# Patient Record
Sex: FEMALE | Race: WHITE | NOT HISPANIC OR LATINO | Employment: OTHER | URBAN - METROPOLITAN AREA
[De-identification: names, ages, dates, MRNs, and addresses within clinical notes are randomized per-mention and may not be internally consistent; named-entity substitution may affect disease eponyms.]

---

## 2017-03-17 ENCOUNTER — DOCUMENTATION ONLY (OUTPATIENT)
Dept: LAB | Facility: CLINIC | Age: 77
End: 2017-03-17

## 2017-03-17 ENCOUNTER — HOSPITAL ENCOUNTER (OUTPATIENT)
Dept: MAMMOGRAPHY | Facility: CLINIC | Age: 77
Discharge: HOME | End: 2017-03-17
Attending: INTERNAL MEDICINE | Admitting: INTERNAL MEDICINE
Payer: MEDICARE

## 2017-03-17 DIAGNOSIS — Z00.00 ROUTINE GENERAL MEDICAL EXAMINATION AT A HEALTH CARE FACILITY: Primary | ICD-10-CM

## 2017-03-17 DIAGNOSIS — Z12.31 VISIT FOR SCREENING MAMMOGRAM: ICD-10-CM

## 2017-03-17 DIAGNOSIS — E78.5 HYPERLIPIDEMIA LDL GOAL <130: ICD-10-CM

## 2017-03-17 PROCEDURE — G0202 SCR MAMMO BI INCL CAD: HCPCS

## 2017-03-17 NOTE — PROGRESS NOTES
This patient is coming in for pre physical labs on 3/20/2017. Please review, associate diagnosis and sign the orders. Thanks!  -Lab Staff

## 2017-03-20 DIAGNOSIS — E78.5 HYPERLIPIDEMIA LDL GOAL <130: ICD-10-CM

## 2017-03-20 DIAGNOSIS — Z00.00 ROUTINE GENERAL MEDICAL EXAMINATION AT A HEALTH CARE FACILITY: ICD-10-CM

## 2017-03-20 LAB
ALBUMIN SERPL-MCNC: 3.6 G/DL (ref 3.4–5)
ALP SERPL-CCNC: 78 U/L (ref 40–150)
ALT SERPL W P-5'-P-CCNC: 29 U/L (ref 0–50)
ANION GAP SERPL CALCULATED.3IONS-SCNC: 4 MMOL/L (ref 3–14)
AST SERPL W P-5'-P-CCNC: 24 U/L (ref 0–45)
BILIRUB SERPL-MCNC: 0.5 MG/DL (ref 0.2–1.3)
BUN SERPL-MCNC: 13 MG/DL (ref 7–30)
CALCIUM SERPL-MCNC: 8.9 MG/DL (ref 8.5–10.1)
CHLORIDE SERPL-SCNC: 108 MMOL/L (ref 94–109)
CHOLEST SERPL-MCNC: 221 MG/DL
CO2 SERPL-SCNC: 31 MMOL/L (ref 20–32)
CREAT SERPL-MCNC: 0.69 MG/DL (ref 0.52–1.04)
ERYTHROCYTE [DISTWIDTH] IN BLOOD BY AUTOMATED COUNT: 13.1 % (ref 10–15)
GFR SERPL CREATININE-BSD FRML MDRD: 83 ML/MIN/1.7M2
GLUCOSE SERPL-MCNC: 96 MG/DL (ref 70–99)
HCT VFR BLD AUTO: 41.6 % (ref 35–47)
HDLC SERPL-MCNC: 74 MG/DL
HGB BLD-MCNC: 13.7 G/DL (ref 11.7–15.7)
LDLC SERPL CALC-MCNC: 126 MG/DL
MCH RBC QN AUTO: 30 PG (ref 26.5–33)
MCHC RBC AUTO-ENTMCNC: 32.9 G/DL (ref 31.5–36.5)
MCV RBC AUTO: 91 FL (ref 78–100)
NONHDLC SERPL-MCNC: 147 MG/DL
PLATELET # BLD AUTO: 215 10E9/L (ref 150–450)
POTASSIUM SERPL-SCNC: 4.3 MMOL/L (ref 3.4–5.3)
PROT SERPL-MCNC: 6.9 G/DL (ref 6.8–8.8)
RBC # BLD AUTO: 4.57 10E12/L (ref 3.8–5.2)
SODIUM SERPL-SCNC: 143 MMOL/L (ref 133–144)
TRIGL SERPL-MCNC: 107 MG/DL
WBC # BLD AUTO: 5.9 10E9/L (ref 4–11)

## 2017-03-20 PROCEDURE — 80053 COMPREHEN METABOLIC PANEL: CPT | Performed by: INTERNAL MEDICINE

## 2017-03-20 PROCEDURE — 80061 LIPID PANEL: CPT | Performed by: INTERNAL MEDICINE

## 2017-03-20 PROCEDURE — 36415 COLL VENOUS BLD VENIPUNCTURE: CPT | Performed by: INTERNAL MEDICINE

## 2017-03-20 PROCEDURE — 85027 COMPLETE CBC AUTOMATED: CPT | Performed by: INTERNAL MEDICINE

## 2017-03-22 ENCOUNTER — TRANSFERRED RECORDS (OUTPATIENT)
Dept: HEALTH INFORMATION MANAGEMENT | Facility: CLINIC | Age: 77
End: 2017-03-22

## 2017-03-27 ENCOUNTER — OFFICE VISIT (OUTPATIENT)
Dept: FAMILY MEDICINE | Facility: CLINIC | Age: 77
End: 2017-03-27
Payer: COMMERCIAL

## 2017-03-27 VITALS
TEMPERATURE: 97.6 F | HEIGHT: 67 IN | SYSTOLIC BLOOD PRESSURE: 129 MMHG | DIASTOLIC BLOOD PRESSURE: 69 MMHG | WEIGHT: 145.4 LBS | BODY MASS INDEX: 22.82 KG/M2 | HEART RATE: 65 BPM | OXYGEN SATURATION: 97 %

## 2017-03-27 DIAGNOSIS — G47.00 INSOMNIA, UNSPECIFIED TYPE: ICD-10-CM

## 2017-03-27 DIAGNOSIS — E78.5 HYPERLIPIDEMIA LDL GOAL <130: ICD-10-CM

## 2017-03-27 DIAGNOSIS — K21.9 GASTROESOPHAGEAL REFLUX DISEASE WITHOUT ESOPHAGITIS: ICD-10-CM

## 2017-03-27 DIAGNOSIS — G62.9 PERIPHERAL POLYNEUROPATHY: ICD-10-CM

## 2017-03-27 DIAGNOSIS — Z00.00 ROUTINE GENERAL MEDICAL EXAMINATION AT A HEALTH CARE FACILITY: Primary | ICD-10-CM

## 2017-03-27 DIAGNOSIS — R91.1 LUNG NODULE: ICD-10-CM

## 2017-03-27 DIAGNOSIS — R09.89 CAROTID BRUIT, UNSPECIFIED LATERALITY: ICD-10-CM

## 2017-03-27 DIAGNOSIS — M48.061 LUMBAR SPINAL STENOSIS: ICD-10-CM

## 2017-03-27 DIAGNOSIS — Z78.0 ASYMPTOMATIC MENOPAUSAL STATE: ICD-10-CM

## 2017-03-27 DIAGNOSIS — N89.3 VAGINAL DYSPLASIA: ICD-10-CM

## 2017-03-27 DIAGNOSIS — F41.9 ANXIETY: ICD-10-CM

## 2017-03-27 PROCEDURE — G0439 PPPS, SUBSEQ VISIT: HCPCS | Performed by: INTERNAL MEDICINE

## 2017-03-27 RX ORDER — ATORVASTATIN CALCIUM 10 MG/1
10 TABLET, FILM COATED ORAL DAILY
Qty: 90 TABLET | Refills: 3 | Status: SHIPPED | OUTPATIENT
Start: 2017-03-27 | End: 2017-04-04

## 2017-03-27 NOTE — MR AVS SNAPSHOT
After Visit Summary   3/27/2017    Cecily Ivory    MRN: [id number]           Patient Information     Date Of Birth          1940        Visit Information        Provider Department      3/27/2017 1:00 PM Ellis Ruiz MD [facility]        Today's Diagnoses     Routine general medical examination at a health care facility    -  1    Peripheral polyneuropathy (H)        Hyperlipidemia LDL goal <130        Gastroesophageal reflux disease without esophagitis        Lumbar spinal stenosis        Insomnia, unspecified type        Anxiety        Lung nodule        Carotid bruit, unspecified laterality        Vaginal dysplasia        Asymptomatic menopausal state          Care Instructions      Preventive Health Recommendations    Female Ages 65 +    Yearly exam:     See your health care provider every year in order to  o Review health changes.   o Discuss preventive care.    o Review your medicines if your doctor has prescribed any.      You no longer need a yearly Pap test unless you've had an abnormal Pap test in the past 10 years. If you have vaginal symptoms, such as bleeding or discharge, be sure to talk with your provider about a Pap test.      Every 1 to 2 years, have a mammogram.  If you are over 69, talk with your health care provider about whether or not you want to continue having screening mammograms.      Every 10 years, have a colonoscopy. Or, have a yearly FIT test (stool test). These exams will check for colon cancer.       Have a cholesterol test every 5 years, or more often if your doctor advises it.       Have a diabetes test (fasting glucose) every three years. If you are at risk for diabetes, you should have this test more often.       At age 65, have a bone density scan (DEXA) to check for osteoporosis (brittle bone disease).    Shots:    Get a flu shot each year.    Get a tetanus shot every 10 years.    Talk to your doctor about your pneumonia vaccines.  There are now two you should receive - Pneumovax (PPSV 23) and Prevnar (PCV 13).    Talk to your doctor about the shingles vaccine.    Talk to your doctor about the hepatitis B vaccine.    Nutrition:     Eat at least 5 servings of fruits and vegetables each day.      Eat whole-grain bread, whole-wheat pasta and brown rice instead of white grains and rice.      Talk to your provider about Calcium and Vitamin D.     Lifestyle    Exercise at least 150 minutes a week (30 minutes a day, 5 days a week). This will help you control your weight and prevent disease.      Limit alcohol to one drink per day.      No smoking.       Wear sunscreen to prevent skin cancer.       See your dentist twice a year for an exam and cleaning.      See your eye doctor every 1 to 2 years to screen for conditions such as glaucoma, macular degeneration and cataracts.        Follow-ups after your visit        Future tests that were ordered for you today     Open Future Orders        Priority Expected Expires Ordered    DX Hip/Pelvis/Spine Routine 3/27/2017 3/27/2018 3/27/2017            Who to contact     If you have questions or need follow up information about today's clinic visit or your schedule please contact [facility] directly at [phone].  Normal or non-critical lab and imaging results will be communicated to you by MyChart, letter or phone within 4 business days after the clinic has received the results. If you do not hear from us within 7 days, please contact the clinic through [employer] or phone. If you have a critical or abnormal lab result, we will notify you by phone as soon as possible.  Submit refill requests through [employer] or call your pharmacy and they will forward the refill request to us. Please allow 3 business days for your refill to be completed.          Additional Information About Your Visit        [employer] Information     [employer] gives you secure access to your electronic health record. If you see a  "primary care provider, you can also send messages to your care team and make appointments. If you have questions, please call your primary care clinic.  If you do not have a primary care provider, please call [phone] and they will assist you.        Care EveryWhere ID     This is your Care EveryWhere ID. This could be used by other organizations to access your [facility] medical records  [id number]        Your Vitals Were     Pulse Temperature Height Pulse Oximetry Breastfeeding? BMI (Body Mass Index)    65 97.6  F (36.4  C) (Oral) 5' 6.5\" (1.689 m) 97% No 23.12 kg/m2       Blood Pressure from Last 3 Encounters:   03/27/17 129/69   08/08/16 108/70   03/24/16 120/70    Weight from Last 3 Encounters:   03/27/17 145 lb 6.4 oz (66 kg)   08/08/16 151 lb 14.4 oz (68.9 kg)   03/24/16 153 lb 6.4 oz (69.6 kg)                 Where to get your medicines      These medications were sent to [id number] - [place], [place] - [address]  [address] [facility] [address]     Phone:  [phone]     atorvastatin 10 MG tablet          Primary Care Provider Office Phone # Fax #    Ellis Ruiz [phone]       [facility] [address]  [facility] [address]        Thank you!     Thank you for choosing [facility]  for your care. Our goal is always to provide you with excellent care. Hearing back from our patients is one way we can continue to improve our services. Please take a few minutes to complete the written survey that you may receive in the mail after your visit with us. Thank you!             Your Updated Medication List - Protect others around you: Learn how to safely use, store and throw away your medicines at www.disposemymeds.org.          This list is accurate as of: 3/27/17  1:11 PM.  Always use your most recent med list.                   Brand Name Dispense Instructions for use    aspirin 81 MG tablet      Take 1 tablet by mouth daily.       " atorvastatin 10 MG tablet    LIPITOR    90 tablet    Take 1 tablet (10 mg) by mouth daily       Biotin 5000 MCG Caps          calcium carb 1250 mg (500 mg [place])/vitamin D 200 units 500-200 MG-UNIT per tablet    OSCAL with D    100 tablet    Take 1 tablet by mouth 2 times daily (with meals). Takes 750mg daily.       CENTRUM SILVER per tablet      Take 1 tablet by mouth daily       CLARITIN 10 MG tablet   Generic drug:  loratadine      Take 1 tablet (10 mg) by mouth daily as needed for allergies Occasional use       METAMUCIL PO          omega-3 acid ethyl esters 1 G capsule    Lovaza    120 capsule    Take 1 capsule (1 g) by mouth daily       VITAMIN B-12 PO      Take 1,000 mcg by mouth.       VITAMIN C PO      Take 500 mg by mouth daily.       VITAMIN D3 PO      Take by mouth daily

## 2017-03-27 NOTE — NURSING NOTE
"Chief Complaint   Patient presents with     Wellness Visit       Initial /69 (BP Location: Left arm, Patient Position: Chair, Cuff Size: Adult Regular)  Pulse 65  Temp 97.6  F (36.4  C) (Oral)  Ht 5' 6.5\" (1.689 m)  Wt 145 lb 6.4 oz (66 kg)  SpO2 97%  Breastfeeding? No  BMI 23.12 kg/m2 Estimated body mass index is 23.12 kg/(m^2) as calculated from the following:    Height as of this encounter: 5' 6.5\" (1.689 m).    Weight as of this encounter: 145 lb 6.4 oz (66 kg).  Medication Reconciliation: complete.  Perri Garcia CMA    "

## 2017-03-27 NOTE — PROGRESS NOTES
SUBJECTIVE:                                                            Cecily Ivory is a 76 year old female who presents for Preventive Visit.    For the most part she feels fine, active, works out, weight down, no chest pain or shortness of breath.  Wants to get dexa.  Up to date colon and mammogram.  In last few weeks occasionally one second pains right side of head, come and no infreq.  Occasionally has had left arm pain, very short lived, no chest pain or shortness of breath, comes and goes in seconds.  No other c/o.  SOme anxiety due to 's death but not bad.  Not always sleeping great.  Has follow up with Dr. Wilde for low back pain.  gerd controlled.  Went off lipitor as worried about dementia.               Past Medical History:      Past Medical History:   Diagnosis Date     Abdominal pain 2009, 2013    ct liver and renal cyst and 2mm lung nodule, repeat ct done 2013 and no significantly abnl.     Anxiety 2014    added med 3/14     Carotid bruit 2011    us nl     Gastritis 2011    egd done by [place] [facility]     GERD (gastroesophageal reflux disease) 2011     History of colonoscopy 2004, 2014    nl     Hypercholesteremia 2000    stopped lovastatin 2015, added lipitor 3/16     Insomnia     using otc      Lumbar spinal stenosis 2016    Dr. Wilde, had epidural     Lung nodule 2009, 2013    seen on ct for abd pain, fu done 3/13 and 2 nodules stable and benign, one new one needs fu 1 year.  fu done 3/14 and fu 1 year and then done; fu done 3/15 and unchanged, no fu needed     Odynophagia 2004    egd nl     Peripheral neuropathies     Dr. Kim     Screening 2006    nl dexa     Vaginal dysplasia     chronic VAIN I, many colpos of vagina.  Now we only do an annual pap of vagina, no colpo since stable long term             Past Surgical History:      Past Surgical History:   Procedure Laterality Date     ANKLE SURGERY  2000     APPENDECTOMY  13     BREAST BIOPSY, CORE RT/LT       C VAGINAL HYSTERECTOMY  1995     GYN  SURGERY      BSO     HC UGI ENDOSCOPY, SIMPLE EXAM  03/15/11    [facility]     LAPAROSCOPIC CHOLECYSTECTOMY  2008     NOSE SURGERY  1990     right knee arthroscopy  2007     thumb surgery Left 2/2015             Social History:     Social History     Social History     Marital status:      Spouse name: N/A     Number of children: 1+4     Years of education: N/A     Occupational History     , retired [employer]      Retired     Social History Main Topics     Smoking status: Former Smoker     Quit date: 2/7/1992     Smokeless tobacco: Never Used     Alcohol use 0.0 oz/week     0 Standard drinks or equivalent per week      Comment: 5 drinks weekly- wine     Drug use: No     Sexual activity: Yes     Birth control/ protection: Post-menopausal, Female Surgical      Comment: vag hyst, BSO     Other Topics Concern     Not on file     Social History Narrative             Family History:   reviewed         Allergies:     Allergies   Allergen Reactions     Latex      Seasonal Allergies      YEAR ROUND ALLERGIES     Sulfa Drugs              Medications:     Current Outpatient Prescriptions   Medication Sig Dispense Refill     atorvastatin (LIPITOR) 10 MG tablet Take 1 tablet (10 mg) by mouth daily 90 tablet 3     loratadine (CLARITIN) 10 MG tablet Take 1 tablet (10 mg) by mouth daily as needed for allergies Occasional use       Multiple Vitamins-Minerals (CENTRUM SILVER) per tablet Take 1 tablet by mouth daily       Cholecalciferol (VITAMIN D3 PO) Take by mouth daily       Psyllium (METAMUCIL PO)        omega-3 acid ethyl esters (LOVAZA) 1 G capsule Take 1 capsule (1 g) by mouth daily 120 capsule      Biotin 5000 MCG CAPS        calcium carb 1250 mg, 500 mg [place],/vitamin D 200 units (OSCAL WITH D) 500-200 MG-UNIT per tablet Take 1 tablet by mouth 2 times daily (with meals). Takes 750mg daily. 100 tablet 3     Ascorbic Acid (VITAMIN C PO) Take 500 mg by mouth daily.       aspirin  "81 MG tablet Take 1 tablet by mouth daily.  3     Cyanocobalamin (VITAMIN B-12 PO) Take 1,000 mcg by mouth.       [DISCONTINUED] atorvastatin (LIPITOR) 10 MG tablet Take 1 tablet (10 mg) by mouth daily 90 tablet 3               Review of Systems:   The 10 point Review of Systems is negative other than noted in the HPI           Physical Exam:   Blood pressure 129/69, pulse 65, temperature 97.6  F (36.4  C), temperature source Oral, height 5' 6.5\" (1.689 m), weight 145 lb 6.4 oz (66 kg), SpO2 97 %, not currently breastfeeding.    Exam:  Constitutional: healthy appearing, alert and in no distress  Heent: Normocephalic. Head without obvious masses or lesions. PERRLDC, EOMI. Mouth exam within normal limits: tongue, mucous membranes, posterior pharynx all normal, no lesions or abnormalities seen.  Tm's and canals within normal limits bilaterally. Neck supple, no nuchal rigidity or masses. No supraclavicular, or cervical adenopathy. Thyroid symmetric, no masses.  Cardiovascular: Regular rate and rhythm, no murmer, rub or gallops.  JVP not elevated, no edema.  Carotids within normal limits bilaterally, faint bruits.  Respiratory: Normal respiratory effort.  Lungs clear, normal flow, no wheezing or crackles.  Breasts: Normal bilaterally.  No masses or lesions.  Nipples within normal limits.  No axillary lesions or nodes.  Gastrointestinal: Normal active bowel sounds.   Soft, not tender, no masses, guarding or rebound.  No hepatosplenomegaly.   Musculoskeletal: extremities normal, no gross deformities noted.  Skin: no suspicious lesions or rashes   Neurologic: Mental status within normal limits.  Speech fluent.  No gross motor abnormalities and gait intact.  Psychiatric: mentation appears normal and affect normal.         Data:   Labs reviewed with patient         Assessment:   1. Normal cpx  2. Elevated cholesterol, d/w patient risks and benefits, I doubt dementia, patient to resume it  3. Insomnia, chronic  4. Lung nodule, " no follow up needed  5. Carotid bruit, us neg  6. Vag dysplasia, follow up gyn  7. Anxiety, control ok  8. Neuropathy, chronic  9. Low back pain, follow up Dr. Wilde  10. Health care maintenance  11. Head pain and arm pain, brief and of doubtful significance, but if changes let me know.         Plan:   Up to date immunizations, mammogram, colon  To get dexa  Exercise, diet  lipitor 10mg, call if problems  Follow up yearly        Ellis Ruiz M.D.              Are you in the first 12 months of your Medicare Part B coverage?  No    Healthy Habits:    Do you get at least three servings of calcium containing foods daily (dairy, green leafy vegetables, etc.)? yes    Amount of exercise or daily activities, outside of work: 7 day(s) per week    Problems taking medications regularly No    Medication side effects: No    Have you had an eye exam in the past two years? yes    Do you see a dentist twice per year? yes    Do you have sleep apnea, excessive snoring or daytime drowsiness?linette                Reviewed and updated as needed this visit by clinical staff         Reviewed and updated as needed this visit by Provider        Social History   Substance Use Topics     Smoking status: Former Smoker     Quit date: 2/7/1992     Smokeless tobacco: Never Used     Alcohol use 0.0 oz/week     0 Standard drinks or equivalent per week      Comment: 5 drinks weekly- wine       The patient does not drink >3 drinks per day nor >7 drinks per week.    Today's PHQ-2 Score:   PHQ-2 ( 1999 Pfizer) 3/24/2016 7/9/2014   Q1: Little interest or pleasure in doing things 0 0   Q2: Feeling down, depressed or hopeless 0 0   PHQ-2 Score 0 0       Do you feel safe in your environment - Yes    Do you have a Health Care Directive?: Yes: Advance Directive has been received and scanned.    Current providers sharing in care for this patient include:   Patient Care Team:  Ellis Ruiz MD as PCP - General (Internal Medicine)      Hearing impairment:  "Yes, low tones    Ability to successfully perform activities of daily living: Yes, no assistance needed     Fall risk:       Home safety:  none identified      The following health maintenance items are reviewed in Epic and correct as of today:  Health Maintenance   Topic Date Due     FALL RISK ASSESSMENT  03/24/2017     INFLUENZA VACCINE (SYSTEM ASSIGNED)  09/01/2017     MAMMO SCREEN Q2 YR (SYSTEM ASSIGNED)  03/17/2019     ADVANCE DIRECTIVE PLANNING Q5 YRS (NO INBASKET)  06/03/2021     LIPID SCREEN Q5 YR FEMALE (SYSTEM ASSIGNED)  03/20/2022     TETANUS IMMUNIZATION (SYSTEM ASSIGNED)  03/12/2023     COLON CANCER SCREEN (SYSTEM ASSIGNED)  03/28/2024     DEXA SCAN SCREENING (SYSTEM ASSIGNED)  Completed     PNEUMOCOCCAL  Completed                End of Life Planning:  Patient currently has an advanced directive: Yes.  Practitioner is supportive of decision.    COUNSELING:  Reviewed preventive health counseling, as reflected in patient instructions       Regular exercise       Healthy diet/nutrition        Estimated body mass index is 23.79 kg/(m^2) as calculated from the following:    Height as of 8/8/16: 5' 7\" (1.702 m).    Weight as of 8/8/16: 151 lb 14.4 oz (68.9 kg).     reports that she quit smoking about 25 years ago. She has never used smokeless tobacco.      Appropriate preventive services were discussed with this patient, including applicable screening as appropriate for cardiovascular disease, diabetes, osteopenia/osteoporosis, and glaucoma.  As appropriate for age/gender, discussed screening for colorectal cancer, prostate cancer, breast cancer, and cervical cancer. Checklist reviewing preventive services available has been given to the patient.    Reviewed patients plan of care and provided an AVS. The Basic Care Plan (routine screening as documented in Health Maintenance) for Cecily meets the Care Plan requirement. This Care Plan has been established and reviewed with the Patient.    Counseling " Resources:  ATP IV Guidelines  Pooled Cohorts Equation Calculator  Breast Cancer Risk Calculator  FRAX Risk Assessment  ICSI Preventive Guidelines  Dietary Guidelines for Americans, 2010  [employer]'s MyPlate  ASA Prophylaxis  Lung CA Screening    Ellis Ruiz MD  [facility]

## 2017-03-28 ASSESSMENT — PATIENT HEALTH QUESTIONNAIRE - PHQ9: SUM OF ALL RESPONSES TO PHQ QUESTIONS 1-9: 13

## 2017-03-31 ENCOUNTER — TRANSFERRED RECORDS (OUTPATIENT)
Dept: HEALTH INFORMATION MANAGEMENT | Facility: CLINIC | Age: 77
End: 2017-03-31

## 2017-04-03 ENCOUNTER — HOSPITAL ENCOUNTER (OUTPATIENT)
Dept: BONE DENSITY | Facility: CLINIC | Age: 77
Discharge: HOME | End: 2017-04-03
Attending: INTERNAL MEDICINE | Admitting: INTERNAL MEDICINE
Payer: MEDICARE

## 2017-04-03 DIAGNOSIS — Z78.0 ASYMPTOMATIC MENOPAUSAL STATE: ICD-10-CM

## 2017-04-03 PROCEDURE — 77080 DXA BONE DENSITY AXIAL: CPT

## 2017-04-03 NOTE — PROGRESS NOTES
Mrs. Ivory,    I am very happy to report that your bone density is entirely normal, better then most!    If you have any questions please call me.    Ellis Ruiz M.D.

## 2017-04-04 DIAGNOSIS — E78.5 HYPERLIPIDEMIA LDL GOAL <130: ICD-10-CM

## 2017-04-04 RX ORDER — ATORVASTATIN CALCIUM 10 MG/1
10 TABLET, FILM COATED ORAL DAILY
Qty: 90 TABLET | Refills: 3 | Status: SHIPPED | OUTPATIENT
Start: 2017-04-04 | End: 2018-03-13

## 2017-04-04 NOTE — TELEPHONE ENCOUNTER
Please see note below.  Was sent to [facility] pharmacy on 3/27/17.  Please re-send to  Mail order.   Ana Perez MA

## 2017-04-04 NOTE — TELEPHONE ENCOUNTER
Reason for Call:  atorvastatin (LIPITOR) 10 MG tablet    Detailed comments: Cecily Ivory called and stated that Dr. Ruiz sent her prescription for Lipitor to the wrong pharmacy. She called and checked and they have not received it. Cecily [place] would like the prescription resent to the [facility]. Their phone # is [phone].         Phone Number Patient can be reached at: Home number on file [phone] (home)    Best Time: ASAP     Can we leave a detailed message on this number? YES    Call taken on 4/4/2017 at 12:04 PM by Beth Nolasco, Patient Representative

## 2017-04-14 ENCOUNTER — TRANSFERRED RECORDS (OUTPATIENT)
Dept: HEALTH INFORMATION MANAGEMENT | Facility: CLINIC | Age: 77
End: 2017-04-14

## 2017-06-01 ENCOUNTER — OFFICE VISIT (OUTPATIENT)
Dept: OBGYN | Facility: CLINIC | Age: 77
End: 2017-06-01
Payer: COMMERCIAL

## 2017-06-01 VITALS — HEIGHT: 67 IN | WEIGHT: 147 LBS | BODY MASS INDEX: 23.07 KG/M2

## 2017-06-01 DIAGNOSIS — N63.10 LUMP OF RIGHT BREAST: Primary | ICD-10-CM

## 2017-06-01 PROCEDURE — 99213 OFFICE O/P EST LOW 20 MIN: CPT | Performed by: OBSTETRICS & GYNECOLOGY

## 2017-06-01 NOTE — MR AVS SNAPSHOT
After Visit Summary   6/1/2017    Cecily Ivory    MRN: [id number]           Patient Information     Date Of Birth          1940        Visit Information        Provider Department      6/1/2017 2:20 PM Gómez Rice MD [facility] [place]        Today's Diagnoses     Lump of right breast    -  1       Follow-ups after your visit        Your next 10 appointments already scheduled     Jun 05, 2017  9:15 AM CDT   MA DIAGNOSTIC RIGHT NAVJOT with [id number]   [facility]    [address] [place] [address], [address]  [facility] [address]   [phone]           Do not use any powder, lotion or deodorant under your arms or on your breast. If you do, we will ask you to remove it before your exam.  Wear comfortable, two-piece clothing.  If you have any allergies, tell your care team.  Bring any previous mammograms from other facilities or have them mailed to the breast center.            Jun 05, 2017  9:50 AM CDT   US BREAST RIGHT CMPL 4 QUAD with [id number]   [facility]    [address] [place] [address], [address]  [facility] [address]   [phone]           Please bring a list of your medicines (including vitamins, minerals and over-the-counter drugs). Also, tell your doctor about any allergies you may have. Wear comfortable clothes and leave your valuables at home.  You do not need to do anything special to prepare for your exam.  Please call the Imaging Department at your exam site with any questions.              Future tests that were ordered for you today     Open Future Orders        Priority Expected Expires Ordered    MA Diagnostic Right w/Navjot Routine  6/1/2018 6/1/2017    US Breast Right Complete 4 Quadrants Routine  6/2/2018 6/1/2017            Who to contact     If you have questions or need follow up information about today's clinic visit or your schedule please contact [facility] directly at [phone].  Normal or non-critical lab and imaging results will be communicated to you by MyChart, letter or phone within 4 business days after the clinic has received the results. If you do not hear from us within 7 days, please contact the clinic through [employer] or phone. If you have a critical or abnormal lab result, we will notify you by phone as soon as possible.  Submit refill requests through [employer] or call your pharmacy and they will forward the refill request to us. Please allow 3 business days for your refill to be completed.          Additional Information About Your Visit        [employer] Information     [employer] gives you secure access to your electronic health record. If you see a primary care provider, you can also send messages to your care team and make appointments. If you have questions, please call your primary care clinic.  If you do not have a primary care provider, please call [phone] and they will assist you.        Care EveryWhere ID     This is your Care EveryWhere ID. This could be used by other organizations to access your [facility] medical records  [id number]        Your Vitals Were     Height Breastfeeding? BMI (Body Mass Index)             5' 6.5\" (1.689 m) No 23.37 kg/m2          Blood Pressure from Last 3 Encounters:   03/27/17 129/69   08/08/16 108/70   03/24/16 120/70    Weight from Last 3 Encounters:   06/01/17 147 lb (66.7 kg)   03/27/17 145 lb 6.4 oz (66 kg)   08/08/16 151 lb 14.4 oz (68.9 kg)               Primary Care Provider Office Phone # Fax #    Ellis Ruiz [phone]       [facility] [address]  [place] [address]        Thank you!     Thank you for choosing [facility]  for your care. Our goal is always to provide you with excellent care. Hearing back from our patients is one way we can continue to improve our services. Please take a few minutes to complete the " written survey that you may receive in the mail after your visit with us. Thank you!             Your Updated Medication List - Protect others around you: Learn how to safely use, store and throw away your medicines at www.disposemymeds.org.          This list is accurate as of: 6/1/17  3:10 PM.  Always use your most recent med list.                   Brand Name Dispense Instructions for use    aspirin 81 MG tablet      Take 1 tablet by mouth daily.       atorvastatin 10 MG tablet    LIPITOR    90 tablet    Take 1 tablet (10 mg) by mouth daily       Biotin 5000 MCG Caps          calcium carb 1250 mg (500 mg [place])/vitamin D 200 units 500-200 MG-UNIT per tablet    OSCAL with D    100 tablet    Take 1 tablet by mouth 2 times daily (with meals). Takes 750mg daily.       CENTRUM SILVER per tablet      Take 1 tablet by mouth daily       CLARITIN 10 MG tablet   Generic drug:  loratadine      Take 1 tablet (10 mg) by mouth daily as needed for allergies Occasional use       magnesium 100 MG Caps          METAMUCIL PO          omega-3 acid ethyl esters 1 G capsule    Lovaza    120 capsule    Take 1 capsule (1 g) by mouth daily       VITAMIN B-12 PO      Take 1,000 mcg by mouth.       VITAMIN C PO      Take 500 mg by mouth daily.       VITAMIN D3 PO      Take by mouth daily

## 2017-06-01 NOTE — PROGRESS NOTES
SUBJECTIVE:                                                   Cecily Ivory is a 76 year old female who presents to clinic today for the following health issue(s):  Patient presents with:  Breast Pain: lump on right breast, did have black and blue spot      Additional information: had normal mammogram in March    HPI:  3 weeks ago noted bruising on right breast and lump. Somewhat tender. On ASA. Doesn't remember injuring that breast.   Last Mammogram in March    No LMP recorded. Patient has had a hysterectomy..   Patient is not sexually active, .  Using none for contraception.    reports that she quit smoking about 25 years ago. She has never used smokeless tobacco.    STD testing offered?  Declined    Health maintenance updated:  yes    Today's PHQ-2 Score:   PHQ-2 (  [employer]) 3/24/2016   Q1: Little interest or pleasure in doing things 0   Q2: Feeling down, depressed or hopeless 0   PHQ-2 Score 0     Today's PHQ-9 Score:   PHQ-9 SCORE 3/27/2017   Total Score 13     Today's BRANDY-7 Score:   BRANDY-7 SCORE 2016   Total Score 2       Problem list and histories reviewed & adjusted, as indicated.  Additional history: as documented.    Patient Active Problem List   Diagnosis     Pain in joint, lower leg     Peripheral neuropathy (H)     Carotid bruit     Hyperlipidemia LDL goal <130     Lung nodule     HL (hearing loss)     Dysphonia     Anxiety     Insomnia     Vaginal dysplasia     GERD (gastroesophageal reflux disease)     Lumbar spinal stenosis     Past Surgical History:   Procedure Laterality Date     ANKLE SURGERY       APPENDECTOMY  15     BREAST BIOPSY, CORE RT/LT       C VAGINAL HYSTERECTOMY       GYN SURGERY      BSO      UGI ENDOSCOPY, SIMPLE EXAM  03/15/11    [facility]     LAPAROSCOPIC CHOLECYSTECTOMY       NOSE SURGERY       right knee arthroscopy  2007     thumb surgery Left 2015      Social History   Substance Use Topics     Smoking status: Former Smoker  "    Quit date: 2/7/1992     Smokeless tobacco: Never Used     Alcohol use 0.0 oz/week     0 Standard drinks or equivalent per week      Comment: 5 drinks weekly- wine      Problem (# of Occurrences) Relation (Name,Age of Onset)    DIABETES (1) Brother    Endocrine Disease (1) Brother    Hyperlipidemia (1) Mother            Current Outpatient Prescriptions   Medication Sig     magnesium 100 MG CAPS      atorvastatin (LIPITOR) 10 MG tablet Take 1 tablet (10 mg) by mouth daily     loratadine (CLARITIN) 10 MG tablet Take 1 tablet (10 mg) by mouth daily as needed for allergies Occasional use     Multiple Vitamins-Minerals (CENTRUM SILVER) per tablet Take 1 tablet by mouth daily     Cholecalciferol (VITAMIN D3 PO) Take by mouth daily     Psyllium (METAMUCIL PO)      omega-3 acid ethyl esters (LOVAZA) 1 G capsule Take 1 capsule (1 g) by mouth daily     calcium carb 1250 mg, 500 mg [place],/vitamin D 200 units (OSCAL WITH D) 500-200 MG-UNIT per tablet Take 1 tablet by mouth 2 times daily (with meals). Takes 750mg daily.     Ascorbic Acid (VITAMIN C PO) Take 500 mg by mouth daily.     aspirin 81 MG tablet Take 1 tablet by mouth daily.     Cyanocobalamin (VITAMIN B-12 PO) Take 1,000 mcg by mouth.     Biotin 5000 MCG CAPS      No current facility-administered medications for this visit.      Allergies   Allergen Reactions     Latex      Seasonal Allergies      YEAR ROUND ALLERGIES     Sulfa Drugs        ROS:  12 point review of systems negative other than symptoms noted below.  Breast: Lumps    OBJECTIVE:     Ht 5' 6.5\" (1.689 m)  Wt 147 lb (66.7 kg)  Breastfeeding? No  BMI 23.37 kg/m2  Body mass index is 23.37 kg/(m^2).    Exam:  Constitutional:  Appearance: Well nourished, well developed alert, in no acute distress  Breasts:  Inspection of Breasts:  No lymphadenopathy present; Palpation of Breasts and Axillae: 2cm somewhat elongated mass at 11:00. Tender. Ecchymosis on right breast.  Axillary Lymph Nodes:  No lymphadenopathy " present  Neurologic/Psychiatric:  Mental Status:  Oriented X3            In-Clinic Test Results:  No results found for this or any previous visit (from the past 24 hour(s)).    ASSESSMENT/PLAN:                                                        ICD-10-CM    1. Lump of right breast N63 US Breast Right Complete 4 Quadrants       Possible cyst vs hematoma. Will have u/s. If considered benign, RTC 4 weeks for re-examination.    Gómez Rice MD  [facility]

## 2017-06-05 ENCOUNTER — HOSPITAL ENCOUNTER (OUTPATIENT)
Dept: MAMMOGRAPHY | Facility: CLINIC | Age: 77
End: 2017-06-05
Attending: OBSTETRICS & GYNECOLOGY
Payer: MEDICARE

## 2017-06-05 ENCOUNTER — HOSPITAL ENCOUNTER (OUTPATIENT)
Dept: MAMMOGRAPHY | Facility: CLINIC | Age: 77
Discharge: HOME | End: 2017-06-05
Attending: OBSTETRICS & GYNECOLOGY | Admitting: OBSTETRICS & GYNECOLOGY
Payer: MEDICARE

## 2017-06-05 DIAGNOSIS — N63.10 LUMP OF RIGHT BREAST: ICD-10-CM

## 2017-06-05 PROCEDURE — 76642 ULTRASOUND BREAST LIMITED: CPT | Mod: RT

## 2017-06-05 PROCEDURE — G0279 TOMOSYNTHESIS, MAMMO: HCPCS

## 2017-06-13 ENCOUNTER — TELEPHONE (OUTPATIENT)
Dept: FAMILY MEDICINE | Facility: CLINIC | Age: 77
End: 2017-06-13

## 2017-06-13 DIAGNOSIS — E78.5 HYPERLIPIDEMIA LDL GOAL <130: ICD-10-CM

## 2017-06-13 RX ORDER — LOVASTATIN 40 MG
40 TABLET ORAL AT BEDTIME
Qty: 90 TABLET | Refills: 1 | Status: SHIPPED | OUTPATIENT
Start: 2017-06-13 | End: 2017-11-10

## 2017-06-13 NOTE — TELEPHONE ENCOUNTER
She called back in a panic because she is leaving the house and she was worried about missing the call  She said that the lipitor causes her problems lots of them and she was on something else   About 4 - 5 years ago and she wants to know if she can go back on that because it never caused her any problems  She wants [facility]

## 2017-06-13 NOTE — TELEPHONE ENCOUNTER
Called Patient and advised as noted below by PCP.    She will call back if further questions or concerns.    Delia Loera RN

## 2017-06-13 NOTE — TELEPHONE ENCOUNTER
Pt was on Lovastatin prior to atorvastatin.  Possibly this is what pt wants to switch to.      Left message for pt to return call to Triage at Clinic.  Angela Castillo RN

## 2017-06-13 NOTE — TELEPHONE ENCOUNTER
Patient had called back but hung before triage could take her call.  Tried to call Patient back, no answer.  Will wait for PCP to address the telephone encounter below and then call Patient back.    Delia Loera RN

## 2017-06-13 NOTE — TELEPHONE ENCOUNTER
Reason for Call:  Other prescription    Detailed comments: Pt is calling and wanting to speak with one of Dr. Ruiz's nurse's in regards to her lipitor. Pt is wanting to switch prescriptions. Please give pt a call back asap in regards to this issue.    Phone Number Patient can be reached at: Home number on file [phone] (home)    Best Time:     Can we leave a detailed message on this number? YES    Call taken on 6/13/2017 at 10:01 AM by Sneha Ramey

## 2017-06-16 ENCOUNTER — TRANSFERRED RECORDS (OUTPATIENT)
Dept: HEALTH INFORMATION MANAGEMENT | Facility: CLINIC | Age: 77
End: 2017-06-16

## 2017-07-05 ENCOUNTER — TRANSFERRED RECORDS (OUTPATIENT)
Dept: HEALTH INFORMATION MANAGEMENT | Facility: CLINIC | Age: 77
End: 2017-07-05

## 2017-07-07 ENCOUNTER — TRANSFERRED RECORDS (OUTPATIENT)
Dept: HEALTH INFORMATION MANAGEMENT | Facility: CLINIC | Age: 77
End: 2017-07-07

## 2017-07-21 ENCOUNTER — TRANSFERRED RECORDS (OUTPATIENT)
Dept: HEALTH INFORMATION MANAGEMENT | Facility: CLINIC | Age: 77
End: 2017-07-21

## 2017-09-05 ENCOUNTER — HOSPITAL ENCOUNTER (OUTPATIENT)
Dept: MAMMOGRAPHY | Facility: CLINIC | Age: 77
Discharge: HOME | End: 2017-09-05
Attending: OBSTETRICS & GYNECOLOGY | Admitting: OBSTETRICS & GYNECOLOGY
Payer: MEDICARE

## 2017-09-05 DIAGNOSIS — N63.0 BREAST LUMP: ICD-10-CM

## 2017-09-05 PROCEDURE — 76642 ULTRASOUND BREAST LIMITED: CPT | Mod: RT

## 2017-09-19 ENCOUNTER — TRANSFERRED RECORDS (OUTPATIENT)
Dept: HEALTH INFORMATION MANAGEMENT | Facility: CLINIC | Age: 77
End: 2017-09-19

## 2017-10-30 ENCOUNTER — TRANSFERRED RECORDS (OUTPATIENT)
Dept: HEALTH INFORMATION MANAGEMENT | Facility: CLINIC | Age: 77
End: 2017-10-30

## 2017-11-03 ENCOUNTER — TRANSFERRED RECORDS (OUTPATIENT)
Dept: HEALTH INFORMATION MANAGEMENT | Facility: CLINIC | Age: 77
End: 2017-11-03

## 2017-11-10 DIAGNOSIS — E78.5 HYPERLIPIDEMIA LDL GOAL <130: ICD-10-CM

## 2017-11-13 RX ORDER — LOVASTATIN 40 MG
TABLET ORAL
Qty: 90 TABLET | Refills: 0 | Status: SHIPPED | OUTPATIENT
Start: 2017-11-13 | End: 2018-02-22

## 2017-11-13 NOTE — TELEPHONE ENCOUNTER
Prescription approved per [facility] Refill Protocol.  Samia Robbins RN- Triage [employer]

## 2017-11-17 ENCOUNTER — TRANSFERRED RECORDS (OUTPATIENT)
Dept: HEALTH INFORMATION MANAGEMENT | Facility: CLINIC | Age: 77
End: 2017-11-17

## 2018-01-23 ENCOUNTER — TRANSFERRED RECORDS (OUTPATIENT)
Dept: HEALTH INFORMATION MANAGEMENT | Facility: CLINIC | Age: 78
End: 2018-01-23

## 2018-01-25 ENCOUNTER — OFFICE VISIT (OUTPATIENT)
Dept: FAMILY MEDICINE | Facility: CLINIC | Age: 78
End: 2018-01-25
Payer: COMMERCIAL

## 2018-01-25 VITALS
SYSTOLIC BLOOD PRESSURE: 147 MMHG | WEIGHT: 150.1 LBS | DIASTOLIC BLOOD PRESSURE: 70 MMHG | HEIGHT: 67 IN | HEART RATE: 61 BPM | TEMPERATURE: 97.8 F | OXYGEN SATURATION: 97 % | BODY MASS INDEX: 23.56 KG/M2

## 2018-01-25 DIAGNOSIS — J06.9 VIRAL URI: ICD-10-CM

## 2018-01-25 DIAGNOSIS — R05.9 COUGH: Primary | ICD-10-CM

## 2018-01-25 PROCEDURE — 99213 OFFICE O/P EST LOW 20 MIN: CPT | Performed by: NURSE PRACTITIONER

## 2018-01-25 RX ORDER — CODEINE PHOSPHATE AND GUAIFENESIN 10; 100 MG/5ML; MG/5ML
1 SOLUTION ORAL EVERY 4 HOURS PRN
Qty: 120 ML | Refills: 0 | Status: SHIPPED | OUTPATIENT
Start: 2018-01-25 | End: 2018-03-13

## 2018-01-25 RX ORDER — CODEINE PHOSPHATE AND GUAIFENESIN 10; 100 MG/5ML; MG/5ML
1 SOLUTION ORAL EVERY 6 HOURS PRN
Qty: 120 ML | Refills: 0 | Status: SHIPPED | OUTPATIENT
Start: 2018-01-25 | End: 2018-03-13

## 2018-01-25 NOTE — MR AVS SNAPSHOT
After Visit Summary   1/25/2018    Cecily Ivory    MRN: [id number]           Patient Information     Date Of Birth          1940        Visit Information        Provider Department      1/25/2018 11:30 AM Argenis Alicia APRN CNP [facility]        Today's Diagnoses     Cough    -  1      Care Instructions    Keep up with fluids  Use plain mucinex 600mg twice a day          Follow-ups after your visit        Your next 10 appointments already scheduled     May 14, 2018   Procedure with William Pollard MD   [facility] (--)    [address] [facility] [id number] [phone]              Who to contact     If you have questions or need follow up information about today's clinic visit or your schedule please contact [facility] directly at [phone].  Normal or non-critical lab and imaging results will be communicated to you by MyChart, letter or phone within 4 business days after the clinic has received the results. If you do not hear from us within 7 days, please contact the clinic through MyChart or phone. If you have a critical or abnormal lab result, we will notify you by phone as soon as possible.  Submit refill requests through [employer] or call your pharmacy and they will forward the refill request to us. Please allow 3 business days for your refill to be completed.          Additional Information About Your Visit        MyChart Information     [employer] gives you secure access to your electronic health record. If you see a primary care provider, you can also send messages to your care team and make appointments. If you have questions, please call your primary care clinic.  If you do not have a primary care provider, please call [phone] and they will assist you.        Care EveryWhere ID     This is your Care EveryWhere ID. This could be used by other organizations to access your [facility] medical records  [id number]       "  Your Vitals Were     Pulse Temperature Height Pulse Oximetry BMI (Body Mass Index)       61 97.8  F (36.6  C) (Oral) 5' 6.5\" (1.689 m) 97% 23.86 kg/m2        Blood Pressure from Last 3 Encounters:   01/25/18 147/70   03/27/17 129/69   08/08/16 108/70    Weight from Last 3 Encounters:   01/25/18 150 lb 1.6 oz (68.1 kg)   06/01/17 147 lb (66.7 kg)   03/27/17 145 lb 6.4 oz (66 kg)              Today, you had the following     No orders found for display         Today's Medication Changes          These changes are accurate as of 1/25/18 11:47 AM.  If you have any questions, ask your nurse or doctor.               Start taking these medicines.        Dose/Directions    * guaiFENesin-codeine 100-10 MG/5ML Soln solution   Commonly known as:  ROBITUSSIN AC   Used for:  Cough   Started by:  Argenis Alicia APRN CNP        Dose:  1 tsp.   Take 5 mLs by mouth every 4 hours as needed for cough   Quantity:  120 mL   Refills:  0       * guaiFENesin-codeine 100-10 MG/5ML Soln solution   Commonly known as:  ROBITUSSIN AC   Used for:  Cough   Started by:  Argenis Alicia APRN CNP        Dose:  1 tsp.   Take 5 mLs by mouth every 6 hours as needed for cough   Quantity:  120 mL   Refills:  0       * Notice:  This list has 2 medication(s) that are the same as other medications prescribed for you. Read the directions carefully, and ask your doctor or other care provider to review them with you.         Where to get your medicines      Some of these will need a paper prescription and others can be bought over the counter.  Ask your nurse if you have questions.     Bring a paper prescription for each of these medications     guaiFENesin-codeine 100-10 MG/5ML Soln solution    guaiFENesin-codeine 100-10 MG/5ML Soln solution                Primary Care Provider Office Phone # Fax #    Ellis Ruiz [phone] [address]  [facility] [address]        Equal Access to Services     TUSHAR MONTANO AH: " Hadii aad ku hadarnaudo Soomaali, waaxda luqadaha, qaybta kaalmada epi, summer rachellberna [place]. So [facility] [phone].    ATENCIÓN: Si deandre noe, tiene a thacker disposición servicios gratuitos de asistencia lingüística. Hernaname al [phone].    We comply with applicable federal civil rights laws and [place] laws. We do not discriminate on the basis of race, color, national origin, age, disability, sex, sexual orientation, or gender identity.            Thank you!     Thank you for choosing [facility]  for your care. Our goal is always to provide you with excellent care. Hearing back from our patients is one way we can continue to improve our services. Please take a few minutes to complete the written survey that you may receive in the mail after your visit with us. Thank you!             Your Updated Medication List - Protect others around you: Learn how to safely use, store and throw away your medicines at www.disposemymeds.org.          This list is accurate as of 1/25/18 11:47 AM.  Always use your most recent med list.                   Brand Name Dispense Instructions for use Diagnosis    aspirin 81 MG tablet      Take 1 tablet by mouth daily.        atorvastatin 10 MG tablet    LIPITOR    90 tablet    Take 1 tablet (10 mg) by mouth daily    Hyperlipidemia LDL goal <130       Biotin 5000 MCG Caps           Calcium carb-Vitamin D 500 mg [place]-200 units 500-200 MG-UNIT per tablet    OSCAL with D;Oyster Shell Calcium    100 tablet    Take 1 tablet by mouth 2 times daily (with meals). Takes 750mg daily.        CENTRUM SILVER per tablet      Take 1 tablet by mouth daily        CLARITIN 10 MG tablet   Generic drug:  loratadine      Take 1 tablet (10 mg) by mouth daily as needed for allergies Occasional use        * guaiFENesin-codeine 100-10 MG/5ML Soln solution    ROBITUSSIN AC    120 mL    Take 5 mLs by mouth every 4 hours as needed for cough    Cough       * guaiFENesin-codeine  100-10 MG/5ML Soln solution    ROBITUSSIN AC    120 mL    Take 5 mLs by mouth every 6 hours as needed for cough    Cough       lovastatin 40 MG tablet    MEVACOR    90 tablet    TAKE ONE TABLET BY MOUTH EVERY NIGHT AT BEDTIME    Hyperlipidemia LDL goal <130       magnesium 100 MG Caps           METAMUCIL PO           omega-3 acid ethyl esters 1 G capsule    Lovaza    120 capsule    Take 1 capsule (1 g) by mouth daily        VITAMIN B-12 PO      Take 1,000 mcg by mouth.        VITAMIN C PO      Take 500 mg by mouth daily.        VITAMIN D3 PO      Take by mouth daily        * Notice:  This list has 2 medication(s) that are the same as other medications prescribed for you. Read the directions carefully, and ask your doctor or other care provider to review them with you.

## 2018-01-25 NOTE — NURSING NOTE
"Chief Complaint   Patient presents with     Cough        Initial /70 (BP Location: Left arm, Patient Position: Chair, Cuff Size: Adult Regular)  Pulse 61  Temp 97.8  F (36.6  C) (Oral)  Ht 5' 6.5\" (1.689 m)  Wt 150 lb 1.6 oz (68.1 kg)  SpO2 97%  BMI 23.86 kg/m2 Estimated body mass index is 23.86 kg/(m^2) as calculated from the following:    Height as of this encounter: 5' 6.5\" (1.689 m).    Weight as of this encounter: 150 lb 1.6 oz (68.1 kg)..    BP completed using cuff size: regular  MEDICATIONS REVIEWED  SOCIAL AND FAMILY HX REVIEWED  Yelitza Knapp CMA  "

## 2018-01-25 NOTE — PROGRESS NOTES
SUBJECTIVE:   Cecily Ivory is a 77 year old female who presents to clinic today for the following health issues:      Cough for 1 week, while on a cruise ship which everyone had.  No fever , no chills, no sinus congestion, non productive cough persists otherwise feels well.  No wheezing or SOB , but has coughing spells  Has been using cider vinegar and honey and advil    Problem list and histories reviewed & adjusted, as indicated.  Additional history: as documented    Patient Active Problem List   Diagnosis     Pain in joint, lower leg     Peripheral neuropathy     Carotid bruit     Hyperlipidemia LDL goal <130     Lung nodule     HL (hearing loss)     Dysphonia     Anxiety     Insomnia     Vaginal dysplasia     GERD (gastroesophageal reflux disease)     Lumbar spinal stenosis     Past Surgical History:   Procedure Laterality Date     ANKLE SURGERY  2000     APPENDECTOMY  15     BREAST BIOPSY, CORE RT/LT       C VAGINAL HYSTERECTOMY  1995     GYN SURGERY      BSO     [facility] UGI ENDOSCOPY, SIMPLE EXAM  03/15/11    [facility]     LAPAROSCOPIC CHOLECYSTECTOMY  2008     NOSE SURGERY  1990     right knee arthroscopy  2007     thumb surgery Left 2/2015       Social History   Substance Use Topics     Smoking status: Former Smoker     Quit date: 2/7/1992     Smokeless tobacco: Never Used     Alcohol use 0.0 oz/week     0 Standard drinks or equivalent per week      Comment: 5 drinks weekly- wine     Family History   Problem Relation Age of Onset     Hyperlipidemia Mother      Endocrine Disease Brother      DIABETES Brother          Current Outpatient Prescriptions   Medication Sig Dispense Refill     lovastatin (MEVACOR) 40 MG tablet TAKE ONE TABLET BY MOUTH EVERY NIGHT AT BEDTIME 90 tablet 0     magnesium 100 MG CAPS        atorvastatin (LIPITOR) 10 MG tablet Take 1 tablet (10 mg) by mouth daily 90 tablet 3     loratadine (CLARITIN) 10 MG tablet Take 1 tablet (10 mg) by mouth daily as needed for allergies  "Occasional use       Multiple Vitamins-Minerals (CENTRUM SILVER) per tablet Take 1 tablet by mouth daily       Cholecalciferol (VITAMIN D3 PO) Take by mouth daily       Psyllium (METAMUCIL PO)        omega-3 acid ethyl esters (LOVAZA) 1 G capsule Take 1 capsule (1 g) by mouth daily 120 capsule      Biotin 5000 MCG CAPS        calcium carb 1250 mg, 500 mg [place],/vitamin D 200 units (OSCAL WITH D) 500-200 MG-UNIT per tablet Take 1 tablet by mouth 2 times daily (with meals). Takes 750mg daily. 100 tablet 3     Ascorbic Acid (VITAMIN C PO) Take 500 mg by mouth daily.       aspirin 81 MG tablet Take 1 tablet by mouth daily.  3     Cyanocobalamin (VITAMIN B-12 PO) Take 1,000 mcg by mouth.       Allergies   Allergen Reactions     Latex      Seasonal Allergies      YEAR ROUND ALLERGIES     Sulfa Drugs        Reviewed and updated as needed this visit by clinical staff       Reviewed and updated as needed this visit by Provider         ROS:  Constitutional, HEENT, cardiovascular, pulmonary, gi and gu systems are negative, except as otherwise noted.    OBJECTIVE:     /70 (BP Location: Left arm, Patient Position: Chair, Cuff Size: Adult Regular)  Pulse 61  Temp 97.8  F (36.6  C) (Oral)  Ht 5' 6.5\" (1.689 m)  Wt 150 lb 1.6 oz (68.1 kg)  SpO2 97%  BMI 23.86 kg/m2  Body mass index is 23.86 kg/(m^2).  GENERAL: healthy, alert and no distress  EYES: Eyes grossly normal to inspection, PERRL and conjunctivae and sclerae normal  HENT: ear canals and TM's normal, nose and mouth without ulcers or lesions  NECK: no adenopathy, no asymmetry, masses, or scars and thyroid normal to palpation  RESP: lungs clear to auscultation - no rales, rhonchi or wheezes  CV: regular rate and rhythm, normal S1 S2, no S3 or S4, no murmur, click or rub, no peripheral edema and peripheral pulses strong    Diagnostic Test Results:  none     ASSESSMENT and PLAN      ICD-10-CM    1. Cough R05 guaiFENesin-codeine (ROBITUSSIN AC) 100-10 MG/5ML SOLN " solution        2. Viral URI J06.9     B97.89        Patient Instructions   Keep up with fluids  Use plain mucinex 600mg twice a day      CARLITOS Bonds [facility]

## 2018-02-22 DIAGNOSIS — E78.5 HYPERLIPIDEMIA LDL GOAL <130: ICD-10-CM

## 2018-02-22 RX ORDER — LOVASTATIN 40 MG
TABLET ORAL
Qty: 30 TABLET | Refills: 0 | Status: SHIPPED | OUTPATIENT
Start: 2018-02-22 | End: 2018-03-13

## 2018-02-22 NOTE — TELEPHONE ENCOUNTER
30 day flor refill given.  Patient is due for an office visit.  Please notify and assist in scheduling.  Deya Lazaro RN  Triage[facility]

## 2018-02-22 NOTE — TELEPHONE ENCOUNTER
"Last Written Prescription Date:  11/13/17  Last Fill Quantity: 90 tablet,  # refills: 0   Last office visit: 1/25/2018 (Maral) with prescribing provider: 3/27/17 (Joseph)     Future Office Visit:      Requested Prescriptions   Pending Prescriptions Disp Refills     lovastatin (MEVACOR) 40 MG tablet 90 tablet 0    Statins Protocol Passed    2/22/2018  2:37 PM       Passed - LDL on file in past 12 months    Recent Labs   Lab Test  03/20/17   0742   LDL  126*            Passed - No abnormal creatine kinase in past 12 months    No lab results found.         Passed - Recent or future visit with authorizing provider    Patient had office visit in the last year or has a visit in the next 30 days with authorizing provider.  See \"Patient Info\" tab in inbasket, or \"Choose Columns\" in Meds & Orders section of the refill encounter.            Passed - Patient is age 18 or older       Passed - No active pregnancy on record       Passed - No positive pregnancy test in past 12 months          "

## 2018-03-07 DIAGNOSIS — Z00.00 ROUTINE GENERAL MEDICAL EXAMINATION AT A HEALTH CARE FACILITY: ICD-10-CM

## 2018-03-07 DIAGNOSIS — E78.5 HYPERLIPIDEMIA LDL GOAL <130: ICD-10-CM

## 2018-03-07 LAB
ALBUMIN SERPL-MCNC: 3.7 G/DL (ref 3.4–5)
ALP SERPL-CCNC: 61 U/L (ref 40–150)
ALT SERPL W P-5'-P-CCNC: 27 U/L (ref 0–50)
ANION GAP SERPL CALCULATED.3IONS-SCNC: 5 MMOL/L (ref 3–14)
AST SERPL W P-5'-P-CCNC: 31 U/L (ref 0–45)
BILIRUB SERPL-MCNC: 0.5 MG/DL (ref 0.2–1.3)
BUN SERPL-MCNC: 15 MG/DL (ref 7–30)
CALCIUM SERPL-MCNC: 8.6 MG/DL (ref 8.5–10.1)
CHLORIDE SERPL-SCNC: 111 MMOL/L (ref 94–109)
CHOLEST SERPL-MCNC: 207 MG/DL
CO2 SERPL-SCNC: 26 MMOL/L (ref 20–32)
CREAT SERPL-MCNC: 0.62 MG/DL (ref 0.52–1.04)
ERYTHROCYTE [DISTWIDTH] IN BLOOD BY AUTOMATED COUNT: 13.4 % (ref 10–15)
GFR SERPL CREATININE-BSD FRML MDRD: >90 ML/MIN/1.7M2
GLUCOSE SERPL-MCNC: 93 MG/DL (ref 70–99)
HCT VFR BLD AUTO: 40.5 % (ref 35–47)
HDLC SERPL-MCNC: 84 MG/DL
HGB BLD-MCNC: 13.4 G/DL (ref 11.7–15.7)
LDLC SERPL CALC-MCNC: 95 MG/DL
MCH RBC QN AUTO: 30.7 PG (ref 26.5–33)
MCHC RBC AUTO-ENTMCNC: 33.1 G/DL (ref 31.5–36.5)
MCV RBC AUTO: 93 FL (ref 78–100)
NONHDLC SERPL-MCNC: 123 MG/DL
PLATELET # BLD AUTO: 215 10E9/L (ref 150–450)
POTASSIUM SERPL-SCNC: 3.7 MMOL/L (ref 3.4–5.3)
PROT SERPL-MCNC: 6.7 G/DL (ref 6.8–8.8)
RBC # BLD AUTO: 4.37 10E12/L (ref 3.8–5.2)
SODIUM SERPL-SCNC: 142 MMOL/L (ref 133–144)
TRIGL SERPL-MCNC: 141 MG/DL
WBC # BLD AUTO: 4.9 10E9/L (ref 4–11)

## 2018-03-07 PROCEDURE — 80053 COMPREHEN METABOLIC PANEL: CPT | Performed by: INTERNAL MEDICINE

## 2018-03-07 PROCEDURE — 85027 COMPLETE CBC AUTOMATED: CPT | Performed by: INTERNAL MEDICINE

## 2018-03-07 PROCEDURE — 36415 COLL VENOUS BLD VENIPUNCTURE: CPT | Performed by: INTERNAL MEDICINE

## 2018-03-07 PROCEDURE — 80061 LIPID PANEL: CPT | Performed by: INTERNAL MEDICINE

## 2018-03-12 NOTE — PROGRESS NOTES
SUBJECTIVE:   Cecily Ivory is a 77 year old female who presents for Preventive Visit.    She is doing very well, min anxiety.  Is now dating someone for 6 months.  Up to date with gyn, no chest pain or shortness of breath, no gi or genitourinary issues.  Some chronic insomnia.  Anxiety under control, no meds.               Past Medical History:      Past Medical History:   Diagnosis Date     Abdominal pain 2009, 2013    ct liver and renal cyst and 2mm lung nodule, repeat ct done 2013 and no significantly abnl.     Anxiety 2014    added med 3/14     Carotid bruit 2011    us nl     Gastritis 2011    egd done by [facility]     GERD (gastroesophageal reflux disease) 2011     History of colonoscopy 2004, 2014    nl     Hypercholesteremia 2000    stopped lovastatin 2015, added lipitor 3/16     Insomnia     using otc      Lumbar spinal stenosis 2016    Dr. Wilde, had epidural     Lung nodule 2009, 2013    seen on ct for abd pain, fu done 3/13 and 2 nodules stable and benign, one new one needs fu 1 year.  fu done 3/14 and fu 1 year and then done; fu done 3/15 and unchanged, no fu needed     Odynophagia 2004    egd nl     Peripheral neuropathies     Dr. Kim     Screening 2006, 2017    nl dexa     Vaginal dysplasia     chronic VAIN I, many colpos of vagina.  Now we only do an annual pap of vagina, no colpo since stable long term             Past Surgical History:      Past Surgical History:   Procedure Laterality Date     ANKLE SURGERY  2000     APPENDECTOMY  13     BREAST BIOPSY, CORE RT/LT       C VAGINAL HYSTERECTOMY  1995     GYN SURGERY      BSO     [facility] UGI ENDOSCOPY, SIMPLE EXAM  03/15/11    [facility]     LAPAROSCOPIC CHOLECYSTECTOMY  2008     NOSE SURGERY  1990     right knee arthroscopy  2007     thumb surgery Left 2/2015             Social History:     Social History     Social History     Marital status:      Spouse name: N/A     Number of children: 1     Years of education: N/A      Occupational History     , retired [employer]      Retired     Social History Main Topics     Smoking status: Former Smoker     Quit date: 2/7/1992     Smokeless tobacco: Never Used     Alcohol use 0.0 oz/week     0 Standard drinks or equivalent per week      Comment: 5 drinks weekly- wine     Drug use: No     Sexual activity: Yes     Birth control/ protection: Post-menopausal, Female Surgical      Comment: vag hyst, BSO     Other Topics Concern     Not on file     Social History Narrative             Family History:   reviewed         Allergies:     Allergies   Allergen Reactions     Latex      Seasonal Allergies      YEAR ROUND ALLERGIES     Sulfa Drugs              Medications:     Current Outpatient Prescriptions   Medication Sig Dispense Refill     lovastatin (MEVACOR) 40 MG tablet TAKE ONE TABLET BY MOUTH EVERY NIGHT AT BEDTIME 90 tablet 3     magnesium 100 MG CAPS        loratadine (CLARITIN) 10 MG tablet Take 1 tablet (10 mg) by mouth daily as needed for allergies Occasional use       Multiple Vitamins-Minerals (CENTRUM SILVER) per tablet Take 1 tablet by mouth daily       Cholecalciferol (VITAMIN D3 PO) Take by mouth daily       Psyllium (METAMUCIL PO)        omega-3 acid ethyl esters (LOVAZA) 1 G capsule Take 1 capsule (1 g) by mouth daily 120 capsule      Biotin 5000 MCG CAPS        calcium carb 1250 mg, 500 mg [place],/vitamin D 200 units (OSCAL WITH D) 500-200 MG-UNIT per tablet Take 1 tablet by mouth 2 times daily (with meals). Takes 750mg daily. 100 tablet 3     Ascorbic Acid (VITAMIN C PO) Take 500 mg by mouth daily.       aspirin 81 MG tablet Take 1 tablet by mouth daily.  3     Cyanocobalamin (VITAMIN B-12 PO) Take 1,000 mcg by mouth.       [DISCONTINUED] lovastatin (MEVACOR) 40 MG tablet TAKE ONE TABLET BY MOUTH EVERY NIGHT AT BEDTIME 30 tablet 0               Review of Systems:   The 10 point Review of Systems is negative other than noted in the HPI           Physical  "Exam:   Blood pressure 117/66, pulse 71, temperature 97.4  F (36.3  C), temperature source Tympanic, height 5' 6.5\" (1.689 m), weight 150 lb (68 kg), SpO2 96 %, not currently breastfeeding.    Exam:  Constitutional: healthy appearing, alert and in no distress  Heent: Normocephalic. Head without obvious masses or lesions. PERRLDC, EOMI. Mouth exam within normal limits: tongue, mucous membranes, posterior pharynx all normal, no lesions or abnormalities seen.  Tm's and canals within normal limits bilaterally. Neck supple, no nuchal rigidity or masses. No supraclavicular, or cervical adenopathy. Thyroid symmetric, no masses.  Cardiovascular: Regular rate and rhythm, no murmer, rub or gallops.  JVP not elevated, no edema.  Carotids within normal limits bilaterally, no bruits.  Respiratory: Normal respiratory effort.  Lungs clear, normal flow, no wheezing or crackles.  Gastrointestinal: Normal active bowel sounds.   Soft, not tender, no masses, guarding or rebound.  No hepatosplenomegaly.   Musculoskeletal: extremities normal, no gross deformities noted.  Skin: no suspicious lesions or rashes   Neurologic: Mental status within normal limits.  Speech fluent.  No gross motor abnormalities and gait intact.  Psychiatric: mentation appears normal and affect normal.         Data:   Labs reviewed with patient         Assessment:   1. Normal complete physical exam  2. Elevated cholesterol, on statin  3. Anxiety, controlled  4. Gerd, no issues  5. Vag dysplasia, follow up gyn  6. Chronic insomnia  7. hcm         Plan:   Up to date immunizations, mammogram, colon  Exercise, diet  Call if problems      Ellis Ruiz M.D.                Are you in the first 12 months of your Medicare Part B coverage?  No    Healthy Habits:    Do you get at least three servings of calcium containing foods daily (dairy, green leafy vegetables, etc.)? No-2    Amount of exercise or daily activities, outside of work: 7 day(s) per week- walking " daily    Problems taking medications regularly No    Medication side effects: No    Have you had an eye exam in the past two years? yes    Do you see a dentist twice per year? yes    Do you have sleep apnea, excessive snoring or daytime drowsiness?no    Ability to successfully perform activities of daily living: Yes, no assistance needed    Home safety:  Steps     Hearing impairment: No    Fall risk:         COGNITIVE SCREEN  1) Repeat 3 items (Banana, Sunrise, Chair)    2) Clock draw: NORMAL  3) 3 item recall: Recalls 3 objects  Results: 3 items recalled: COGNITIVE IMPAIRMENT LESS LIKELY    Mini-CogTM Copyright S Vianey. Licensed by the author for use in [facility]; reprinted with permission (soob@[facility]). All rights reserved.            Reviewed and updated as needed this visit by clinical staff         Reviewed and updated as needed this visit by Provider        Social History   Substance Use Topics     Smoking status: Former Smoker     Quit date: 2/7/1992     Smokeless tobacco: Never Used     Alcohol use 0.0 oz/week     0 Standard drinks or equivalent per week      Comment: 5 drinks weekly- wine       If you drink alcohol do you typically have >3 drinks per day or >7 drinks per week? No                        Today's PHQ-2 Score:   PHQ-2 ( 1999 Pfizer) 3/24/2016 7/9/2014   Q1: Little interest or pleasure in doing things 0 0   Q2: Feeling down, depressed or hopeless 0 0   PHQ-2 Score 0 0       Do you feel safe in your environment - YES    Do you have a Health Care Directive?: Yes: Advance Directive has been received and scanned.    Current providers sharing in care for this patient include:   Patient Care Team:  Ellis Ruiz MD as PCP - General (Internal Medicine)    The following health maintenance items are reviewed in Epic and correct as of today:  Health Maintenance   Topic Date Due     FALL RISK ASSESSMENT  03/27/2018     INFLUENZA VACCINE (SYSTEM ASSIGNED)  09/01/2018     ADVANCE  "DIRECTIVE PLANNING Q5 YRS  03/27/2022     LIPID SCREEN Q5 YR FEMALE (SYSTEM ASSIGNED)  03/07/2023     TETANUS IMMUNIZATION (SYSTEM ASSIGNED)  03/12/2023     COLON CANCER SCREEN (SYSTEM ASSIGNED)  03/28/2024     DEXA SCAN SCREENING (SYSTEM ASSIGNED)  Completed     PNEUMOCOCCAL  Completed           End of Life Planning:  Patient currently has an advanced directive: yes    COUNSELING:  Reviewed preventive health counseling, as reflected in patient instructions       Regular exercise       Healthy diet/nutrition        Estimated body mass index is 23.86 kg/(m^2) as calculated from the following:    Height as of 1/25/18: 5' 6.5\" (1.689 m).    Weight as of 1/25/18: 150 lb 1.6 oz (68.1 kg).       reports that she quit smoking about 26 years ago. She has never used smokeless tobacco.      Appropriate preventive services were discussed with this patient, including applicable screening as appropriate for cardiovascular disease, diabetes, osteopenia/osteoporosis, and glaucoma.  As appropriate for age/gender, discussed screening for colorectal cancer, prostate cancer, breast cancer, and cervical cancer. Checklist reviewing preventive services available has been given to the patient.    Reviewed patients plan of care and provided an AVS. The Basic Care Plan (routine screening as documented in Health Maintenance) for Cecily meets the Care Plan requirement. This Care Plan has been established and reviewed with the Patient.    Counseling Resources:  ATP IV Guidelines  Pooled Cohorts Equation Calculator  Breast Cancer Risk Calculator  FRAX Risk Assessment  ICSI Preventive Guidelines  Dietary Guidelines for Americans, 2010  USDA's MyPlate  ASA Prophylaxis  Lung CA Screening    Ellis Ruiz MD  [facility]  "

## 2018-03-13 ENCOUNTER — OFFICE VISIT (OUTPATIENT)
Dept: FAMILY MEDICINE | Facility: CLINIC | Age: 78
End: 2018-03-13
Payer: COMMERCIAL

## 2018-03-13 VITALS
SYSTOLIC BLOOD PRESSURE: 117 MMHG | TEMPERATURE: 97.4 F | OXYGEN SATURATION: 96 % | HEIGHT: 67 IN | HEART RATE: 71 BPM | BODY MASS INDEX: 23.54 KG/M2 | WEIGHT: 150 LBS | DIASTOLIC BLOOD PRESSURE: 66 MMHG

## 2018-03-13 DIAGNOSIS — G47.00 INSOMNIA, UNSPECIFIED TYPE: ICD-10-CM

## 2018-03-13 DIAGNOSIS — N89.3 VAGINAL DYSPLASIA: ICD-10-CM

## 2018-03-13 DIAGNOSIS — K21.9 GASTROESOPHAGEAL REFLUX DISEASE WITHOUT ESOPHAGITIS: ICD-10-CM

## 2018-03-13 DIAGNOSIS — E78.5 HYPERLIPIDEMIA LDL GOAL <130: ICD-10-CM

## 2018-03-13 DIAGNOSIS — Z00.00 ROUTINE GENERAL MEDICAL EXAMINATION AT A HEALTH CARE FACILITY: Primary | ICD-10-CM

## 2018-03-13 DIAGNOSIS — F41.9 ANXIETY: ICD-10-CM

## 2018-03-13 PROCEDURE — 99397 PER PM REEVAL EST PAT 65+ YR: CPT | Performed by: INTERNAL MEDICINE

## 2018-03-13 RX ORDER — LOVASTATIN 40 MG
TABLET ORAL
Qty: 90 TABLET | Refills: 3 | Status: SHIPPED | OUTPATIENT
Start: 2018-03-13 | End: 2019-02-07

## 2018-03-13 NOTE — MR AVS SNAPSHOT
After Visit Summary   3/13/2018    Cecily Ivory    MRN: [id number]           Patient Information     Date Of Birth          1940        Visit Information        Provider Department      3/13/2018 12:30 PM Ellis Ruiz MD [facility]        Today's Diagnoses     Routine general medical examination at a health care facility    -  1    Hyperlipidemia LDL goal <130        Vaginal dysplasia        Anxiety        Gastroesophageal reflux disease without esophagitis        Insomnia, unspecified type          Care Instructions      Preventive Health Recommendations    Female Ages 65 +    Yearly exam:     See your health care provider every year in order to  o Review health changes.   o Discuss preventive care.    o Review your medicines if your doctor has prescribed any.      You no longer need a yearly Pap test unless you've had an abnormal Pap test in the past 10 years. If you have vaginal symptoms, such as bleeding or discharge, be sure to talk with your provider about a Pap test.      Every 1 to 2 years, have a mammogram.  If you are over 69, talk with your health care provider about whether or not you want to continue having screening mammograms.      Every 10 years, have a colonoscopy. Or, have a yearly FIT test (stool test). These exams will check for colon cancer.       Have a cholesterol test every 5 years, or more often if your doctor advises it.       Have a diabetes test (fasting glucose) every three years. If you are at risk for diabetes, you should have this test more often.       At age 65, have a bone density scan (DEXA) to check for osteoporosis (brittle bone disease).    Shots:    Get a flu shot each year.    Get a tetanus shot every 10 years.    Talk to your doctor about your pneumonia vaccines. There are now two you should receive - Pneumovax (PPSV 23) and Prevnar (PCV 13).    Talk to your doctor about the shingles vaccine.    Talk to your doctor about the  hepatitis B vaccine.    Nutrition:     Eat at least 5 servings of fruits and vegetables each day.      Eat whole-grain bread, whole-wheat pasta and brown rice instead of white grains and rice.      Talk to your provider about Calcium and Vitamin D.     Lifestyle    Exercise at least 150 minutes a week (30 minutes a day, 5 days a week). This will help you control your weight and prevent disease.      Limit alcohol to one drink per day.      No smoking.       Wear sunscreen to prevent skin cancer.       See your dentist twice a year for an exam and cleaning.      See your eye doctor every 1 to 2 years to screen for conditions such as glaucoma, macular degeneration and cataracts.          Follow-ups after your visit        Your next 10 appointments already scheduled     Mar 20, 2018 10:00 AM CDT   PHYSICAL with Gómez Rice MD   [facility][1] ([facility][1])    [address] [place] [id number] [phone]            May 14, 2018   Procedure with William Pollard MD   [facility] (--)    [address] [facility] [address] [phone]              Who to contact     If you have questions or need follow up information about today's clinic visit or your schedule please contact [facility] directly at [phone].  Normal or non-critical lab and imaging results will be communicated to you by MyChart, letter or phone within 4 business days after the clinic has received the results. If you do not hear from us within 7 days, please contact the clinic through MyChart or phone. If you have a critical or abnormal lab result, we will notify you by phone as soon as possible.  Submit refill requests through [employer] or call your pharmacy and they will forward the refill request to us. Please allow 3 business days for your refill to be completed.          Additional Information About Your Visit        MyChart Information  "    [employer] gives you secure access to your electronic health record. If you see a primary care provider, you can also send messages to your care team and make appointments. If you have questions, please call your primary care clinic.  If you do not have a primary care provider, please call [phone] and they will assist you.        Care EveryWhere ID     This is your Care EveryWhere ID. This could be used by other organizations to access your [facility] medical records  [id number]        Your Vitals Were     Pulse Temperature Height Pulse Oximetry Breastfeeding? BMI (Body Mass Index)    71 97.4  F (36.3  C) (Tympanic) 5' 6.5\" (1.689 m) 96% No 23.85 kg/m2       Blood Pressure from Last 3 Encounters:   03/13/18 117/66   01/25/18 147/70   03/27/17 129/69    Weight from Last 3 Encounters:   03/13/18 150 lb (68 kg)   01/25/18 150 lb 1.6 oz (68.1 kg)   06/01/17 147 lb (66.7 kg)              Today, you had the following     No orders found for display         Today's Medication Changes          These changes are accurate as of 3/13/18 12:47 PM.  If you have any questions, ask your nurse or doctor.               Stop taking these medicines if you haven't already. Please contact your care team if you have questions.     atorvastatin 10 MG tablet   Commonly known as:  LIPITOR   Stopped by:  Ellis Ruiz MD           guaiFENesin-codeine 100-10 MG/5ML Soln solution   Commonly known as:  ROBITUSSIN AC   Stopped by:  Ellis Ruiz MD                    Primary Care Provider Office Phone # Fax #    Ellis Ruiz [phone] [address]  [place] [address]        Equal Access to Services     TUSHAR MONTANO AH: Jillian Baker, sorin sanders, qaybta kaalmada epi, summer [place]. So [facility] [phone].    ATENCIÓN: Si habla español, tiene a thacker disposición servicios gratuitos de asistencia lingüística. Llame al [phone].    We " comply with applicable federal civil rights laws and [place] laws. We do not discriminate on the basis of race, color, national origin, age, disability, sex, sexual orientation, or gender identity.            Thank you!     Thank you for choosing [facility]  for your care. Our goal is always to provide you with excellent care. Hearing back from our patients is one way we can continue to improve our services. Please take a few minutes to complete the written survey that you may receive in the mail after your visit with us. Thank you!             Your Updated Medication List - Protect others around you: Learn how to safely use, store and throw away your medicines at www.disposemymeds.org.          This list is accurate as of 3/13/18 12:47 PM.  Always use your most recent med list.                   Brand Name Dispense Instructions for use Diagnosis    aspirin 81 MG tablet      Take 1 tablet by mouth daily.        Biotin 5000 MCG Caps           Calcium carb-Vitamin D 500 mg [place]-200 units 500-200 MG-UNIT per tablet    OSCAL with D;[place]yster [place] Calcium    100 tablet    Take 1 tablet by mouth 2 times daily (with meals). Takes 750mg daily.        CENTRUM SILVER per tablet      Take 1 tablet by mouth daily        CLARITIN 10 MG tablet   Generic drug:  loratadine      Take 1 tablet (10 mg) by mouth daily as needed for allergies Occasional use        lovastatin 40 MG tablet    MEVACOR    30 tablet    TAKE ONE TABLET BY MOUTH EVERY NIGHT AT BEDTIME    Hyperlipidemia LDL goal <130       magnesium 100 MG Caps           METAMUCIL PO           omega-3 acid ethyl esters 1 G capsule    Lovaza    120 capsule    Take 1 capsule (1 g) by mouth daily        VITAMIN B-12 PO      Take 1,000 mcg by mouth.        VITAMIN C PO      Take 500 mg by mouth daily.        VITAMIN D3 PO      Take by mouth daily

## 2018-03-13 NOTE — NURSING NOTE
"Chief Complaint   Patient presents with     Medicare Visit       Initial /66 (BP Location: Left arm, Patient Position: Sitting, Cuff Size: Adult Regular)  Pulse 71  Temp 97.4  F (36.3  C) (Tympanic)  Ht 5' 6.5\" (1.689 m)  Wt 150 lb (68 kg)  SpO2 96%  Breastfeeding? No  BMI 23.85 kg/m2 Estimated body mass index is 23.85 kg/(m^2) as calculated from the following:    Height as of this encounter: 5' 6.5\" (1.689 m).    Weight as of this encounter: 150 lb (68 kg).  Medication Reconciliation: complete   Elsa Infante- CMA      "

## 2018-03-20 ENCOUNTER — HOSPITAL ENCOUNTER (OUTPATIENT)
Dept: MAMMOGRAPHY | Facility: CLINIC | Age: 78
Discharge: HOME | End: 2018-03-20
Attending: INTERNAL MEDICINE | Admitting: INTERNAL MEDICINE
Payer: MEDICARE

## 2018-03-20 ENCOUNTER — TELEPHONE (OUTPATIENT)
Dept: OBGYN | Facility: CLINIC | Age: 78
End: 2018-03-20

## 2018-03-20 ENCOUNTER — RESULT FOLLOW UP (OUTPATIENT)
Dept: OBGYN | Facility: CLINIC | Age: 78
End: 2018-03-20

## 2018-03-20 ENCOUNTER — OFFICE VISIT (OUTPATIENT)
Dept: OBGYN | Facility: CLINIC | Age: 78
End: 2018-03-20
Payer: COMMERCIAL

## 2018-03-20 VITALS
BODY MASS INDEX: 23.54 KG/M2 | HEART RATE: 76 BPM | WEIGHT: 150 LBS | SYSTOLIC BLOOD PRESSURE: 120 MMHG | HEIGHT: 67 IN | DIASTOLIC BLOOD PRESSURE: 82 MMHG

## 2018-03-20 DIAGNOSIS — Z12.31 VISIT FOR SCREENING MAMMOGRAM: ICD-10-CM

## 2018-03-20 DIAGNOSIS — R87.611 PAPANICOLAOU SMEAR OF CERVIX WITH ATYPICAL SQUAMOUS CELLS CANNOT EXCLUDE HIGH GRADE SQUAMOUS INTRAEPITHELIAL LESION (ASC-H): ICD-10-CM

## 2018-03-20 DIAGNOSIS — Z01.419 ENCOUNTER FOR GYNECOLOGICAL EXAMINATION WITHOUT ABNORMAL FINDING: Primary | ICD-10-CM

## 2018-03-20 PROCEDURE — G0476 HPV COMBO ASSAY CA SCREEN: HCPCS | Performed by: OBSTETRICS & GYNECOLOGY

## 2018-03-20 PROCEDURE — 88175 CYTOPATH C/V AUTO FLUID REDO: CPT | Performed by: OBSTETRICS & GYNECOLOGY

## 2018-03-20 PROCEDURE — 77067 SCR MAMMO BI INCL CAD: CPT

## 2018-03-20 PROCEDURE — 99213 OFFICE O/P EST LOW 20 MIN: CPT | Performed by: OBSTETRICS & GYNECOLOGY

## 2018-03-20 PROCEDURE — 88141 CYTOPATH C/V INTERPRET: CPT | Performed by: OBSTETRICS & GYNECOLOGY

## 2018-03-20 RX ORDER — NAPROXEN 500 MG/1
500 TABLET ORAL 2 TIMES DAILY PRN
COMMUNITY
Start: 2018-01-29 | End: 2018-11-09

## 2018-03-20 ASSESSMENT — ANXIETY QUESTIONNAIRES
5. BEING SO RESTLESS THAT IT IS HARD TO SIT STILL: NOT AT ALL
7. FEELING AFRAID AS IF SOMETHING AWFUL MIGHT HAPPEN: NOT AT ALL
2. NOT BEING ABLE TO STOP OR CONTROL WORRYING: SEVERAL DAYS
6. BECOMING EASILY ANNOYED OR IRRITABLE: NOT AT ALL
IF YOU CHECKED OFF ANY PROBLEMS ON THIS QUESTIONNAIRE, HOW DIFFICULT HAVE THESE PROBLEMS MADE IT FOR YOU TO DO YOUR WORK, TAKE CARE OF THINGS AT HOME, OR GET ALONG WITH OTHER PEOPLE: NOT DIFFICULT AT ALL
GAD7 TOTAL SCORE: 2
1. FEELING NERVOUS, ANXIOUS, OR ON EDGE: NOT AT ALL
3. WORRYING TOO MUCH ABOUT DIFFERENT THINGS: SEVERAL DAYS

## 2018-03-20 ASSESSMENT — PATIENT HEALTH QUESTIONNAIRE - PHQ9: 5. POOR APPETITE OR OVEREATING: NOT AT ALL

## 2018-03-20 NOTE — PROGRESS NOTES
Cecily is a 77 year old  female who presents for annual exam.     Besides routine health maintenance, she has no other health concerns today .    Do you have a Health Care Directive?: Yes: Advance Directive has been received and scanned.    Fall risk:   Fallen 2 or more times in the past year?: No  Any fall with injury in the past year?: No    HPI:  The patient's PCP is  Ellis Ruiz MD.    Last year had ASCUS-H pap with neg HPV of vagina.  Colpo negative.  Dating same jessica since . Not sexually active. Unsure what to do. Worried about STD's.     GYNECOLOGIC HISTORY:  No LMP recorded. Patient has had a hysterectomy..   reports that she quit smoking about 26 years ago. She has never used smokeless tobacco.    Patient is not sexually active.  STD testing offered?  Declined  Last PHQ-9 score on record=   PHQ-9 SCORE 3/20/2018   Total Score 3     Last GAD7 score on record=   BRANDY-7 SCORE 2016 3/20/2018   Total Score 2 2     Alcohol Score = 4    HEALTH MAINTENANCE:  Cholesterol: 3/7/18   Total= 207, Triglycerides=141, HDL=84, LDL=95    Cholesterol   Date Value Ref Range Status   2018 207 (H) <200 mg/dL Final     Comment:     Desirable:       <200 mg/dl   2017 221 (H) <200 mg/dL Final     Comment:     Desirable:       <200 mg/dl      Last Mammo: 3/2017 , Result: abnormal, but the work up was negative, Next Mammo: today     Pap: 16 ASC-H HPV-  Lab Results   Component Value Date    PAP ASC-H 2016      DEXA:  4/3/17  Colonoscopy:  3/28/14, Result:  normal, Next Colonoscopy: 6 if needed    Health maintenance updated:  yes    HISTORY:  Obstetric History       T1      L1     SAB0   TAB0   Ectopic0   Multiple0   Live Births0       # Outcome Date GA Lbr Galen/2nd Weight Sex Delivery Anes PTL Lv   1 Term                 Patient Active Problem List   Diagnosis     Pain in joint, lower leg     Peripheral neuropathy     Carotid bruit     Hyperlipidemia LDL goal <130     HL (hearing  loss)     Dysphonia     Anxiety     Insomnia     Vaginal dysplasia     GERD (gastroesophageal reflux disease)     Lumbar spinal stenosis    / Past Surgical History:   Procedure Laterality Date     ANKLE SURGERY  2000     APPENDECTOMY  15     BREAST BIOPSY, CORE RT/LT       C VAGINAL HYSTERECTOMY  1995     GYN SURGERY      BSO     HC UGI ENDOSCOPY, SIMPLE EXAM  03/15/11    [facility]     LAPAROSCOPIC CHOLECYSTECTOMY  2008     NOSE SURGERY  1990     right knee arthroscopy  2007     thumb surgery Left 2/2015      Social History   Substance Use Topics     Smoking status: Former Smoker     Quit date: 2/7/1992     Smokeless tobacco: Never Used     Alcohol use 0.0 oz/week     0 Standard drinks or equivalent per week      Comment: 5 drinks weekly- wine      Problem (# of Occurrences) Relation (Name,Age of Onset)    DIABETES (1) Brother    Endocrine Disease (1) Brother    Hyperlipidemia (1) Mother            Current Outpatient Prescriptions   Medication Sig     naproxen (NAPROSYN) 500 MG tablet      lovastatin (MEVACOR) 40 MG tablet TAKE ONE TABLET BY MOUTH EVERY NIGHT AT BEDTIME     magnesium 100 MG CAPS      loratadine (CLARITIN) 10 MG tablet Take 1 tablet (10 mg) by mouth daily as needed for allergies Occasional use     Multiple Vitamins-Minerals (CENTRUM SILVER) per tablet Take 1 tablet by mouth daily     Cholecalciferol (VITAMIN D3 PO) Take by mouth daily     Psyllium (METAMUCIL PO)      Biotin 5000 MCG CAPS      calcium carb 1250 mg, 500 mg [place],/vitamin D 200 units (OSCAL WITH D) 500-200 MG-UNIT per tablet Take 1 tablet by mouth 2 times daily (with meals). Takes 750mg daily.     Ascorbic Acid (VITAMIN C PO) Take 500 mg by mouth daily.     aspirin 81 MG tablet Take 1 tablet by mouth daily.     Cyanocobalamin (VITAMIN B-12 PO) Take 1,000 mcg by mouth.     No current facility-administered medications for this visit.        Allergies   Allergen Reactions     Latex      Seasonal Allergies      YEAR ROUND  "ALLERGIES     Sulfa Drugs        Past medical, surgical, social and family history were reviewed and updated in EPIC.    ROS:   12 point review of systems negative other than symptoms noted below.  Musculoskeletal: Height Loss  Psychiatric: Difficulty Sleeping    EXAM:  /82  Pulse 76  Ht 5' 6.5\" (1.689 m)  Wt 150 lb (68 kg)  BMI 23.85 kg/m2   BMI: Body mass index is 23.85 kg/(m^2).    EXAM:  Constitutional: Appearance: Well nourished, well developed alert, in no acute distress  Neck:  Lymph Nodes:  No lymphadenopathy present    Thyroid:  Gland size normal, nontender, no nodules or masses present  on palpation  Chest:  Respiratory Effort:  Breathing unlabored  Cardiovascular:Heart    Auscultation:  Regular rate, normal rhythm, no murmurs present  Breasts: Inspection of Breasts:  No lymphadenopathy present., Palpation of Breasts and Axillae:  No masses present on palpation, no breast tenderness., Axillary Lymph Nodes:  No lymphadenopathy present. and No nodularity, asymmetry or nipple discharge bilaterally.  Gastrointestinal:  Abdominal Examination:  Abdomen nontender to palpation, tone normal without     rigidity or guarding, no masses present, umbilicus without lesions    Liver and speen:  No hepatomegaly present, liver nontender to palpation    Hernias:  No hernias present  Lymphatic: Lymph Nodes:  No other lymphadenopathy present  Skin:  General Inspection:  No rashes present, no lesions present, no areas of  discoloration.    Genitalia and Groin:  No rashes present, no lesions present, no areas of  discoloration, no masses present  Neurologic/Psychiatric:    Mental Status:  Oriented X3     Pelvic Exam:  External Genitalia:     Normal appearance for age, no discharge present, no tenderness present, no inflammatory lesions present, color normal  Vagina:     Normal vaginal vault without central or paravaginal defects, no discharge present, no inflammatory lesions present, no masses present, " Atrophic  Bladder:     Nontender to palpation  Urethra:   Urethral Body:  Urethra palpation normal, urethra structural support normal   Urethral Meatus:  No erythema or lesions present  Cervix:     Surgically absent  Uterus:     Surgically absent  Adnexa:     No adnexal tenderness present, no adnexal masses present  Perineum:     Perineum within normal limits, no evidence of trauma, no rashes or skin lesions present  Anus:     Anus within normal limits, no hemorrhoids present  Inguinal Lymph Nodes:     No lymphadenopathy present  Pubic Hair:     Normal pubic hair distribution for age  Genitalia and Groin:     No rashes present, no lesions present, no areas of discoloration, no masses present      COUNSELING:   Reviewed preventive health counseling, as reflected in patient instructions       Regular exercise    BMI:  Body mass index is 23.85 kg/(m^2).     reports that she quit smoking about 26 years ago. She has never used smokeless tobacco.      ASSESSMENT:  77 year old female with satisfactory annual exam.    ICD-10-CM    1. Encounter for gynecological examination without abnormal finding Z01.419 Pap imaged thin layer screen with HPV - recommended age 30 - 65     HPV High Risk Types DNA Cervical       PLAN:  Discussed STDs, testing and treatments. Can have partner tested beforehand or have pt tested afterwards.   Discussed atrophic vagina.   If pap and HPV are negative can consider stopping paps. No lesions noted with speculum exam    Gómez Rice MD

## 2018-03-20 NOTE — TELEPHONE ENCOUNTER
It's from lack of estrogen in vagina and slight inflammation. If bothers her, vaginal estrogen would help to make the vagina healthier.

## 2018-03-20 NOTE — MR AVS SNAPSHOT
After Visit Summary   3/20/2018    Cecily Ivory    MRN: [id number]           Patient Information     Date Of Birth          1940        Visit Information        Provider Department      3/20/2018 10:00 AM Gómez Rice MD [facility] [place]        Today's Diagnoses     Encounter for gynecological examination without abnormal finding    -  1    Papanicolaou smear of cervix with atypical squamous cells cannot exclude high grade squamous intraepithelial lesion (ASC-H)           Follow-ups after your visit        Your next 10 appointments already scheduled     May 14, 2018   Procedure with William Pollard MD   [facility] (--)    [address] [facility] [id number] [phone]              Who to contact     If you have questions or need follow up information about today's clinic visit or your schedule please contact [facility] [place] directly at [phone].  Normal or non-critical lab and imaging results will be communicated to you by MyChart, letter or phone within 4 business days after the clinic has received the results. If you do not hear from us within 7 days, please contact the clinic through MyChart or phone. If you have a critical or abnormal lab result, we will notify you by phone as soon as possible.  Submit refill requests through "[employer] or call your pharmacy and they will forward the refill request to us. Please allow 3 business days for your refill to be completed.          Additional Information About Your Visit        MyChart Information     "[employer] gives you secure access to your electronic health record. If you see a primary care provider, you can also send messages to your care team and make appointments. If you have questions, please call your primary care clinic.  If you do not have a primary care provider, please call [phone] and they will assist you.        Care EveryWhere ID     This is your Care  "EveryWhere ID. This could be used by other organizations to access your [place] medical records  [id number]        Your Vitals Were     Pulse Height BMI (Body Mass Index)             76 5' 6.5\" (1.689 m) 23.85 kg/m2          Blood Pressure from Last 3 Encounters:   03/20/18 120/82   03/13/18 117/66   01/25/18 147/70    Weight from Last 3 Encounters:   03/20/18 150 lb (68 kg)   03/13/18 150 lb (68 kg)   01/25/18 150 lb 1.6 oz (68.1 kg)              We Performed the Following     HPV High Risk Types DNA Cervical     Pap imaged thin layer screen with HPV - recommended age 30 - 65        Primary Care Provider Office Phone # Fax #    Ellis Ruiz [phone] [address]  [place] [address]        Equal Access to Services     [facility]: Hadii aad ku hadasho Somanan, waaxda luqadaha, qaybta kaalmada adeegyada, summer more . So [facility] [phone].    ATENCIÓN: Si habla español, tiene a thacker disposición servicios gratuitos de asistencia lingüística. Hernaname al [phone].    We comply with applicable federal civil rights laws and [place] laws. We do not discriminate on the basis of race, color, national origin, age, disability, sex, sexual orientation, or gender identity.            Thank you!     Thank you for choosing [facility] [place]  for your care. Our goal is always to provide you with excellent care. Hearing back from our patients is one way we can continue to improve our services. Please take a few minutes to complete the written survey that you may receive in the mail after your visit with us. Thank you!             Your Updated Medication List - Protect others around you: Learn how to safely use, store and throw away your medicines at www.disposemymeds.org.          This list is accurate as of 3/20/18 11:12 AM.  Always use your most recent med list.                   Brand Name Dispense Instructions for use Diagnosis    " aspirin 81 MG tablet      Take 1 tablet by mouth daily.        Biotin 5000 MCG Caps           Calcium carb-Vitamin D 500 mg [place]-200 units 500-200 MG-UNIT per tablet    OSCAL with D;Oyster Shell Calcium    100 tablet    Take 1 tablet by mouth 2 times daily (with meals). Takes 750mg daily.        CENTRUM SILVER per tablet      Take 1 tablet by mouth daily        CLARITIN 10 MG tablet   Generic drug:  loratadine      Take 1 tablet (10 mg) by mouth daily as needed for allergies Occasional use        lovastatin 40 MG tablet    MEVACOR    90 tablet    TAKE ONE TABLET BY MOUTH EVERY NIGHT AT BEDTIME    Hyperlipidemia LDL goal <130       magnesium 100 MG Caps           METAMUCIL PO           naproxen 500 MG tablet    NAPROSYN          VITAMIN B-12 PO      Take 1,000 mcg by mouth.        VITAMIN C PO      Take 500 mg by mouth daily.        VITAMIN D3 PO      Take by mouth daily

## 2018-03-20 NOTE — TELEPHONE ENCOUNTER
Pt will not tell me what she needs to speak with the nurses about. She just wants a call back.  Since I do not know what her concern is and all she said is its about her health I am sending [facility].

## 2018-03-20 NOTE — TELEPHONE ENCOUNTER
"Pt calling back stating that she forgot to mention today to Dr. Rice that within the past yr she has noticed a strong odor vaginally and wondering about that. States it is not all the time, it comes and goes and she is doing daily hygiene/bathing. Denies vaginal discharge and vaginal itching. Pt asked if the odor \"is because of my age?\" Informed would send off to Dr. Rice to review and advise and get back to her if not today then tomorrow. Pt voiced understanding.  "

## 2018-03-20 NOTE — LETTER
March 6, 2019      Cecily Ivory  [address]  [place] [address]    Dear ,      At [facility], your health and wellness is our primary concern. That is why we are following up on an abnormal pap from 03/20/18, which was reported as ASCUS. Your provider had recommended that you have a Pap smear and HPV test completed by 03/20/19. Our records do not show that this has been scheduled.    It is important to complete the follow up that your provider has suggested for you to ensure that there are no worsening changes which may, over time, develop into cancer.      Please contact our office at [phone] to schedule an appointment for a Pap smear and HPV test at your earliest convenience. If you have questions or concerns, please call the clinic and we will be happy to assist you.    If you have completed the tests outside of [facility], please have the results forwarded to our office. We will update the chart for your primary Physician to review before your next annual physical.     Thank you for choosing [facility]!    Sincerely,      Gómez Rice MD/[facility]

## 2018-03-21 ASSESSMENT — PATIENT HEALTH QUESTIONNAIRE - PHQ9: SUM OF ALL RESPONSES TO PHQ QUESTIONS 1-9: 3

## 2018-03-21 ASSESSMENT — ANXIETY QUESTIONNAIRES: GAD7 TOTAL SCORE: 2

## 2018-03-22 LAB
COPATH REPORT: ABNORMAL
PAP: ABNORMAL

## 2018-03-23 LAB
FINAL DIAGNOSIS: NORMAL
HPV HR 12 DNA CVX QL NAA+PROBE: NEGATIVE
HPV16 DNA SPEC QL NAA+PROBE: NEGATIVE
HPV18 DNA SPEC QL NAA+PROBE: NEGATIVE
SPECIMEN DESCRIPTION: NORMAL
SPECIMEN SOURCE CVX/VAG CYTO: NORMAL

## 2018-03-27 NOTE — PROGRESS NOTES
9/08, 3/09, 4/10, 9/10, 10/12, 10/13 LSIL vaginal pap  10/14/14 ASC-H vaginal pap  1/28/16 ASC-H /Neg HPV of vaginal cuff  3/20/18 ASCUS/Neg HPV of vaginal cuff. Plan:cotest in 1 year  03/06/19 Cotest reminder letter sent. ([facility])  3/19/19 vaginal pap smear no longer indicated.

## 2018-04-02 ENCOUNTER — TRANSFERRED RECORDS (OUTPATIENT)
Dept: HEALTH INFORMATION MANAGEMENT | Facility: CLINIC | Age: 78
End: 2018-04-02

## 2018-04-05 ENCOUNTER — HOSPITAL ENCOUNTER (OUTPATIENT)
Dept: LAB | Facility: CLINIC | Age: 78
Discharge: HOME | End: 2018-04-05
Attending: ORTHOPAEDIC SURGERY | Admitting: ORTHOPAEDIC SURGERY
Payer: MEDICARE

## 2018-04-05 DIAGNOSIS — Z01.812 PRE-OPERATIVE LABORATORY EXAMINATION: ICD-10-CM

## 2018-04-05 LAB
MRSA DNA SPEC QL NAA+PROBE: NEGATIVE
SPECIMEN SOURCE: NORMAL

## 2018-04-05 PROCEDURE — 87640 STAPH A DNA AMP PROBE: CPT | Performed by: ORTHOPAEDIC SURGERY

## 2018-04-05 PROCEDURE — 40000830 ZZHCL STATISTIC STAPH AUREUS METH RESIST SCREEN PCR: Performed by: ORTHOPAEDIC SURGERY

## 2018-04-05 PROCEDURE — 87641 MR-STAPH DNA AMP PROBE: CPT | Performed by: ORTHOPAEDIC SURGERY

## 2018-04-05 PROCEDURE — 40000829 ZZHCL STATISTIC STAPH AUREUS SUSCEPT SCREEN PCR: Performed by: ORTHOPAEDIC SURGERY

## 2018-05-01 ENCOUNTER — OFFICE VISIT (OUTPATIENT)
Dept: FAMILY MEDICINE | Facility: CLINIC | Age: 78
End: 2018-05-01
Payer: COMMERCIAL

## 2018-05-01 VITALS
WEIGHT: 148 LBS | BODY MASS INDEX: 23.23 KG/M2 | TEMPERATURE: 98.3 F | DIASTOLIC BLOOD PRESSURE: 62 MMHG | HEART RATE: 69 BPM | SYSTOLIC BLOOD PRESSURE: 120 MMHG | HEIGHT: 67 IN | OXYGEN SATURATION: 96 %

## 2018-05-01 DIAGNOSIS — Z01.818 PREOP GENERAL PHYSICAL EXAM: Primary | ICD-10-CM

## 2018-05-01 DIAGNOSIS — Z29.8 * * * SBE PROPHYLAXIS * * *: ICD-10-CM

## 2018-05-01 PROBLEM — R87.610 ASCUS OF CERVIX WITH NEGATIVE HIGH RISK HPV: Status: ACTIVE | Noted: 2018-03-01

## 2018-05-01 PROCEDURE — 93000 ELECTROCARDIOGRAM COMPLETE: CPT | Performed by: INTERNAL MEDICINE

## 2018-05-01 PROCEDURE — 99214 OFFICE O/P EST MOD 30 MIN: CPT | Performed by: INTERNAL MEDICINE

## 2018-05-01 RX ORDER — AMOXICILLIN 500 MG/1
2000 CAPSULE ORAL ONCE
Qty: 4 CAPSULE | Refills: 3 | Status: SHIPPED | OUTPATIENT
Start: 2018-05-01 | End: 2019-03-19

## 2018-05-01 NOTE — NURSING NOTE
"Chief Complaint   Patient presents with     Pre-Op Exam       Initial /62  Pulse 69  Temp 98.3  F (36.8  C) (Oral)  Ht 5' 6.5\" (1.689 m)  Wt 148 lb (67.1 kg)  SpO2 96%  Breastfeeding? No  BMI 23.53 kg/m2 Estimated body mass index is 23.53 kg/(m^2) as calculated from the following:    Height as of this encounter: 5' 6.5\" (1.689 m).    Weight as of this encounter: 148 lb (67.1 kg).  Medication Reconciliation: complete   Lydia Gustafson CMA      "

## 2018-05-01 NOTE — PROGRESS NOTES
[address] [address]  [phone]  Dept: [phone]    PRE-OP EVALUATION:  Today's date: 2018    Cecily Ivory (: 1940) presents for pre-operative evaluation assessment as requested by William Ramos MD.  She requires evaluation and anesthesia risk assessment prior to undergoing surgery/procedure for treatment of ARTHROPLASTY KNEE .    Proposed Surgery/ Procedure: Left Total Knee Arthroplasty   Date of Surgery/ Procedure: 18  Time of Surgery/ Procedure: 12:45 PM  Hospital/Surgical Facility:  [facility]  [phone]  Primary Physician: Ellis Ruiz  Type of Anesthesia Anticipated: Choice    Patient has a Health Care Directive or Living Will:  YES    The patient is having surgery as noted above.  She can do 4 mets.  She feels fine.                Past Medical History:      Past Medical History:   Diagnosis Date     Abdominal pain ,     ct liver and renal cyst and 2mm lung nodule, repeat ct done  and no significantly abnl.     Anxiety     added med 3/14     Arthritis      Carotid bruit     us nl     Gastritis     egd done by [place] [facility]     GERD (gastroesophageal reflux disease)      History of colonoscopy ,     nl     Hypercholesteremia 2000    stopped lovastatin , added lipitor 3/16     Insomnia     using otc      Lumbar spinal stenosis     Dr. Wilde, had epidural     Lung nodule ,     seen on ct for abd pain, fu done 3/13 and 2 nodules stable and benign, one new one needs fu 1 year.  fu done 3/14 and fu 1 year and then done; fu done 3/15 and unchanged, no fu needed     Odynophagia     egd nl     Other chronic pain      Peripheral neuropathies     Dr. Kim     Screening ,     nl dexa     Vaginal dysplasia     chronic VAIN I, many colpos of vagina.  Now we only do an annual pap of vagina, no colpo since stable long term             Past Surgical History:      Past Surgical  History:   Procedure Laterality Date     ANKLE SURGERY  2000     APPENDECTOMY  13     BREAST BIOPSY, CORE RT/LT       C VAGINAL HYSTERECTOMY  1995     GYN SURGERY      BSO     HC UGI ENDOSCOPY, SIMPLE EXAM  03/15/11    [facility]     LAPAROSCOPIC CHOLECYSTECTOMY  2008     NOSE SURGERY  1990     right knee arthroscopy  2007     thumb surgery Left 2/2015             Social History:     Social History   Substance Use Topics     Smoking status: Former Smoker     Packs/day: 1.50     Years: 20.00     Types: Cigarettes     Quit date: 2/7/1992     Smokeless tobacco: Never Used     Alcohol use 0.0 oz/week     0 Standard drinks or equivalent per week      Comment: 5-6 drinks weekly             Family History:   No family history of bleeding difficulty, anesthesia problems or blood clots.         Allergies:     Allergies   Allergen Reactions     Latex Cough     Seasonal Allergies      YEAR ROUND ALLERGIES     Sulfa Drugs Unknown             Medications:     Current Outpatient Prescriptions   Medication Sig Dispense Refill     amoxicillin (AMOXIL) 500 MG capsule Take 4 capsules (2,000 mg) by mouth once for 1 dose 4 capsule 3     Ascorbic Acid (VITAMIN C PO) Take 500 mg by mouth daily.       aspirin 81 MG tablet Take 1 tablet by mouth daily.  3     Biotin 5000 MCG CAPS Take 1 tablet by mouth daily        calcium carb 1250 mg, 500 mg [place],/vitamin D 200 units (OSCAL WITH D) 500-200 MG-UNIT per tablet Take 1 tablet by mouth 2 times daily (with meals). Takes 750mg daily. 100 tablet 3     Cholecalciferol (VITAMIN D3 PO) Take by mouth daily       Cyanocobalamin (VITAMIN B-12 PO) Take 1,000 mcg by mouth.       loratadine (CLARITIN) 10 MG tablet Take 1 tablet (10 mg) by mouth daily as needed for allergies Occasional use       lovastatin (MEVACOR) 40 MG tablet TAKE ONE TABLET BY MOUTH EVERY NIGHT AT BEDTIME 90 tablet 3     magnesium 100 MG CAPS Take 1 tablet by mouth daily        Multiple Vitamins-Minerals (CENTRUM  "SILVER) per tablet Take 1 tablet by mouth daily       naproxen (NAPROSYN) 500 MG tablet Take 500 mg by mouth 2 times daily as needed        Psyllium (METAMUCIL PO) Take 1 capsule by mouth daily as needed                  Review of Systems:   No history of bleeding difficulty, anesthesia problems or blood clots.  The 10 point Review of Systems is negative other than noted in the HPI           Physical Exam:   Blood pressure 120/62, pulse 69, temperature 98.3  F (36.8  C), temperature source Oral, height 5' 6.5\" (1.689 m), weight 148 lb (67.1 kg), SpO2 96 %, not currently breastfeeding.    Constitutional: healthy appearing, alert and in no distress  Heent: Normocephalic. Head without obvious masses or lesions. PERRLDC, EOMI. Mouth exam within normal limits: tongue, mucous membranes, posterior pharynx all normal, no lesions or abnormalities seen.  Tm's and canals within normal limits bilaterally. Neck supple, no nuchal rigidity or masses. No supraclavicular, or cervical adenopathy. Thyroid symmetric, no masses.  Cardiovascular: Regular rate and rhythm, no murmer, rub or gallops.  JVP not elevated, no edema.  Carotids within normal limits bilaterally, no bruits.  Respiratory: Normal respiratory effort.  Lungs clear, normal flow, no wheezing or crackles.  Gastrointestinal: Normal active bowel sounds.   Soft, not tender, no masses, guarding or rebound.  No hepatosplenomegaly.   Musculoskeletal: extremities normal, no gross deformities noted.  Skin: no suspicious lesions or rashes   Neurologic: Mental status within normal limits.  Speech fluent.  No gross motor abnormalities and gait intact.  Psychiatric: mentation appears normal and affect normal.         Data:   Labs done 3/18 and not repeated; ekg - nsr, lad, otherwise within normal limits.        Assessment:   1. Normal pre op exam for tka, the patient should be low risk for this procedure  2. Anxiety, controlled  3. Elevated cholesterol on statin         Plan:   The " patient is ok for the procedure  Coumadin or lmwh post op for dvt prophylaxis      Ellis Ruiz M.D.

## 2018-05-01 NOTE — MR AVS SNAPSHOT
After Visit Summary   5/1/2018    Cecily Ivory    MRN: [id number]           Patient Information     Date Of Birth          1940        Visit Information        Provider Department      5/1/2018 3:00 PM Ellis Ruiz MD [facility]        Today's Diagnoses     Preop general physical exam    -  1    * * * SBE PROPHYLAXIS * * *          Care Instructions      Before Your Surgery      Call your surgeon if there is any change in your health. This includes signs of a cold or flu (such as a sore throat, runny nose, cough, rash or fever).    Do not smoke, drink alcohol or take over the counter medicine (unless your surgeon or primary care doctor tells you to) for the 24 hours before and after surgery.    If you take prescribed drugs: Follow your doctor s orders about which medicines to take and which to stop until after surgery.    Eating and drinking prior to surgery: follow the instructions from your surgeon    Take a shower or bath the night before surgery. Use the soap your surgeon gave you to gently clean your skin. If you do not have soap from your surgeon, use your regular soap. Do not shave or scrub the surgery site.  Wear clean pajamas and have clean sheets on your bed.           Follow-ups after your visit        Your next 10 appointments already scheduled     May 14, 2018   Procedure with William Pollard MD   [facility] (--)    [address] [facility] [address] [phone]              Who to contact     If you have questions or need follow up information about today's clinic visit or your schedule please contact [facility] directly at [phone].  Normal or non-critical lab and imaging results will be communicated to you by MyChart, letter or phone within 4 business days after the clinic has received the results. If you do not hear from us within 7 days, please contact the clinic through MyChart or phone. If you have a  "critical or abnormal lab result, we will notify you by phone as soon as possible.  Submit refill requests through [employer] or call your pharmacy and they will forward the refill request to us. Please allow 3 business days for your refill to be completed.          Additional Information About Your Visit        twtrlandhart Information     [employer] gives you secure access to your electronic health record. If you see a primary care provider, you can also send messages to your care team and make appointments. If you have questions, please call your primary care clinic.  If you do not have a primary care provider, please call [phone] and they will assist you.        Care EveryWhere ID     This is your Care EveryWhere ID. This could be used by other organizations to access your [facility] medical records  [id number]        Your Vitals Were     Pulse Temperature Height Pulse Oximetry Breastfeeding? BMI (Body Mass Index)    69 98.3  F (36.8  C) (Oral) 5' 6.5\" (1.689 m) 96% No 23.53 kg/m2       Blood Pressure from Last 3 Encounters:   05/01/18 120/62   03/20/18 120/82   03/13/18 117/66    Weight from Last 3 Encounters:   05/01/18 148 lb (67.1 kg)   03/20/18 150 lb (68 kg)   03/13/18 150 lb (68 kg)              We Performed the Following     EKG 12-LEAD CLINIC READ          Today's Medication Changes          These changes are accurate as of 5/1/18  3:21 PM.  If you have any questions, ask your nurse or doctor.               Start taking these medicines.        Dose/Directions    amoxicillin 500 MG capsule   Commonly known as:  AMOXIL   Used for:  * * * SBE PROPHYLAXIS * * *   Started by:  Ellis Ruiz MD        Dose:  2000 mg   Take 4 capsules (2,000 mg) by mouth once for 1 dose   Quantity:  4 capsule   Refills:  3            Where to get your medicines      These medications were sent to [id number] - [place], [place] - [address] [facility]  [address] [facility] [address]     Phone:  [phone]     " amoxicillin 500 MG capsule                Primary Care Provider Office Phone # Fax #    Ellis Ruiz [phone] [address] [address]        Equal Access to Services     TUSHAR MONTANO : Hadelio archibald ku tarao Somanan, waaxda luqadaha, qaybta kaalmada adeegyada, summer kiran argenis [place]. So [facility] [phone].    ATENCIÓN: Si habla español, tiene a thacker disposición servicios gratuitos de asistencia lingüística. Llame al [phone].    We comply with applicable federal civil rights laws and [place] laws. We do not discriminate on the basis of race, color, national origin, age, disability, sex, sexual orientation, or gender identity.            Thank you!     Thank you for choosing [facility]  for your care. Our goal is always to provide you with excellent care. Hearing back from our patients is one way we can continue to improve our services. Please take a few minutes to complete the written survey that you may receive in the mail after your visit with us. Thank you!             Your Updated Medication List - Protect others around you: Learn how to safely use, store and throw away your medicines at www.disposemymeds.org.          This list is accurate as of 5/1/18  3:21 PM.  Always use your most recent med list.                   Brand Name Dispense Instructions for use Diagnosis    amoxicillin 500 MG capsule    AMOXIL    4 capsule    Take 4 capsules (2,000 mg) by mouth once for 1 dose    * * * SBE PROPHYLAXIS * * *       aspirin 81 MG tablet      Take 1 tablet by mouth daily.        Biotin 5000 MCG Caps      Take 1 tablet by mouth daily        Calcium carb-Vitamin D 500 mg [place]-200 units 500-200 MG-UNIT per tablet    OSCAL with D;Oyster Shell Calcium    100 tablet    Take 1 tablet by mouth 2 times daily (with meals). Takes 750mg daily.        CENTRUM SILVER per tablet      Take 1 tablet by mouth daily        CLARITIN 10 MG tablet    Generic drug:  loratadine      Take 1 tablet (10 mg) by mouth daily as needed for allergies Occasional use        lovastatin 40 MG tablet    MEVACOR    90 tablet    TAKE ONE TABLET BY MOUTH EVERY NIGHT AT BEDTIME    Hyperlipidemia LDL goal <130       magnesium 100 MG Caps      Take 1 tablet by mouth daily        METAMUCIL PO      Take 1 capsule by mouth daily as needed        naproxen 500 MG tablet    NAPROSYN     Take 500 mg by mouth 2 times daily as needed        VITAMIN B-12 PO      Take 1,000 mcg by mouth.        VITAMIN C PO      Take 500 mg by mouth daily.        VITAMIN D3 PO      Take by mouth daily

## 2018-05-08 NOTE — PHARMACY-ADMISSION MEDICATION HISTORY
Pharmacy reviewed prior to admission med list from pre-admitting rn, GERONIMO Snyder.      Prior to Admission medications    Medication Sig Last Dose Taking? Auth Provider   Ascorbic Acid (VITAMIN C PO) Take 500 mg by mouth daily.  Yes Reported, Patient   aspirin 81 MG tablet Take 1 tablet by mouth daily.  Yes Ellis Ruiz MD   Biotin 5000 MCG CAPS Take 1 tablet by mouth daily   Yes Reported, Patient   calcium carb 1250 mg, 500 mg [place],/vitamin D 200 units (OSCAL WITH D) 500-200 MG-UNIT per tablet Take 1 tablet by mouth 2 times daily (with meals). Takes 750mg daily.  Yes Ellis Ruiz MD   Cholecalciferol (VITAMIN D3 PO) Take 1,000 Units by mouth daily   Yes Reported, Patient   Cyanocobalamin (VITAMIN B-12 PO) Take 1,000 mcg by mouth.  Yes Reported, Patient   loratadine (CLARITIN) 10 MG tablet Take 1 tablet (10 mg) by mouth daily as needed for allergies Occasional use  Yes Ellis Ruiz MD   lovastatin (MEVACOR) 40 MG tablet TAKE ONE TABLET BY MOUTH EVERY NIGHT AT BEDTIME  Yes Ellis Ruiz MD   magnesium 100 MG CAPS Take 1 tablet by mouth daily   Yes Reported, Patient   Multiple Vitamins-Minerals (CENTRUM SILVER) per tablet Take 1 tablet by mouth daily  Yes Reported, Patient   naproxen (NAPROSYN) 500 MG tablet Take 500 mg by mouth 2 times daily as needed   Yes Reported, Patient   Psyllium (METAMUCIL PO) Take 1 capsule by mouth daily as needed    Reported, Patient

## 2018-05-09 NOTE — H&P (VIEW-ONLY)
[address] [address]  [phone]  Dept: [phone]    PRE-OP EVALUATION:  Today's date: 2018    Cecily Ivory (: 1940) presents for pre-operative evaluation assessment as requested by William Ramos MD.  She requires evaluation and anesthesia risk assessment prior to undergoing surgery/procedure for treatment of ARTHROPLASTY KNEE .    Proposed Surgery/ Procedure: Left Total Knee Arthroplasty   Date of Surgery/ Procedure: 18  Time of Surgery/ Procedure: 12:45 PM  Hospital/Surgical Facility:  OR  [phone]  Primary Physician: Ellis Ruiz  Type of Anesthesia Anticipated: Choice    Patient has a Health Care Directive or Living Will:  YES    The patient is having surgery as noted above.  She can do 4 mets.  She feels fine.                Past Medical History:      Past Medical History:   Diagnosis Date     Abdominal pain ,     ct liver and renal cyst and 2mm lung nodule, repeat ct done  and no significantly abnl.     Anxiety     added med 3/14     Arthritis      Carotid bruit     us nl     Gastritis     egd done by [place]     GERD (gastroesophageal reflux disease)      History of colonoscopy ,     nl     Hypercholesteremia 2000    stopped lovastatin , added lipitor 3/16     Insomnia     using otc      Lumbar spinal stenosis     Dr. Wilde, had epidural     Lung nodule ,     seen on ct for abd pain, fu done 3/13 and 2 nodules stable and benign, one new one needs fu 1 year.  fu done 3/14 and fu 1 year and then done; fu done 3/15 and unchanged, no fu needed     Odynophagia     egd nl     Other chronic pain      Peripheral neuropathies     Dr. Kim     Screening ,     nl dexa     Vaginal dysplasia     chronic VAIN I, many colpos of vagina.  Now we only do an annual pap of vagina, no colpo since stable long term             Past Surgical History:      Past Surgical  History:   Procedure Laterality Date     ANKLE SURGERY  2000     APPENDECTOMY  13     BREAST BIOPSY, CORE RT/LT       C VAGINAL HYSTERECTOMY  1995     GYN SURGERY      BSO     HC UGI ENDOSCOPY, SIMPLE EXAM  03/15/11    [facility]     LAPAROSCOPIC CHOLECYSTECTOMY  2008     NOSE SURGERY  1990     right knee arthroscopy  2007     thumb surgery Left 2/2015             Social History:     Social History   Substance Use Topics     Smoking status: Former Smoker     Packs/day: 1.50     Years: 20.00     Types: Cigarettes     Quit date: 2/7/1992     Smokeless tobacco: Never Used     Alcohol use 0.0 oz/week     0 Standard drinks or equivalent per week      Comment: 5-6 drinks weekly             Family History:   No family history of bleeding difficulty, anesthesia problems or blood clots.         Allergies:     Allergies   Allergen Reactions     Latex Cough     Seasonal Allergies      YEAR ROUND ALLERGIES     Sulfa Drugs Unknown             Medications:     Current Outpatient Prescriptions   Medication Sig Dispense Refill     amoxicillin (AMOXIL) 500 MG capsule Take 4 capsules (2,000 mg) by mouth once for 1 dose 4 capsule 3     Ascorbic Acid (VITAMIN C PO) Take 500 mg by mouth daily.       aspirin 81 MG tablet Take 1 tablet by mouth daily.  3     Biotin 5000 MCG CAPS Take 1 tablet by mouth daily        calcium carb 1250 mg, 500 mg [place],/vitamin D 200 units (OSCAL WITH D) 500-200 MG-UNIT per tablet Take 1 tablet by mouth 2 times daily (with meals). Takes 750mg daily. 100 tablet 3     Cholecalciferol (VITAMIN D3 PO) Take by mouth daily       Cyanocobalamin (VITAMIN B-12 PO) Take 1,000 mcg by mouth.       loratadine (CLARITIN) 10 MG tablet Take 1 tablet (10 mg) by mouth daily as needed for allergies Occasional use       lovastatin (MEVACOR) 40 MG tablet TAKE ONE TABLET BY MOUTH EVERY NIGHT AT BEDTIME 90 tablet 3     magnesium 100 MG CAPS Take 1 tablet by mouth daily        Multiple Vitamins-Minerals (CENTRUM  "SILVER) per tablet Take 1 tablet by mouth daily       naproxen (NAPROSYN) 500 MG tablet Take 500 mg by mouth 2 times daily as needed        Psyllium (METAMUCIL PO) Take 1 capsule by mouth daily as needed                  Review of Systems:   No history of bleeding difficulty, anesthesia problems or blood clots.  The 10 point Review of Systems is negative other than noted in the HPI           Physical Exam:   Blood pressure 120/62, pulse 69, temperature 98.3  F (36.8  C), temperature source Oral, height 5' 6.5\" (1.689 m), weight 148 lb (67.1 kg), SpO2 96 %, not currently breastfeeding.    Constitutional: healthy appearing, alert and in no distress  Heent: Normocephalic. Head without obvious masses or lesions. PERRLDC, EOMI. Mouth exam within normal limits: tongue, mucous membranes, posterior pharynx all normal, no lesions or abnormalities seen.  Tm's and canals within normal limits bilaterally. Neck supple, no nuchal rigidity or masses. No supraclavicular, or cervical adenopathy. Thyroid symmetric, no masses.  Cardiovascular: Regular rate and rhythm, no murmer, rub or gallops.  JVP not elevated, no edema.  Carotids within normal limits bilaterally, no bruits.  Respiratory: Normal respiratory effort.  Lungs clear, normal flow, no wheezing or crackles.  Gastrointestinal: Normal active bowel sounds.   Soft, not tender, no masses, guarding or rebound.  No hepatosplenomegaly.   Musculoskeletal: extremities normal, no gross deformities noted.  Skin: no suspicious lesions or rashes   Neurologic: Mental status within normal limits.  Speech fluent.  No gross motor abnormalities and gait intact.  Psychiatric: mentation appears normal and affect normal.         Data:   Labs done 3/18 and not repeated; ekg - nsr, lad, otherwise within normal limits.        Assessment:   1. Normal pre op exam for tka, the patient should be low risk for this procedure  2. Anxiety, controlled  3. Elevated cholesterol on statin         Plan:   The " patient is ok for the procedure  Coumadin or lmwh post op for dvt prophylaxis      Ellis Ruiz M.D.

## 2018-05-14 ENCOUNTER — ANESTHESIA EVENT (OUTPATIENT)
Dept: SURGERY | Facility: CLINIC | Age: 78
DRG: 470 | End: 2018-05-14
Payer: MEDICARE

## 2018-05-14 ENCOUNTER — HOSPITAL ENCOUNTER (INPATIENT)
Facility: CLINIC | Age: 78
LOS: 3 days | Discharge: NURSING, REHAB OR LONG-TERM CARE | DRG: 470 | End: 2018-05-17
Attending: ORTHOPAEDIC SURGERY | Admitting: ORTHOPAEDIC SURGERY
Payer: MEDICARE

## 2018-05-14 ENCOUNTER — APPOINTMENT (OUTPATIENT)
Dept: GENERAL RADIOLOGY | Facility: CLINIC | Age: 78
DRG: 470 | End: 2018-05-14
Attending: ORTHOPAEDIC SURGERY
Payer: MEDICARE

## 2018-05-14 ENCOUNTER — ANESTHESIA (OUTPATIENT)
Dept: SURGERY | Facility: CLINIC | Age: 78
DRG: 470 | End: 2018-05-14
Payer: MEDICARE

## 2018-05-14 DIAGNOSIS — Z96.659 STATUS POST TOTAL KNEE REPLACEMENT, UNSPECIFIED LATERALITY: Primary | ICD-10-CM

## 2018-05-14 PROBLEM — M17.9 DEGENERATIVE ARTHRITIS OF KNEE: Status: ACTIVE | Noted: 2018-05-14

## 2018-05-14 LAB — INR PPP: 1.06 (ref 0.86–1.14)

## 2018-05-14 PROCEDURE — A9270 NON-COVERED ITEM OR SERVICE: HCPCS | Mod: GY | Performed by: ANESTHESIOLOGY

## 2018-05-14 PROCEDURE — 37000009 ZZH ANESTHESIA TECHNICAL FEE, EACH ADDTL 15 MIN: Performed by: ORTHOPAEDIC SURGERY

## 2018-05-14 PROCEDURE — 27210794 ZZH OR GENERAL SUPPLY STERILE: Performed by: ORTHOPAEDIC SURGERY

## 2018-05-14 PROCEDURE — 37000008 ZZH ANESTHESIA TECHNICAL FEE, 1ST 30 MIN: Performed by: ORTHOPAEDIC SURGERY

## 2018-05-14 PROCEDURE — 25000128 H RX IP 250 OP 636: Performed by: NURSE ANESTHETIST, CERTIFIED REGISTERED

## 2018-05-14 PROCEDURE — 40000306 ZZH STATISTIC PRE PROC ASSESS II: Performed by: ORTHOPAEDIC SURGERY

## 2018-05-14 PROCEDURE — A9270 NON-COVERED ITEM OR SERVICE: HCPCS | Mod: GY | Performed by: ORTHOPAEDIC SURGERY

## 2018-05-14 PROCEDURE — 36415 COLL VENOUS BLD VENIPUNCTURE: CPT | Performed by: ORTHOPAEDIC SURGERY

## 2018-05-14 PROCEDURE — 85610 PROTHROMBIN TIME: CPT | Performed by: ORTHOPAEDIC SURGERY

## 2018-05-14 PROCEDURE — 25800025 ZZH RX 258: Performed by: ORTHOPAEDIC SURGERY

## 2018-05-14 PROCEDURE — 25000128 H RX IP 250 OP 636: Performed by: ORTHOPAEDIC SURGERY

## 2018-05-14 PROCEDURE — 36000093 ZZH SURGERY LEVEL 4 1ST 30 MIN: Performed by: ORTHOPAEDIC SURGERY

## 2018-05-14 PROCEDURE — 0SRD0J9 REPLACEMENT OF LEFT KNEE JOINT WITH SYNTHETIC SUBSTITUTE, CEMENTED, OPEN APPROACH: ICD-10-PCS | Performed by: ORTHOPAEDIC SURGERY

## 2018-05-14 PROCEDURE — 71000013 ZZH RECOVERY PHASE 1 LEVEL 1 EA ADDTL HR: Performed by: ORTHOPAEDIC SURGERY

## 2018-05-14 PROCEDURE — 25000125 ZZHC RX 250: Performed by: NURSE ANESTHETIST, CERTIFIED REGISTERED

## 2018-05-14 PROCEDURE — 25000128 H RX IP 250 OP 636: Performed by: ANESTHESIOLOGY

## 2018-05-14 PROCEDURE — C1776 JOINT DEVICE (IMPLANTABLE): HCPCS | Performed by: ORTHOPAEDIC SURGERY

## 2018-05-14 PROCEDURE — 40000985 XR KNEE PORT LT 1/2 VW: Mod: LT

## 2018-05-14 PROCEDURE — 27810169 ZZH OR IMPLANT GENERAL: Performed by: ORTHOPAEDIC SURGERY

## 2018-05-14 PROCEDURE — 12000007 ZZH R&B INTERMEDIATE

## 2018-05-14 PROCEDURE — 25000125 ZZHC RX 250: Performed by: ORTHOPAEDIC SURGERY

## 2018-05-14 PROCEDURE — 25000132 ZZH RX MED GY IP 250 OP 250 PS 637: Mod: GY | Performed by: ANESTHESIOLOGY

## 2018-05-14 PROCEDURE — 71000012 ZZH RECOVERY PHASE 1 LEVEL 1 FIRST HR: Performed by: ORTHOPAEDIC SURGERY

## 2018-05-14 PROCEDURE — 25000132 ZZH RX MED GY IP 250 OP 250 PS 637: Mod: GY | Performed by: ORTHOPAEDIC SURGERY

## 2018-05-14 PROCEDURE — 36000063 ZZH SURGERY LEVEL 4 EA 15 ADDTL MIN: Performed by: ORTHOPAEDIC SURGERY

## 2018-05-14 DEVICE — IMP COMP FEM STRK TRIATHLN CR LT 5 5510-F-501: Type: IMPLANTABLE DEVICE | Site: KNEE | Status: FUNCTIONAL

## 2018-05-14 DEVICE — BONE CEMENT RADIOPAQUE SIMPLEX HV FULL DOSE 6194-1-001: Type: IMPLANTABLE DEVICE | Site: KNEE | Status: FUNCTIONAL

## 2018-05-14 DEVICE — IMP BASEPLATE TIBIAL HOWM TRI 5 5520-B-500: Type: IMPLANTABLE DEVICE | Site: KNEE | Status: FUNCTIONAL

## 2018-05-14 DEVICE — IMP COMP PATELLA TRI 31X9MM 5550-L-319: Type: IMPLANTABLE DEVICE | Site: KNEE | Status: FUNCTIONAL

## 2018-05-14 DEVICE — IMP INSERT TIBIAL HOWM TRI 5X11MM 5531-G-511: Type: IMPLANTABLE DEVICE | Site: KNEE | Status: FUNCTIONAL

## 2018-05-14 RX ORDER — ONDANSETRON 4 MG/1
4 TABLET, ORALLY DISINTEGRATING ORAL EVERY 6 HOURS PRN
Status: DISCONTINUED | OUTPATIENT
Start: 2018-05-14 | End: 2018-05-17 | Stop reason: HOSPADM

## 2018-05-14 RX ORDER — FENTANYL CITRATE 50 UG/ML
25-50 INJECTION, SOLUTION INTRAMUSCULAR; INTRAVENOUS
Status: DISCONTINUED | OUTPATIENT
Start: 2018-05-14 | End: 2018-05-14 | Stop reason: HOSPADM

## 2018-05-14 RX ORDER — LIDOCAINE 40 MG/G
CREAM TOPICAL
Status: DISCONTINUED | OUTPATIENT
Start: 2018-05-14 | End: 2018-05-14 | Stop reason: HOSPADM

## 2018-05-14 RX ORDER — DOCUSATE SODIUM 100 MG/1
100 CAPSULE, LIQUID FILLED ORAL 2 TIMES DAILY
Status: DISCONTINUED | OUTPATIENT
Start: 2018-05-14 | End: 2018-05-17 | Stop reason: HOSPADM

## 2018-05-14 RX ORDER — WARFARIN SODIUM 5 MG/1
5 TABLET ORAL
Status: COMPLETED | OUTPATIENT
Start: 2018-05-14 | End: 2018-05-14

## 2018-05-14 RX ORDER — SODIUM CHLORIDE, SODIUM LACTATE, POTASSIUM CHLORIDE, CALCIUM CHLORIDE 600; 310; 30; 20 MG/100ML; MG/100ML; MG/100ML; MG/100ML
INJECTION, SOLUTION INTRAVENOUS CONTINUOUS
Status: DISCONTINUED | OUTPATIENT
Start: 2018-05-14 | End: 2018-05-14 | Stop reason: HOSPADM

## 2018-05-14 RX ORDER — DEXTROSE MONOHYDRATE, SODIUM CHLORIDE, AND POTASSIUM CHLORIDE 50; 1.49; 4.5 G/1000ML; G/1000ML; G/1000ML
INJECTION, SOLUTION INTRAVENOUS CONTINUOUS
Status: DISCONTINUED | OUTPATIENT
Start: 2018-05-14 | End: 2018-05-17 | Stop reason: HOSPADM

## 2018-05-14 RX ORDER — BUPIVACAINE HYDROCHLORIDE AND EPINEPHRINE 2.5; 5 MG/ML; UG/ML
30 INJECTION, SOLUTION EPIDURAL; INFILTRATION; INTRACAUDAL; PERINEURAL ONCE
Status: CANCELLED | OUTPATIENT
Start: 2018-05-14

## 2018-05-14 RX ORDER — METOPROLOL TARTRATE 1 MG/ML
1-2 INJECTION, SOLUTION INTRAVENOUS EVERY 5 MIN PRN
Status: DISCONTINUED | OUTPATIENT
Start: 2018-05-14 | End: 2018-05-14 | Stop reason: HOSPADM

## 2018-05-14 RX ORDER — METOCLOPRAMIDE 5 MG/1
5 TABLET ORAL EVERY 6 HOURS PRN
Status: DISCONTINUED | OUTPATIENT
Start: 2018-05-14 | End: 2018-05-17 | Stop reason: HOSPADM

## 2018-05-14 RX ORDER — ONDANSETRON 2 MG/ML
4 INJECTION INTRAMUSCULAR; INTRAVENOUS EVERY 30 MIN PRN
Status: DISCONTINUED | OUTPATIENT
Start: 2018-05-14 | End: 2018-05-14 | Stop reason: HOSPADM

## 2018-05-14 RX ORDER — PROCHLORPERAZINE MALEATE 5 MG
5 TABLET ORAL EVERY 6 HOURS PRN
Status: DISCONTINUED | OUTPATIENT
Start: 2018-05-14 | End: 2018-05-17 | Stop reason: HOSPADM

## 2018-05-14 RX ORDER — METOCLOPRAMIDE HYDROCHLORIDE 5 MG/ML
5 INJECTION INTRAMUSCULAR; INTRAVENOUS EVERY 6 HOURS PRN
Status: DISCONTINUED | OUTPATIENT
Start: 2018-05-14 | End: 2018-05-17 | Stop reason: HOSPADM

## 2018-05-14 RX ORDER — LIDOCAINE HYDROCHLORIDE 10 MG/ML
INJECTION, SOLUTION INFILTRATION; PERINEURAL PRN
Status: DISCONTINUED | OUTPATIENT
Start: 2018-05-14 | End: 2018-05-14

## 2018-05-14 RX ORDER — ONDANSETRON 2 MG/ML
4 INJECTION INTRAMUSCULAR; INTRAVENOUS EVERY 6 HOURS PRN
Status: DISCONTINUED | OUTPATIENT
Start: 2018-05-14 | End: 2018-05-17 | Stop reason: HOSPADM

## 2018-05-14 RX ORDER — BUPIVACAINE HYDROCHLORIDE AND EPINEPHRINE 2.5; 5 MG/ML; UG/ML
30 INJECTION, SOLUTION EPIDURAL; INFILTRATION; INTRACAUDAL; PERINEURAL ONCE
Status: COMPLETED | OUTPATIENT
Start: 2018-05-14 | End: 2018-05-14

## 2018-05-14 RX ORDER — ACETAMINOPHEN 325 MG/1
975 TABLET ORAL EVERY 8 HOURS
Status: DISCONTINUED | OUTPATIENT
Start: 2018-05-15 | End: 2018-05-17 | Stop reason: HOSPADM

## 2018-05-14 RX ORDER — HYDROMORPHONE HYDROCHLORIDE 1 MG/ML
.3-.5 INJECTION, SOLUTION INTRAMUSCULAR; INTRAVENOUS; SUBCUTANEOUS EVERY 10 MIN PRN
Status: DISCONTINUED | OUTPATIENT
Start: 2018-05-14 | End: 2018-05-14 | Stop reason: HOSPADM

## 2018-05-14 RX ORDER — ACETAMINOPHEN 325 MG/1
650 TABLET ORAL EVERY 4 HOURS PRN
Status: DISCONTINUED | OUTPATIENT
Start: 2018-05-17 | End: 2018-05-17 | Stop reason: HOSPADM

## 2018-05-14 RX ORDER — KETOROLAC TROMETHAMINE 15 MG/ML
15 INJECTION, SOLUTION INTRAMUSCULAR; INTRAVENOUS EVERY 6 HOURS
Status: COMPLETED | OUTPATIENT
Start: 2018-05-14 | End: 2018-05-15

## 2018-05-14 RX ORDER — NALOXONE HYDROCHLORIDE 0.4 MG/ML
.1-.4 INJECTION, SOLUTION INTRAMUSCULAR; INTRAVENOUS; SUBCUTANEOUS
Status: DISCONTINUED | OUTPATIENT
Start: 2018-05-14 | End: 2018-05-17 | Stop reason: HOSPADM

## 2018-05-14 RX ORDER — HYDROXYZINE HYDROCHLORIDE 10 MG/1
10 TABLET, FILM COATED ORAL EVERY 6 HOURS PRN
Status: DISCONTINUED | OUTPATIENT
Start: 2018-05-14 | End: 2018-05-17 | Stop reason: HOSPADM

## 2018-05-14 RX ORDER — DEXAMETHASONE SODIUM PHOSPHATE 10 MG/ML
INJECTION INTRAMUSCULAR; INTRAVENOUS PRN
Status: DISCONTINUED | OUTPATIENT
Start: 2018-05-14 | End: 2018-05-14 | Stop reason: HOSPADM

## 2018-05-14 RX ORDER — ONDANSETRON 4 MG/1
4 TABLET, ORALLY DISINTEGRATING ORAL EVERY 30 MIN PRN
Status: DISCONTINUED | OUTPATIENT
Start: 2018-05-14 | End: 2018-05-14 | Stop reason: HOSPADM

## 2018-05-14 RX ORDER — NALOXONE HYDROCHLORIDE 0.4 MG/ML
.1-.4 INJECTION, SOLUTION INTRAMUSCULAR; INTRAVENOUS; SUBCUTANEOUS
Status: DISCONTINUED | OUTPATIENT
Start: 2018-05-14 | End: 2018-05-14 | Stop reason: HOSPADM

## 2018-05-14 RX ORDER — OXYCODONE HYDROCHLORIDE 5 MG/1
5-10 TABLET ORAL EVERY 4 HOURS PRN
Status: DISCONTINUED | OUTPATIENT
Start: 2018-05-14 | End: 2018-05-17 | Stop reason: HOSPADM

## 2018-05-14 RX ORDER — CEFAZOLIN SODIUM 1 G/50ML
1 INJECTION, SOLUTION INTRAVENOUS EVERY 8 HOURS
Status: COMPLETED | OUTPATIENT
Start: 2018-05-14 | End: 2018-05-15

## 2018-05-14 RX ORDER — PROMETHAZINE HYDROCHLORIDE 25 MG/ML
6.25 INJECTION, SOLUTION INTRAMUSCULAR; INTRAVENOUS
Status: DISCONTINUED | OUTPATIENT
Start: 2018-05-14 | End: 2018-05-14 | Stop reason: HOSPADM

## 2018-05-14 RX ORDER — CEFAZOLIN SODIUM 1 G/3ML
1 INJECTION, POWDER, FOR SOLUTION INTRAMUSCULAR; INTRAVENOUS SEE ADMIN INSTRUCTIONS
Status: DISCONTINUED | OUTPATIENT
Start: 2018-05-14 | End: 2018-05-14 | Stop reason: HOSPADM

## 2018-05-14 RX ORDER — ALBUTEROL SULFATE 0.83 MG/ML
2.5 SOLUTION RESPIRATORY (INHALATION) EVERY 4 HOURS PRN
Status: DISCONTINUED | OUTPATIENT
Start: 2018-05-14 | End: 2018-05-14 | Stop reason: HOSPADM

## 2018-05-14 RX ORDER — CEFAZOLIN SODIUM 2 G/100ML
2 INJECTION, SOLUTION INTRAVENOUS
Status: COMPLETED | OUTPATIENT
Start: 2018-05-14 | End: 2018-05-14

## 2018-05-14 RX ORDER — ACETAMINOPHEN 500 MG
1000 TABLET ORAL ONCE
Status: COMPLETED | OUTPATIENT
Start: 2018-05-14 | End: 2018-05-14

## 2018-05-14 RX ORDER — PROPOFOL 10 MG/ML
INJECTION, EMULSION INTRAVENOUS CONTINUOUS PRN
Status: DISCONTINUED | OUTPATIENT
Start: 2018-05-14 | End: 2018-05-14

## 2018-05-14 RX ORDER — HYDROMORPHONE HYDROCHLORIDE 1 MG/ML
.3-.5 INJECTION, SOLUTION INTRAMUSCULAR; INTRAVENOUS; SUBCUTANEOUS
Status: DISCONTINUED | OUTPATIENT
Start: 2018-05-14 | End: 2018-05-17 | Stop reason: HOSPADM

## 2018-05-14 RX ORDER — LIDOCAINE 40 MG/G
CREAM TOPICAL
Status: DISCONTINUED | OUTPATIENT
Start: 2018-05-14 | End: 2018-05-17 | Stop reason: HOSPADM

## 2018-05-14 RX ORDER — MEPERIDINE HYDROCHLORIDE 50 MG/ML
12.5 INJECTION INTRAMUSCULAR; INTRAVENOUS; SUBCUTANEOUS
Status: DISCONTINUED | OUTPATIENT
Start: 2018-05-14 | End: 2018-05-14 | Stop reason: HOSPADM

## 2018-05-14 RX ADMIN — LIDOCAINE HYDROCHLORIDE 30 MG: 10 INJECTION, SOLUTION INFILTRATION; PERINEURAL at 13:24

## 2018-05-14 RX ADMIN — CEFAZOLIN SODIUM 2 G: 2 INJECTION, SOLUTION INTRAVENOUS at 13:16

## 2018-05-14 RX ADMIN — MIDAZOLAM 1 MG: 1 INJECTION INTRAMUSCULAR; INTRAVENOUS at 13:22

## 2018-05-14 RX ADMIN — HYDROMORPHONE HYDROCHLORIDE 0.5 MG: 1 INJECTION, SOLUTION INTRAMUSCULAR; INTRAVENOUS; SUBCUTANEOUS at 17:14

## 2018-05-14 RX ADMIN — FENTANYL CITRATE 50 MCG: 50 INJECTION INTRAMUSCULAR; INTRAVENOUS at 17:31

## 2018-05-14 RX ADMIN — FENTANYL CITRATE 50 MCG: 50 INJECTION INTRAMUSCULAR; INTRAVENOUS at 15:40

## 2018-05-14 RX ADMIN — HYDROMORPHONE HYDROCHLORIDE 0.5 MG: 1 INJECTION, SOLUTION INTRAMUSCULAR; INTRAVENOUS; SUBCUTANEOUS at 16:32

## 2018-05-14 RX ADMIN — CEFAZOLIN SODIUM 1 G: 1 INJECTION, SOLUTION INTRAVENOUS at 21:50

## 2018-05-14 RX ADMIN — SODIUM CHLORIDE, POTASSIUM CHLORIDE, SODIUM LACTATE AND CALCIUM CHLORIDE: 600; 310; 30; 20 INJECTION, SOLUTION INTRAVENOUS at 13:47

## 2018-05-14 RX ADMIN — Medication 1 G: at 13:18

## 2018-05-14 RX ADMIN — Medication 1 G: at 14:36

## 2018-05-14 RX ADMIN — ACETAMINOPHEN 1000 MG: 500 TABLET, FILM COATED ORAL at 17:59

## 2018-05-14 RX ADMIN — KETOROLAC TROMETHAMINE 15 MG: 15 INJECTION, SOLUTION INTRAMUSCULAR; INTRAVENOUS at 20:11

## 2018-05-14 RX ADMIN — HYDROMORPHONE HYDROCHLORIDE 0.5 MG: 1 INJECTION, SOLUTION INTRAMUSCULAR; INTRAVENOUS; SUBCUTANEOUS at 15:57

## 2018-05-14 RX ADMIN — SODIUM CHLORIDE, POTASSIUM CHLORIDE, SODIUM LACTATE AND CALCIUM CHLORIDE: 600; 310; 30; 20 INJECTION, SOLUTION INTRAVENOUS at 12:15

## 2018-05-14 RX ADMIN — WARFARIN SODIUM 5 MG: 5 TABLET ORAL at 20:11

## 2018-05-14 RX ADMIN — PROPOFOL 25 MCG/KG/MIN: 10 INJECTION, EMULSION INTRAVENOUS at 13:23

## 2018-05-14 RX ADMIN — POTASSIUM CHLORIDE, DEXTROSE MONOHYDRATE AND SODIUM CHLORIDE: 150; 5; 450 INJECTION, SOLUTION INTRAVENOUS at 18:48

## 2018-05-14 RX ADMIN — FENTANYL CITRATE 50 MCG: 50 INJECTION INTRAMUSCULAR; INTRAVENOUS at 15:03

## 2018-05-14 RX ADMIN — DOCUSATE SODIUM 100 MG: 100 CAPSULE, LIQUID FILLED ORAL at 20:11

## 2018-05-14 RX ADMIN — MIDAZOLAM 1 MG: 1 INJECTION INTRAMUSCULAR; INTRAVENOUS at 13:16

## 2018-05-14 RX ADMIN — FENTANYL CITRATE 50 MCG: 50 INJECTION INTRAMUSCULAR; INTRAVENOUS at 15:22

## 2018-05-14 RX ADMIN — FENTANYL CITRATE 50 MCG: 50 INJECTION INTRAMUSCULAR; INTRAVENOUS at 15:08

## 2018-05-14 ASSESSMENT — LIFESTYLE VARIABLES: TOBACCO_USE: 1

## 2018-05-14 NOTE — OP NOTE
[facility]  Daily Post-Op Note    Cemented Total Knee Arthroplasty - Female    Cecily Ivory MRN# [id number]   YOB: 1940 Age: 77 year old   Procedure Date:  5/14/2018       PREOPERATIVE DIAGNOSIS:   Degenerative osteoarthritis, primary,left knee.    POSTOPERATIVE DIAGNOSIS:   Degenerative osteoarthritis, primary, left knee.    OPERATIVE PROCEDURE:   left total knee arthroplasty.    SURGEON:  William Pollard M.D.    FIRST ASSISTANT:  Reese Brown PA-C.    ANESTHESIA:  Block with general.    ANESTHESIOLOGIST:     INDICATIONS:  Ms. Cecily Ivory is a 77 year old-year-old woman with advanced primary degenerative osteoarthritis involving the medial and patellofemoral compartments of her left knee.  She has failed nonoperative care with anti-inflammatory drugs, corticosteroid injections, and reduced activity.  She was brought to surgery for total knee arthroplasty.    PROCEDURE:  After obtaining informed surgical consent, the patient was brought to the operating room.  2 grams of Ancef was administered intravenously one hour prior to surgery and will be discontinued within 24 hours.  Coumadin was administered preoperatively. She was given both the block and a general anesthetic.  The left leg was prepped and draped in the usual sterile fashion.  Following exsanguination, the tourniquet was inflated.  A midline incision was made over the patella.  A medial parapatellar incision was used to enter the joint.  The menisci, ACL, and osteophytes were debrided and discarded.  The intramedullary canal of the femur was opened.  The intramedullary cutting system was applied.  The distal femoral cut was made at 5 degrees of valgus and 8 mm of resection.  She had a significantly valgus stress and more bone was taken medially than laterally.  Jig #2 was applied and appropriate drill holes neutrally aligned at the epicondylar notch were created.  A size # 5-universal cutting block was then  positioned on the distal femur.  The anterior, posterior, and chamfer cuts were completed.  The chamfer bone was then used to bone graft the femoral tunnel.  The proximal tibia was exposed.  The posterior cruciate ligament was spared on a bone island.  The tibia was resected and neutral varus, valgus, 5 degrees of posterior slope.  A size # 5 tray covered nicely.  The tray was neutrally aligned at the femoral component.  Proximal tibia was then punched.  The underside of the patella was then resected and a # 31 template was used to create drill holes.  The knee was irrigated with antibiotic irrigating solution.  All components were implanted with the use of cement.  Ultimately, an 11-mm tibial bearing was implanted.  The knee was stable throughout and full range of motion.  The wound was irrigated thoroughly and closed in layers over drains with #1 Vicryl, 2-0 Vicryl, and staples.  Sterile dressing was applied.  The tourniquet was deflated.  No intraoperative complications.  Blood loss was minimal.  Sponge and needle counts were correct.    The patient returned to the recovery room in stable condition.    William Pollard M.D.

## 2018-05-14 NOTE — PROGRESS NOTES
Arrived to room 605 from PACU at 1845 via cart, transferred to bed via hover mat without difficulty, alert and oriented x 4, oriented to room and call system, dressing CDI, CMS intact, IV patent and infusing, simental patent, rates pain 4/10, reviewed welcome folder and pain/medication information packet with patient and daughter. SCD's on BLE. Ervin ice chips well. Frequent VS started.

## 2018-05-14 NOTE — ANESTHESIA POSTPROCEDURE EVALUATION
Patient: Cecily Ivory    Procedure(s):  Left Total Knee Arthroplasty    - Wound Class: I-Clean    Diagnosis:DJD  Diagnosis Additional Information: Degenerative osteoarthritis, primary, left knee.    Anesthesia Type:  Spinal, Periph. Nerve Block for postop pain    Note:  Anesthesia Post Evaluation    Patient location during evaluation: PACU  Patient participation: Able to fully participate in evaluation  Level of consciousness: awake  Pain management: adequate  Airway patency: patent  Cardiovascular status: acceptable  Respiratory status: acceptable  Hydration status: acceptable  PONV: controlled     Anesthetic complications: None          Last vitals:  Vitals:    05/14/18 1445 05/14/18 1450 05/14/18 1503   BP: 129/74 126/78 140/71   Resp: 19 26    Temp:      SpO2: 95% 97% 97%         Electronically Signed By: Forrest Velazquez DO  May 14, 2018  3:18 PM

## 2018-05-14 NOTE — ANESTHESIA PROCEDURE NOTES
Peripheral nerve/Neuraxial procedure note : Femoral  Pre-Procedure  Performed by PEGGY VELAZQUEZ  Referred by JODEE SWEENEY MD  Location: pre-op    Procedure Times:5/14/2018 12:52 PM and 5/14/2018 1:01 PM  Pre-Anesthestic Checklist: patient identified, IV checked, site marked, risks and benefits discussed, informed consent, monitors and equipment checked, pre-op evaluation, at physician/surgeon's request and post-op pain management    Timeout  Correct Patient: Yes   Correct Procedure: Yes   Correct Site: Yes   Correct Laterality: Yes   Correct Position: Yes   Site Marked: Yes   .   Procedure Documentation    .    Procedure:  left  Femoral.  Insertion Site:L2-3 Local skin infiltrated with mL of 1% lidocaine.     Ultrasound used to identify targeted nerve, plexus, or vascular marker and placed a needle adjacent to it., Ultrasound was used to visualize the spread of the anesthetic in close proximity to the above stated nerve.   Patient Prep;mask, sterile gloves, povidone-iodine 7.5% surgical scrub.  Nerve Stim: Initial Level 1 mA. Lowest motor response mA..  Needle: insulated  Needle Length (Inches) 3.13  Insertion Method: Single Shot.       Assessment/Narrative  Paresthesias: No.  .  The placement was negative for: blood aspirated, painful injection and site bleeding.  Bolus given via needle..   Secured via.   Complications: none. Comments:  .The surgeon has given a verbal order transferring care of this patient to me for the performance of a regional analgesia block for post-op pain control. It is requested of me because I am uniquely trained and qualified to perform this block and the surgeon is neither trained nor qualified to perform this procedure.  .Femoral Nerve Block    Medication- Bupivicaine 0.75% 16cc + Lido 2% w/ epi 1:200k 4cc    RODGER Velazquez

## 2018-05-14 NOTE — IP AVS SNAPSHOT
` `     [facility] ORTHO SPINE: [phone]            Medication Administration Report for Cecily Ivory as of 18 1414   Legend:    Given Hold Not Given Due Canceled Entry Other Actions    Time Time (Time) Time  Time-Action       Inactive    Active    Linked        Medications 05/11/18 05/12/18 05/13/18 05/14/18 05/15/18 05/16/18 05/17/18    acetaminophen (TYLENOL) tablet 650 mg  Dose: 650 mg  Freq: EVERY 4 HOURS PRN Route: PO  PRN Reason: other  PRN Comment: multimodal surgical pain management along with NSAIDS and opioid medication as indicated based on pain control and physical function.  Start: 18 0000   Admin Instructions: May give first dose 4 hours after last scheduled dose of acetaminophen.  Maximum acetaminophen dose from all sources = 75 mg/kg/day not to exceed 4 grams/day.    Admin. Amount: 2 tablet (2 × 325 mg tablet)  Dispense Loc:  [facility]  POC: Post-procedure               acetaminophen (TYLENOL) tablet 975 mg  Dose: 975 mg  Freq: EVERY 8 HOURS Route: PO  Start: 05/15/18 0200   End: 18 0159   Admin Instructions: Do not use if patient has an active opioid/acetaminophen combined analgesic product ordered for pain.  Maximum acetaminophen dose from all sources = 75 mg/kg/day not to exceed 4 grams/day.    Admin. Amount: 3 tablet (3 × 325 mg tablet)  Last Admin: 18 1158  Dispense Loc:  [facility]  Administrations Remainin  POC: Post-procedure         (0415)-Not Given       1020 (975 mg)-Given       1755 (975 mg)-Given        0140 (975 mg)-Given       0942 (975 mg)-Given       1737 (975 mg)-Given        0149 (975 mg)-Given       1158 (975 mg)-Given       [ ] 1800           ascorbic acid (VITAMIN C) tablet 500 mg  Dose: 500 mg  Freq: DAILY Route: PO  Start: 05/15/18 1415   Admin. Amount: 1 tablet (1 × 500 mg tablet)  Last Admin: 18 0903  Dispense Loc:  [facility]         1756 (500 mg)-Given        0830 (500 mg)-Given        0903 (500 mg)-Given            benzocaine-menthol (CHLORASEPTIC) 6-10 MG lozenge 1-2 lozenge  Dose: 1-2 lozenge  Freq: EVERY 1 HOUR PRN Route: BU  PRN Reason: sore throat  PRN Comment: sore throat without fever  Start: 05/14/18 1838   Admin. Amount: 1-2 lozenge  Dispense Loc: [facility]  POC: Post-procedure               Biotin CAPS 1 capsule  Dose: 1 capsule  Freq: DAILY Route: PO  Start: 05/16/18 0800   Admin Instructions: Pt to use own supply*    Last Admin: 05/17/18 0800  Dispense Loc:  Main Pharmacy          0830 (1 capsule)-Given [C]        0800 (1 capsule)-Given [C]           Calcium carb-Vitamin D 500 mg Pala-200 units (OSCAL with D;Oyster Shell Calcium) per tablet 1 tablet  Dose: 1 tablet  Freq: 2 TIMES DAILY WITH MEALS Route: PO  Start: 05/15/18 1800   Admin. Amount: 1 tablet  Last Admin: 05/17/18 0904  Dispense Loc: [facility]         1756 (1 tablet)-Given        0830 (1 tablet)-Given       1738 (1 tablet)-Given        0904 (1 tablet)-Given       [ ] 1800           cholecalciferol (vitamin D3) tablet 1,000 Units  Dose: 1,000 Units  Freq: DAILY Route: PO  Start: 05/15/18 1415   Admin. Amount: 1 tablet (1 × 1,000 Units tablet)  Last Admin: 05/17/18 0903  Dispense Loc: [facility]         1756 (1,000 Units)-Given        0830 (1,000 Units)-Given        0903 (1,000 Units)-Given           cyanocobalamin (vitamin  B-12) tablet 1,000 mcg  Dose: 1,000 mcg  Freq: DAILY Route: PO  Start: 05/15/18 1415   Admin. Amount: 1 tablet (1 × 1,000 mcg tablet)  Last Admin: 05/17/18 0903  Dispense Loc: [facility]         1756 (1,000 mcg)-Given        0830 (1,000 mcg)-Given        0903 (1,000 mcg)-Given           dextrose 5% and 0.45% NaCl + KCl 20 mEq/L infusion  Rate: 75 mL/hr   Freq: CONTINUOUS Route: IV  Start: 05/14/18 1845   Admin Instructions: Change to saline lock when PO well tolerated.    Last Admin: 05/15/18 0700  Dispense Loc: [facility] Floor Stock  Volume: 1,000 mL  POC: Post-procedure        1848 ( )-New Bag        0000 ( )-Rate/Dose Verify        0700 ( )-Rate/Dose Verify             docusate sodium (COLACE) capsule 100 mg  Dose: 100 mg  Freq: 2 TIMES DAILY Route: PO  Start: 05/14/18 2000   Admin Instructions: To prevent constipation.  Hold for loose stools.    Admin. Amount: 1 capsule (1 × 100 mg capsule)  Last Admin: 05/17/18 0903  Dispense Loc:  [facility]  POC: Post-procedure        2011 (100 mg)-Given        0805 (100 mg)-Given       2020 (100 mg)-Given        0830 (100 mg)-Given       1955 (100 mg)-Given        0903 (100 mg)-Given       [ ] 2000           HYDROmorphone (PF) (DILAUDID) injection 0.3-0.5 mg  Dose: 0.3-0.5 mg  Freq: EVERY 2 HOURS PRN Route: IV  PRN Reason: other  PRN Comment: pain control or improvement in physical function. Hold dose for analgesic side effects.  Start: 05/14/18 1838   Admin Instructions: Start at the lowest dose.  May adjust dose by 0.1 mg every 2 hours as needed.   Notify provider to assess for uncontrolled pain or analgesic side effects.  Hold while on IV PCA or with regular IV opioid dosing.<br><br>Give IF unable to tolerate oral option for pain control<br>  For ordered doses up to 4 mg give IV Push undiluted. Administer each 2mg over 2-5 minutes.    Admin. Amount: 0.3-0.5 mg  Last Admin: 05/16/18 1008  Dispense Loc:  [facility]  POC: Post-procedure         0026 (0.5 mg)-Given       0638 (0.5 mg)-Given       1926 (0.5 mg)-Given        1008 (0.5 mg)-Given            hydrOXYzine (ATARAX) tablet 10 mg  Dose: 10 mg  Freq: EVERY 6 HOURS PRN Route: PO  PRN Reason: itching  Start: 05/14/18 1838   Admin Instructions: Caution to be used when administering multiple CNS depressing meds within a short time frame.    Admin. Amount: 1 tablet (1 × 10 mg tablet)  Last Admin: 05/17/18 1348  Dispense Loc:  [facility]  POC: Post-procedure         2020 (10 mg)-Given        0223 (10 mg)-Given       0745 (10 mg)-Given       1347 (10 mg)-Given       1955 (10 mg)-Given        0149 (10 mg)-Given       1348 (10 mg)-Given           lidocaine  "(LMX4) kit  Freq: EVERY 1 HOUR PRN Route: Top  PRN Reason: pain  PRN Comment: with VAD insertion or accessing implanted port.  Start: 05/14/18 1838   Admin Instructions: Do NOT give if patient has a history of allergy to any local anesthetic or any \"samira\" product.   Apply 30 minutes prior to VAD insertion or port access.  MAX Dose:  2.5 g (  of 5 g tube)    Dispense Loc:  Main Pharmacy  POC: Post-procedure               lidocaine 1 % 1 mL  Dose: 1 mL  Freq: EVERY 1 HOUR PRN Route: OTHER  PRN Comment: mild pain with VAD insertion or accessing implanted port  Start: 05/14/18 1838   Admin Instructions: Do NOT give if patient has a history of allergy to any local anesthetic or any \"samira\" product. MAX dose 1 mL subcutaneous OR intradermal in divided doses.    Admin. Amount: 1 mL  Dispense Loc: [facility] Floor Stock  Volume: 2 mL  POC: Post-procedure               lovastatin (MEVACOR) tablet 40 mg  Dose: 40 mg  Freq: AT BEDTIME Route: PO  Start: 05/15/18 2200   Admin. Amount: 2 tablet (2 × 20 mg tablet)  Last Admin: 05/16/18 2157  Dispense Loc:  [facility]         2021 (40 mg)-Given               2157 (40 mg)-Given        [ ] 2200           metoclopramide (REGLAN) tablet 5 mg  Dose: 5 mg  Freq: EVERY 6 HOURS PRN Route: PO  PRN Reasons: nausea,vomiting  Start: 05/14/18 1838   Admin Instructions: This is Step 3 of nausea and vomiting management.  Give if nausea not resolved 15 minutes after giving prochlorperazine (COMPAZINE).  If nausea not resolved in 15-30 minutes, Notify provider.    Admin. Amount: 1 tablet (1 × 5 mg tablet)  Dispense Loc:  Main Pharmacy  POC: Post-procedure              Or  metoclopramide (REGLAN) injection 5 mg  Dose: 5 mg  Freq: EVERY 6 HOURS PRN Route: IV  PRN Reasons: nausea,vomiting  Start: 05/14/18 1838   Admin Instructions: This is Step 3 of nausea and vomiting management.  Give if nausea not resolved 15 minutes after giving prochlorperazine (COMPAZINE).  If nausea not resolved in 15-30 minutes, " Notify provider.  Avoid use if patient has full bowel obstruction or perforation. Irritant. For ordered doses up to 10 mg, give IV Push undiluted over 2 minutes.    Admin. Amount: 5 mg = 1 mL Conc: 5 mg/mL  Dispense Loc: [facility]  Infused Over: 2 Minutes  Volume: 2 mL  POC: Post-procedure               naloxone (NARCAN) injection 0.1-0.4 mg  Dose: 0.1-0.4 mg  Freq: EVERY 2 MIN PRN Route: IV  PRN Reason: opioid reversal  Start: 05/14/18 1838   Admin Instructions: For respiratory rate LESS than or EQUAL to 8.  Partial reversal dose:  0.1 mg titrated q 2 minutes for Analgesia Side Effects Monitoring Sedation Level of 3 (frequently drowsy, arousable, drifts to sleep during conversation).Full reversal dose:  0.4 mg bolus for Analgesia Side Effects Monitoring Sedation Level of 4 (somnolent, minimal or no response to stimulation).  For ordered doses up to 2mg give IVP. Give each 0.4mg over 15 seconds in emergency situations. For non-emergent situations further dilute in 9mL of NS to facilitate titration of response.    Admin. Amount: 0.1-0.4 mg = 0.25-1 mL Conc: 0.4 mg/mL  Dispense Loc: [facility]  Volume: 1 mL  POC: Post-procedure               ondansetron (ZOFRAN-ODT) ODT tab 4 mg  Dose: 4 mg  Freq: EVERY 6 HOURS PRN Route: PO  PRN Reasons: nausea,vomiting  Start: 05/14/18 1838   Admin Instructions: This is Step 1 of nausea and vomiting management.  If nausea not resolved in 15 minutes, go to Step 2 prochlorperazine (COMPAZINE). Do not push through foil backing. Peel back foil and gently remove. Place on tongue immediately. Administration with liquid unnecessary  With dry hands, peel back foil backing and gently remove tablet; do not push oral disintegrating tablet through foil backing; administer immediately on tongue and oral disintegrating tablet dissolves in seconds; then swallow with saliva; liquid not required.    Admin. Amount: 1 tablet (1 × 4 mg tablet)  Dispense Loc: [facility]  POC: Post-procedure                      Or  ondansetron (ZOFRAN) injection 4 mg  Dose: 4 mg  Freq: EVERY 6 HOURS PRN Route: IV  PRN Reasons: nausea,vomiting  Start: 05/14/18 1838   Admin Instructions: This is Step 1 of nausea and vomiting management.  If nausea not resolved in 15 minutes, go to Step 2 prochlorperazine (COMPAZINE).  Irritant. For ordered doses up to 4 mg, give IV Push undiluted over 2-5 minutes.    Admin. Amount: 4 mg = 2 mL Conc: 4 mg/2 mL  Last Admin: 05/15/18 0026  Dispense Loc: [facility]  Infused Over: 2-5 Minutes  Volume: 2 mL  POC: Post-procedure         0026 (4 mg)-Given             oxyCODONE IR (ROXICODONE) tablet 5-10 mg  Dose: 5-10 mg  Freq: EVERY 4 HOURS PRN Route: PO  PRN Reason: other  PRN Comment: pain control or improvement in physical function. Hold dose for analgesic side effects.  Start: 05/14/18 1838   Admin Instructions: Start with the lowest dose. May adjust dose by 5 mg every 4 hours as needed. Notify provider to assess for uncontrolled pain or analgesic side effects. Hold while on PCA or with regular IV opioid dosing. Maximum total is 60 mg in 24 hours.    Admin. Amount: 1-2 tablet (1-2 × 5 mg tablet)  Last Admin: 05/17/18 1348  Dispense Loc: [facility]  POC: Post-procedure         1712 (5 mg)-Given       2129 (10 mg)-Given        0140 (5 mg)-Given       0712 (10 mg)-Given       1000 (10 mg)-Given       1347 (10 mg)-Given       1738 (10 mg)-Given       2157 (10 mg)-Given        0149 (10 mg)-Given       0904 (10 mg)-Given       1348 (5 mg)-Given           prochlorperazine (COMPAZINE) injection 5 mg  Dose: 5 mg  Freq: EVERY 6 HOURS PRN Route: IV  PRN Reasons: nausea,vomiting  Start: 05/14/18 1838   Admin Instructions: This is Step 2 of nausea and vomiting management.   If nausea not resolved in 15 minutes, give metoclopramide (REGLAN) if ordered (step 3 of nausea and vomiting management)  For ordered doses up to 10 mg, give IV Push undiluted. Each 5mg over 1 minute.    Admin. Amount: 5 mg = 1 mL Conc: 5  mg/mL  Dispense Loc: [facility]  Infused Over: 1-2 Minutes  Volume: 1 mL  POC: Post-procedure              Or  prochlorperazine (COMPAZINE) tablet 5 mg  Dose: 5 mg  Freq: EVERY 6 HOURS PRN Route: PO  PRN Reasons: nausea,vomiting  Start: 05/14/18 1838   Admin Instructions: This is Step 2 of nausea and vomiting management.   If nausea not resolved in 15 minutes, give metoclopramide (REGLAN) if ordered (step 3 of nausea and vomiting management)    Admin. Amount: 1 tablet (1 × 5 mg tablet)  Dispense Loc: [facility]  POC: Post-procedure               psyllium (METAMUCIL/KONSYL) Packet 1 packet  Dose: 1 packet  Freq: DAILY Route: PO  Start: 05/15/18 1400   Admin Instructions: Powder should be diluted with fluid before given.    Admin. Amount: 1 packet  Last Admin: 05/17/18 0906  Dispense Loc: [facility]         1509 (1 packet)-Given        0829 (1 packet)-Given        0906 (1 packet)-Given           sodium chloride (PF) 0.9% PF flush 3 mL  Dose: 3 mL  Freq: EVERY 8 HOURS Route: IK  Start: 05/14/18 1845   Admin Instructions: And Q1H PRN, to lock peripheral IV dormant line.    Admin. Amount: 3 mL  Last Admin: 05/17/18 0150  Dispense Loc: [facility] Floor Stock  Volume: 4 mL  POC: Post-procedure        (1850)-Not Given               0810 (3 mL)-Given              1322 (3 mL)-Given       1325 (3 mL)-Given       1758 (3 mL)-Given        0141 (3 mL)-Given              0832 (3 mL)-Given       1008 (3 mL)-Given              1958 (3 mL)-Given        0150 (3 mL)-Given       (1147)-Not Given       [ ] 1845           sodium chloride (PF) 0.9% PF flush 3 mL  Dose: 3 mL  Freq: EVERY 1 HOUR PRN Route: IK  PRN Reason: line flush  PRN Comment: for peripheral IV flush post IV meds  Start: 05/14/18 1838   Admin. Amount: 3 mL  Last Admin: 05/15/18 1926  Dispense Loc: [facility] Floor Stock  Volume: 4 mL  POC: Post-procedure         1926 (3 mL)-Given             Warfarin Therapy Reminder (Check START DATE - warfarin may be starting in the FUTURE)  Dose:  1 each  Freq: CONTINUOUS PRN Route: XX  Start: 18 1838   Admin Instructions: Reorder warfarin (COUMADIN) daily  Patient is on Warfarin Therapy - check for daily order    Admin. Amount: 1 each  Dispense Loc:  Main Pharmacy  POC: Post-procedure               zolpidem (AMBIEN) half-tab 2.5 mg  Dose: 2.5 mg  Freq: AT BEDTIME PRN Route: PO  PRN Reason: sleep  Start: 05/15/18 2000   Admin Instructions: POD 1.  Do not give unless at least 6 hours of uninterrupted sleep is expected.    Admin. Amount: 1 half-tab (1 × 2.5 mg half-tab)  Last Admin: 05/15/18 2022  Dispense Loc:  [facility]  POC: Post-procedure          (2.5 mg)-Given            Completed Medications  Medications 05/11/18 05/12/18 05/13/18 05/14/18 05/15/18 05/16/18 05/17/18         Dose: 1,000 mg  Freq: ONCE Route: PO  Start: 18   End: 18   Admin Instructions: Maximum acetaminophen dose from all sources = 75 mg/kg/day not to exceed 4 gram    Admin. Amount: 2 tablet (2 × 500 mg tablet)  Last Admin: 18  Dispense Loc: [facility] PACU  Administrations Remainin  POC: PACU/Phase II         (1,000 mg)-Given                Freq: ONCE Route: IR  Start: 18   End: 18   Last Admin: 18  Dispense Loc:  Main Pharmacy  Administrations Remainin  Volume: 2,000 mL  POC: Intra-procedure   Mixture Administration Information:   Medication Type Amount   bacitracin 95014 units SOLR Additives 100,000 Units   gentamicin 40 MG/ML SOLN Additives 200 mg   ceFAZolin 1 GM SOLR Additives 2 g   sodium chloride 0.9% (bag) 0.9 % SOLN Base 2,000 mL                1419 (500 mL)-Given                       Dose: 30 mL  Freq: ONCE Route: OTHER  Start: 18 111   End: 18   Admin. Amount: 75 mg = 30 mL Conc: 4.5 mg/1.8 mL  Last Admin: 05/14/18 1418  Dispense Loc:  Main Pharmacy  Administrations Remainin  Volume: 30 mL  POC: Intra-procedure        1418 (30 mL)-Given                       Dose:  1 g  Freq: EVERY 8 HOURS Route: IV  Indications of Use: PERIOPERATIVE PHARMACOPROPHYLAXIS  Start: 18 2100   End: 05/15/18 0645   Admin Instructions: First post-op dose due 8 hours after intra-op dose, see eMAR.    Admin. Amount: 1 g = 50 mL Conc: 1 g/50 mL  Last Admin: 05/15/18 0615  Dispense Loc:  Main Pharmacy  Infused Over: 30 Minutes  Administrations Remainin  Volume: 50 mL  POC: Post-procedure        2150 (1 g)-New Bag        0615 (1 g)-New Bag               Dose: 15 mg  Freq: EVERY 6 HOURS Route: IV  Start: 18   End: 05/15/18 132   Admin Instructions: Age greater than or equal to 65 years AND CrCl greater than 50 mL/minute.  May continue use for up to 5 days MAX if order renewed.  IF celecoxib (CELEBREX) was given pre-operatively, start ketorolac (TORADOL) 12 hours after celecoxib (CELEBREX) given.  Can cause pain on injection. Administer through a running maintenance fluid over 1 minute followed by a flush. If patient complains of pain on injection, may dilute 15-30 mg in 5 mL and push over 1 to 2 minutes.    Admin. Amount: 15 mg = 1 mL Conc: 15 mg/mL  Last Admin: 05/15/18 132  Dispense Loc:  [facility]  Administrations Remainin  Volume: 1 mL  POC: Post-procedure         (15 mg)-Given        0236 (15 mg)-Given       0806 (15 mg)-Given       1322 (15 mg)-Given               Dose: 1 g  Freq: ONCE Route: IV  Start: 18 1115   End: 18 1436   Admin Instructions: Post-op, after tourniquet deflation.    Admin. Amount: 1 g = 60 mL Conc: 1 g/60 mL  Last Admin: 18 1436  Dispense Loc:  [facility]  Administrations Remainin  Volume: 60 mL  POC: Pre-procedure        1436 (1 g)-Given                Dose: 5 mg  Freq: ONCE Route: PO  Start: 18 1400   End: 18 1348   Admin. Amount: 1 tablet (1 × 5 mg tablet)  Last Admin: 18 1348  Dispense Loc: [facility]  Administrations Remainin           1348 (5 mg)-Given             Dose: 5 mg  Freq: ONCE AT 6PM  Route: PO  Start: 18   End: 18   Admin. Amount: 1 tablet (1 × 5 mg tablet)  Last Admin: 18  Dispense Loc: [facility]  Administrations Remainin          1738 (5 mg)-Given              Dose: 5 mg  Freq: ONCE AT 6PM Route: PO  Start: 05/15/18 1800   End: 05/15/18 1756   Admin. Amount: 1 tablet (1 × 5 mg tablet)  Last Admin: 05/15/18 1756  Dispense Loc: [facility]  Administrations Remainin         1756 (5 mg)-Given               Dose: 5 mg  Freq: ONCE AT 6PM Route: PO  Start: 18   End: 18   Admin. Amount: 1 tablet (1 × 5 mg tablet)  Last Admin: 18  Dispense Loc: [facility]  Administrations Remainin         (5 mg)-Given             Discontinued Medications  Medications 05/11/18 05/12/18 05/13/18 05/14/18 05/15/18 05/16/18 05/17/18         Dose: 2.5 mg  Freq: EVERY 4 HOURS PRN Route: NEBULIZATION  PRN Reason: shortness of breath / dyspnea  Start: 18 1505   End: 18   Admin. Amount: 2.5 mg = 3 mL Conc: 2.5 mg/3 mL  Dispense Loc: [facility] PACU  Volume: 3 mL  POC: PACU        1837-Med Discontinued            Dose: 1 g  Freq: SEE ADMIN INSTRUCTIONS Route: IV  Indications of Use: PERIOPERATIVE PHARMACOPROPHYLAXIS  Start: 18 1105   End: 18 1441   Admin Instructions: Intra-Op Dose.  Give every 2 hours while patient in surgery, starting 2 hours after pre-op dose.  DO NOT GIVE intra-op dose if CrCl less than 10 mL/min (on dialysis).  If CrCL less than 50 mL/min, double the time interval between doses.    Admin. Amount: 1 g  Dispense Loc: [facility]  Infused Over: 30 Minutes  POC: Pre-procedure        1441-Med Discontinued            Freq: PRN  Start: 18 141   End: 18   Last Admin: 05/14/18 1418  POC: Intra-procedure        1418 (40 mg)-Given       1837-Med Discontinued            Dose: 25-50 mcg  Freq: EVERY 15 MIN PRN Route: IV  PRN Reason: other  PRN Comment: acute pain while in Phase  II  Indications of Use: SEDATION  Start: 05/14/18 1759   End: 05/14/18 1837   Admin Instructions: MAX cumulative dose = 250 mcg.    Use Fentanyl initially, as a short acting agent for acute pain control.  If insufficient, or a longer acting agent is needed, begin Morphine or Hydromorphone if ordered.  For ordered doses up to 100 mcg give IV Push undiluted over a minimum of 3-5 minutes.    Admin. Amount: 25-50 mcg = 0.5-1 mL Conc: 50 mcg/mL  Dispense Loc:  [facility] PACU  Volume: 2 mL  POC: Phase ll        1837-Med Discontinued            Dose: 25-50 mcg  Freq: EVERY 2 MIN PRN Route: IV  PRN Reason: other  PRN Comment: acute pain while in PACU.  Start: 05/14/18 1505   End: 05/14/18 1837   Admin Instructions: MAX cumulative dose = 250 mcg.    Use Fentanyl initially, as a short acting agent for acute pain control.  If insufficient, or a longer acting agent is needed, begin Morphine or Hydromorphone if ordered.  For ordered doses up to 100 mcg give IV Push undiluted over a minimum of 3-5 minutes.    Admin. Amount: 25-50 mcg = 0.5-1 mL Conc: 50 mcg/mL  Last Admin: 05/14/18 1731  Dispense Loc: [facility] PACU  Volume: 2 mL  POC: PACU        1503 (50 mcg)-Given       1508 (50 mcg)-Given       1522 (50 mcg)-Given       1540 (50 mcg)-Given       1731 (50 mcg)-Given       1837-Med Discontinued            Dose: 0.3-0.5 mg  Freq: EVERY 10 MIN PRN Route: IV  PRN Reason: other  PRN Comment: acute pain.  May administer if Respiratory Rate is greater than 10  Start: 05/14/18 1505   End: 05/14/18 1837   Admin Instructions: If fentanyl is also ordered, use HYDROmorphone if pain control insufficient with fentanyl or a longer acting agent is needed.   Max cumulative dose = 2 mg  For ordered doses up to 4 mg give IV Push undiluted. Administer each 2mg over 2-5 minutes.    Admin. Amount: 0.3-0.5 mg  Last Admin: 05/14/18 1714  Dispense Loc:  [facility] PACU  POC: PACU/Phase II        1557 (0.5 mg)-Given       1632 (0.5 mg)-Given       1714 (0.5  "mg)-Given       1837-Med Discontinued            Rate: 100 mL/hr   Freq: CONTINUOUS Route: IV  Start: 18 1515   End: 18   Admin Instructions: Continue until IV catheter is weaned    Dispense Loc: [facility] Floor Stock  Volume: 1,000 mL  POC: PACU/Phase II               1837-Med Discontinued            Rate: 25 mL/hr   Freq: CONTINUOUS Route: IV  Start: 18 1130   End: 18 144   Admin Instructions: IF patient NOT on dialysis.    Last Admin: 18 134  Dispense Loc: [facility] Floor Stock  Volume: 1,000 mL  POC: Pre-procedure        1215 ( )-New Bag       1347 ( )-New Bag       1434 ( )-Anesthesia Volume Adjustment       1441-Med Discontinued            Freq: EVERY 1 HOUR PRN Route: Top  PRN Reason: pain  PRN Comment: with VAD insertion or accessing implanted port.  Start: 18 112   End: 18   Admin Instructions: Do NOT give if patient has a history of allergy to any local anesthetic or any \"samira\" product.   Apply 30 minutes prior to VAD insertion or port access.  MAX Dose:  2.5 g (  of 5 g tube)    Dispense Loc: [facility] OR  POC: Pre-procedure        1441-Med Discontinued            Dose: 1 mL  Freq: EVERY 1 HOUR PRN Route: OTHER  PRN Comment: mild pain with VAD insertion or accessing implanted port  Start: 18 112   End: 18   Admin Instructions: Do NOT give if patient has a history of allergy to any local anesthetic or any \"samira\" product. MAX dose 1 mL subcutaneous OR intradermal in divided doses.    Admin. Amount: 1 mL  Dispense Loc: [facility] OR  Volume: 30 mL  POC: Pre-procedure        1441-Med Discontinued            Dose: 12.5 mg  Freq: EVERY 15 MIN PRN Route: IV  PRN Reason: post anesthesia shivering  Start: 18 1505   End: 18   Admin Instructions: Give IV Push undiluted. 10-40 mg over 2-3 minutes, up to 125 mg over 3-15 minutes    Admin. Amount: 12.5 mg = 0.25 mL Conc: 50 mg/mL  Dispense Loc: [facility] PACU  Administrations Remainin  Volume: 1 " mL  POC: PACU/Phase II        1837-Med Discontinued            Dose: 1-2 mg  Freq: EVERY 5 MIN PRN Route: IV  PRN Reason: high blood pressure  PRN Comment: for Systolic Blood Pressure greater than 160 mmHg and Heart Rate greater than 60 bpm.    Start: 05/14/18 1505   End: 05/14/18 1837   Admin Instructions: Max cumulative dose = 10 mg.  For PACU USE ONLY.  For ordered doses up to 15 mg, give IV Push undiluted over 5-10 minutes.    Admin. Amount: 1-2 mg = 1-2 mL Conc: 1 mg/mL  Dispense Loc: [facility] PACU  Infused Over: 5-10 Minutes  Volume: 2 mL  POC: PACU        1837-Med Discontinued            Dose: 0.1-0.4 mg  Freq: EVERY 2 MIN PRN Route: IV  PRN Reason: opioid reversal  Start: 05/14/18 1505   End: 05/14/18 1837   Admin Instructions: For apnea or imminent respiratory arrest: give 0.4 mg IV undiluted Q 2 minutes PRN until desired degree of reversal is obtained, stop opioid and notify provider. Continue monitoring until discharge are criteria met for a minimum of 2 hours.  For severe sedation, decrease in respiratory depth, quality or Respiratory Rate greater than 8: give 0.1 mg IV Q 2 minutes x 3 doses, stop opioid and notify provider.  Try to minimize reversal of analgesia especially in end-of-life patients.  Continue monitoring until discharge criteria are met for a minimum of 2 hours.  For ordered doses up to 2mg give IVP. Give each 0.4mg over 15 seconds in emergency situations. For non-emergent situations further dilute in 9mL of NS to facilitate titration of response.    Admin. Amount: 0.1-0.4 mg = 0.25-1 mL Conc: 0.4 mg/mL  Dispense Loc: [facility] PACU  Volume: 1 mL  POC: PACU/Phase II        1837-Med Discontinued            Dose: 4 mg  Freq: EVERY 30 MIN PRN Route: PO  PRN Reasons: nausea,vomiting  Start: 05/14/18 1505   End: 05/14/18 1837   Admin Instructions: MAX total dose = 8 mg, including OR dosing. This is step 1 of nausea and vomiting management.  If not resolved in 15 minutes, then go to step 2  [prochlorperazine (COMPAZINE) if ordered].  With dry hands, peel back foil backing and gently remove tablet; do not push oral disintegrating tablet through foil backing; administer immediately on tongue and oral disintegrating tablet dissolves in seconds; then swallow with saliva; liquid not required.    Admin. Amount: 1 tablet (1 × 4 mg tablet)  Dispense Loc: [facility] PACU  Administrations Remainin  POC: PACU/Phase II        1837-Med Discontinued         Or    Dose: 4 mg  Freq: EVERY 30 MIN PRN Route: IV  PRN Reasons: nausea,vomiting  Start: 18   End: 18   Admin Instructions: MAX total dose = 8 mg, including OR dosing. This is step 1 of nausea and vomiting management.  If not resolved in 15 minutes, then go to step 2 [prochlorperazine (COMPAZINE) if ordered].  Irritant. For ordered doses up to 4 mg, give IV Push undiluted over 2-5 minutes.    Admin. Amount: 4 mg = 2 mL Conc: 4 mg/2 mL  Dispense Loc: [facility] PACU  Infused Over: 2-5 Minutes  Administrations Remainin  Volume: 2 mL  POC: PACU/Phase II        1837-Med Discontinued            Freq: CONTINUOUS PRN Route: XX  Start: 18   End: 18   Admin Instructions: May administer oral pain medications as ordered by surgeon for take home use.  Discontinue IV pain medication prior to administration of oral pain medication.    Dispense Loc: Non Rx dispense  POC: PACU/Phase II        1837-Med Discontinued            Dose: 5 mg  Freq: EVERY 6 HOURS PRN Route: IV  PRN Reasons: nausea,vomiting  Start: 18   End: 18   Admin Instructions: This is Step 2 of nausea and vomiting management.   If nausea not resolved in 15 minutes, give metoclopramide (REGLAN) if ordered [step 3 of nausea and vomiting management].  For ordered doses up to 10 mg, give IV Push undiluted. Each 5mg over 1 minute.    Admin. Amount: 5 mg = 1 mL Conc: 5 mg/mL  Dispense Loc: [facility] PACU  Infused Over: 1-2 Minutes  Volume: 1 mL  POC:  PACU/Phase II        -Med Discontinued            Dose: 6.25 mg  Freq: ONCE PRN Route: IV  PRN Reason: nausea  Start: 18 1505   End: 18   Admin Instructions: Vesicant. Dilute 25 mg with 10 mL of normal saline in a syringe. Resulting concentration = 2.5 mg/ mL. Administer over 3-5 minutes. High risk of tissue necrosis with extravasation.    Admin. Amount: 6.25 mg = 0.25 mL Conc: 25 mg/mL  Dispense Loc: [facility] PACU  Administrations Remainin  Volume: 1 mL  POC: PACU/Phase II        -Med Discontinued            Dose: 3 mL  Freq: EVERY 8 HOURS Route: IK  Start: 18 1130   End: 18 144   Admin Instructions: And Q1H PRN, to lock peripheral IV dormant line.    Admin. Amount: 3 mL  Dispense Loc: [facility] Floor Stock  Volume: 4 mL  POC: Pre-procedure               1441-Med Discontinued            Dose: 3 mL  Freq: EVERY 1 HOUR PRN Route: IK  PRN Reason: line flush  PRN Comment: for peripheral IV flush post IV meds  Start: 18 1128   End: 18 144   Admin. Amount: 3 mL  Dispense Loc: [facility] Floor Stock  Volume: 4 mL  POC: Pre-procedure        1441-Med Discontinued            Dose: 5 mg  Freq: ONCE AT 6PM Route: PO  Start: 18 1800   End: 18 1309   Admin Instructions: To be given tonight if patient DOES NOT leave hospital    Admin. Amount: 1 tablet (1 × 5 mg tablet)  Dispense Loc:  ADS MS6E  Administrations Remainin           1309-Med Discontinued    Medications 05/11/18 05/12/18 05/13/18 05/14/18 05/15/18 05/16/18 05/17/18

## 2018-05-14 NOTE — IP AVS SNAPSHOT
` ` Patient Information     Patient Name Sex Cecily Cai ([id number]) Female 1940       Room Bed    [address] [place] [address]      Patient Demographics     Address Phone E-mail Address    [address] [place] [address] [phone] (Home)  NONE (Work)  [phone] (Mobile) *Preferred* [email or web address]      Patient Ethnicity & Race     Ethnic Group Patient Race    American White      Emergency Contact(s)     Name Relation Home Work Mobile    Silvia Guzmán Daughter [phone]      Documents on File        Status Date Received Description       Documents for the Patient    Insurance Card  () 05     Face Sheet  () 10/16/06     Privacy Notice - [place]  () 05     Insurance Card  () 06     Consent Form  06     Waiver - Payment  06     Insurance Card  () 07     Consent Form  07     Privacy Notice - [place] Received 07     Waiver - Payment  07     Patient ID Received () 12     Consent for Services - Hospital/Clinic Received () 11     External Medication Information Consent Accepted () 11     Insurance Card  () 11     Waiver - Payment  11     HIM MARTY Authorization   [facility] 11    Insurance Card Received () 12     Consent for Services - Hospital/Clinic Received () 12     External Medication Information Consent Accepted () 12     Immunization Record   [facility] Immunizations    HIM MARTY Authorization - File Only   Record Request [facility] 2-7-12    HIM MARTY Authorization   ORIGINAL CHART GIVEN TO DR. ZAPATA    Insurance Card Received () 10/25/12     Insurance Card Received () 10/25/12 Medicare    Consent for Services - Hospital/Clinic Received () 12     Patient ID Received () 14 [place] DL exp: 2016    External Medication Information Consent  Accepted 13     Consent for EHR Access  13 Copied from existing Consent for services - C/HOD collected on 2012    HIM MARTY Authorization - File Only   Charissa, 3/12/13    Consent for Services - Hospital/Clinic Received () 13     [facility] Specified Other       Insurance Card Received () 14 Medica Prime Sol    Consent for Services - Hospital/Clinic Received () 14     Advance Directives and Living Will Not Received  VALIDATION OF AD  14    Advance Directives and Living Will Received 14 HEALTH CARE DIRECTIVE 14    Consent for Services - Hospital/Clinic Received () 14 PHI consent    Patient ID Received () 03/12/15 Exp: 09/15/2016    Insurance Card Received () 03/12/15     Consent for Services - Hospital/Clinic Received () 10/06/15     Consent for Services/Privacy Notice - Hospital/Clinic Received () 16     Insurance Card Received () 16 MEDICA PRIME SOLUTION    Insurance Card Received () 16 MEDICARE    Consent to Communicate Received 18     Patient ID Received 18 MNDL 2020    Advance Directives and Living Will Not Received  Validation of AD  2015    Advance Directives and Living Will Received 16 Health Care Directive  2015    Insurance Card Received () 17 MEDICARE    Consent for Services/Privacy Notice - Hospital/Clinic Received () 17     Insurance Card Received () 17 Medica    Insurance Card Received () 17 MEDICA PRIME    Care Everywhere Prospective Auth Received 18     Insurance Card Received 18 Medica & Medicare    Consent for Services/Privacy Notice - Hospital/Clinic Received 18     Consent for Services - Hospital and Clinic Received 18     HIE Auth Received 18     External Medication Information Consent  (Deleted)      Insurance Card  (Deleted) 11     Patient ID  Received (Deleted) 02/25/14        Documents for the Encounter    CMS IM for Patient Signature Received 05/14/18     CMS IM for Patient Signature Received 05/17/18 2nd IMM      Admission Information     Attending Provider Admitting Provider Admission Type Admission Date/Time    William Pollard MD Hartman, Robert, MD Elective 05/14/18  1047    Discharge Date Hospital Service Auth/Cert Status Service Area     Surgery Incomplete [facility]    Unit Room/Bed Admission Status       [facility] [id number]/[id number] Admission (Confirmed)       Admission     Complaint    DJD, Degenerative arthritis of knee      Hospital Account     Name Acct ID Class Status Primary Coverage    Cecily Ivory T [id number] Inpatient Open MEDICARE - MEDICARE FOR HB SUPPLEMENT            Guarantor Account (for Hospital Account #[id number])     Name Relation to Pt Service Area Active? Acct Type    DianelysCecily T  FCS Yes Personal/Family    Address Phone          [address]  [place] [address] [phone](H)              Coverage Information (for Hospital Account #[id number])     1. MEDICARE/MEDICARE FOR HB SUPPLEMENT     F/O Payor/Plan Precert #    MEDICARE/MEDICARE FOR HB SUPPLEMENT     Subscriber Subscriber #    Cecily Ivory T [id number]    Address Phone    ATT[employer] CLAIMS  [address]  [place] [address] [phone]          2. MEDICA/MEDICA PRIME SOLUTION     F/O Payor/Plan Precert #    MEDICA/MEDICA PRIME SOLUTION     Subscriber Subscriber #    Cecily Ivory T [id number]    Address Phone    [address] [address]  [place] [address] [phone]

## 2018-05-14 NOTE — BRIEF OP NOTE
[facility] Brief Operative Note    Pre-operative diagnosis: DJD   Post-operative diagnosis * No post-op diagnosis entered *  same   Procedure: Procedure(s):  Left Total Knee Arthroplasty    - Wound Class: I-Clean   Surgeon(s): Surgeon(s) and Role:     * William Pollard MD - Primary     * Murphy Brown PA-C - Assisting   Estimated blood loss: * No values recorded between 5/14/2018  1:38 PM and 5/14/2018  2:25 PM *    Specimens: * No specimens in log *   Findings: As above

## 2018-05-14 NOTE — IP AVS SNAPSHOT
[facility]    [address]    [facility] [address]    Phone:  [phone]    Fax:  [phone]                                       After Visit Summary   5/14/2018    Cecily Ivory    MRN: [id number]           After Visit Summary Signature Page     I have received my discharge instructions, and my questions have been answered. I have discussed any challenges I see with this plan with the nurse or doctor.    ..........................................................................................................................................  Patient/Patient Representative Signature      ..........................................................................................................................................  Patient Representative Print Name and Relationship to Patient    ..................................................               ................................................  Date                                            Time    ..........................................................................................................................................  Reviewed by Signature/Title    ...................................................              ..............................................  Date                                                            Time

## 2018-05-14 NOTE — ANESTHESIA CARE TRANSFER NOTE
Patient: Cecily Ivory    Procedure(s):  Left Total Knee Arthroplasty    - Wound Class: I-Clean    Diagnosis: DJD  Diagnosis Additional Information: No value filed.    Anesthesia Type:   Spinal, Periph. Nerve Block for postop pain     Note:  Airway :Room Air  Patient transferred to:PACU  Handoff Report: Identifed the Patient, Identified the Reponsible Provider, Reviewed the pertinent medical history, Discussed the surgical course, Reviewed Intra-OP anesthesia mangement and issues during anesthesia, Set expectations for post-procedure period and Allowed opportunity for questions and acknowledgement of understanding      Vitals: (Last set prior to Anesthesia Care Transfer)    CRNA VITALS  5/14/2018 1407 - 5/14/2018 1444      5/14/2018             NIBP: 137/65    NIBP Mean: 86                Electronically Signed By: CARLITOS Coleman CRNA  May 14, 2018  2:44 PM

## 2018-05-14 NOTE — ANESTHESIA PREPROCEDURE EVALUATION
Anesthesia Evaluation     .             ROS/MED HX    ENT/Pulmonary:  - neg pulmonary ROS   (+)tobacco use, Past use , . .    Neurologic:  - neg neurologic ROS     Cardiovascular:     (+) Dyslipidemia, ----. : . . . :. .       METS/Exercise Tolerance:     Hematologic:  - neg hematologic  ROS       Musculoskeletal:   (+) arthritis, , , -       GI/Hepatic:     (+) GERD       Renal/Genitourinary:  - ROS Renal section negative       Endo:  - neg endo ROS       Psychiatric:     (+) psychiatric history anxiety      Infectious Disease:  - neg infectious disease ROS       Malignancy:         Other:    (+) H/O Chronic Pain,                   Physical Exam  Normal systems: cardiovascular and pulmonary    Airway   Mallampati: II    Dental     Cardiovascular   Rhythm and rate: regular and normal      Pulmonary    breath sounds clear to auscultation                    Anesthesia Plan      History & Physical Review  History and physical reviewed and following examination; no interval change.    ASA Status:  2 .        Plan for Spinal and Periph. Nerve Block for postop pain   PONV prophylaxis:  Dexamethasone or Solumedrol and Ondansetron (or other 5HT-3)       Postoperative Care  Postoperative pain management:  IV analgesics, Oral pain medications and Multi-modal analgesia.      Consents                          .

## 2018-05-14 NOTE — IP AVS SNAPSHOT
"          [facility]: [phone]                                              INTERAGENCY TRANSFER FORM - PHYSICIAN ORDERS   2018                    Hospital Admission Date: 2018  REID PERDUE   : 1940  Sex: Female        Attending Provider: William Pollard MD     Allergies:  Latex, Seasonal Allergies, Sulfa Drugs    Infection:  None   Service:  SURGERY    Ht:  1.689 m (5' 6.5\")   Wt:  67.1 kg (148 lb)   Admission Wt:  67.1 kg (148 lb)    BMI:  23.53 kg/m 2   BSA:  1.77 m 2            Patient PCP Information     Provider PCP Type    Ellis Ruiz MD General      ED Clinical Impression     Diagnosis Description Comment Added By Time Added    Status post total knee replacement, unspecified laterality [Z96.659] Status post total knee replacement, unspecified laterality [Z96.659]  Murphy Brown, PAJodiC 2018 11:57 AM      Hospital Problems as of 2018              Priority Class Noted POA    Degenerative arthritis of knee Medium  2018 Yes      Non-Hospital Problems as of 2018              Priority Class Noted    Pain in joint, lower leg Medium  2007    Abnormal vaginal Pap smear Medium  2008    Peripheral neuropathy Medium  Unknown    Carotid bruit Medium  Unknown    Hyperlipidemia LDL goal <130 Medium  2012    HL (hearing loss) Medium  2013    Dysphonia Medium  2013    Anxiety Medium  Unknown    Insomnia Medium  Unknown    Vaginal dysplasia Medium  Unknown    GERD (gastroesophageal reflux disease) Medium  Unknown    Lumbar spinal stenosis Medium  Unknown    ASCUS of cervix with negative high risk HPV Medium  3/1/2018      Code Status History     Date Active Date Inactive Code Status Order ID Comments User Context    6/3/2016  4:17 PM 2018  6:38 PM Full Code [id number]  Shanta Stroud, MARICRUZ Outpatient    2014 10:45 AM 6/3/2016  4:17 PM DNR [id number]  Ellis Ruiz MD Outpatient         Medication Review      START " taking        Dose / Directions Comments    aspirin 81 MG EC tablet   Used for:  Status post total knee replacement, unspecified laterality   Replaces:  aspirin 81 MG tablet        Dose:  81 mg   Take 1 tablet (81 mg) by mouth 2 times daily   Quantity:  84 tablet   Refills:  1        COMPRESSION STOCKINGS   Used for:  Status post total knee replacement, unspecified laterality        Dose:  1 each   1 each continuous   Quantity:  1 each   Refills:  0        oxyCODONE IR 5 MG tablet   Commonly known as:  ROXICODONE   Used for:  Status post total knee replacement, unspecified laterality        1-2 tabs every 4 hours as needed for pain.   Quantity:  60 tablet   Refills:  0          CONTINUE these medications which have NOT CHANGED        Dose / Directions Comments    Biotin 5000 MCG Caps        Dose:  1 tablet   Take 1 tablet by mouth daily   Refills:  0        Calcium carb-Vitamin D 500 mg [place]-200 units 500-200 MG-UNIT per tablet   Commonly known as:  OSCAL with D;Oyster Shell Calcium        Dose:  1 tablet   Take 1 tablet by mouth 2 times daily (with meals). Takes 750mg daily.   Quantity:  100 tablet   Refills:  3        CENTRUM SILVER per tablet        Dose:  1 tablet   Take 1 tablet by mouth daily   Refills:  0        CLARITIN 10 MG tablet   Generic drug:  loratadine        Dose:  10 mg   Take 1 tablet (10 mg) by mouth daily as needed for allergies Occasional use   Refills:  0        lovastatin 40 MG tablet   Commonly known as:  MEVACOR   Used for:  Hyperlipidemia LDL goal <130        TAKE ONE TABLET BY MOUTH EVERY NIGHT AT BEDTIME   Quantity:  90 tablet   Refills:  3        magnesium 100 MG Caps        Dose:  1 tablet   Take 1 tablet by mouth daily   Refills:  0        METAMUCIL PO        Dose:  1 capsule   Take 1 capsule by mouth daily as needed   Refills:  0        naproxen 500 MG tablet   Commonly known as:  NAPROSYN        Dose:  500 mg   Take 500 mg by mouth 2 times daily as needed   Refills:  0         VITAMIN B-12 PO        Dose:  1000 mcg   Take 1,000 mcg by mouth.   Refills:  0        VITAMIN C PO        Dose:  500 mg   Take 500 mg by mouth daily.   Refills:  0        VITAMIN D3 PO        Dose:  1000 Units   Take 1,000 Units by mouth daily   Refills:  0          STOP taking     aspirin 81 MG tablet   Replaced by:  aspirin 81 MG EC tablet                   Summary of Visit     Reason for your hospital stay       tka             After Care     Activity - Up ad barbi       Knee immobilizer until can SLR       Advance Diet as Tolerated       Follow this diet upon discharge: Regular       Continuous Passive Motion Machine       Start CPM Day of Surgery.  0 - 90 degrees. Advance as tolerated.       General info for SNF       Length of Stay Estimate: short Term Care  Condition at Discharge: Improving  Level of care:skilled   Rehabilitation Potential: Excellent  Admission H&P remains valid and up-to-date: Yes  Recent Chemotherapy: N/A  Use Nursing Home Standing Orders: yes       Mantoux instructions       Give two-step Mantoux (PPD) Per Facility Policy Yes       Weight bearing status       As tolerated in knee immobilizer.       Wound care       Leave aquacell in place until post-op appt.             Referrals     Occupational Therapy Adult Consult       Evaluate and treat as clinically indicated.    Reason:  S/p tka       Physical Therapy Adult Consult       Evaluate and treat as clinically indicated.    Reason:  S/p tka             Follow-Up Appointment Instructions     Future Labs/Procedures    Follow Up and recommended labs and tests     Comments:    Follow up with Dr. nolan in 12-14 days.  No follow up labs or test are needed.      Follow-Up Appointment Instructions     Follow Up and recommended labs and tests       Follow up with Dr. nolan in 12-14 days.  No follow up labs or test are needed.             Statement of Approval     Ordered          05/16/18 1158  I have reviewed and agree with all the  recommendations and orders detailed in this document.  EFFECTIVE NOW     Approved and electronically signed by:  Murphy Brown PA-C

## 2018-05-14 NOTE — ANESTHESIA PROCEDURE NOTES
Peripheral nerve/Neuraxial procedure note : intrathecal  Pre-Procedure  Performed by PEGGY VELAZQUEZ  Location: pre-op      Pre-Anesthestic Checklist: patient identified, IV checked, risks and benefits discussed, informed consent, monitors and equipment checked, pre-op evaluation and at physician/surgeon's request    Timeout  Correct Patient: Yes   Correct Procedure: Yes   Correct Site: Yes   Correct Laterality: N/A   Correct Position: Yes   Site Marked: N/A   .   Procedure Documentation    .    Procedure:    Intrathecal.  Insertion Site:L3-4  (midline approach)      Patient Prep;mask, povidone-iodine 7.5% surgical scrub.  .  Needle: Sprotte Spinal/LP Needle Length (inches): 3.5 # of attempts: 1 and # of redirects: Introducer used .       Assessment/Narrative  Paresthesias: No.  .  .  clear CSF fluid removed . Comments:  Bupivicaine 15mg    RODGER Velazquez

## 2018-05-14 NOTE — IP AVS SNAPSHOT
MRN:[id number]                      After Visit Summary   5/14/2018    Cecily Ivory    MRN: [id number]           Thank you!     Thank you for choosing [facility] for your care. Our goal is always to provide you with excellent care. Hearing back from our patients is one way we can continue to improve our services. Please take a few minutes to complete the written survey that you may receive in the mail after you visit. If you would like to speak to someone directly about your visit please contact Patient Relations at [phone]. Thank you!          Patient Information     Date Of Birth          1940        Designated Caregiver       Most Recent Value    Caregiver    Will someone help with your care after discharge? no [to TCU]      About your hospital stay     You were admitted on:  May 14, 2018 You last received care in the:  [facility]    You were discharged on:  May 17, 2018        Reason for your hospital stay       tka                  Who to Call     For medical emergencies, please call 911.  For non-urgent questions about your medical care, please call your primary care provider or clinic, [phone]  For questions related to your surgery, please call your surgery clinic        Attending Provider     Provider Specialty    William Pollard MD Orthopaedic Surgery       Primary Care Provider Office Phone # Fax #    Ellis Ruiz [phone]      After Care Instructions     Activity - Up ad barbi       Knee immobilizer until can SLR            Advance Diet as Tolerated       Follow this diet upon discharge: Regular            Continuous Passive Motion Machine       Start CPM Day of Surgery.  0 - 90 degrees. Advance as tolerated.            General info for SNF       Length of Stay Estimate: short Term Care  Condition at Discharge: Improving  Level of care:skilled   Rehabilitation Potential: Excellent  Admission H&P remains valid  "and up-to-date: Yes  Recent Chemotherapy: N/A  Use Nursing Home Standing Orders: yes            Mantoux instructions       Give two-step Mantoux (PPD) Per Facility Policy Yes            Weight bearing status       As tolerated in knee immobilizer.            Wound care       Leave aquacell in place until post-op appt.                  Follow-up Appointments     Follow Up and recommended labs and tests       Follow up with Dr. pollard in 12-14 days.  No follow up labs or test are needed.                  Additional Services     Occupational Therapy Adult Consult       Evaluate and treat as clinically indicated.    Reason:  S/p tka            Physical Therapy Adult Consult       Evaluate and treat as clinically indicated.    Reason:  S/p tka                  Pending Results     No orders found from 5/12/2018 to 5/15/2018.            Statement of Approval     Ordered          05/16/18 1158  I have reviewed and agree with all the recommendations and orders detailed in this document.  EFFECTIVE NOW     Approved and electronically signed by:  Murphy Brown PA-C             Admission Information     Date & Time Provider Department Dept. Phone    5/14/2018 William Pollard MD [facility] [phone]      Your Vitals Were     Blood Pressure Temperature Respirations Height Weight Pulse Oximetry    113/47 (BP Location: Left arm) 96.1  F (35.6  C) (Oral) 17 1.689 m (5' 6.5\") 67.1 kg (148 lb) 96%    BMI (Body Mass Index)                   23.53 kg/m2           MyChart Information     [employer] gives you secure access to your electronic health record. If you see a primary care provider, you can also send messages to your care team and make appointments. If you have questions, please call your primary care clinic.  If you do not have a primary care provider, please call [phone] and they will assist you.        Care EveryWhere ID     This is your Care EveryWhere ID. This could be used by other " organizations to access your [place] medical records  [id number]        Equal Access to Services     TUSHAR MONTANO : Jillian Baker, sorin sanders, roxie mirelesmasummer rangel. So [facility] [phone].    ATENCIÓN: Si habla español, tiene a thacker disposición servicios gratuitos de asistencia lingüística. Llame al [phone].    We comply with applicable federal civil rights laws and [place] laws. We do not discriminate on the basis of race, color, national origin, age, disability, sex, sexual orientation, or gender identity.               Review of your medicines      START taking        Dose / Directions    aspirin 81 MG EC tablet   Used for:  Status post total knee replacement, unspecified laterality   Replaces:  aspirin 81 MG tablet        Dose:  81 mg   Take 1 tablet (81 mg) by mouth 2 times daily   Quantity:  84 tablet   Refills:  1       COMPRESSION STOCKINGS   Used for:  Status post total knee replacement, unspecified laterality        Dose:  1 each   1 each continuous   Quantity:  1 each   Refills:  0       oxyCODONE IR 5 MG tablet   Commonly known as:  ROXICODONE   Used for:  Status post total knee replacement, unspecified laterality        1-2 tabs every 4 hours as needed for pain.   Quantity:  60 tablet   Refills:  0         CONTINUE these medicines which have NOT CHANGED        Dose / Directions    Biotin 5000 MCG Caps        Dose:  1 tablet   Take 1 tablet by mouth daily   Refills:  0       Calcium carb-Vitamin D 500 mg [place]-200 units 500-200 MG-UNIT per tablet   Commonly known as:  OSCAL with D;Oyster Shell Calcium        Dose:  1 tablet   Take 1 tablet by mouth 2 times daily (with meals). Takes 750mg daily.   Quantity:  100 tablet   Refills:  3       CENTRUM SILVER per tablet        Dose:  1 tablet   Take 1 tablet by mouth daily   Refills:  0       CLARITIN 10 MG tablet   Generic drug:  loratadine        Dose:  10 mg   Take 1 tablet (10 mg) by  mouth daily as needed for allergies Occasional use   Refills:  0       lovastatin 40 MG tablet   Commonly known as:  MEVACOR   Used for:  Hyperlipidemia LDL goal <130        TAKE ONE TABLET BY MOUTH EVERY NIGHT AT BEDTIME   Quantity:  90 tablet   Refills:  3       magnesium 100 MG Caps        Dose:  1 tablet   Take 1 tablet by mouth daily   Refills:  0       METAMUCIL PO        Dose:  1 capsule   Take 1 capsule by mouth daily as needed   Refills:  0       naproxen 500 MG tablet   Commonly known as:  NAPROSYN        Dose:  500 mg   Take 500 mg by mouth 2 times daily as needed   Refills:  0       VITAMIN B-12 PO        Dose:  1000 mcg   Take 1,000 mcg by mouth.   Refills:  0       VITAMIN C PO        Dose:  500 mg   Take 500 mg by mouth daily.   Refills:  0       VITAMIN D3 PO        Dose:  1000 Units   Take 1,000 Units by mouth daily   Refills:  0         STOP taking     aspirin 81 MG tablet   Replaced by:  aspirin 81 MG EC tablet                Where to get your medicines      Some of these will need a paper prescription and others can be bought over the counter. Ask your nurse if you have questions.     Bring a paper prescription for each of these medications     oxyCODONE IR 5 MG tablet       You don't need a prescription for these medications     aspirin 81 MG EC tablet    COMPRESSION STOCKINGS                Protect others around you: Learn how to safely use, store and throw away your medicines at www.disposemymeds.org.        Information about OPIOIDS     PRESCRIPTION OPIOIDS: WHAT YOU NEED TO KNOW   You have a prescription for an opioid (narcotic) pain medicine. Opioids can cause addiction. If you have a history of chemical dependency of any type, you are at a higher risk of becoming addicted to opioids. Only take this medicine after all other options have been tried. Take it for as short a time and as few doses as possible.     Do not:    Drive. If you drive while taking these medicines, you could be arrested  for driving under the influence (DUI).    Operate heavy machinery    Do any other dangerous activities while taking these medicines.     Drink any alcohol while taking these medicines.      Take with any other medicines that contain acetaminophen. Read all labels carefully. Look for the word  acetaminophen  or  Tylenol.  Ask your pharmacist if you have questions or are unsure.    Store your pills in a secure place, locked if possible. We will not replace any lost or stolen medicine. If you don t finish your medicine, please throw away (dispose) as directed by your pharmacist. The [place] Pollution Control Agency has more information about safe disposal: https://www.pca.[facility].[place].us/living-green/managing-unwanted-medications    All opioids tend to cause constipation. Drink plenty of water and eat foods that have a lot of fiber, such as fruits, vegetables, prune juice, apple juice and high-fiber cereal. Take a laxative (Miralax, milk of magnesia, Colace, Senna) if you don t move your bowels at least every other day.              Medication List: This is a list of all your medications and when to take them. Check marks below indicate your daily home schedule. Keep this list as a reference.      Medications           Morning Afternoon Evening Bedtime As Needed    aspirin 81 MG EC tablet   Take 1 tablet (81 mg) by mouth 2 times daily                                Biotin 5000 MCG Caps   Take 1 tablet by mouth daily   Last time this was given:  1 capsule on 5/17/2018  8:00 AM                                Calcium carb-Vitamin D 500 mg [place]-200 units 500-200 MG-UNIT per tablet   Commonly known as:  OSCAL with D;Oyster Shell Calcium   Take 1 tablet by mouth 2 times daily (with meals). Takes 750mg daily.   Last time this was given:  1 tablet on 5/17/2018  9:04 AM                                CENTRUM SILVER per tablet   Take 1 tablet by mouth daily                                CLARITIN 10 MG tablet   Take 1 tablet (10  mg) by mouth daily as needed for allergies Occasional use   Generic drug:  loratadine                                COMPRESSION STOCKINGS   1 each continuous                                lovastatin 40 MG tablet   Commonly known as:  MEVACOR   TAKE ONE TABLET BY MOUTH EVERY NIGHT AT BEDTIME   Last time this was given:  40 mg on 5/16/2018  9:57 PM                                magnesium 100 MG Caps   Take 1 tablet by mouth daily                                METAMUCIL PO   Take 1 capsule by mouth daily as needed                                naproxen 500 MG tablet   Commonly known as:  NAPROSYN   Take 500 mg by mouth 2 times daily as needed                                oxyCODONE IR 5 MG tablet   Commonly known as:  ROXICODONE   1-2 tabs every 4 hours as needed for pain.   Last time this was given:  5 mg on 5/17/2018  1:48 PM                                VITAMIN B-12 PO   Take 1,000 mcg by mouth.   Last time this was given:  1,000 mcg on 5/17/2018  9:03 AM                                VITAMIN C PO   Take 500 mg by mouth daily.   Last time this was given:  500 mg on 5/17/2018  9:03 AM                                VITAMIN D3 PO   Take 1,000 Units by mouth daily   Last time this was given:  1,000 Units on 5/17/2018  9:03 AM

## 2018-05-15 ENCOUNTER — APPOINTMENT (OUTPATIENT)
Dept: PHYSICAL THERAPY | Facility: CLINIC | Age: 78
DRG: 470 | End: 2018-05-15
Attending: ORTHOPAEDIC SURGERY
Payer: MEDICARE

## 2018-05-15 LAB
GLUCOSE SERPL-MCNC: 181 MG/DL (ref 70–99)
HGB BLD-MCNC: 11 G/DL (ref 11.7–15.7)
INR PPP: 1.12 (ref 0.86–1.14)

## 2018-05-15 PROCEDURE — 99207 ZZC CONSULT E&M CHANGED TO INITIAL LEVEL: CPT | Performed by: INTERNAL MEDICINE

## 2018-05-15 PROCEDURE — 25000132 ZZH RX MED GY IP 250 OP 250 PS 637: Mod: GY | Performed by: INTERNAL MEDICINE

## 2018-05-15 PROCEDURE — 82947 ASSAY GLUCOSE BLOOD QUANT: CPT | Performed by: ORTHOPAEDIC SURGERY

## 2018-05-15 PROCEDURE — 25000128 H RX IP 250 OP 636: Performed by: ORTHOPAEDIC SURGERY

## 2018-05-15 PROCEDURE — A9270 NON-COVERED ITEM OR SERVICE: HCPCS | Mod: GY | Performed by: ORTHOPAEDIC SURGERY

## 2018-05-15 PROCEDURE — 85018 HEMOGLOBIN: CPT | Performed by: ORTHOPAEDIC SURGERY

## 2018-05-15 PROCEDURE — 97110 THERAPEUTIC EXERCISES: CPT | Mod: GP | Performed by: PHYSICAL THERAPIST

## 2018-05-15 PROCEDURE — 25000132 ZZH RX MED GY IP 250 OP 250 PS 637: Mod: GY | Performed by: ORTHOPAEDIC SURGERY

## 2018-05-15 PROCEDURE — 40000193 ZZH STATISTIC PT WARD VISIT: Performed by: PHYSICAL THERAPIST

## 2018-05-15 PROCEDURE — 85610 PROTHROMBIN TIME: CPT | Performed by: ORTHOPAEDIC SURGERY

## 2018-05-15 PROCEDURE — 12000000 ZZH R&B MED SURG/OB

## 2018-05-15 PROCEDURE — 36415 COLL VENOUS BLD VENIPUNCTURE: CPT | Performed by: ORTHOPAEDIC SURGERY

## 2018-05-15 PROCEDURE — 97116 GAIT TRAINING THERAPY: CPT | Mod: GP | Performed by: PHYSICAL THERAPIST

## 2018-05-15 PROCEDURE — 99222 1ST HOSP IP/OBS MODERATE 55: CPT | Performed by: INTERNAL MEDICINE

## 2018-05-15 PROCEDURE — A9270 NON-COVERED ITEM OR SERVICE: HCPCS | Mod: GY | Performed by: INTERNAL MEDICINE

## 2018-05-15 PROCEDURE — 97161 PT EVAL LOW COMPLEX 20 MIN: CPT | Mod: GP | Performed by: PHYSICAL THERAPIST

## 2018-05-15 PROCEDURE — 97530 THERAPEUTIC ACTIVITIES: CPT | Mod: GP | Performed by: PHYSICAL THERAPIST

## 2018-05-15 RX ORDER — LOVASTATIN 20 MG
40 TABLET ORAL AT BEDTIME
Status: DISCONTINUED | OUTPATIENT
Start: 2018-05-15 | End: 2018-05-17 | Stop reason: HOSPADM

## 2018-05-15 RX ORDER — WARFARIN SODIUM 5 MG/1
5 TABLET ORAL
Status: COMPLETED | OUTPATIENT
Start: 2018-05-15 | End: 2018-05-15

## 2018-05-15 RX ORDER — ASCORBIC ACID 500 MG
500 TABLET ORAL DAILY
Status: DISCONTINUED | OUTPATIENT
Start: 2018-05-15 | End: 2018-05-17 | Stop reason: HOSPADM

## 2018-05-15 RX ORDER — LANOLIN ALCOHOL/MO/W.PET/CERES
1000 CREAM (GRAM) TOPICAL DAILY
Status: DISCONTINUED | OUTPATIENT
Start: 2018-05-15 | End: 2018-05-17 | Stop reason: HOSPADM

## 2018-05-15 RX ADMIN — ONDANSETRON 4 MG: 2 INJECTION, SOLUTION INTRAMUSCULAR; INTRAVENOUS at 00:26

## 2018-05-15 RX ADMIN — VITAMIN D, TAB 1000IU (100/BT) 1000 UNITS: 25 TAB at 17:56

## 2018-05-15 RX ADMIN — CEFAZOLIN SODIUM 1 G: 1 INJECTION, SOLUTION INTRAVENOUS at 06:15

## 2018-05-15 RX ADMIN — DOCUSATE SODIUM 100 MG: 100 CAPSULE, LIQUID FILLED ORAL at 08:05

## 2018-05-15 RX ADMIN — DOCUSATE SODIUM 100 MG: 100 CAPSULE, LIQUID FILLED ORAL at 20:20

## 2018-05-15 RX ADMIN — HYDROMORPHONE HYDROCHLORIDE 0.5 MG: 1 INJECTION, SOLUTION INTRAMUSCULAR; INTRAVENOUS; SUBCUTANEOUS at 19:26

## 2018-05-15 RX ADMIN — OXYCODONE HYDROCHLORIDE 10 MG: 5 TABLET ORAL at 21:29

## 2018-05-15 RX ADMIN — HYDROXYZINE HYDROCHLORIDE 10 MG: 10 TABLET ORAL at 20:20

## 2018-05-15 RX ADMIN — CYANOCOBALAMIN TAB 1000 MCG 1000 MCG: 1000 TAB at 17:56

## 2018-05-15 RX ADMIN — HYDROMORPHONE HYDROCHLORIDE 0.5 MG: 1 INJECTION, SOLUTION INTRAMUSCULAR; INTRAVENOUS; SUBCUTANEOUS at 06:38

## 2018-05-15 RX ADMIN — PSYLLIUM HUSK 1 PACKET: 3.4 POWDER ORAL at 15:09

## 2018-05-15 RX ADMIN — ACETAMINOPHEN 975 MG: 325 TABLET ORAL at 17:55

## 2018-05-15 RX ADMIN — KETOROLAC TROMETHAMINE 15 MG: 15 INJECTION, SOLUTION INTRAMUSCULAR; INTRAVENOUS at 08:06

## 2018-05-15 RX ADMIN — OXYCODONE HYDROCHLORIDE AND ACETAMINOPHEN 500 MG: 500 TABLET ORAL at 17:56

## 2018-05-15 RX ADMIN — OXYCODONE HYDROCHLORIDE 5 MG: 5 TABLET ORAL at 17:12

## 2018-05-15 RX ADMIN — KETOROLAC TROMETHAMINE 15 MG: 15 INJECTION, SOLUTION INTRAMUSCULAR; INTRAVENOUS at 13:22

## 2018-05-15 RX ADMIN — Medication 2.5 MG: at 20:22

## 2018-05-15 RX ADMIN — WARFARIN SODIUM 5 MG: 5 TABLET ORAL at 17:56

## 2018-05-15 RX ADMIN — ACETAMINOPHEN 975 MG: 325 TABLET ORAL at 10:20

## 2018-05-15 RX ADMIN — KETOROLAC TROMETHAMINE 15 MG: 15 INJECTION, SOLUTION INTRAMUSCULAR; INTRAVENOUS at 02:36

## 2018-05-15 RX ADMIN — OYSTER SHELL CALCIUM WITH VITAMIN D 1 TABLET: 500; 200 TABLET, FILM COATED ORAL at 17:56

## 2018-05-15 RX ADMIN — LOVASTATIN 40 MG: 20 TABLET ORAL at 20:21

## 2018-05-15 RX ADMIN — HYDROMORPHONE HYDROCHLORIDE 0.5 MG: 1 INJECTION, SOLUTION INTRAMUSCULAR; INTRAVENOUS; SUBCUTANEOUS at 00:26

## 2018-05-15 NOTE — PROGRESS NOTES
05/15/18 0841   Quick Adds   Type of Visit Initial PT Evaluation   Living Environment   Lives With alone   Living Arrangements house  (Tri level)   Home Accessibility stairs to enter home;stairs within home   Number of Stairs to Enter Home 2  (rail present)   Number of Stairs Within Home 7   Transportation Available car;family or friend will provide  (patient drives at baseline)   Self-Care   Usual Activity Tolerance excellent   Current Activity Tolerance good  (some L hip pain)   Regular Exercise yes   Activity/Exercise Type walking;strength training   Exercise Amount/Frequency 5-7 times/wk  (club 3x/week)   Equipment Currently Used at Home none   Activity/Exercise/Self-Care Comment patient normally very independent and active   Functional Level Prior   Ambulation 0-->independent   Transferring 0-->independent   Toileting 0-->independent   Bathing 0-->independent   Dressing 0-->independent   Eating 0-->independent   Communication 0-->understands/communicates without difficulty   Swallowing 0-->swallows foods/liquids without difficulty   Cognition 0 - no cognition issues reported   Fall history within last six months no   Which of the above functional risks had a recent onset or change? 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   Precautions/Limitations fall precautions;other (see comments)  (knee immobilizer until independent with SLR)   Weight-Bearing Status - LLE weight-bearing as tolerated   General Observations patient in bed upon therapist arrival; reports hip pain   Cognitive Status Examination    Orientation orientation to person, place and time   Level of Consciousness alert   Follows Commands and Answers Questions 100% of the time   Personal Safety and Judgment intact   Memory intact   Pain Assessment   Patient Currently in Pain Yes, see Vital Sign flowsheet  (hip pain 6-7/10)   Integumentary/Edema   Integumentary/Edema Comments L knee incision; covered  and acewrapped   Posture    Posture Comments WFL   Range of Motion (ROM)   ROM Comment L LE limited by recent surgery and pain; others appear WFL   Strength   Strength Comments L LE limited by pain in hip and recent surgery; unable to SLR on L; buckling of L knee in immobilizer   Bed Mobility   Bed Mobility Comments min A for L LE for supine<>sit; mild dizziness   Transfer Skills   Transfer Comments sit>stand with min A and bed elevated to assist standing; transfer to wheelchair with walker and min A; buckling of L knee   Gait   Gait Comments able to ambulate only bed to chair secondary to buckling of L LE in knee immobilizer   Balance   Balance Comments currently needing walker and assist   Sensory Examination   Sensory Perception Comments decreased in L LE   Muscle Tone   Muscle Tone Comments decreased in L LE from block   Modality Interventions   Planned Modality Interventions Comments ice to L hip area   General Therapy Interventions   Planned Therapy Interventions bed mobility training;gait training;transfer training;progressive activity/exercise;ROM;strengthening   Clinical Impression   Criteria for Skilled Therapeutic Intervention yes, treatment indicated   PT Diagnosis impaired gait/transfers   Influenced by the following impairments decreased sensation in L LE; buckling of L knee in immobilizer; L hip pain > L knee; decreased L knee ROM/strength   Functional limitations due to impairments impaired independence with functional mobility   Clinical Presentation Stable/Uncomplicated   Clinical Presentation Rationale min A or less for mobility; lives  "alone; medically stable   Clinical Decision Making (Complexity) Low complexity   Therapy Frequency` 2 times/day   Predicted Duration of Therapy Intervention (days/wks) 2-3 days   Anticipated Equipment Needs at Discharge front wheeled walker   Anticipated Discharge Disposition Transitional Care Facility  (patient has preregistered at [facility])   Risk & Benefits of therapy have been explained Yes   Patient, Family & other staff in agreement with plan of care Yes   [facility] TM \"6 Clicks\"   2016, Trustees of [facility], under license to [employer].  All rights reserved.   6 Clicks Short Forms Basic Mobility Inpatient Short Form   [facility] AM-PAC  \"6 Clicks\" V.2 Basic Mobility Inpatient Short Form   1. Turning from your back to your side while in a flat bed without using bedrails? 3 - A Little   2. Moving from lying on your back to sitting on the side of a flat bed without using bedrails? 3 - A Little   3. Moving to and from a bed to a chair (including a wheelchair)? 3 - A Little   4. Standing up from a chair using your arms (e.g., wheelchair, or bedside chair)? 3 - A Little   5. To walk in hospital room? 3 - A Little   6. Climbing 3-5 steps with a railing? 2 - A Lot   Basic Mobility Raw Score (Score out of 24.Lower scores equate to lower levels of function) 17   Total Evaluation Time   Total Evaluation Time (Minutes) 10     "

## 2018-05-15 NOTE — CONSULTS
"Care Transitions Team: Following for CC, discharge planning, and disposition.        Per chart review MD has consulted SWS for potential for disposition concerns.     Per PT  POD 1 assessment     Discharge Planner PT   Patient plan for discharge: TCU at [facility]  Current status: Patient currently needing min A for bed mobility, sit<>stand and transfer to bedside wheelchair; gait from bed to wheelchair with knee immobilizer and walker; buckling of L LE secondary to poor quad control currently; able to participate In L LE with assist for most secondary to decreased quad control and pain in L Hip > knee  Barriers to return to prior living situation: Lives alone; multi level split; no help available; falls risk  Recommendations for discharge: TCU; if had 24 hour assist available, likely could return home with home/OP PT given prior level of independence  Rationale for recommendations:  Decreased L knee ROM/strength; impaired independence with functional mobility      Met with pt at bedside who relays that she is not planning on having someone 24/7 for assist and is and has been anticipating TCU. Her name \"is on the list\" at [facility].     Pt is anticipating need for transport but she will check with her daughter .   Pt has requested a private room, and is awarded of the costs.   Formal referral placed     May Morales RN, BSN, CTS  Care Transitions Team  [phone]        "

## 2018-05-15 NOTE — PROGRESS NOTES
"[facility] Post-Op Note    Cecily Ivory MRN# [id number]   YOB: 1940 Age: 77 year old              Subjective:  Feels well, no pain.          Physical Exam:  Blood pressure 128/51, temperature 98.1  F (36.7  C), resp. rate 14, height 1.689 m (5' 6.5\"), weight 67.1 kg (148 lb), SpO2 93 %, not currently breastfeeding.  Wound clean and dry with minimal or no drainage.  Surrounding skin with minimal erythema.  Dressing dry and intact.    CMS is intact.    Calves non-tender bilaterally.          Data:  CBC:    Recent Labs  Lab 05/15/18  0658   HGB 11.0*     INR:  Recent Labs  Lab 05/15/18  0658 05/14/18  1852   INR 1.12 1.06             Assessment and Plan:   Stable, s/p tka  D/c Thursday to tcu      Murphy Brown PA-C    "

## 2018-05-15 NOTE — PLAN OF CARE
Problem: Patient Care Overview  Goal: Plan of Care/Patient Progress Review  Outcome: Improving  Evening RN ([phone])  A/O.  VSS, afebrile.  Taking oxycodone 5 mg for pain along with scheduled tylenol, IV toradol and ice.  CMS intact.  Acewrap CDI.  Up with A1, WW and KI pivoting to chair.  Saenz patent draining, remains in place d/t immobility issues, plan is to remove in AM.  Tolerating regular diet, no nausea issues.  Plan is to DC to TCU on Thursday.  Will continue to monitor.

## 2018-05-15 NOTE — CONSULTS
[facility]    Hospitalist Consultation    Date of Admission:  5/14/2018  Date of Consult (When I saw the patient): 05/15/18    Assessment & Plan   Cecily Ivory is a 77 year old female patient who is past medical history of anxiety, GERD, hyperlipidemia, insomnia, degenerative joint disease status post left total knee arthroplasty.    1.  Status post left TKA, POD #1.  Pain management, DVT prophylaxis, PT/OT per orthopedic surgery     She was started on Coumadin     2.  Vitamin D deficiency: Resume vitamin D    3.  Lipidemia: We will resume lovastatin    4.  Constipation: We will resume Metamucil    DVT Prophylaxis: Per orthopedic surgery  Code Status: Full Code    Disposition: Expected discharge in 1-2 days    Natalio Hughes MD    Reason for Consult   Reason for consult:    Primary Care Physician   Ellis Ruiz    Chief Complaint     History is obtained from the patient    History of Present Illness   Cecily Ivory is a 77 year old female patient who is past medical history of anxiety, GERD, hyperlipidemia, insomnia, degenerative joint disease was admitted for elective surgery.  She underwent left total knee arthroplasty on 5/14.  Patient tolerated the procedure well.  Hospitalist team was consulted for postop medical management.  Patient stated that she has no cough, shortness of breath, chest pain, fever.  She stated that her pain is well controlled.  She has no nausea, vomiting or abdominal pain.  Patient denies history of diabetes.    Past Medical History    I have reviewed this patient's medical history and updated it with pertinent information if needed.   Past Medical History:   Diagnosis Date     Abdominal pain 2009, 2013    ct liver and renal cyst and 2mm lung nodule, repeat ct done 2013 and no significantly abnl.     Anxiety 2014    added med 3/14     Arthritis      ASCUS of cervix with negative high risk HPV 03/2018     Carotid bruit 2011    us nl     Gastritis 2011    egd done by  [facility]     GERD (gastroesophageal reflux disease) 2011     History of colonoscopy 2004, 2014    nl     Hypercholesteremia 2000    stopped lovastatin 2015, added lipitor 3/16     Insomnia     using otc      Lumbar spinal stenosis 2016    Dr. Wilde, had epidural     Lung nodule 2009, 2013    seen on ct for abd pain, fu done 3/13 and 2 nodules stable and benign, one new one needs fu 1 year.  fu done 3/14 and fu 1 year and then done; fu done 3/15 and unchanged, no fu needed     Odynophagia 2004    egd nl     Other chronic pain      Peripheral neuropathies     Dr. Kim     Screening 2006, 2017    nl dexa     Vaginal dysplasia     chronic VAIN I, many colpos of vagina.  Now we only do an annual pap of vagina, no colpo since stable long term       Past Surgical History   I have reviewed this patient's surgical history and updated it with pertinent information if needed.  Past Surgical History:   Procedure Laterality Date     ANKLE SURGERY  2000     APPENDECTOMY  13     ARTHROPLASTY KNEE Left 5/14/2018    Procedure: ARTHROPLASTY KNEE;  Left total knee arthroplasty;  Surgeon: William Pollard MD;  Location: [facility] OR     BREAST BIOPSY, CORE RT/LT       C VAGINAL HYSTERECTOMY  1995     GYN SURGERY      BSO     HC UGI ENDOSCOPY, SIMPLE EXAM  03/15/11    [facility]     LAPAROSCOPIC CHOLECYSTECTOMY  2008     NOSE SURGERY  1990     right knee arthroscopy  2007     thumb surgery Left 2/2015       Prior to Admission Medications   Prior to Admission Medications   Prescriptions Last Dose Informant Patient Reported? Taking?   Ascorbic Acid (VITAMIN C PO) Past Week at Unknown time  Yes Yes   Sig: Take 500 mg by mouth daily.   Biotin 5000 MCG CAPS Past Week at Unknown time  Yes Yes   Sig: Take 1 tablet by mouth daily    Cholecalciferol (VITAMIN D3 PO) Past Week at Unknown time  Yes Yes   Sig: Take 1,000 Units by mouth daily    Cyanocobalamin (VITAMIN B-12 PO) Past Week at Unknown time  Yes Yes   Sig: Take 1,000 mcg by mouth.    Multiple Vitamins-Minerals (CENTRUM SILVER) per tablet Past Week at Unknown time  Yes Yes   Sig: Take 1 tablet by mouth daily   Psyllium (METAMUCIL PO) Past Week at Unknown time  Yes Yes   Sig: Take 1 capsule by mouth daily as needed    aspirin 81 MG tablet Past Week at Unknown time  Yes Yes   Sig: Take 1 tablet by mouth daily.   calcium carb 1250 mg, 500 mg [place],/vitamin D 200 units (OSCAL WITH D) 500-200 MG-UNIT per tablet Past Week at Unknown time  Yes Yes   Sig: Take 1 tablet by mouth 2 times daily (with meals). Takes 750mg daily.   loratadine (CLARITIN) 10 MG tablet Past Week at Unknown time  Yes Yes   Sig: Take 1 tablet (10 mg) by mouth daily as needed for allergies Occasional use   lovastatin (MEVACOR) 40 MG tablet Past Week at Unknown time  No Yes   Sig: TAKE ONE TABLET BY MOUTH EVERY NIGHT AT BEDTIME   magnesium 100 MG CAPS Past Week at Unknown time  Yes Yes   Sig: Take 1 tablet by mouth daily    naproxen (NAPROSYN) 500 MG tablet Past Week at Unknown time  Yes Yes   Sig: Take 500 mg by mouth 2 times daily as needed       Facility-Administered Medications: None     Allergies   Allergies   Allergen Reactions     Latex Cough     Seasonal Allergies      YEAR ROUND ALLERGIES     Sulfa Drugs Unknown       Social History   I have reviewed this patient's social history and updated it with pertinent information if needed. Cecily Ivory  reports that she quit smoking about 26 years ago. Her smoking use included Cigarettes. She has a 30.00 pack-year smoking history. She has never used smokeless tobacco. She reports that she drinks alcohol. She reports that she does not use illicit drugs.    Family History   I have reviewed this patient's family history and updated it with pertinent information if needed.   Family History   Problem Relation Age of Onset     Hyperlipidemia Mother      Endocrine Disease Brother      DIABETES Brother        Review of Systems   The 10 point Review of Systems is negative other than  noted in the HPI or here.    Physical Exam   Temp: 98.1  F (36.7  C) Temp src: Oral BP: 128/51   Heart Rate: 69 Resp: 14 SpO2: 93 % O2 Device: None (Room air) Oxygen Delivery: 2 LPM  Vital Signs with Ranges  Temp:  [95.1  F (35.1  C)-98.5  F (36.9  C)] 98.1  F (36.7  C)  Heart Rate:  [56-75] 69  Resp:  [9-26] 14  BP: (115-160)/(47-78) 128/51  SpO2:  [87 %-98 %] 93 %  148 lbs 0 oz    GEN:  Alert, oriented x 3, appears comfortable, NAD.  HEENT:  Normocephalic/atraumatic, no scleral icterus, no nasal discharge, mouth moist.  CV:  Regular rate and rhythm, no murmur or JVD.  S1 + S2 noted, no S3 or S4.  LUNGS:  Clear to auscultation bilaterally without rales/rhonchi/wheezing/retractions.  Symmetric chest rise on inhalation noted.  ABD:  Active bowel sounds, soft, non-tender/non-distended.  No rebound/guarding/rigidity.  EXT:  No edema or cyanosis.  Hands/feet warm to touch with good signs of peripheral perfusion.  No joint synovitis noted.  SKIN:  Dry to touch, no exanthems noted in the visualized areas.  NEURO:  Symmetric muscle strength, sensation to touch grossly intact.  No new focal deficits appreciated.    Data   -Data reviewed today: All pertinent laboratory and imaging results from this encounter were reviewed. I personally reviewed    Recent Labs  Lab 05/15/18  0658 05/14/18  1852   HGB 11.0*  --    INR 1.12 1.06   *  --        Recent Results (from the past 24 hour(s))   XR Knee Port Left 1/2 Views    Narrative    PORTABLE LEFT KNEE ONE-TWO VIEWS   5/14/2018 3:00 PM     HISTORY: Postoperative total Knee.    COMPARISON: None.    FINDINGS: Recent left total knee arthroplasty. Components appear well  seated.      Impression    IMPRESSION: Recent left total knee arthroplasty in good condition.    KE YUAN MD

## 2018-05-15 NOTE — PLAN OF CARE
Problem: Patient Care Overview  Goal: Plan of Care/Patient Progress Review  Discharge Planner PT   Patient plan for discharge: TCU at [facility]  Current status: Patient currently needing min A for bed mobility, sit<>stand and transfer to bedside wheelchair; gait from bed to wheelchair with knee immobilizer and walker; buckling of L LE secondary to poor quad control currently; able to participate In L LE with assist for most secondary to decreased quad control and pain in L Hip > knee  Barriers to return to prior living situation: Lives alone; multi level split; no help available; falls risk  Recommendations for discharge: TCU; if had 24 hour assist available, likely could return home with home/OP PT given prior level of independence  Rationale for recommendations:  Decreased L knee ROM/strength; impaired independence with functional mobility       Entered by: Missy Zendejas 05/15/2018 9:38 AM

## 2018-05-15 NOTE — PROGRESS NOTES
"SPIRITUAL HEALTH SERVICES  SPIRITUAL ASSESSMENT Progress Note  [facility]    PRIMARY FOCUS:     Goals of care    Symptom/pain management    Emotional/spiritual/Moravian distress    Support for coping    ILLNESS CIRCUMSTANCES:   Reviewed documentation. Reflective conversation shared with pt, Cecily, which integrated elements of illness and family narratives.     Context of Serious Illness/Symptom(s) - Cecily shares that she had a TKA yesterday.     Resources for Support - Cecily names her strong amarilis community, previous co-workers and friends, children of her  .     DISTRESS:     Emotional/Existential/Relational Distress - Cecily processed the following:    How she carries her ongoing grief following the death of her  Yevgeniy 4 years ago. She notes that this the second marriage for both of them. She fondly, and through tears, shared stories of how they met, their 26+ years of marriage, and how \"blessed\" she is that his children from the first marriage are lovingly caring for her still.     Cecily names a fear of developing dementia, as her mother  from this disease, and how she uses her brain to \"stay engaged with people\" as a way of managing this.     Spiritual/Roman Catholic Distress - None expressed.     Social/Cultural/Economic Distress - Cecily lives alone and notes that she intentionally \"gets out and sees people\" as she feels being alone may not be good for her mental health.     SPIRITUAL/Denominational COPING:     Holiness/Amarilis - Romanian Latter day, attending Quaker Hinduism in [place].     Spiritual Practice(s) - Prayer, Eucharist, Bible study, Lutheran.    Emotional/Existential/Relational Connections - Cecily enjoys connecting through sharing her family's history, staying engaged with various community organizations/groups, and with her children. Amarilis is primary support and she is involved deeply in her Hinduism.   GOALS OF CARE:    Goals of Care - Cecily plans on going to TCU until she is able to " return home safely and manage by herself.    Meaning/Sense-Making - Cecily engaged in reflective conversation focusing on:    Her family's history of immigrating from [place].    The blessings of her marriage to Yevgeniy and the tender care she has received from his children.    Her strong kadie and trust in God.    Her strong work ethic and the blessings of having served in a long career with good people at [employer].     PLAN: Will f/u per length of stay if she remains in hospital through the end of the week. I and the other chaplains remain available per pt/family/staff need or request.     Mk Melo M.Div.  Staff   Pager [phone]

## 2018-05-15 NOTE — PHARMACY-ANTICOAGULATION SERVICE
Clinical Pharmacy - Warfarin Dosing Consult     Pharmacy has been consulted to manage this patient s warfarin therapy.  Indication: DVT/PE Prophylaxis  Therapy Goal: INR 2-2.5  Warfarin Prior to Admission: No  Significant drug interactions: Aspirin, ketorolac  Recent documented change in oral intake/nutrition: Unknown    INR   Date Value Ref Range Status   05/14/2018 1.06 0.86 - 1.14 Final       Recommend warfarin 5 mg today.  Pharmacy will monitor Cecily Ivory daily and order warfarin doses to achieve specified goal.      Please contact pharmacy as soon as possible if the warfarin needs to be held for a procedure or if the warfarin goals change.

## 2018-05-16 ENCOUNTER — APPOINTMENT (OUTPATIENT)
Dept: PHYSICAL THERAPY | Facility: CLINIC | Age: 78
DRG: 470 | End: 2018-05-16
Attending: ORTHOPAEDIC SURGERY
Payer: MEDICARE

## 2018-05-16 LAB
GLUCOSE SERPL-MCNC: 110 MG/DL (ref 70–99)
HGB BLD-MCNC: 8.8 G/DL (ref 11.7–15.7)
INR PPP: 1.34 (ref 0.86–1.14)

## 2018-05-16 PROCEDURE — 99232 SBSQ HOSP IP/OBS MODERATE 35: CPT | Performed by: INTERNAL MEDICINE

## 2018-05-16 PROCEDURE — 25000132 ZZH RX MED GY IP 250 OP 250 PS 637: Mod: GY | Performed by: ORTHOPAEDIC SURGERY

## 2018-05-16 PROCEDURE — A9270 NON-COVERED ITEM OR SERVICE: HCPCS | Mod: GY

## 2018-05-16 PROCEDURE — 97110 THERAPEUTIC EXERCISES: CPT | Mod: GP | Performed by: PHYSICAL THERAPY ASSISTANT

## 2018-05-16 PROCEDURE — 40000193 ZZH STATISTIC PT WARD VISIT: Performed by: PHYSICAL THERAPY ASSISTANT

## 2018-05-16 PROCEDURE — 40000894 ZZH STATISTIC OT IP EVAL DEFER: Performed by: STUDENT IN AN ORGANIZED HEALTH CARE EDUCATION/TRAINING PROGRAM

## 2018-05-16 PROCEDURE — 85610 PROTHROMBIN TIME: CPT | Performed by: ORTHOPAEDIC SURGERY

## 2018-05-16 PROCEDURE — A9270 NON-COVERED ITEM OR SERVICE: HCPCS | Mod: GY | Performed by: ORTHOPAEDIC SURGERY

## 2018-05-16 PROCEDURE — 97530 THERAPEUTIC ACTIVITIES: CPT | Mod: GP | Performed by: PHYSICAL THERAPY ASSISTANT

## 2018-05-16 PROCEDURE — 25000132 ZZH RX MED GY IP 250 OP 250 PS 637: Mod: GY

## 2018-05-16 PROCEDURE — 25000128 H RX IP 250 OP 636: Performed by: ORTHOPAEDIC SURGERY

## 2018-05-16 PROCEDURE — 25000132 ZZH RX MED GY IP 250 OP 250 PS 637: Mod: GY | Performed by: INTERNAL MEDICINE

## 2018-05-16 PROCEDURE — 12000000 ZZH R&B MED SURG/OB

## 2018-05-16 PROCEDURE — 97116 GAIT TRAINING THERAPY: CPT | Mod: GP | Performed by: PHYSICAL THERAPY ASSISTANT

## 2018-05-16 PROCEDURE — 36415 COLL VENOUS BLD VENIPUNCTURE: CPT | Performed by: ORTHOPAEDIC SURGERY

## 2018-05-16 PROCEDURE — A9270 NON-COVERED ITEM OR SERVICE: HCPCS | Mod: GY | Performed by: INTERNAL MEDICINE

## 2018-05-16 PROCEDURE — 85018 HEMOGLOBIN: CPT | Performed by: ORTHOPAEDIC SURGERY

## 2018-05-16 PROCEDURE — 82947 ASSAY GLUCOSE BLOOD QUANT: CPT | Performed by: ORTHOPAEDIC SURGERY

## 2018-05-16 RX ORDER — WARFARIN SODIUM 5 MG/1
5 TABLET ORAL
Status: COMPLETED | OUTPATIENT
Start: 2018-05-16 | End: 2018-05-16

## 2018-05-16 RX ORDER — OXYCODONE HYDROCHLORIDE 5 MG/1
TABLET ORAL
Qty: 60 TABLET | Refills: 0 | Status: SHIPPED | OUTPATIENT
Start: 2018-05-16 | End: 2018-05-31

## 2018-05-16 RX ADMIN — OXYCODONE HYDROCHLORIDE 10 MG: 5 TABLET ORAL at 07:12

## 2018-05-16 RX ADMIN — CYANOCOBALAMIN TAB 1000 MCG 1000 MCG: 1000 TAB at 08:30

## 2018-05-16 RX ADMIN — OXYCODONE HYDROCHLORIDE 5 MG: 5 TABLET ORAL at 01:40

## 2018-05-16 RX ADMIN — OXYCODONE HYDROCHLORIDE 10 MG: 5 TABLET ORAL at 10:00

## 2018-05-16 RX ADMIN — OXYCODONE HYDROCHLORIDE 10 MG: 5 TABLET ORAL at 21:57

## 2018-05-16 RX ADMIN — DOCUSATE SODIUM 100 MG: 100 CAPSULE, LIQUID FILLED ORAL at 08:30

## 2018-05-16 RX ADMIN — LOVASTATIN 40 MG: 20 TABLET ORAL at 21:57

## 2018-05-16 RX ADMIN — HYDROXYZINE HYDROCHLORIDE 10 MG: 10 TABLET ORAL at 19:55

## 2018-05-16 RX ADMIN — OXYCODONE HYDROCHLORIDE AND ACETAMINOPHEN 500 MG: 500 TABLET ORAL at 08:30

## 2018-05-16 RX ADMIN — HYDROXYZINE HYDROCHLORIDE 10 MG: 10 TABLET ORAL at 02:23

## 2018-05-16 RX ADMIN — Medication 1 CAPSULE: at 08:30

## 2018-05-16 RX ADMIN — PSYLLIUM HUSK 1 PACKET: 3.4 POWDER ORAL at 08:29

## 2018-05-16 RX ADMIN — HYDROMORPHONE HYDROCHLORIDE 0.5 MG: 1 INJECTION, SOLUTION INTRAMUSCULAR; INTRAVENOUS; SUBCUTANEOUS at 10:08

## 2018-05-16 RX ADMIN — WARFARIN SODIUM 5 MG: 5 TABLET ORAL at 17:38

## 2018-05-16 RX ADMIN — ACETAMINOPHEN 975 MG: 325 TABLET ORAL at 09:42

## 2018-05-16 RX ADMIN — HYDROXYZINE HYDROCHLORIDE 10 MG: 10 TABLET ORAL at 07:45

## 2018-05-16 RX ADMIN — VITAMIN D, TAB 1000IU (100/BT) 1000 UNITS: 25 TAB at 08:30

## 2018-05-16 RX ADMIN — DOCUSATE SODIUM 100 MG: 100 CAPSULE, LIQUID FILLED ORAL at 19:55

## 2018-05-16 RX ADMIN — OYSTER SHELL CALCIUM WITH VITAMIN D 1 TABLET: 500; 200 TABLET, FILM COATED ORAL at 08:30

## 2018-05-16 RX ADMIN — OYSTER SHELL CALCIUM WITH VITAMIN D 1 TABLET: 500; 200 TABLET, FILM COATED ORAL at 17:38

## 2018-05-16 RX ADMIN — ACETAMINOPHEN 975 MG: 325 TABLET ORAL at 17:37

## 2018-05-16 RX ADMIN — ACETAMINOPHEN 975 MG: 325 TABLET ORAL at 01:40

## 2018-05-16 RX ADMIN — OXYCODONE HYDROCHLORIDE 10 MG: 5 TABLET ORAL at 13:47

## 2018-05-16 RX ADMIN — HYDROXYZINE HYDROCHLORIDE 10 MG: 10 TABLET ORAL at 13:47

## 2018-05-16 RX ADMIN — OXYCODONE HYDROCHLORIDE 10 MG: 5 TABLET ORAL at 17:38

## 2018-05-16 NOTE — PLAN OF CARE
Problem: Patient Care Overview  Goal: Plan of Care/Patient Progress Review  Outcome: Improving  A&O x4 with some forgetfulness. VSS. LS CTA all fields. BS active x4. Dressing to L knee is CDI, ACE wrap removed this AM. Mod D/P to L foot, otherwise CMS intact. gamaliel PO well. up with A1 and walker. Required one dose of IV dilaudid, then oxycodone 5-10mg and vistaril managing pain. voiding in good amts via simental catheter, removed this AM. Pt is due to void. plans to dc to TCU Thurs. Will continue to monitor.

## 2018-05-16 NOTE — PLAN OF CARE
Problem: Knee Arthroplasty (Total, Partial) (Adult)  Goal: Signs and Symptoms of Listed Potential Problems Will be Absent, Minimized or Managed (Knee Arthroplasty)  Signs and symptoms of listed potential problems will be absent, minimized or managed by discharge/transition of care (reference Knee Arthroplasty (Total, Partial) (Adult) CPG).   Outcome: Improving  Low grade temp, vss, cms intact, block gone now so pt c/o a lot of pain, used IV Dilaudid for breakthrough pain when po oxycodone 10mg and vistaril was not enough. Pt did graduate from the knee immobilizer, up with assist of 1 and walker. Ace wrap off and aquacel with small amount dried drainage. Pt took shower today and sat to dry hair put makeup on and eat lunch and visit with daughter and brother and his wife. Less pain this pm with the increased activity. Set up to go to [facility] tomorrow with w/c transport

## 2018-05-16 NOTE — PROGRESS NOTES
"[facility] Orthopedic Post-Op Note    Cecily Ivory MRN# [id number]   YOB: 1940 Age: 77 year old              Subjective:  Feels well, minimal pain.          Physical Exam:  Blood pressure 159/61, temperature 99.7  F (37.6  C), resp. rate 18, height 1.689 m (5' 6.5\"), weight 67.1 kg (148 lb), SpO2 94 %, not currently breastfeeding.  Wound clean and dry with minimal or no drainage.  Surrounding skin with minimal erythema.  Dressing dry and intact.    CMS is intact.    Calves non-tender bilaterally.          Data:  CBC:    Recent Labs  Lab 05/16/18  0605 05/15/18  0658   HGB 8.8* 11.0*     INR:  Recent Labs  Lab 05/16/18  0605 05/15/18  0658   INR 1.34* 1.12             Assessment and Plan:   Stable, s/p tka  D/c tomorrow to tcu      Murphy Brown PA-C    "

## 2018-05-16 NOTE — PLAN OF CARE
Problem: Patient Care Overview  Goal: Plan of Care/Patient Progress Review  Discharge Planner PT   Patient plan for discharge: to TCU  Current status: still need KI but closer to not needing it. Gait with RW to 125 feet with CGA bed mobility with S to Min A   Barriers to return to prior living situation: mobility safety  Recommendations for discharge: PT- Per plan established by the Physical Therapist, the discharge recommendation is TCU; if had 24 hour assist available, likely could return home with home/OP PT given prior level of independence    Rationale for recommendations: limited by hip pain but better after session.       Entered by: Mariella Eubanks 05/16/2018 11:06 AM

## 2018-05-16 NOTE — DISCHARGE SUMMARY
[facility] Discharge Summary    Cecily Ivory MRN# [id number]   Age: 77 year old YOB: 1940     Date of Admission:  5/14/2018  Date of Discharge::  5/17/18   Admitting Physician:  William Pollard MD  Discharge Physician:  Murphy Brown PA-C          Admission Diagnoses:   DJD  Degenerative arthritis of knee          Discharge Diagnosis:   DJD  Degenerative arthritis of knee  S/p tka       Procedures:   Procedure(s): Total knee arthoplasty        No other significant procedures performed during this admission           Medications Prior to Admission:     Prescriptions Prior to Admission   Medication Sig Dispense Refill Last Dose     Ascorbic Acid (VITAMIN C PO) Take 500 mg by mouth daily.   Past Week at Unknown time     Biotin 5000 MCG CAPS Take 1 tablet by mouth daily    Past Week at Unknown time     calcium carb 1250 mg, 500 mg [place],/vitamin D 200 units (OSCAL WITH D) 500-200 MG-UNIT per tablet Take 1 tablet by mouth 2 times daily (with meals). Takes 750mg daily. 100 tablet 3 Past Week at Unknown time     Cholecalciferol (VITAMIN D3 PO) Take 1,000 Units by mouth daily    Past Week at Unknown time     Cyanocobalamin (VITAMIN B-12 PO) Take 1,000 mcg by mouth.   Past Week at Unknown time     loratadine (CLARITIN) 10 MG tablet Take 1 tablet (10 mg) by mouth daily as needed for allergies Occasional use   Past Week at Unknown time     lovastatin (MEVACOR) 40 MG tablet TAKE ONE TABLET BY MOUTH EVERY NIGHT AT BEDTIME 90 tablet 3 Past Week at Unknown time     magnesium 100 MG CAPS Take 1 tablet by mouth daily    Past Week at Unknown time     Multiple Vitamins-Minerals (CENTRUM SILVER) per tablet Take 1 tablet by mouth daily   Past Week at Unknown time     naproxen (NAPROSYN) 500 MG tablet Take 500 mg by mouth 2 times daily as needed    Past Week at Unknown time     Psyllium (METAMUCIL PO) Take 1 capsule by mouth daily as needed    Past Week at Unknown time     [DISCONTINUED] aspirin 81 MG  tablet Take 1 tablet by mouth daily.  3 Past Week at Unknown time             Discharge Medications:     Current Discharge Medication List      START taking these medications    Details   aspirin 81 MG EC tablet Take 1 tablet (81 mg) by mouth 2 times daily  Qty: 84 tablet, Refills: 1    Associated Diagnoses: Status post total knee replacement, unspecified laterality      COMPRESSION STOCKINGS 1 each continuous  Qty: 1 each, Refills: 0    Associated Diagnoses: Status post total knee replacement, unspecified laterality      oxyCODONE IR (ROXICODONE) 5 MG tablet 1-2 tabs every 4 hours as needed for pain.  Qty: 60 tablet, Refills: 0    Associated Diagnoses: Status post total knee replacement, unspecified laterality         CONTINUE these medications which have NOT CHANGED    Details   Ascorbic Acid (VITAMIN C PO) Take 500 mg by mouth daily.      Biotin 5000 MCG CAPS Take 1 tablet by mouth daily       calcium carb 1250 mg, 500 mg [place],/vitamin D 200 units (OSCAL WITH D) 500-200 MG-UNIT per tablet Take 1 tablet by mouth 2 times daily (with meals). Takes 750mg daily.  Qty: 100 tablet, Refills: 3      Cholecalciferol (VITAMIN D3 PO) Take 1,000 Units by mouth daily       Cyanocobalamin (VITAMIN B-12 PO) Take 1,000 mcg by mouth.      loratadine (CLARITIN) 10 MG tablet Take 1 tablet (10 mg) by mouth daily as needed for allergies Occasional use      lovastatin (MEVACOR) 40 MG tablet TAKE ONE TABLET BY MOUTH EVERY NIGHT AT BEDTIME  Qty: 90 tablet, Refills: 3    Associated Diagnoses: Hyperlipidemia LDL goal <130      magnesium 100 MG CAPS Take 1 tablet by mouth daily       Multiple Vitamins-Minerals (CENTRUM SILVER) per tablet Take 1 tablet by mouth daily      naproxen (NAPROSYN) 500 MG tablet Take 500 mg by mouth 2 times daily as needed       Psyllium (METAMUCIL PO) Take 1 capsule by mouth daily as needed          STOP taking these medications       aspirin 81 MG tablet Comments:   Reason for Stopping:                      Consultations:   Consultation during this admission received from internal medicine.  No medical intervention was indicated.            Brief History of Illness:   This patient was a 77 year old female with a known history of osteoarthritis.  She underwent a period of non-operative treatment which only provided mild and intermittent relief.  After a lengthy discussion and consultation with Dr. Pollard, the patient chose to undergo a knee arthroplasty.  She was explained the risks,complications, and benefits of the procedure and elected to have the surgical procedure.  Patient was cleared by her primary care physician for joint replacement.           Hospital Course:   The patient tolerated the procedure well and was taken to postop recovery in stable condition.  Please refer to the full operative note for complete details.   In recovery, she had a post-operative hemoglobin of   Hemoglobin   Date Value Ref Range Status   05/16/2018 8.8 (L) 11.7 - 15.7 g/dL Final   and was noted to be motor and neurovascular intact.  Post-operative films show components in excellent position.     On post-operative day 1, patient's wound was checked and noted to be healing well.  The drain output was within normal limits.  Patient had adequate pain control and was prescribed physical therapy.  She was given 24 hrs of perioperative antibiotics.  Patient had motor strength of 5/5 on the both sides.  Patient was neurovascularly intact in the both sides lower extremity. There were no complications throughout the hospital course as the patient passed physical therapy/occupational therapy on post-operative day #3.  The drain was pulled on post-operative day #1.  Her discharge hemoglobin was   Hemoglobin   Date Value Ref Range Status   05/16/2018 8.8 (L) 11.7 - 15.7 g/dL Final   and the patient did not require a blood transfusion.  She was followed by the hospitalist during this hospital visit to manage her medical problems.     Upon  discharge, the patient was seen by Dr. Pollard/Reese Brown PA-C and all questions were answered.  She was discharged on home medications as outlined in medication reconciliation list outlined below.  The patient has instructions that if she has increased pain, fever, erythema, swelling or drainage to immediately call.          Discharge Instructions and Follow-Up:   Discharge diet: Regular   Discharge activity: Activity as tolerated with immobilizer   Discharge follow-up: Follow up with me in 12-14 days   Wound care: Apply bandage daily  Keep wound clean and dry  May get incision wet in shower but do not soak or scrub           Discharge Disposition:   Discharged to short-term care facility      Attestation:  I have reviewed today's vital signs, notes, medications, labs and imaging.    Murphy Brown PA-C

## 2018-05-16 NOTE — PROGRESS NOTES
Discharge Planner   Discharge Plans in progress: TCU/[facility] [address] W/C 5/17.   Barriers to discharge plan: Not anticipating any   Follow up plan: In AM to verify Discharge readiness        Entered by: May Morales 05/16/2018 1:39 PM

## 2018-05-16 NOTE — PROGRESS NOTES
Your information has been submitted on May 16th, 2018 at 04:42:31 PM CDT. The confirmation number is [id number]

## 2018-05-16 NOTE — PLAN OF CARE
Problem: Patient Care Overview  Goal: Plan of Care/Patient Progress Review  OT: Order received, chart reviewed, pt POD#2 L TKA, met with pt who reports planning for TCU tomorrow. Pt lives alone in a multi story home, currently needing KI when up out of bed; educated pt on role of OT and that services will be provided at TCU since safe discharge planning is in place for tomorrow, pt in agreement, will sign off for IP OT    Discharge Planner OT   Patient plan for discharge: TCU  Current status: A of 1, KI, FWW for transfers and mobility  Barriers to return to prior living situation: per PT: mobility safety, stairs  Recommendations for discharge: per PT: TCU unless pt able to have 24 hr assist at home  Rationale for recommendations: no need for skilled OT evaluation and treatment at this setting as pt has a bed at TCU with transport set-up for tomorrow       Entered by: Deya Zaidi 05/16/2018 12:01 PM

## 2018-05-17 ENCOUNTER — APPOINTMENT (OUTPATIENT)
Dept: PHYSICAL THERAPY | Facility: CLINIC | Age: 78
DRG: 470 | End: 2018-05-17
Attending: ORTHOPAEDIC SURGERY
Payer: MEDICARE

## 2018-05-17 VITALS
SYSTOLIC BLOOD PRESSURE: 113 MMHG | WEIGHT: 148 LBS | TEMPERATURE: 96.1 F | OXYGEN SATURATION: 96 % | RESPIRATION RATE: 15 BRPM | BODY MASS INDEX: 23.78 KG/M2 | HEIGHT: 66 IN | DIASTOLIC BLOOD PRESSURE: 47 MMHG

## 2018-05-17 VITALS
TEMPERATURE: 99.1 F | DIASTOLIC BLOOD PRESSURE: 76 MMHG | WEIGHT: 156.4 LBS | SYSTOLIC BLOOD PRESSURE: 128 MMHG | RESPIRATION RATE: 18 BRPM | HEART RATE: 73 BPM | OXYGEN SATURATION: 92 % | BODY MASS INDEX: 24.87 KG/M2

## 2018-05-17 LAB — INR PPP: 1.49 (ref 0.86–1.14)

## 2018-05-17 PROCEDURE — 85610 PROTHROMBIN TIME: CPT | Performed by: ORTHOPAEDIC SURGERY

## 2018-05-17 PROCEDURE — A9270 NON-COVERED ITEM OR SERVICE: HCPCS | Mod: GY | Performed by: INTERNAL MEDICINE

## 2018-05-17 PROCEDURE — 40000193 ZZH STATISTIC PT WARD VISIT: Performed by: PHYSICAL THERAPY ASSISTANT

## 2018-05-17 PROCEDURE — A9270 NON-COVERED ITEM OR SERVICE: HCPCS | Mod: GY | Performed by: ORTHOPAEDIC SURGERY

## 2018-05-17 PROCEDURE — 25000132 ZZH RX MED GY IP 250 OP 250 PS 637: Mod: GY | Performed by: ORTHOPAEDIC SURGERY

## 2018-05-17 PROCEDURE — 36415 COLL VENOUS BLD VENIPUNCTURE: CPT | Performed by: ORTHOPAEDIC SURGERY

## 2018-05-17 PROCEDURE — 97530 THERAPEUTIC ACTIVITIES: CPT | Mod: GP | Performed by: PHYSICAL THERAPY ASSISTANT

## 2018-05-17 PROCEDURE — 25000132 ZZH RX MED GY IP 250 OP 250 PS 637: Mod: GY | Performed by: INTERNAL MEDICINE

## 2018-05-17 PROCEDURE — 97116 GAIT TRAINING THERAPY: CPT | Mod: GP | Performed by: PHYSICAL THERAPY ASSISTANT

## 2018-05-17 PROCEDURE — 97110 THERAPEUTIC EXERCISES: CPT | Mod: GP | Performed by: PHYSICAL THERAPY ASSISTANT

## 2018-05-17 RX ORDER — WARFARIN SODIUM 5 MG/1
5 TABLET ORAL
Status: DISCONTINUED | OUTPATIENT
Start: 2018-05-17 | End: 2018-05-17

## 2018-05-17 RX ORDER — WARFARIN SODIUM 5 MG/1
5 TABLET ORAL ONCE
Status: COMPLETED | OUTPATIENT
Start: 2018-05-17 | End: 2018-05-17

## 2018-05-17 RX ADMIN — PSYLLIUM HUSK 1 PACKET: 3.4 POWDER ORAL at 09:06

## 2018-05-17 RX ADMIN — OXYCODONE HYDROCHLORIDE 10 MG: 5 TABLET ORAL at 09:04

## 2018-05-17 RX ADMIN — DOCUSATE SODIUM 100 MG: 100 CAPSULE, LIQUID FILLED ORAL at 09:03

## 2018-05-17 RX ADMIN — OXYCODONE HYDROCHLORIDE AND ACETAMINOPHEN 500 MG: 500 TABLET ORAL at 09:03

## 2018-05-17 RX ADMIN — ACETAMINOPHEN 975 MG: 325 TABLET ORAL at 11:58

## 2018-05-17 RX ADMIN — VITAMIN D, TAB 1000IU (100/BT) 1000 UNITS: 25 TAB at 09:03

## 2018-05-17 RX ADMIN — OXYCODONE HYDROCHLORIDE 5 MG: 5 TABLET ORAL at 13:48

## 2018-05-17 RX ADMIN — OYSTER SHELL CALCIUM WITH VITAMIN D 1 TABLET: 500; 200 TABLET, FILM COATED ORAL at 09:04

## 2018-05-17 RX ADMIN — HYDROXYZINE HYDROCHLORIDE 10 MG: 10 TABLET ORAL at 01:49

## 2018-05-17 RX ADMIN — Medication 1 CAPSULE: at 08:00

## 2018-05-17 RX ADMIN — OXYCODONE HYDROCHLORIDE 10 MG: 5 TABLET ORAL at 01:49

## 2018-05-17 RX ADMIN — WARFARIN SODIUM 5 MG: 5 TABLET ORAL at 13:48

## 2018-05-17 RX ADMIN — ACETAMINOPHEN 975 MG: 325 TABLET ORAL at 01:49

## 2018-05-17 RX ADMIN — HYDROXYZINE HYDROCHLORIDE 10 MG: 10 TABLET ORAL at 13:48

## 2018-05-17 RX ADMIN — CYANOCOBALAMIN TAB 1000 MCG 1000 MCG: 1000 TAB at 09:03

## 2018-05-17 ASSESSMENT — PAIN DESCRIPTION - DESCRIPTORS: DESCRIPTORS: ACHING

## 2018-05-17 NOTE — PROGRESS NOTES
Pt A&Ox4. Up with stand by assist using FWW.  Aquacel in place to left knee. Dried drainage. CMS intact.  Reported mild pain to L knee. Pain managed with oxycodone, atarax and scheduled tylenol. Lung sounds CTA. Voiding well. Eat breakfast but refused lunch. No N/V. Bowel sounds active. Plan is to discharge to [facility] for 3:30pm .

## 2018-05-17 NOTE — PLAN OF CARE
Problem: Knee Arthroplasty (Total, Partial) (Adult)  Goal: Signs and Symptoms of Listed Potential Problems Will be Absent, Minimized or Managed (Knee Arthroplasty)  Signs and symptoms of listed potential problems will be absent, minimized or managed by discharge/transition of care (reference Knee Arthroplasty (Total, Partial) (Adult) CPG).   Outcome: Improving  Pt A&O x4  VSS; afebrile. Taking  Scheduled tylenol, PRN Oxycodone and Vistaril  For pain with relief. CMS intact. Scant amount of dried drainage on knee dressing.  Up w/ SBA, gait belt, and walker. Voiding well. Tolerating regular diet. Plan is to d/c to [facility] today [id number]. Will continue to monitor.

## 2018-05-17 NOTE — PLAN OF CARE
Problem: Patient Care Overview  Goal: Plan of Care/Patient Progress Review  Discharge Planner PT   Patient plan for discharge: to TCU  Current status: Pt transfers sit to/from supine with CGA - SBA . Not met Pt transfers supervision with sit to/from stand. Pt transfers bed to/from wc with ww with CGA . Not met Pt amb 200' with ww with CGA with step thru gait pattern. Not met  Barriers to return to prior living situation: mobility safety  Recommendations for discharge: PT- Per plan established by the Physical Therapist, the discharge recommendation is TCU; if had 24 hour assist available, likely could return home with home/OP PT given prior level of independence    Rationale for recommendations: limited by hip pain but better after session.    PT - Pt discharging to TCU today with goals not met.  Please refer to discharge summary.        Entered by: Gisela Gates 05/17/2018 9:03 AM

## 2018-05-17 NOTE — PLAN OF CARE
Problem: Patient Care Overview  Goal: Plan of Care/Patient Progress Review  Outcome: Improving  Pt A&O. VS stable; afebrile. PO pain meds managing pain. (Oxycodone and Vistaril). CMS intact. Dressing: scant amount of dried drainage. Up w/ SBA, gait belt, and walker. Voiding in good amts. Tolerating regular diet. Plan is to d/c to [facility] tomorrow @ 1300. Will continue to monitor.

## 2018-05-17 NOTE — PROGRESS NOTES
[facility]    Hospitalist Progress Note  Provider : Natalio Hughes MD  Date of Service (when I saw the patient): 05/16/2018    Assessment & Plan   Assessment & Plan      Cecily Ivory is a 77 year old female patient who is past medical history of anxiety, GERD, hyperlipidemia, insomnia, degenerative joint disease status post left total knee arthroplasty.     1.  Status post left TKA, POD #2.  Pain management, DVT prophylaxis, PT/OT per orthopedic surgery     She was started on Coumadin      2.  Vitamin D deficiency: Resume vitamin D     3.  Lipidemia: continue lovastatin     4.  Constipation: continue Metamucil     DVT Prophylaxis: Per orthopedic surgery  Code Status: Full Code     Disposition: Expected discharge in 1-2 days     Natalio Hughes MD  Pain Assessment:  Current Pain Score 5/16/2018   Patient currently in pain? sleeping: patient not able to self report   Pain score (0-10) -   Pain location -   Pain descriptors -   Cecily rodriguez pain level was assessed and she currently denies pain.      Incidental Findings:  DVT Prophylaxis: Pneumatic Compression Devices  Code Status: Full Code    Disposition: Expected discharge tomorrow per ortho    Natalio Hughes MD    Interval History   Patient seen and examined. Feeling better. No new symptoms    -Data reviewed today: I reviewed all new labs and imaging results over the last 24 hours.     Physical Exam   Temp: 98.7  F (37.1  C) Temp src: Oral BP: 140/70   Heart Rate: 97 Resp: 16 SpO2: 90 % O2 Device: None (Room air)    Vitals:    04/24/18 1331 05/14/18 1107   Weight: 67.1 kg (148 lb) 67.1 kg (148 lb)     Vital Signs with Ranges  Temp:  [97.4  F (36.3  C)-99.7  F (37.6  C)] 98.7  F (37.1  C)  Heart Rate:  [68-97] 97  Resp:  [12-18] 16  BP: (111-167)/(43-70) 140/70  SpO2:  [90 %-94 %] 90 %  I/O last 3 completed shifts:  In: 960 [P.O.:960]  Out: 1120 [Urine:1120]    GEN:  Alert, oriented x 3, appears comfortable, NAD.  HEENT:   Normocephalic/atraumatic, no scleral icterus, no nasal discharge, mouth moist.  CV:  Regular rate and rhythm, no murmur or JVD.  S1 + S2 noted, no S3 or S4.  LUNGS:  Clear to auscultation bilaterally without rales/rhonchi/wheezing/retractions.  Symmetric chest rise on inhalation noted.  ABD:  Active bowel sounds, soft, non-tender/non-distended.  No rebound/guarding/rigidity.  EXT:  No edema or cyanosis.  Hands/feet warm to touch with good signs of peripheral perfusion.  No joint synovitis noted.  SKIN:  Dry to touch, no exanthems noted in the visualized areas.  NEURO:  Symmetric muscle strength, sensation to touch grossly intact.  No new focal deficits appreciated.    Medications     dextrose 5% and 0.45% NaCl + KCl 20 mEq/L 75 mL/hr at 05/15/18 0700     Warfarin Therapy Reminder         acetaminophen  975 mg Oral Q8H     ascorbic acid (VITAMIN C) tablet 500 mg  500 mg Oral Daily     Biotin  1 capsule Oral Daily     Calcium carb-Vitamin D 500 mg [place]-200 units  1 tablet Oral BID w/meals     cholecalciferol (vitamin D3) tablet 1,000 Units  1,000 Units Oral Daily     cyanocolbalamin  1,000 mcg Oral Daily     docusate sodium  100 mg Oral BID     lovastatin  40 mg Oral At Bedtime     psyllium  1 packet Oral Daily     sodium chloride (PF)  3 mL Intracatheter Q8H       Data     Recent Labs  Lab 05/16/18  0605 05/15/18  0658 05/14/18  1852   HGB 8.8* 11.0*  --    INR 1.34* 1.12 1.06   * 181*  --        No results found for this or any previous visit (from the past 24 hour(s)).

## 2018-05-17 NOTE — PROGRESS NOTES
Care Transitions Team: Following for CC, discharge planning, and disposition.      [facility] is unable to accept pt until after 3pm today.   transport time was rescheduled at the earliest available for 3:30.     VM left for pt daughter explaining change in transport time.     May Morales RN, BSN, [facility]  Care Transitions Team  [phone]

## 2018-05-18 ENCOUNTER — NURSING HOME VISIT (OUTPATIENT)
Dept: GERIATRICS | Facility: CLINIC | Age: 78
End: 2018-05-18
Payer: COMMERCIAL

## 2018-05-18 DIAGNOSIS — K59.03 DRUG-INDUCED CONSTIPATION: ICD-10-CM

## 2018-05-18 DIAGNOSIS — Z96.652 AFTERCARE FOLLOWING LEFT KNEE JOINT REPLACEMENT SURGERY: ICD-10-CM

## 2018-05-18 DIAGNOSIS — D62 ANEMIA DUE TO BLOOD LOSS, ACUTE: ICD-10-CM

## 2018-05-18 DIAGNOSIS — Z47.1 AFTERCARE FOLLOWING LEFT KNEE JOINT REPLACEMENT SURGERY: ICD-10-CM

## 2018-05-18 DIAGNOSIS — M17.12 PRIMARY OSTEOARTHRITIS OF LEFT KNEE: Primary | ICD-10-CM

## 2018-05-18 PROCEDURE — 99309 SBSQ NF CARE MODERATE MDM 30: CPT | Performed by: NURSE PRACTITIONER

## 2018-05-18 NOTE — PROGRESS NOTES
[facility]  PRIMARY CARE PROVIDER AND CLINIC:  Ellis Ruiz [address] / [place] [address]  Chief Complaint   Patient presents with     Hospital F/U     [facility] Medical Record Number:  [id number]    HPI:    Cecily Ivory is a 77 year old  (1940), PMH of anxiety, GERD, HLD, presented for elective left TKA admitted to the [facility] from [facility].  Hospital stay 5/14/18 through 5/17/18.    DJD s/p left TKA: Dr. Pollard  Anemia:  Hgb 8.8 at discharge  Cough: during hospitalization patient has c/o cough and wheezing, lungs CTA no workup    Admitted to this facility for  rehab.  HPI information obtained from: facility chart records, facility staff, patient report and [facility] chart review.  Current issues are:  On exam today patient is sitting up in WC playing solitaire on the computer, affect is flat, she is not too interested in participating in ROS and exam today, as she continues to play on the computer the entire time I am in the room. Patient states pain in left knee is rated 6/10 at rest and increase pain with movement. Denies fever, chills, cough, congestion, SOB, N/V/D states she has not had a BM since before surgery, denies abdominal pain, states she slept well last night, no other concerns.         Last 3 BPs: 131/64, 120/72, 128/76 mmHg  HR Ranges:  73-92 bpm  Admission Weight: 156.4 lbs    CODE STATUS/ADVANCE DIRECTIVES DISCUSSION:   CPR/Full code   Patient's living condition: lives alone    ALLERGIES:Latex; Seasonal allergies; and Sulfa drugs  PAST MEDICAL HISTORY:  has a past medical history of Abdominal pain (2009, 2013); Anxiety (2014); Arthritis; ASCUS of cervix with negative high risk HPV (03/2018); Carotid bruit (2011); Gastritis (2011); GERD (gastroesophageal reflux disease) (2011); History of colonoscopy (2004, 2014); Hypercholesteremia (2000); Insomnia; Lumbar spinal stenosis (2016); Lung nodule (2009, 2013);  Odynophagia (2004); Other chronic pain; Peripheral neuropathies; Screening (2006, 2017); and Vaginal dysplasia. She also has no past medical history of Chronic infection; History of blood transfusion; PONV (postoperative nausea and vomiting); or Sleep apnea.  PAST SURGICAL HISTORY:  has a past surgical history that includes appendectomy (13); Ankle surgery (2000); Nose surgery (1990); breast biopsy, core rt/lt; right knee arthroscopy (2007); laparoscopic cholecystectomy (2008); VAGINAL HYSTERECTOMY (1995); UPPER GI ENDOSCOPY,EXAM (03/15/11); thumb surgery (Left, 2/2015); GYN surgery; and Arthroplasty knee (Left, 5/14/2018).  FAMILY HISTORY: family history includes DIABETES in her brother; Endocrine Disease in her brother; Hyperlipidemia in her mother.  SOCIAL HISTORY:  reports that she quit smoking about 26 years ago. Her smoking use included Cigarettes. She has a 30.00 pack-year smoking history. She has never used smokeless tobacco. She reports that she drinks alcohol. She reports that she does not use illicit drugs.    Post Discharge Medication Reconciliation Status: discharge medications reconciled, continue medications without change.  Current Outpatient Prescriptions   Medication Sig Dispense Refill     Ascorbic Acid (VITAMIN C PO) Take 500 mg by mouth daily.       aspirin 81 MG EC tablet Take 1 tablet (81 mg) by mouth 2 times daily 84 tablet 1     Biotin 5000 MCG CAPS Take 1 tablet by mouth daily        calcium carb 1250 mg, 500 mg [place],/vitamin D 200 units (OSCAL WITH D) 500-200 MG-UNIT per tablet Take 1 tablet by mouth 2 times daily (with meals). Takes 750mg daily. 100 tablet 3     Cholecalciferol (VITAMIN D3 PO) Take 1,000 Units by mouth daily        COMPRESSION STOCKINGS 1 each continuous 1 each 0     loratadine (CLARITIN) 10 MG tablet Take 1 tablet (10 mg) by mouth daily as needed for allergies Occasional use       lovastatin (MEVACOR) 40 MG tablet TAKE ONE TABLET BY MOUTH EVERY NIGHT AT BEDTIME 90 tablet  3     magnesium 100 MG CAPS Take 1 tablet by mouth daily        Multiple Vitamins-Minerals (CENTRUM SILVER) per tablet Take 1 tablet by mouth daily       naproxen (NAPROSYN) 500 MG tablet Take 500 mg by mouth 2 times daily as needed        oxyCODONE IR (ROXICODONE) 5 MG tablet 1-2 tabs every 4 hours as needed for pain. 60 tablet 0       ROS:  10 point ROS of systems including Constitutional, Eyes, Respiratory, Cardiovascular, Gastroenterology, Genitourinary, Integumentary, Muscularskeletal, Psychiatric were all negative except for pertinent positives noted in my HPI.    Exam:  /76  Pulse 73  Temp 99.1  F (37.3  C)  Resp 18  Wt 156 lb 6.4 oz (70.9 kg)  SpO2 92%  BMI 24.87 kg/m2  GENERAL APPEARANCE:  Alert, in no distress  ENT:  Mouth and posterior oropharynx normal, moist mucous membranes, normal hearing acuity  EYES:  EOM, conjunctivae, lids, pupils and irises normal, PERRL  RESP:  respiratory effort and palpation of chest normal, lungs clear to auscultation , no respiratory distress  CV:  Palpation and auscultation of heart done , regular rate and rhythm, no murmur, rub, or gallop, peripheral edema 1+ in LLE  ABDOMEN:  normal bowel sounds, soft, nontender, no hepatosplenomegaly or other masses  M/S:   Examination of:   right upper extremity, left upper extremity and right lower extremity  Inspection, ROM, stability and muscle strength normal and decreased ROM and strength to LLE  SKIN:  Inspection of skin and subcutaneous tissue baseline, did not visualize left knee incision  NEURO:   Cranial nerves 2-12 are normal tested and grossly at patient's baseline, speech WNL  PSYCH:  affect and mood normal    Lab/Diagnostic data:  CBC RESULTS:   Recent Labs   Lab Test  05/16/18   0605  05/15/18   0658  03/07/18   0827  03/20/17   0742   WBC   --    --   4.9  5.9   RBC   --    --   4.37  4.57   HGB  8.8*  11.0*  13.4  13.7   HCT   --    --   40.5  41.6   MCV   --    --   93  91   MCH   --    --   30.7  30.0    MCHC   --    --   33.1  32.9   RDW   --    --   13.4  13.1   PLT   --    --   215  215       Last Basic Metabolic Panel:  Recent Labs   Lab Test  05/16/18   0605  05/15/18   0658  03/07/18 0827 03/20/17   0742   NA   --    --   142  143   POTASSIUM   --    --   3.7  4.3   CHLORIDE   --    --   111*  108   SUGAR   --    --   8.6  8.9   CO2   --    --   26  31   BUN   --    --   15  13   CR   --    --   0.62  0.69   GLC  110*  181*  93  96       Liver Function Studies -   Recent Labs   Lab Test  03/07/18 0827 03/20/17   0742   PROTTOTAL  6.7*  6.9   ALBUMIN  3.7  3.6   BILITOTAL  0.5  0.5   ALKPHOS  61  78   AST  31  24   ALT  27  29       TSH   Date Value Ref Range Status   10/10/2002 1.67 0.4 - 5.0 mU/L Final       ASSESSMENT/PLAN:  Primary osteoarthritis of left knee  Aftercare following left knee joint replacement surgery  Acute/ongoing: PT and OT for strengthening, schedule tylenol 650mg q 6 hours while awake, oxycodone 5-10mg q 4 hours prn, ASA 81mg BID  F/u with ortho in 2 weeks    Anemia due to blood loss, acute  Acute: Hgb twice weekly, continue vitamin B12 1000mcg QD    Drug-induced constipation  Acute/ongoing: DC metamucil, start senna s 1 PO BID scheduled and 1 PO BID prn, miralax 17gm QD  prn       Orders:  Hgb twice weekly   BMP on Monday  DC metamucil  Senna s 1 PO BID and 1 PO BID prn  miralax 17gm QD prn  Tylenol 650mg q 6 hours prn      Electronically signed by:  Tonya Lynn Haase, CARLITOS LAUREN

## 2018-05-19 ENCOUNTER — NURSING HOME VISIT (OUTPATIENT)
Dept: GERIATRICS | Facility: CLINIC | Age: 78
End: 2018-05-19
Payer: COMMERCIAL

## 2018-05-19 DIAGNOSIS — Z96.652 AFTERCARE FOLLOWING LEFT KNEE JOINT REPLACEMENT SURGERY: Primary | ICD-10-CM

## 2018-05-19 DIAGNOSIS — D62 ANEMIA DUE TO BLOOD LOSS, ACUTE: ICD-10-CM

## 2018-05-19 DIAGNOSIS — Z47.1 AFTERCARE FOLLOWING LEFT KNEE JOINT REPLACEMENT SURGERY: Primary | ICD-10-CM

## 2018-05-19 PROCEDURE — 99304 1ST NF CARE SF/LOW MDM 25: CPT | Performed by: INTERNAL MEDICINE

## 2018-05-20 NOTE — PROGRESS NOTES
[facility]  PRIMARY CARE PROVIDER AND CLINIC:  Ellis Ruiz [address] / [place] [address]      Pt was seen by Dr Maier on 5/19/18 for an initial TCU visit      HPI:      Cecily Ivory is a 77 year old  (1940), PMH of anxiety, GERD, HLD, who underwent an elective L TKA by Dr Pollard on 5/14/18 at [facility]    Post op medical course was uncomplicated  Hgb at d.c was 8.8    Admitted to this facility for  rehab.     Pt reports feeling well overall  Knee pain has been under fair control with just tylenol  She denies chest pain, SOB, dizziness, abd pain or dysuria    Vitals have been stable since admission    CODE STATUS/ADVANCE DIRECTIVES DISCUSSION:   CPR/Full code   Patient's living condition: lives alone    ALLERGIES:Latex; Seasonal allergies; and Sulfa drugs  PAST MEDICAL HISTORY:  has a past medical history of Abdominal pain (2009, 2013); Anxiety (2014); Arthritis; ASCUS of cervix with negative high risk HPV (03/2018); Carotid bruit (2011); Gastritis (2011); GERD (gastroesophageal reflux disease) (2011); History of colonoscopy (2004, 2014); Hypercholesteremia (2000); Insomnia; Lumbar spinal stenosis (2016); Lung nodule (2009, 2013); Odynophagia (2004); Other chronic pain; Peripheral neuropathies; Screening (2006, 2017); and Vaginal dysplasia. She also has no past medical history of Chronic infection; History of blood transfusion; PONV (postoperative nausea and vomiting); or Sleep apnea.  PAST SURGICAL HISTORY:  has a past surgical history that includes appendectomy (13); Ankle surgery (2000); Nose surgery (1990); breast biopsy, core rt/lt; right knee arthroscopy (2007); laparoscopic cholecystectomy (2008); VAGINAL HYSTERECTOMY (1995); UPPER GI ENDOSCOPY,EXAM (03/15/11); thumb surgery (Left, 2/2015); GYN surgery; and Arthroplasty knee (Left, 5/14/2018).  FAMILY HISTORY: family history includes DIABETES in her brother; Endocrine Disease in her brother; Hyperlipidemia in  her mother.  SOCIAL HISTORY:  reports that she quit smoking about 26 years ago. Her smoking use included Cigarettes. She has a 30.00 pack-year smoking history. She has never used smokeless tobacco. She reports that she drinks alcohol. She reports that she does not use illicit drugs.        Post Discharge Medication Reconciliation Status:   .  Current Outpatient Prescriptions   Medication Sig Dispense Refill     Ascorbic Acid (VITAMIN C PO) Take 500 mg by mouth daily.       aspirin 81 MG EC tablet Take 1 tablet (81 mg) by mouth 2 times daily 84 tablet 1     Biotin 5000 MCG CAPS Take 1 tablet by mouth daily        calcium carb 1250 mg, 500 mg [place],/vitamin D 200 units (OSCAL WITH D) 500-200 MG-UNIT per tablet Take 1 tablet by mouth 2 times daily (with meals). Takes 750mg daily. 100 tablet 3     Cholecalciferol (VITAMIN D3 PO) Take 1,000 Units by mouth daily        COMPRESSION STOCKINGS 1 each continuous 1 each 0     loratadine (CLARITIN) 10 MG tablet Take 1 tablet (10 mg) by mouth daily as needed for allergies Occasional use       lovastatin (MEVACOR) 40 MG tablet TAKE ONE TABLET BY MOUTH EVERY NIGHT AT BEDTIME 90 tablet 3     magnesium 100 MG CAPS Take 1 tablet by mouth daily        Multiple Vitamins-Minerals (CENTRUM SILVER) per tablet Take 1 tablet by mouth daily       naproxen (NAPROSYN) 500 MG tablet Take 500 mg by mouth 2 times daily as needed        oxyCODONE IR (ROXICODONE) 5 MG tablet 1-2 tabs every 4 hours as needed for pain. 60 tablet 0       ROS:  10 point ROS neg except as noted above.    Exam:    GENERAL APPEARANCE:  Alert, in no distress, appears well  ENT:  Mouth and posterior oropharynx normal, moist mucous membranes, normal hearing acuity  EYES:  EOM, conjunctivae, lids, pupils and irises normal, PERRL  RESP:  Lungs clear  CV: RRR no M  ABDOMEN:  Soft, non-tender  M/S:  Occlusive dressing L knee. Sl bruising L calf, no calf tenderness NEURO:   Cranial nerves 2-12 are normal  PSYCH:  affect and mood  normal    Lab/Diagnostic data:  CBC RESULTS:   Recent Labs   Lab Test  05/16/18   0605  05/15/18   0658  03/07/18   0827  03/20/17   0742   WBC   --    --   4.9  5.9   RBC   --    --   4.37  4.57   HGB  8.8*  11.0*  13.4  13.7   HCT   --    --   40.5  41.6   MCV   --    --   93  91   MCH   --    --   30.7  30.0   MCHC   --    --   33.1  32.9   RDW   --    --   13.4  13.1   PLT   --    --   215  215       Last Basic Metabolic Panel:  Recent Labs   Lab Test  05/16/18   0605  05/15/18   0658  03/07/18   0827  03/20/17   0742   NA   --    --   142  143   POTASSIUM   --    --   3.7  4.3   CHLORIDE   --    --   111*  108   SUGAR   --    --   8.6  8.9   CO2   --    --   26  31   BUN   --    --   15  13   CR   --    --   0.62  0.69   GLC  110*  181*  93  96       Liver Function Studies -   Recent Labs   Lab Test  03/07/18 0827 03/20/17   0742   PROTTOTAL  6.7*  6.9   ALBUMIN  3.7  3.6   BILITOTAL  0.5  0.5   ALKPHOS  61  78   AST  31  24   ALT  27  29       TSH   Date Value Ref Range Status   10/10/2002 1.67 0.4 - 5.0 mU/L Final       ASSESSMENT/PLAN:    Primary osteoarthritis of left knee  Aftercare following left knee joint replacement surgery  Pt is doing well  Plan continue therapies.  ASA for DVT proph. Ortho f/u  Bowel regimen      Anemia due to blood loss, acute  Asymptomatic  Plan monitor Hgb, symptoms    Hyperlipidemia  On statin       Electronically signed by:  Olegario Maier MD

## 2018-05-21 ENCOUNTER — TRANSFERRED RECORDS (OUTPATIENT)
Dept: HEALTH INFORMATION MANAGEMENT | Facility: CLINIC | Age: 78
End: 2018-05-21

## 2018-05-21 LAB
BUN SERPL-MCNC: 15 MG/DL (ref 9–26)
CALCIUM SERPL-MCNC: 8.3 MG/DL (ref 8.4–10.4)
CHLORIDE SERPLBLD-SCNC: 110 MMOL/L (ref 98–109)
CO2 SERPL-SCNC: 21 MMOL/L (ref 22–31)
CREAT SERPL-MCNC: 0.56 MG/DL (ref 0.55–1.02)
GFR SERPL CREATININE-BSD FRML MDRD: >60 ML/MIN/1.73M2
GLUCOSE SERPL-MCNC: 102 MG/DL (ref 70–100)
HEMOGLOBIN: 8.9 G/DL (ref 11.8–15.5)
POTASSIUM SERPL-SCNC: 3.8 MMOL/L (ref 3.5–5.2)
SODIUM SERPL-SCNC: 140 MMOL/L (ref 136–145)

## 2018-05-23 ENCOUNTER — DISCHARGE SUMMARY NURSING HOME (OUTPATIENT)
Dept: GERIATRICS | Facility: CLINIC | Age: 78
End: 2018-05-23
Payer: COMMERCIAL

## 2018-05-23 VITALS
SYSTOLIC BLOOD PRESSURE: 129 MMHG | WEIGHT: 148 LBS | HEART RATE: 75 BPM | OXYGEN SATURATION: 94 % | TEMPERATURE: 97.7 F | DIASTOLIC BLOOD PRESSURE: 79 MMHG | RESPIRATION RATE: 15 BRPM | BODY MASS INDEX: 23.53 KG/M2

## 2018-05-23 DIAGNOSIS — K59.03 DRUG-INDUCED CONSTIPATION: ICD-10-CM

## 2018-05-23 DIAGNOSIS — Z96.652 AFTERCARE FOLLOWING LEFT KNEE JOINT REPLACEMENT SURGERY: ICD-10-CM

## 2018-05-23 DIAGNOSIS — D62 ANEMIA DUE TO BLOOD LOSS, ACUTE: ICD-10-CM

## 2018-05-23 DIAGNOSIS — M17.12 PRIMARY OSTEOARTHRITIS OF LEFT KNEE: Primary | ICD-10-CM

## 2018-05-23 DIAGNOSIS — Z47.1 AFTERCARE FOLLOWING LEFT KNEE JOINT REPLACEMENT SURGERY: ICD-10-CM

## 2018-05-23 PROCEDURE — 99316 NF DSCHRG MGMT 30 MIN+: CPT | Performed by: NURSE PRACTITIONER

## 2018-05-23 RX ORDER — POLYETHYLENE GLYCOL 3350 17 G/17G
1 POWDER, FOR SOLUTION ORAL DAILY PRN
COMMUNITY
End: 2018-05-31

## 2018-05-23 RX ORDER — SENNOSIDES 8.6 MG
1 TABLET ORAL 2 TIMES DAILY
COMMUNITY
End: 2018-05-31

## 2018-05-23 NOTE — PROGRESS NOTES
[facility] DISCHARGE SUMMARY    PATIENT'S NAME: Cecily Ivory  YOB: 1940  MEDICAL RECORD NUMBER:  [id number]    PRIMARY CARE PROVIDER AND CLINIC RESPONSIBLE AFTER TRANSFER: Ellis Ruiz [address] / [place] [address]     CODE STATUS/ADVANCE DIRECTIVES DISCUSSION:   CPR/Full code        Allergies   Allergen Reactions     Latex Cough     Seasonal Allergies      YEAR ROUND ALLERGIES     Sulfa Drugs Unknown       TRANSFERRING PROVIDERS: Tonya Lynn Haase, APRN CNP, Dr. Livier MD  DATE OF SNF ADMISSION:  May / 17 / 2018  DATE OF SNF (anticipated) DISCHARGE: May / 24 / 2018  DISCHARGE DISPOSITION: [facility] Provider   Nursing Facility: [facility] stay 5/14/18 to 5/17/18.     Condition on Discharge:  Stable.  Function:  Walking > 150 feet using a RW  Cognitive Scores: BIMS: 15/15, SBT: 0/28    Equipment: walker    DISCHARGE DIAGNOSIS:   1. Primary osteoarthritis of left knee    2. Aftercare following left knee joint replacement surgery    3. Anemia due to blood loss, acute    4. Drug-induced constipation            PAST MEDICAL HISTORY:  has a past medical history of Abdominal pain (2009, 2013); Anxiety (2014); Arthritis; ASCUS of cervix with negative high risk HPV (03/2018); Carotid bruit (2011); Gastritis (2011); GERD (gastroesophageal reflux disease) (2011); History of colonoscopy (2004, 2014); Hypercholesteremia (2000); Insomnia; Lumbar spinal stenosis (2016); Lung nodule (2009, 2013); Odynophagia (2004); Other chronic pain; Peripheral neuropathies; Screening (2006, 2017); and Vaginal dysplasia. She also has no past medical history of Chronic infection; History of blood transfusion; PONV (postoperative nausea and vomiting); or Sleep apnea.    DISCHARGE MEDICATIONS:  Current Outpatient Prescriptions   Medication Sig Dispense Refill     Ascorbic Acid (VITAMIN C PO) Take 500 mg by mouth daily.       aspirin 81 MG EC tablet Take 1  tablet (81 mg) by mouth 2 times daily 84 tablet 1     Biotin 5000 MCG CAPS Take 1 tablet by mouth daily        calcium carb 1250 mg, 500 mg [place],/vitamin D 200 units (OSCAL WITH D) 500-200 MG-UNIT per tablet Take 1 tablet by mouth 2 times daily (with meals). Takes 750mg daily. 100 tablet 3     Cholecalciferol (VITAMIN D3 PO) Take 1,000 Units by mouth daily        COMPRESSION STOCKINGS 1 each continuous 1 each 0     CYANOCOBALAMIN PO Take 1,000 mcg by mouth daily       loratadine (CLARITIN) 10 MG tablet Take 1 tablet (10 mg) by mouth daily as needed for allergies Occasional use       lovastatin (MEVACOR) 40 MG tablet TAKE ONE TABLET BY MOUTH EVERY NIGHT AT BEDTIME 90 tablet 3     magnesium 100 MG CAPS Take 1 tablet by mouth daily        Multiple Vitamins-Minerals (CENTRUM SILVER) per tablet Take 1 tablet by mouth daily       naproxen (NAPROSYN) 500 MG tablet Take 500 mg by mouth 2 times daily as needed        oxyCODONE IR (ROXICODONE) 5 MG tablet 1-2 tabs every 4 hours as needed for pain. 60 tablet 0     polyethylene glycol (MIRALAX/GLYCOLAX) Packet Take 1 packet by mouth daily as needed for constipation       sennosides (SENOKOT) 8.6 MG tablet Take 1 tablet by mouth 2 times daily AND 1 tab BID PRN         MEDICATION CHANGES/RATIONALE:   There were no medication changes made during TCU stay.   Last 3 BPs: 129/79, 118/64, 122/65 mmHg  HR Ranges: 68-76 bpm  Admission Weight: 156.4 lbs  Current Weight: 148 lbs  Controlled medications sent with patient:   not applicable/none     ROS:    10 point ROS of systems including Constitutional, Eyes, Respiratory, Cardiovascular, Gastroenterology, Genitourinary, Integumentary, Muscularskeletal, Psychiatric were all negative except for pertinent positives noted in my HPI.    Physical Exam:   Vitals: /79  Pulse 75  Temp 97.7  F (36.5  C)  Resp 15  Wt 148 lb (67.1 kg)  SpO2 94%  BMI 23.53 kg/m2  BMI= Body mass index is 23.53 kg/(m^2).  GENERAL APPEARANCE:  Alert, in no  distress  ENT:  Mouth and posterior oropharynx normal, moist mucous membranes, normal hearing acuity  EYES:  EOM, conjunctivae, lids, pupils and irises normal, PERRL  RESP:  respiratory effort and palpation of chest normal, lungs clear to auscultation , no respiratory distress  CV:  Palpation and auscultation of heart done , regular rate and rhythm, no murmur, rub, or gallop, peripheral edema trace+ in LLE  ABDOMEN:  normal bowel sounds, soft, nontender, no hepatosplenomegaly or other masses  M/S:   Examination of:   right upper extremity, left upper extremity and right lower extremity  Inspection, ROM, stability and muscle strength normal and decreased ROM and strength to LLE  SKIN:  Inspection of skin and subcutaneous tissue baseline, did not visualize left knee incision  NEURO:   Cranial nerves 2-12 are normal tested and grossly at patient's baseline, speech WNL  PSYCH:  affect and mood normal    HPI Nursing Facility Course: Patient progressed in therapy to walking > 150 feet using a RW, indep with ADL's, no cognitive impairement, will DC home with OP PT through [facility].   HPI information obtained from: facility chart records, facility staff, patient report and [facility] chart review.      Primary osteoarthritis of left knee  Aftercare following left knee joint replacement surgery  Acute/ongoing: DC home with OP PT through [facility], continue tylenol 650mg q 6 hours while awake, naproxen 500mg BID prn, DC oxycodone, ASA 81mg BID  F/u with ortho as directed     Anemia due to blood loss, acute  Acute:  continue vitamin B12 1000mcg QD, f/u with PCP     Drug-induced constipation  Acute/ongoing: change senna s to  1 PO BID prn, miralax 17gm QD  prn         DISCHARGE PLAN:  outpatient physical therapy  Patient instructed to follow-up with:  PCP in 5-7 days       Current [place] scheduled appointments:  Future Appointments  Date Time Provider Department Center   5/31/2018 2:00 PM Ellis Ruiz MD [facility]  referral needed and placed by this provider: No    Pending labs: none  SNF labs   CBC RESULTS:    Recent Labs   Lab Test 5/21/18  05/16/18   0605  05/15/18   0658  03/07/18   0827  03/20/17   0742   WBC    --    --   4.9  5.9   RBC    --    --   4.37  4.57   HGB 8.9  8.8*  11.0*  13.4  13.7   HCT    --    --   40.5  41.6   MCV    --    --   93  91   MCH    --    --   30.7  30.0   MCHC    --    --   33.1  32.9   RDW    --    --   13.4  13.1   PLT    --    --   215  215       Last Basic Metabolic Panel:   Recent Labs   Lab Test 5/21/18  05/16/18   0605  05/15/18   0658  03/07/18   0827  03/20/17   0742      --    --   142  143   POTASSIUM 3.8   --    --   3.7  4.3   CHLORIDE 110   --    --   111*  108   SUGAR 8.3   --    --   8.6  8.9   CO2 21   --    --   26  31   BUN 15   --    --   15  13   CR 0.56   --    --   0.62  0.69     110*  181*  93  96       Liver Function Studies -   Recent Labs   Lab Test  03/07/18 0827 03/20/17 0742   PROTTOTAL  6.7*  6.9   ALBUMIN  3.7  3.6   BILITOTAL  0.5  0.5   ALKPHOS  61  78   AST  31  24   ALT  27  29         Discharge Treatments:none    TOTAL DISCHARGE TIME:   Greater than 30 minutes  Electronically signed by:  Tonya Lynn Haase, APRN CNP

## 2018-05-24 ENCOUNTER — TRANSFERRED RECORDS (OUTPATIENT)
Dept: HEALTH INFORMATION MANAGEMENT | Facility: CLINIC | Age: 78
End: 2018-05-24

## 2018-05-24 LAB — HEMOGLOBIN: 18.9 G/DL (ref 11.8–15.5)

## 2018-05-25 ENCOUNTER — PATIENT OUTREACH (OUTPATIENT)
Dept: CARE COORDINATION | Facility: CLINIC | Age: 78
End: 2018-05-25

## 2018-05-25 ENCOUNTER — TELEPHONE (OUTPATIENT)
Dept: FAMILY MEDICINE | Facility: CLINIC | Age: 78
End: 2018-05-25

## 2018-05-25 NOTE — LETTER
[place] CARE COORDINATION  [facility]     May 30, 2018    Cecily Ivory  [address]  [place] [address]      Dear Cecily,    I am a clinic care coordinator who works with Ellis Ruiz MD at [facility]. I wanted to thank you for spending the time to talk with me.  I wanted to provide you with my contact information so that you can call me with questions or concerns about your health care. Below is a description of clinic care coordination and how I can further assist you.     The clinic care coordinator is a registered nurse and/or  who understand the health care system. The goal of clinic care coordination is to help you manage your health and improve access to the [facility] system in the most efficient manner. The registered nurse can assist you in meeting your health care goals by providing education, coordinating services, and strengthening the communication among your providers. The  can assist you with financial, behavioral, psychosocial, chemical dependency, counseling, and/or psychiatric resources.    Please feel free to contact me at [phone] with any questions or concerns. We at [facility] are focused on providing you with the highest-quality healthcare experience possible and that all starts with you.     Sincerely,     Faye Bailey

## 2018-05-25 NOTE — PROGRESS NOTES
Clinic Care Coordination Contact  [facility]/Voicemail       Clinical Data: Care Coordinator Outreach  Outreach attempted x 1.  Left message on voicemail with call back information and requested return call.  Plan:  Care Coordinator will try to reach patient again in 3-5 business days.    Cinthya Gore RN  Clinic Care Coordinator  [phone]  [email or web address]

## 2018-05-25 NOTE — TELEPHONE ENCOUNTER
Primary Osteoarthritis Of Left Knee 05/24/2018 6 mo ED/IP 0/1  [phone] (home) NONE (work)    Hosp f/u 5/31/18 with PCP

## 2018-05-28 ENCOUNTER — HOSPITAL ENCOUNTER (EMERGENCY)
Facility: CLINIC | Age: 78
Discharge: HOME | End: 2018-05-28
Attending: EMERGENCY MEDICINE | Admitting: EMERGENCY MEDICINE
Payer: MEDICARE

## 2018-05-28 VITALS
HEART RATE: 72 BPM | HEIGHT: 67 IN | TEMPERATURE: 99.2 F | OXYGEN SATURATION: 99 % | BODY MASS INDEX: 23.32 KG/M2 | RESPIRATION RATE: 16 BRPM | WEIGHT: 148.6 LBS | DIASTOLIC BLOOD PRESSURE: 61 MMHG | SYSTOLIC BLOOD PRESSURE: 142 MMHG

## 2018-05-28 DIAGNOSIS — R53.1 GENERALIZED WEAKNESS: ICD-10-CM

## 2018-05-28 LAB
ALBUMIN UR-MCNC: NEGATIVE MG/DL
ANION GAP SERPL CALCULATED.3IONS-SCNC: 7 MMOL/L (ref 3–14)
APPEARANCE UR: CLEAR
BASOPHILS # BLD AUTO: 0 10E9/L (ref 0–0.2)
BASOPHILS NFR BLD AUTO: 0.2 %
BILIRUB UR QL STRIP: NEGATIVE
BUN SERPL-MCNC: 14 MG/DL (ref 7–30)
CALCIUM SERPL-MCNC: 8.4 MG/DL (ref 8.5–10.1)
CHLORIDE SERPL-SCNC: 107 MMOL/L (ref 94–109)
CO2 SERPL-SCNC: 26 MMOL/L (ref 20–32)
COLOR UR AUTO: YELLOW
CREAT SERPL-MCNC: 0.65 MG/DL (ref 0.52–1.04)
DIFFERENTIAL METHOD BLD: ABNORMAL
EOSINOPHIL # BLD AUTO: 0.2 10E9/L (ref 0–0.7)
EOSINOPHIL NFR BLD AUTO: 2.2 %
ERYTHROCYTE [DISTWIDTH] IN BLOOD BY AUTOMATED COUNT: 15.2 % (ref 10–15)
GFR SERPL CREATININE-BSD FRML MDRD: 89 ML/MIN/1.7M2
GLUCOSE SERPL-MCNC: 100 MG/DL (ref 70–99)
GLUCOSE UR STRIP-MCNC: NEGATIVE MG/DL
HCT VFR BLD AUTO: 31.8 % (ref 35–47)
HGB BLD-MCNC: 10.4 G/DL (ref 11.7–15.7)
HGB UR QL STRIP: NEGATIVE
IMM GRANULOCYTES # BLD: 0 10E9/L (ref 0–0.4)
IMM GRANULOCYTES NFR BLD: 0.2 %
INTERPRETATION ECG - MUSE: NORMAL
KETONES UR STRIP-MCNC: NEGATIVE MG/DL
LEUKOCYTE ESTERASE UR QL STRIP: NEGATIVE
LYMPHOCYTES # BLD AUTO: 1.6 10E9/L (ref 0.8–5.3)
LYMPHOCYTES NFR BLD AUTO: 18.2 %
MCH RBC QN AUTO: 30.4 PG (ref 26.5–33)
MCHC RBC AUTO-ENTMCNC: 32.7 G/DL (ref 31.5–36.5)
MCV RBC AUTO: 93 FL (ref 78–100)
MONOCYTES # BLD AUTO: 0.6 10E9/L (ref 0–1.3)
MONOCYTES NFR BLD AUTO: 6.5 %
MUCOUS THREADS #/AREA URNS LPF: PRESENT /LPF
NEUTROPHILS # BLD AUTO: 6.3 10E9/L (ref 1.6–8.3)
NEUTROPHILS NFR BLD AUTO: 72.7 %
NITRATE UR QL: NEGATIVE
NRBC # BLD AUTO: 0 10*3/UL
NRBC BLD AUTO-RTO: 0 /100
PH UR STRIP: 6 PH (ref 5–7)
PLATELET # BLD AUTO: 438 10E9/L (ref 150–450)
POTASSIUM SERPL-SCNC: 3.7 MMOL/L (ref 3.4–5.3)
RBC # BLD AUTO: 3.42 10E12/L (ref 3.8–5.2)
RBC #/AREA URNS AUTO: 0 /HPF (ref 0–2)
SODIUM SERPL-SCNC: 140 MMOL/L (ref 133–144)
SOURCE: ABNORMAL
SP GR UR STRIP: 1.01 (ref 1–1.03)
UROBILINOGEN UR STRIP-MCNC: NORMAL MG/DL (ref 0–2)
WBC # BLD AUTO: 8.7 10E9/L (ref 4–11)
WBC #/AREA URNS AUTO: 1 /HPF (ref 0–5)

## 2018-05-28 PROCEDURE — 25000128 H RX IP 250 OP 636: Performed by: EMERGENCY MEDICINE

## 2018-05-28 PROCEDURE — 96360 HYDRATION IV INFUSION INIT: CPT

## 2018-05-28 PROCEDURE — 81001 URINALYSIS AUTO W/SCOPE: CPT | Performed by: EMERGENCY MEDICINE

## 2018-05-28 PROCEDURE — 85025 COMPLETE CBC W/AUTO DIFF WBC: CPT | Performed by: EMERGENCY MEDICINE

## 2018-05-28 PROCEDURE — 99284 EMERGENCY DEPT VISIT MOD MDM: CPT | Mod: 25

## 2018-05-28 PROCEDURE — 80048 BASIC METABOLIC PNL TOTAL CA: CPT | Performed by: EMERGENCY MEDICINE

## 2018-05-28 PROCEDURE — 93005 ELECTROCARDIOGRAM TRACING: CPT

## 2018-05-28 RX ADMIN — SODIUM CHLORIDE 1000 ML: 9 INJECTION, SOLUTION INTRAVENOUS at 13:12

## 2018-05-28 ASSESSMENT — ENCOUNTER SYMPTOMS
LIGHT-HEADEDNESS: 1
VOMITING: 0
DYSURIA: 0
BLOOD IN STOOL: 0
PALPITATIONS: 0
DIFFICULTY URINATING: 0
WEAKNESS: 1
CONSTIPATION: 0
NAUSEA: 0
SHORTNESS OF BREATH: 0
DIARRHEA: 0
ABDOMINAL DISTENTION: 0

## 2018-05-28 NOTE — ED PROVIDER NOTES
History     Chief Complaint:  Generalized Weakness       HPI   Cecily Ivory is a 77 year old female who presents with generalized weakness. The patient had left knee surgery 2 weeks ago today. The patient was discharged to a rehab facility, where she said the food was terrible. As a result she didn't eat much. The patient began feeling faint while in rehab. She returned home last Thursday, 5 days ago, and resumed eating normally. However this morning she again felt faint. The patient has not actually fainted and she has no history of syncope. Now in the ED the patient feels weak but not pre-syncope. The patient denies any shortness of breath, palpitations, chest or abdominal pain, nausea or vomiting, leg pain or swelling, urinary or bowel movement problems, or blood clot history. The patient was on warfarin for about a week after the surgery, but now just takes Tylenol.      Allergies:  Latex  Seasonal Allergies  Sulfa Drugs    Medications:    Aspirin 81 MG EC tablet  Compression STOCKINGS  Cyanocobalamin PO  Loratadine   Lovastatin   Naproxen  Oxycodone IR     Past Medical History:    Abdominal pain  Anxiety   Arthritis   ASCUS of cervix with negative high risk HPV   Carotid bruit  Gastritis  GERD (gastroesophageal reflux disease)  Hypercholesteremia  Insomnia   Lumbar spinal stenosis  Lung nodule  Odynophagia   Other chronic pain   Peripheral neuropathies   Vaginal dysplasia     Past Surgical History:    ANKLE SURGERY  Appendectomy  C Vaginal Hysterectomy  Laparoscopic Cholecystectomy  [facility] Surgery  Right knee arthroscopy    Family History:    Hyperlipidemia  Endocrine Disease   Diabetes     Social History:  Marital Status:   [5]  Smoking status: Former Smoker  Alcohol use 0.0 oz/week      Review of Systems   Respiratory: Negative for shortness of breath.    Cardiovascular: Negative for chest pain, palpitations and leg swelling.   Gastrointestinal: Negative for abdominal distention, blood in stool,  "constipation, diarrhea, nausea and vomiting.   Genitourinary: Negative for difficulty urinating and dysuria.   Neurological: Positive for weakness and light-headedness.   All other systems reviewed and are negative.        Physical Exam   First Vitals:  BP: 149/64  Pulse: 73  Temp: 99.2  F (37.3  C)  Resp: 16  Height: 170.2 cm (5' 7\")  Weight: 67.4 kg (148 lb 9.6 oz)  SpO2: 99 %      Physical Exam  SKIN:  Warm, dry.ecchymosis of the LLE from thigh to calf.  The left knee incision is intact and non-inflamed.  HEMATOLOGIC/IMMUNOLOGIC/LYMPHATIC:  No pallor.  EYES:  Conjunctivae normal.  CARDIOVASCULAR:  Regular rate and rhythm.  No murmur.  RESPIRATORY:  No respiratory distress, breath sounds equal and normal.  GASTROINTESTINAL:  Soft, nontender abdomen with active bowel sounds.  No mass or distension.  MUSCULOSKELETAL:  Normal body habitus.  Well tolerated passive ROM of the LLE/left knee.    NEUROLOGIC:  Alert, conversant.  PSYCHIATRIC:  Normal mood.    Emergency Department Course   ECG:  Indication: generalized weakness  Time: 1334  Vent. Rate 67 bpm. NE interval 152. QRS duration 90. QT/QTc 450/475. P-R-T axis 57 -25 26. Normal sinus rhythm. Possible left atrial enlargement. Borderline ECG.  Read time: 1336    Laboratory:  UA with micro: mucous - present, o/w negative    BMP: Glucose 100, Calcium 8.4, o/w WNL (Creatinine: 0.65)    CMP: Glucose 8.7, 10.4, o/w WNL (Creatinine: 438)    Interventions:  1312 NS 1L IV Bolus     Emergency Department Course:  Nursing notes and vitals reviewed.     1248 I performed an exam of the patient as documented above.     IV inserted. Medicine administered as documented above.     Blood drawn. This was sent to the lab for further testing, results above.    The patient provided a urine sample here in the emergency department. This was sent for laboratory testing, findings above.     EKG obtained in the ED, see results above.     1353 I rechecked the patient and discussed the results " of her workup thus far.     Findings and plan explained to the patient. Patient discharged home with instructions regarding supportive care, medications, and reasons to return. The importance of close follow-up was reviewed.    I personally reviewed the laboratory results with the patient and answered all related questions prior to discharge.         Impression & Plan      Medical Decision Making:  This patient presents feeling weak in general. Evaluation reassuring. Unclear exactly as to what is causing her symptoms but I don't think she required other testing. There are no cardiopulmonary symptoms. I advised recheck if symptoms linger.      Diagnosis:    ICD-10-CM   1. Generalized weakness R53.1       Disposition:  Discharged to home.    Discharge Medications:    I, Jorge A Dillon, am serving as a scribe on 5/28/2018 at 12:48 PM to personally document services performed by Lars Woodall MD based on my observations and the provider's statements to me.       Jorge A Dillon  5/28/2018    EMERGENCY DEPARTMENT       Lars Woodall MD  05/29/18 0919

## 2018-05-28 NOTE — ED AVS SNAPSHOT
Emergency Department    [address] [place] [address]    Phone:  [phone]    Fax:  [phone]                                       Cecily Ivory   MRN: [id number]    Department:   Emergency Department   Date of Visit:  5/28/2018           Patient Information     Date Of Birth          1940        Your diagnoses for this visit were:     Generalized weakness        You were seen by Lars Woodall MD.      Follow-up Information     Please follow up.    Why:  return if any concerns        Discharge Instructions         Weakness with Uncertain Cause  Based on your exam today, the exact cause of your weakness is not certain. But your weakness does not seem to be a sign of a serious illness at this time. Keep an eye on your symptoms and get medical advice as instructed below.  Home care    Rest at home today. Don't over-exert yourself.    Take any medicine as prescribed.    For the next few days, drink extra fluids (unless your healthcare provider wants you to restrict fluids for other reasons). Don't skip meals.    Unless otherwise directed, continue to take any prescription medicines.    Contact your healthcare provider if you have any questions or concerns.  Follow-up care  Follow up with your healthcare provider, or as advised.  When to seek medical advice  Call your healthcare provider right away for any of the following:    Symptoms get worse    Symptoms don't start getting better within 2 days    Fever of 100.4  F (38  C) or higher, or as directed by your healthcare provider  Call 911  Call 911 for any of these:    Chest, arm, neck, jaw, or upper back pain    Trouble breathing    Numbness or weakness of the face, one arm, or one leg    Slurred speech, confusion, or trouble speaking, walking, or seeing    Blood in vomit or stool (black or red color)    Loss of consciousness    Severe headache  Date Last Reviewed: 10/1/2017    7722-4998 [email or web address]. [address] [place] [address]  [place] [address]. All rights reserved. This information is not intended as a substitute for professional medical care. Always follow your healthcare professional's instructions.          Your next 10 appointments already scheduled     May 31, 2018  2:00 PM CDT   Office Visit with Ellis Ruiz MD   [facility] ([facility])    [address] [facility] [id number] [phone]           Bring a current list of meds and any records pertaining to this visit. For Physicals, please bring immunization records and any forms needing to be filled out. Please arrive 10 minutes early to complete paperwork.              24 Hour Appointment Hotline       To make an appointment at any [facility], call [phone] ([phone]). If you don't have a family doctor or clinic, we will help you find one. [facility] are conveniently located to serve the needs of you and your family.             Review of your medicines      Our records show that you are taking the medicines listed below. If these are incorrect, please call your family doctor or clinic.        Dose / Directions Last dose taken    aspirin 81 MG EC tablet   Dose:  81 mg   Quantity:  84 tablet        Take 1 tablet (81 mg) by mouth 2 times daily   Refills:  1        Biotin 5000 MCG Caps   Dose:  1 tablet        Take 1 tablet by mouth daily   Refills:  0        Calcium carb-Vitamin D 500 mg [place]-200 units 500-200 MG-UNIT per tablet   Commonly known as:  OSCAL with D;Oyster Shell Calcium   Dose:  1 tablet   Quantity:  100 tablet        Take 1 tablet by mouth 2 times daily (with meals). Takes 750mg daily.   Refills:  3        CENTRUM SILVER per tablet   Dose:  1 tablet        Take 1 tablet by mouth daily   Refills:  0        CLARITIN 10 MG tablet   Dose:  10 mg   Generic drug:  loratadine        Take 1 tablet (10 mg) by mouth daily as needed for allergies Occasional use   Refills:  0        COMPRESSION STOCKINGS    Dose:  1 each   Quantity:  1 each        1 each continuous   Refills:  0        CYANOCOBALAMIN PO   Dose:  1000 mcg        Take 1,000 mcg by mouth daily   Refills:  0        lovastatin 40 MG tablet   Commonly known as:  MEVACOR   Quantity:  90 tablet        TAKE ONE TABLET BY MOUTH EVERY NIGHT AT BEDTIME   Refills:  3        magnesium 100 MG Caps   Dose:  1 tablet        Take 1 tablet by mouth daily   Refills:  0        naproxen 500 MG tablet   Commonly known as:  NAPROSYN   Dose:  500 mg        Take 500 mg by mouth 2 times daily as needed   Refills:  0        oxyCODONE IR 5 MG tablet   Commonly known as:  ROXICODONE   Quantity:  60 tablet        1-2 tabs every 4 hours as needed for pain.   Refills:  0        polyethylene glycol Packet   Commonly known as:  MIRALAX/GLYCOLAX   Dose:  1 packet        Take 1 packet by mouth daily as needed for constipation   Refills:  0        sennosides 8.6 MG tablet   Commonly known as:  SENOKOT   Dose:  1 tablet        Take 1 tablet by mouth 2 times daily AND 1 tab BID PRN   Refills:  0        VITAMIN C PO   Dose:  500 mg        Take 500 mg by mouth daily.   Refills:  0        VITAMIN D3 PO   Dose:  1000 Units        Take 1,000 Units by mouth daily   Refills:  0                Procedures and tests performed during your visit     Basic metabolic panel    CBC with platelets differential    EKG 12-lead, tracing only    Peripheral IV catheter    UA with Microscopic      Orders Needing Specimen Collection     None      Pending Results     Date and Time Order Name Status Description    5/28/2018 1257 EKG 12-lead, tracing only Preliminary             Pending Culture Results     No orders found from 5/26/2018 to 5/29/2018.            Pending Results Instructions     If you had any lab results that were not finalized at the time of your Discharge, you can call the ED Lab Result RN at [phone]. You will be contacted by this team for any positive Lab results or changes in treatment. The  nurses are available 7 days a week from 10A to 6:30P.  You can leave a message 24 hours per day and they will return your call.        Test Results From Your Hospital Stay        5/28/2018  1:30 PM      Component Results     Component Value Ref Range & Units Status    WBC 8.7 4.0 - 11.0 10e9/L Final    RBC Count 3.42 (L) 3.8 - 5.2 10e12/L Final    Hemoglobin 10.4 (L) 11.7 - 15.7 g/dL Final    Hematocrit 31.8 (L) 35.0 - 47.0 % Final    MCV 93 78 - 100 fl Final    MCH 30.4 26.5 - 33.0 pg Final    MCHC 32.7 31.5 - 36.5 g/dL Final    RDW 15.2 (H) 10.0 - 15.0 % Final    Platelet Count 438 150 - 450 10e9/L Final    Diff Method Automated Method  Final    % Neutrophils 72.7 % Final    % Lymphocytes 18.2 % Final    % Monocytes 6.5 % Final    % Eosinophils 2.2 % Final    % Basophils 0.2 % Final    % Immature Granulocytes 0.2 % Final    Nucleated RBCs 0 0 /100 Final    Absolute Neutrophil 6.3 1.6 - 8.3 10e9/L Final    Absolute Lymphocytes 1.6 0.8 - 5.3 10e9/L Final    Absolute Monocytes 0.6 0.0 - 1.3 10e9/L Final    Absolute Eosinophils 0.2 0.0 - 0.7 10e9/L Final    Absolute Basophils 0.0 0.0 - 0.2 10e9/L Final    Abs Immature Granulocytes 0.0 0 - 0.4 10e9/L Final    Absolute Nucleated RBC 0.0  Final         5/28/2018  1:43 PM      Component Results     Component Value Ref Range & Units Status    Sodium 140 133 - 144 mmol/L Final    Potassium 3.7 3.4 - 5.3 mmol/L Final    Chloride 107 94 - 109 mmol/L Final    Carbon Dioxide 26 20 - 32 mmol/L Final    Anion Gap 7 3 - 14 mmol/L Final    Glucose 100 (H) 70 - 99 mg/dL Final    Urea Nitrogen 14 7 - 30 mg/dL Final    Creatinine 0.65 0.52 - 1.04 mg/dL Final    GFR Estimate 89 >60 mL/min/1.7m2 Final    Non  GFR Calc    GFR Estimate If Black >90 >60 mL/min/1.7m2 Final    African American GFR Calc    Calcium 8.4 (L) 8.5 - 10.1 mg/dL Final         5/28/2018  1:32 PM      Component Results     Component Value Ref Range & Units Status    Color Urine Yellow  Final     Appearance Urine Clear  Final    Glucose Urine Negative NEG^Negative mg/dL Final    Bilirubin Urine Negative NEG^Negative Final    Ketones Urine Negative NEG^Negative mg/dL Final    Specific Gravity Urine 1.011 1.003 - 1.035 Final    Blood Urine Negative NEG^Negative Final    pH Urine 6.0 5.0 - 7.0 pH Final    Protein Albumin Urine Negative NEG^Negative mg/dL Final    Urobilinogen mg/dL Normal 0.0 - 2.0 mg/dL Final    Nitrite Urine Negative NEG^Negative Final    Leukocyte Esterase Urine Negative NEG^Negative Final    Source Midstream Urine  Final    WBC Urine 1 0 - 5 /HPF Final    RBC Urine 0 0 - 2 /HPF Final    Mucous Urine Present (A) NEG^Negative /LPF Final                Clinical Quality Measure: Blood Pressure Screening     Your blood pressure was checked while you were in the emergency department today. The last reading we obtained was  BP: 142/61 . Please read the guidelines below about what these numbers mean and what you should do about them.  If your systolic blood pressure (the top number) is less than 120 and your diastolic blood pressure (the bottom number) is less than 80, then your blood pressure is normal. There is nothing more that you need to do about it.  If your systolic blood pressure (the top number) is 120-139 or your diastolic blood pressure (the bottom number) is 80-89, your blood pressure may be higher than it should be. You should have your blood pressure rechecked within a year by a primary care provider.  If your systolic blood pressure (the top number) is 140 or greater or your diastolic blood pressure (the bottom number) is 90 or greater, you may have high blood pressure. High blood pressure is treatable, but if left untreated over time it can put you at risk for heart attack, stroke, or kidney failure. You should have your blood pressure rechecked by a primary care provider within the next 4 weeks.  If your provider in the emergency department today gave you specific instructions to  follow-up with your doctor or provider even sooner than that, you should follow that instruction and not wait for up to 4 weeks for your follow-up visit.        Thank you for choosing [facility]       Thank you for choosing [facility] for your care. Our goal is always to provide you with excellent care. Hearing back from our patients is one way we can continue to improve our services. Please take a few minutes to complete the written survey that you may receive in the mail after you visit with us. Thank you!        [employer] Information     [employer] gives you secure access to your electronic health record. If you see a primary care provider, you can also send messages to your care team and make appointments. If you have questions, please call your primary care clinic.  If you do not have a primary care provider, please call [phone] and they will assist you.        Care EveryWhere ID     This is your Care EveryWhere ID. This could be used by other organizations to access your [facility] medical records  [id number]        Equal Access to Services     TUSHAR MONTANO : Jillian Baker, sorin sanders, roxie chowdary, summer [place]. So [facility] [phone].    ATENCIÓN: Si habla español, tiene a thacker disposición servicios gratuitos de asistencia lingüística. Llame al [phone].    We comply with applicable federal civil rights laws and [place] laws. We do not discriminate on the basis of race, color, national origin, age, disability, sex, sexual orientation, or gender identity.            After Visit Summary       This is your record. Keep this with you and show to your community pharmacist(s) and doctor(s) at your next visit.

## 2018-05-28 NOTE — DISCHARGE INSTRUCTIONS
Weakness with Uncertain Cause  Based on your exam today, the exact cause of your weakness is not certain. But your weakness does not seem to be a sign of a serious illness at this time. Keep an eye on your symptoms and get medical advice as instructed below.  Home care    Rest at home today. Don't over-exert yourself.    Take any medicine as prescribed.    For the next few days, drink extra fluids (unless your healthcare provider wants you to restrict fluids for other reasons). Don't skip meals.    Unless otherwise directed, continue to take any prescription medicines.    Contact your healthcare provider if you have any questions or concerns.  Follow-up care  Follow up with your healthcare provider, or as advised.  When to seek medical advice  Call your healthcare provider right away for any of the following:    Symptoms get worse    Symptoms don't start getting better within 2 days    Fever of 100.4  F (38  C) or higher, or as directed by your healthcare provider  Call 911  Call 911 for any of these:    Chest, arm, neck, jaw, or upper back pain    Trouble breathing    Numbness or weakness of the face, one arm, or one leg    Slurred speech, confusion, or trouble speaking, walking, or seeing    Blood in vomit or stool (black or red color)    Loss of consciousness    Severe headache  Date Last Reviewed: 10/1/2017    [id number] [employer]. [address] [place] [address], [place] [address]. All rights reserved. This information is not intended as a substitute for professional medical care. Always follow your healthcare professional's instructions.

## 2018-05-28 NOTE — ED AVS SNAPSHOT
Emergency Department    [address] [facility] [address]    Phone:  [phone]    Fax:  [phone]                                       Cecily Ivory   MRN: [id number]    Department:   Emergency Department   Date of Visit:  5/28/2018           After Visit Summary Signature Page     I have received my discharge instructions, and my questions have been answered. I have discussed any challenges I see with this plan with the nurse or doctor.    ..........................................................................................................................................  Patient/Patient Representative Signature      ..........................................................................................................................................  Patient Representative Print Name and Relationship to Patient    ..................................................               ................................................  Date                                            Time    ..........................................................................................................................................  Reviewed by Signature/Title    ...................................................              ..............................................  Date                                                            Time

## 2018-05-29 NOTE — PROGRESS NOTES
"Clinic Care Coordination Contact    Clinic Care Coordination Contact  OUTREACH    Referral Information:  Referral Source: SNF/TCU (ED follow up 5/28/18)    Primary Diagnosis: Orthopedic    Chief Complaint   Patient presents with     Clinic Care Coordination - Post Hospital     TCU discharge/ED follow up        Universal Utilization: Patient has proper utilization of the ED/Hospital.   Clinic Utilization: patient has proper utilization of PCP. Patient has follow up on 5/31 at 2pm.   Difficulty keeping appointments:: No  Utilization    Last refreshed: 5/29/2018 10:07 AM:  No Show Count (past year) 0       Last refreshed: 5/29/2018 10:07 AM:  ED visits 1       Last refreshed: 5/29/2018 10:07 AM:  Hospital admissions 1          Current as of: 5/29/2018 10:07 AM             Clinical Concerns:  Current Medical Concerns: Patient recently discharged from the TCU ([facility]) after undergoing  A left knee joint replacement surgery. Patient was then transferred to [facility] for further medical and therapy treatment. Patient stated that she feels she got weak in there after not eating due to the food causing her nausea. Patient stated \"they started giving me nutritional shakes because I couldn't eat\". Patient feels like the therapy was beneficial.     Since being home the patient was seen in the ED on 5/28/18 for weakness. All tests were unremarkable. Spoke with the patient today who stated she is still weak. Patient currently laying in bed. Spoke with the patient about the need to come into the clinic and she declined. RN CC asked the patient if she could call her daughter but she declined as well. Patient stated \"Im just weak but Ill be ok.\" Patient stated that she will be able to care for herself.   Current Behavioral Concerns: Patient is alert and orientated. Denied any behavioral concerns at this time.   Education Provided to patient: To call the clinic with concerns. Nutritional education emphasized.  "   Pain  Chronic pain (GOAL):: No  Health Maintenance Reviewed: Not assessed    Medication Management:  Medications not reviewed with the patient at this time. Patient hung up before the RN CC could get the review completed. Will address at next phone call.     Functional Status:  Dependent ADLs:: Ambulation-walker  Bed or wheelchair confined:: No  Mobility Status: Independent w/Device  Fallen 2 or more times in the past year?: No  Any fall with injury in the past year?: No    Living Situation:  Current living arrangement:: I live alone    Diet/Exercise/Sleep:  Diet:: Regular  Inadequate nutrition (GOAL):: Yes  Food Insecurity: No  Tube Feeding: No  Exercise:: Currently not exercising  Inadequate activity/exercise (GOAL):: No  Significant changes in sleep pattern (GOAL): No    Transportation:  Transportation concerns (GOAL):: No  Transportation means:: Family, Regular car     Psychosocial:  Uatsdin or spiritual beliefs that impact treatment:: No  Mental health DX:: No  Mental health management concern (GOAL):: No  Informal Support system:: Family     Financial/Insurance: [employer]. No financial concerns at this time.   Financial/Insurance concerns (GOAL):: No       Resources and Interventions:  Community Resources: Other (see comment) (OP Therapy)     Equipment Currently Used at Home: walker, rolling    Advance Care Plan/Directive  Advanced Care Plans/Directives on file:: Yes  Type Advanced Care Plans/Directives: Advanced Directive - On File    Patient/Caregiver understanding: Patient verbalized understanding, engaged in AIDET communication behavior during encounter.    Outreach Frequency: monthly  Future Appointments              In 2 days Ellis Ruiz MD [facility]           Plan:   1. Patient will call the clinic should needs arise. Patient will return to the ED if symptoms worsen.   2. RN CC will mail out care coordination introduction letter and  touch base with the patient  in 2 weeks.     Cinthya Gore RN  Clinic Care Coordinator  [phone]  [email or web address]

## 2018-05-30 NOTE — PROGRESS NOTES
SUBJECTIVE:   Cecily Ivory is a 77 year old female who presents to clinic today for the following health issues:          Hospital Follow-up Visit:    Hospital/Nursing Home/ Rehab Facility:[facility]  Date of Discharge: 5/24/18 from nursing home, 5/17/18 from hospital  Reason(s) for Admission: left tka            Problems taking medications regularly:  None       Medication changes since discharge: None       Problems adhering to non-medication therapy:  None    Summary of hospitalization:  [facility] discharge summary reviewed  And tcu dc note reviewed and emergency room note reviewed  Diagnostic Tests/Treatments reviewed.  Follow up needed: none  Other Healthcare Providers Involved in Patient s Care:         Specialist appointment - Ortho  Update since discharge: improved.     Post Discharge Medication Reconciliation: discharge medications reconciled, continue medications without change.  Plan of care communicated with patient     Coding guidelines for this visit:  Type of Medical   Decision Making Face-to-Face Visit       within 7 Days of discharge Face-to-Face Visit        within 14 days of discharge   Moderate Complexity [id number]   High Complexity [id number]          The patient is here for hospital then TCU follow-up, having been discharged from the TCU 7 days ago.  As noted, the  patient had a left total knee replacement.  She recovered from that but has not felt well since then with complaint of weakness.  She has not had chest pain or shortness of breath.  No fevers.  No coughs.  No sore throats or headaches.  Slightly dizzy.  No abdominal pain or nausea or vomiting.  Her bowels have been a bit more frequent but not bloody.  No urine symptoms.  No fevers or night sweats.  She was not eating well in the nursing home but is now.  She is not feeling anxious.  She is not on any narcotics.  She has not had any other new medicines.  As noted she was seen in the ER and nothing  was found.    Past Medical History:   Diagnosis Date     Abdominal pain 2009, 2013    ct liver and renal cyst and 2mm lung nodule, repeat ct done 2013 and no significantly abnl.     Anxiety 2014    added med 3/14     Arthritis      ASCUS of cervix with negative high risk HPV 03/2018     Carotid bruit 2011    us nl     Gastritis 2011    egd done by [facility]     GERD (gastroesophageal reflux disease) 2011     History of colonoscopy 2004, 2014    nl     Hypercholesteremia 2000    stopped lovastatin 2015, added lipitor 3/16     Insomnia     using otc      Lumbar spinal stenosis 2016    Dr. Wilde, had epidural     Lung nodule 2009, 2013    seen on ct for abd pain, fu done 3/13 and 2 nodules stable and benign, one new one needs fu 1 year.  fu done 3/14 and fu 1 year and then done; fu done 3/15 and unchanged, no fu needed     Odynophagia 2004    egd nl     Other chronic pain      Peripheral neuropathies     Dr. Kim     Screening 2006, 2017    nl dexa     Vaginal dysplasia     chronic VAIN I, many colpos of vagina.  Now we only do an annual pap of vagina, no colpo since stable long term     Past Surgical History:   Procedure Laterality Date     ANKLE SURGERY  2000     APPENDECTOMY  13     ARTHROPLASTY KNEE Left 5/14/2018    Procedure: ARTHROPLASTY KNEE;  Left total knee arthroplasty;  Surgeon: William Pollard MD;  Location: [facility] OR     BREAST BIOPSY, CORE RT/LT       C VAGINAL HYSTERECTOMY  1995     GYN SURGERY      BSO     HC UGI ENDOSCOPY, SIMPLE EXAM  03/15/11    [facility]     LAPAROSCOPIC CHOLECYSTECTOMY  2008     NOSE SURGERY  1990     right knee arthroscopy  2007     thumb surgery Left 2/2015     Social History     Social History     Marital status:      Spouse name: N/A     Number of children: 1     Years of education: N/A     Occupational History     , retired [employer]      Retired     Social History Main Topics     Smoking status: Former Smoker     Packs/day: 1.50      "Years: 20.00     Types: Cigarettes     Quit date: 2/7/1992     Smokeless tobacco: Never Used     Alcohol use 0.0 oz/week     0 Standard drinks or equivalent per week      Comment: 5-6 drinks weekly     Drug use: No     Sexual activity: Yes     Birth control/ protection: Post-menopausal, Female Surgical      Comment: vag hyst, BSO     Other Topics Concern     Not on file     Social History Narrative     Current Outpatient Prescriptions   Medication Sig Dispense Refill     Ascorbic Acid (VITAMIN C PO) Take 500 mg by mouth daily.       aspirin 81 MG EC tablet Take 1 tablet (81 mg) by mouth 2 times daily 84 tablet 1     Biotin 5000 MCG CAPS Take 1 tablet by mouth daily        calcium carb 1250 mg, 500 mg [place],/vitamin D 200 units (OSCAL WITH D) 500-200 MG-UNIT per tablet Take 1 tablet by mouth 2 times daily (with meals). Takes 750mg daily. 100 tablet 3     Cholecalciferol (VITAMIN D3 PO) Take 1,000 Units by mouth daily        COMPRESSION STOCKINGS 1 each continuous 1 each 0     CYANOCOBALAMIN PO Take 1,000 mcg by mouth daily       ferrous sulfate (IRON) 325 (65 Fe) MG tablet Take by mouth daily (with breakfast)       loratadine (CLARITIN) 10 MG tablet Take 1 tablet (10 mg) by mouth daily as needed for allergies Occasional use       lovastatin (MEVACOR) 40 MG tablet TAKE ONE TABLET BY MOUTH EVERY NIGHT AT BEDTIME 90 tablet 3     magnesium 100 MG CAPS Take 1 tablet by mouth daily        Multiple Vitamins-Minerals (CENTRUM SILVER) per tablet Take 1 tablet by mouth daily       naproxen (NAPROSYN) 500 MG tablet Take 500 mg by mouth 2 times daily as needed        Allergies   Allergen Reactions     Latex Cough     Seasonal Allergies      YEAR ROUND ALLERGIES     Sulfa Drugs Unknown     FAMILY HISTORY NOTED AND REVIEWED    REVIEW OF SYSTEMS: above    PHYSICAL EXAM    BP (P) 133/69  Pulse (P) 90  Temp (P) 97.8  F (36.6  C) (Oral)  Ht (P) 5' 7\" (1.702 m)  Wt (P) 147 lb (66.7 kg)  SpO2 (P) 97%  Breastfeeding? No  BMI (P) " 23.02 kg/m2    Patient appears non toxic  Mouth - tongue midline and within normal limits, mucous membranes and posterior pharynx within normal limits, no lesions seen.  Neck - no masses, lesions or tenderness  Nodes - no supraclavicular, cervical or axially adenopathy .  Lungs - clear, normal flow  Cardiovascular - regular rate and rhythm, no murmer, rub or gallop, no jvp or edema, carotids within normal limits, no bruits.  Abdomen - normal active bowel sounds, soft, non tender, no masses, guarding or rebound, no hepatosplenomegaly  Knee looks good, incision clean and dry  Legs no signs clot    Labs sent; ekg - nsr, lad, no change from old, no acute ischemic changes    ASSESSMENT:  Weakness post op to ltka, suspect due to surgery, no signs sepsis, cv event, neuro event, no signs infection.  Does have blood loss anemia.  I suspect this is due to her surgery and anemia.  Doubt meds or clots    PLAN:  Labs now  If worsens or not resolving in next week to call      Ellis Ruiz M.D.

## 2018-05-31 ENCOUNTER — OFFICE VISIT (OUTPATIENT)
Dept: FAMILY MEDICINE | Facility: CLINIC | Age: 78
End: 2018-05-31
Payer: COMMERCIAL

## 2018-05-31 DIAGNOSIS — Z96.652 HISTORY OF LEFT KNEE REPLACEMENT: ICD-10-CM

## 2018-05-31 DIAGNOSIS — D62 ANEMIA DUE TO BLOOD LOSS, ACUTE: ICD-10-CM

## 2018-05-31 DIAGNOSIS — M62.81 GENERALIZED MUSCLE WEAKNESS: Primary | ICD-10-CM

## 2018-05-31 LAB
ALBUMIN UR-MCNC: NEGATIVE MG/DL
APPEARANCE UR: CLEAR
BILIRUB UR QL STRIP: NEGATIVE
COLOR UR AUTO: YELLOW
ERYTHROCYTE [DISTWIDTH] IN BLOOD BY AUTOMATED COUNT: 15 % (ref 10–15)
GLUCOSE UR STRIP-MCNC: NEGATIVE MG/DL
HCT VFR BLD AUTO: 36.8 % (ref 35–47)
HGB BLD-MCNC: 11.8 G/DL (ref 11.7–15.7)
HGB UR QL STRIP: NEGATIVE
KETONES UR STRIP-MCNC: ABNORMAL MG/DL
LEUKOCYTE ESTERASE UR QL STRIP: NEGATIVE
MCH RBC QN AUTO: 31.4 PG (ref 26.5–33)
MCHC RBC AUTO-ENTMCNC: 32.1 G/DL (ref 31.5–36.5)
MCV RBC AUTO: 98 FL (ref 78–100)
NITRATE UR QL: NEGATIVE
PH UR STRIP: 5 PH (ref 5–7)
PLATELET # BLD AUTO: 476 10E9/L (ref 150–450)
RBC # BLD AUTO: 3.76 10E12/L (ref 3.8–5.2)
SOURCE: ABNORMAL
SP GR UR STRIP: >1.03 (ref 1–1.03)
UROBILINOGEN UR STRIP-ACNC: 0.2 EU/DL (ref 0.2–1)
WBC # BLD AUTO: 8.3 10E9/L (ref 4–11)

## 2018-05-31 PROCEDURE — 80053 COMPREHEN METABOLIC PANEL: CPT | Performed by: INTERNAL MEDICINE

## 2018-05-31 PROCEDURE — 99496 TRANSJ CARE MGMT HIGH F2F 7D: CPT | Performed by: INTERNAL MEDICINE

## 2018-05-31 PROCEDURE — 81003 URINALYSIS AUTO W/O SCOPE: CPT | Performed by: INTERNAL MEDICINE

## 2018-05-31 PROCEDURE — 36415 COLL VENOUS BLD VENIPUNCTURE: CPT | Performed by: INTERNAL MEDICINE

## 2018-05-31 PROCEDURE — 85027 COMPLETE CBC AUTOMATED: CPT | Performed by: INTERNAL MEDICINE

## 2018-05-31 PROCEDURE — 93010 ELECTROCARDIOGRAM REPORT: CPT | Performed by: INTERNAL MEDICINE

## 2018-05-31 RX ORDER — FERROUS SULFATE 325(65) MG
TABLET ORAL
COMMUNITY
End: 2018-11-09

## 2018-05-31 NOTE — MR AVS SNAPSHOT
"              After Visit Summary   5/31/2018    Cecily Ivory    MRN: [id number]           Patient Information     Date Of Birth          1940        Visit Information        Provider Department      5/31/2018 2:00 PM Ellis Ruiz MD [facility]        Today's Diagnoses     Generalized muscle weakness    -  1    History of left knee replacement        Anemia due to blood loss, acute          Care Instructions    If you feel worse call me.    I will send you the labs from today    If you are not better in one week let me know.    Ellis Ruiz M.D.            Follow-ups after your visit        Who to contact     If you have questions or need follow up information about today's clinic visit or your schedule please contact [facility] directly at [phone].  Normal or non-critical lab and imaging results will be communicated to you by MyChart, letter or phone within 4 business days after the clinic has received the results. If you do not hear from us within 7 days, please contact the clinic through MyChart or phone. If you have a critical or abnormal lab result, we will notify you by phone as soon as possible.  Submit refill requests through [employer] or call your pharmacy and they will forward the refill request to us. Please allow 3 business days for your refill to be completed.          Additional Information About Your Visit        MyChart Information     [employer] lets you send messages to your doctor, view your test results, renew your prescriptions, schedule appointments and more. To sign up, go to [email or web address] . Click on \"Log in\" on the left side of the screen, which will take you to the Welcome page. Then click on \"Sign up Now\" on the right side of the page.     You will be asked to enter the access code listed below, as well as some personal information. Please follow the directions to create your username and password.     Your access code is: " [email or web address]  Expires: 2018  2:05 PM     Your access code will  in 90 days. If you need help or a new code, please call your [facility] clinic or [phone].        Care EveryWhere ID     This is your Care EveryWhere ID. This could be used by other organizations to access your [facility] medical records  [id number]        Your Vitals Were     Breastfeeding?                   No            Blood Pressure from Last 3 Encounters:   18 (P) 133/69   18 142/61   18 129/79    Weight from Last 3 Encounters:   18 (P) 147 lb (66.7 kg)   18 148 lb 9.6 oz (67.4 kg)   18 148 lb (67.1 kg)              We Performed the Following     *UA reflex to Microscopic     CBC with platelets     Comprehensive metabolic panel     EKG 12-LEAD CLINIC READ          Today's Medication Changes          These changes are accurate as of 18  2:06 PM.  If you have any questions, ask your nurse or doctor.               Stop taking these medicines if you haven't already. Please contact your care team if you have questions.     oxyCODONE IR 5 MG tablet   Commonly known as:  ROXICODONE   Stopped by:  Ellis Ruiz MD           polyethylene glycol Packet   Commonly known as:  MIRALAX/GLYCOLAX   Stopped by:  Ellis Ruiz MD           sennosides 8.6 MG tablet   Commonly known as:  SENOKOT   Stopped by:  Ellis Ruiz MD                    Primary Care Provider Office Phone # Fax #    Ellis Ruiz [phone] [address]  [facility] [address]        Equal Access to Services     [facility] [place]: Hadii dragan spear hadashdeangelo Somanan, waaxda luqadaha, qaybta kaalmasummer rangel. So [facility] [phone].    ATENCIÓN: Si habla español, tiene a thacker disposición servicios gratuitos de asistencia lingüística. Llame al [phone].    We comply with applicable federal civil rights laws and [place] laws. We do not  discriminate on the basis of race, color, national origin, age, disability, sex, sexual orientation, or gender identity.            Thank you!     Thank you for choosing [facility]  for your care. Our goal is always to provide you with excellent care. Hearing back from our patients is one way we can continue to improve our services. Please take a few minutes to complete the written survey that you may receive in the mail after your visit with us. Thank you!             Your Updated Medication List - Protect others around you: Learn how to safely use, store and throw away your medicines at www.disposemymeds.org.          This list is accurate as of 5/31/18  2:06 PM.  Always use your most recent med list.                   Brand Name Dispense Instructions for use Diagnosis    aspirin 81 MG EC tablet     84 tablet    Take 1 tablet (81 mg) by mouth 2 times daily    Status post total knee replacement, unspecified laterality       Biotin 5000 MCG Caps      Take 1 tablet by mouth daily        Calcium carb-Vitamin D 500 mg [place]-200 units 500-200 MG-UNIT per tablet    OSCAL with D;Oyster Shell Calcium    100 tablet    Take 1 tablet by mouth 2 times daily (with meals). Takes 750mg daily.        CENTRUM SILVER per tablet      Take 1 tablet by mouth daily        CLARITIN 10 MG tablet   Generic drug:  loratadine      Take 1 tablet (10 mg) by mouth daily as needed for allergies Occasional use        COMPRESSION STOCKINGS     1 each    1 each continuous    Status post total knee replacement, unspecified laterality       CYANOCOBALAMIN PO      Take 1,000 mcg by mouth daily        ferrous sulfate 325 (65 Fe) MG tablet    IRON     Take by mouth daily (with breakfast)    Generalized muscle weakness       lovastatin 40 MG tablet    MEVACOR    90 tablet    TAKE ONE TABLET BY MOUTH EVERY NIGHT AT BEDTIME    Hyperlipidemia LDL goal <130       magnesium 100 MG Caps      Take 1 tablet by mouth daily        naproxen 500 MG tablet     NAPROSYN     Take 500 mg by mouth 2 times daily as needed        VITAMIN C PO      Take 500 mg by mouth daily.        VITAMIN D3 PO      Take 1,000 Units by mouth daily

## 2018-05-31 NOTE — PATIENT INSTRUCTIONS
If you feel worse call me.    I will send you the labs from today    If you are not better in one week let me know.    Ellis Ruiz M.D.

## 2018-06-01 ENCOUNTER — TELEPHONE (OUTPATIENT)
Dept: FAMILY MEDICINE | Facility: CLINIC | Age: 78
End: 2018-06-01

## 2018-06-01 LAB
ALBUMIN SERPL-MCNC: 3.5 G/DL (ref 3.4–5)
ALP SERPL-CCNC: 82 U/L (ref 40–150)
ALT SERPL W P-5'-P-CCNC: 25 U/L (ref 0–50)
ANION GAP SERPL CALCULATED.3IONS-SCNC: 9 MMOL/L (ref 3–14)
AST SERPL W P-5'-P-CCNC: 26 U/L (ref 0–45)
BILIRUB SERPL-MCNC: 0.3 MG/DL (ref 0.2–1.3)
BUN SERPL-MCNC: 14 MG/DL (ref 7–30)
CALCIUM SERPL-MCNC: 8.6 MG/DL (ref 8.5–10.1)
CHLORIDE SERPL-SCNC: 106 MMOL/L (ref 94–109)
CO2 SERPL-SCNC: 24 MMOL/L (ref 20–32)
CREAT SERPL-MCNC: 0.62 MG/DL (ref 0.52–1.04)
GFR SERPL CREATININE-BSD FRML MDRD: >90 ML/MIN/1.7M2
GLUCOSE SERPL-MCNC: 151 MG/DL (ref 70–99)
POTASSIUM SERPL-SCNC: 4.2 MMOL/L (ref 3.4–5.3)
PROT SERPL-MCNC: 6.9 G/DL (ref 6.8–8.8)
SODIUM SERPL-SCNC: 139 MMOL/L (ref 133–144)

## 2018-06-01 NOTE — TELEPHONE ENCOUNTER
Called patient to relay message below. No answer, left VM to return call to clinic.     Mihaela WHITMAN RN

## 2018-06-01 NOTE — TELEPHONE ENCOUNTER
Please call patient, labs and urine fine, if not better within 7 days let me know    Ellis Ruiz M.D.

## 2018-06-19 ENCOUNTER — TRANSFERRED RECORDS (OUTPATIENT)
Dept: HEALTH INFORMATION MANAGEMENT | Facility: CLINIC | Age: 78
End: 2018-06-19

## 2018-07-02 ENCOUNTER — OFFICE VISIT (OUTPATIENT)
Dept: FAMILY MEDICINE | Facility: CLINIC | Age: 78
End: 2018-07-02
Payer: COMMERCIAL

## 2018-07-02 VITALS
HEART RATE: 68 BPM | WEIGHT: 146.4 LBS | SYSTOLIC BLOOD PRESSURE: 110 MMHG | OXYGEN SATURATION: 97 % | DIASTOLIC BLOOD PRESSURE: 76 MMHG | BODY MASS INDEX: 22.93 KG/M2 | TEMPERATURE: 98.1 F

## 2018-07-02 DIAGNOSIS — R19.7 DIARRHEA OF PRESUMED INFECTIOUS ORIGIN: Primary | ICD-10-CM

## 2018-07-02 PROCEDURE — 99214 OFFICE O/P EST MOD 30 MIN: CPT | Performed by: INTERNAL MEDICINE

## 2018-07-02 RX ORDER — LOPERAMIDE HYDROCHLORIDE 2 MG/1
2 TABLET ORAL 3 TIMES DAILY PRN
Qty: 30 TABLET | Refills: 1 | Status: SHIPPED | OUTPATIENT
Start: 2018-07-02 | End: 2018-07-02

## 2018-07-02 RX ORDER — TRAZODONE HYDROCHLORIDE 50 MG/1
50 TABLET, FILM COATED ORAL
COMMUNITY
Start: 2018-07-02 | End: 2018-07-12

## 2018-07-02 RX ORDER — LOPERAMIDE HYDROCHLORIDE 2 MG/1
2 TABLET ORAL 3 TIMES DAILY PRN
Qty: 30 TABLET | Refills: 1 | Status: SHIPPED | OUTPATIENT
Start: 2018-07-02 | End: 2018-11-09

## 2018-07-02 NOTE — PROGRESS NOTES
SUBJECTIVE:   Cecily Ivory is a 77 year old female who presents to clinic today for the following health issues:      Chief Complaint   Patient presents with     Diarrhea     x1 week experiencing 2-3 watery stools after eating, worse in the morning; patient reports loss of about 8 lb since this started         Current Medications:     Current Outpatient Prescriptions   Medication Sig Dispense Refill     Ascorbic Acid (VITAMIN C PO) Take 500 mg by mouth daily.       Biotin 5000 MCG CAPS Take 1 tablet by mouth daily        calcium carb 1250 mg, 500 mg [place],/vitamin D 200 units (OSCAL WITH D) 500-200 MG-UNIT per tablet Take 1 tablet by mouth 2 times daily (with meals). Takes 750mg daily. 100 tablet 3     Cholecalciferol (VITAMIN D3 PO) Take 1,000 Units by mouth daily        CYANOCOBALAMIN PO Take 1,000 mcg by mouth daily       ferrous sulfate (IRON) 325 (65 Fe) MG tablet Take by mouth daily (with breakfast)       loperamide (IMODIUM A-D) 2 MG tablet Take 1 tablet (2 mg) by mouth 3 times daily as needed for diarrhea 30 tablet 1     loratadine (CLARITIN) 10 MG tablet Take 1 tablet (10 mg) by mouth daily as needed for allergies Occasional use       lovastatin (MEVACOR) 40 MG tablet TAKE ONE TABLET BY MOUTH EVERY NIGHT AT BEDTIME 90 tablet 3     magnesium 100 MG CAPS Take 1 tablet by mouth daily        Multiple Vitamins-Minerals (CENTRUM SILVER) per tablet Take 1 tablet by mouth daily       naproxen (NAPROSYN) 500 MG tablet Take 500 mg by mouth 2 times daily as needed        traZODone (DESYREL) 50 MG tablet Take 1 tablet (50 mg) by mouth nightly as needed for sleep Following knee surgery           Allergies:      Allergies   Allergen Reactions     Latex Cough     Seasonal Allergies      YEAR ROUND ALLERGIES     Sulfa Drugs Unknown            Past Medical History:     Past Medical History:   Diagnosis Date     Abdominal pain 2009, 2013    ct liver and renal cyst and 2mm lung nodule, repeat ct done 2013 and no  significantly abnl.     Anxiety 2014    added med 3/14     Arthritis      ASCUS of cervix with negative high risk HPV 03/2018     Carotid bruit 2011    us nl     Gastritis 2011    egd done [facility]     GERD (gastroesophageal reflux disease) 2011     History of colonoscopy 2004, 2014    nl     Hypercholesteremia 2000    stopped lovastatin 2015, added lipitor 3/16     Insomnia     using otc      Lumbar spinal stenosis 2016    Dr. Wilde, had epidural     Lung nodule 2009, 2013    seen on ct for abd pain, fu done 3/13 and 2 nodules stable and benign, one new one needs fu 1 year.  fu done 3/14 and fu 1 year and then done; fu done 3/15 and unchanged, no fu needed     Odynophagia 2004    egd nl     Other chronic pain      Peripheral neuropathies     Dr. Kim     Screening 2006, 2017    nl dexa     Vaginal dysplasia     chronic VAIN I, many colpos of vagina.  Now we only do an annual pap of vagina, no colpo since stable long term         Past Surgical History:     Past Surgical History:   Procedure Laterality Date     ANKLE SURGERY  2000     APPENDECTOMY  13     ARTHROPLASTY KNEE Left 5/14/2018    Procedure: ARTHROPLASTY KNEE;  Left total knee arthroplasty;  Surgeon: William Pollard MD;  Location: [facility] OR     BREAST BIOPSY, CORE RT/LT       C VAGINAL HYSTERECTOMY  1995     GYN SURGERY      BSO     HC UGI ENDOSCOPY, SIMPLE EXAM  03/15/11    [facility]     LAPAROSCOPIC CHOLECYSTECTOMY  2008     NOSE SURGERY  1990     right knee arthroscopy  2007     thumb surgery Left 2/2015         Family Medical History:     Family History   Problem Relation Age of Onset     Hyperlipidemia Mother      Endocrine Disease Brother      Diabetes Brother          Social History:     Social History     Social History     Marital status:      Spouse name: N/A     Number of children: 1     Years of education: N/A     Occupational History     , retired [employer]      Retired     Social History Main  Topics     Smoking status: Former Smoker     Packs/day: 1.50     Years: 20.00     Types: Cigarettes     Quit date: 2/7/1992     Smokeless tobacco: Never Used     Alcohol use 0.0 oz/week     0 Standard drinks or equivalent per week      Comment: 5-6 drinks weekly     Drug use: No     Sexual activity: Yes     Birth control/ protection: Post-menopausal, Female Surgical      Comment: vag hyst, BSO     Other Topics Concern     Not on file     Social History Narrative           Review of System:     Constitutional: Negative for fever or chills, positive for recent 8 lbs weight loss  Skin: Negative for rashes  Ears/Nose/Throat: Negative for nasal congestion, sore throat  Respiratory: No shortness of breath, dyspnea on exertion, cough, or hemoptysis  Cardiovascular: Negative for chest pain  Gastrointestinal: Negative for nausea, vomiting, positive for recent persistent diarrhea symptoms  Genitourinary: Negative for dysuria, hematuria  Musculoskeletal: Positive for recent knee surgery  Neurologic: Negative for headaches  Psychiatric: Negative for depression, anxiety  Hematologic/Lymphatic/Immunologic: Negative  Endocrine: Negative  Behavioral: Negative for tobacco use       Physical Exam:   /76  Pulse 68  Temp 98.1  F (36.7  C) (Oral)  Wt 146 lb 6.4 oz (66.4 kg)  SpO2 97%  Breastfeeding? No  BMI (P) 22.93 kg/m2    GENERAL: alert and no distress  EYES: eyes grossly normal to inspection, and conjunctivae and sclerae normal  HENT: Normocephalic atraumatic. Nose and mouth without ulcers or lesions  NECK: supple  RESP: lungs clear to auscultation   CV: regular rate and rhythm, normal S1 S2  LYMPH: no peripheral edema   ABDOMEN: nondistended  MS: no gross musculoskeletal defects noted  SKIN: no suspicious lesions or rashes  NEURO: Alert & Oriented x 3.   PSYCH: mentation appears normal, affect normal        Diagnostic Test Results:     I also have ordered labs for Enteric Bacteria and Virus Panel by [facility] Stool,  Clostridium difficile Toxin B PCR to screen for infectious causes of chronic diarrhea.    ASSESSMENT/PLAN:       (A09) Diarrhea of presumed infectious origin  (primary encounter diagnosis)  Comment: chronic diarrhea of unclear etiology since recent knee surgery with weight loss.  Plan: I have prescribed loperamide (IMODIUM A-D) 2 MG tablet for diarrhea treatment. I also have ordered labs for Enteric Bacteria and Virus Panel by [facility] Stool, Clostridium difficile Toxin B PCR to screen for infectious causes of chronic diarrhea.         Follow Up Plan:     Patient is instructed to return to Internal Medicine clinic for follow-up visit in 1 week.        Lisy Khalil MD  Internal Medicine  [facility]

## 2018-07-02 NOTE — MR AVS SNAPSHOT
After Visit Summary   7/2/2018    Cecily Ivory    MRN: [id number]           Patient Information     Date Of Birth          1940        Visit Information        Provider Department      7/2/2018 2:20 PM Lisy Khalil MD [facility]        Today's Diagnoses     Diarrhea of presumed infectious origin    -  1       Follow-ups after your visit        Your next 10 appointments already scheduled     Jul 11, 2018  1:00 PM CDT   Office Visit with Lisy Khalil MD   [facility] ([facility])    [address] [facility] [address] [phone]           Bring a current list of meds and any records pertaining to this visit. For Physicals, please bring immunization records and any forms needing to be filled out. Please arrive 10 minutes early to complete paperwork.              Future tests that were ordered for you today     Open Future Orders        Priority Expected Expires Ordered    Enteric Bacteria and Virus Panel by BRYAN Stool Routine  7/2/2019 7/2/2018    Clostridium difficile Toxin B PCR Routine  8/1/2018 7/2/2018            Who to contact     If you have questions or need follow up information about today's clinic visit or your schedule please contact [facility] directly at [phone].  Normal or non-critical lab and imaging results will be communicated to you by MyChart, letter or phone within 4 business days after the clinic has received the results. If you do not hear from us within 7 days, please contact the clinic through MyChart or phone. If you have a critical or abnormal lab result, we will notify you by phone as soon as possible.  Submit refill requests through [employer] or call your pharmacy and they will forward the refill request to us. Please allow 3 business days for your refill to be completed.          Additional Information About Your Visit        Care EveryWhere ID     This is your Care EveryWhere ID. This could be  used by other organizations to access your [place] medical records  [id number]        Your Vitals Were     Pulse Temperature Pulse Oximetry Breastfeeding? BMI (Body Mass Index)       68 98.1  F (36.7  C) (Oral) 97% No 22.93 kg/m2        Blood Pressure from Last 3 Encounters:   07/02/18 110/76   05/31/18 (P) 133/69   05/28/18 142/61    Weight from Last 3 Encounters:   07/02/18 146 lb 6.4 oz (66.4 kg)   05/31/18 (P) 147 lb (66.7 kg)   05/28/18 148 lb 9.6 oz (67.4 kg)                 Today's Medication Changes          These changes are accurate as of 7/2/18  5:56 PM.  If you have any questions, ask your nurse or doctor.               Start taking these medicines.        Dose/Directions    loperamide 2 MG tablet   Commonly known as:  IMODIUM A-D   Used for:  Diarrhea of presumed infectious origin   Started by:  Lisy Khalil MD        Dose:  2 mg   Take 1 tablet (2 mg) by mouth 3 times daily as needed for diarrhea   Quantity:  30 tablet   Refills:  1            Where to get your medicines      These medications were sent to [id number] - [place], [place] - [address]  [address] [facility] [address]     Phone:  [phone]     loperamide 2 MG tablet                Primary Care Provider Office Phone # Fax #    Ellis Ruiz [phone] [address] [address]        Equal Access to Services     [facility]: Hadii aad ku hadasho Somanan, waaxda luqadaha, qaybta kaalmada adeegyada, waxay hannah washington. So [facility] [phone].    ATENCIÓN: Si habla español, tiene a thacker disposición servicios gratuitos de asistencia lingüística. Llame al [phone].    We comply with applicable federal civil rights laws and [place] laws. We do not discriminate on the basis of race, color, national origin, age, disability, sex, sexual orientation, or gender identity.            Thank you!     Thank you for choosing [facility]  for  your care. Our goal is always to provide you with excellent care. Hearing back from our patients is one way we can continue to improve our services. Please take a few minutes to complete the written survey that you may receive in the mail after your visit with us. Thank you!             Your Updated Medication List - Protect others around you: Learn how to safely use, store and throw away your medicines at www.disposemymeds.org.          This list is accurate as of 7/2/18  5:56 PM.  Always use your most recent med list.                   Brand Name Dispense Instructions for use Diagnosis    Biotin 5000 MCG Caps      Take 1 tablet by mouth daily        Calcium carb-Vitamin D 500 mg [place]-200 units 500-200 MG-UNIT per tablet    OSCAL with D;Oyster Shell Calcium    100 tablet    Take 1 tablet by mouth 2 times daily (with meals). Takes 750mg daily.        CENTRUM SILVER per tablet      Take 1 tablet by mouth daily        CLARITIN 10 MG tablet   Generic drug:  loratadine      Take 1 tablet (10 mg) by mouth daily as needed for allergies Occasional use        CYANOCOBALAMIN PO      Take 1,000 mcg by mouth daily        ferrous sulfate 325 (65 Fe) MG tablet    IRON     Take by mouth daily (with breakfast)    Generalized muscle weakness       loperamide 2 MG tablet    IMODIUM A-D    30 tablet    Take 1 tablet (2 mg) by mouth 3 times daily as needed for diarrhea    Diarrhea of presumed infectious origin       lovastatin 40 MG tablet    MEVACOR    90 tablet    TAKE ONE TABLET BY MOUTH EVERY NIGHT AT BEDTIME    Hyperlipidemia LDL goal <130       magnesium 100 MG Caps      Take 1 tablet by mouth daily        naproxen 500 MG tablet    NAPROSYN     Take 500 mg by mouth 2 times daily as needed        traZODone 50 MG tablet    DESYREL     Take 1 tablet (50 mg) by mouth nightly as needed for sleep Following knee surgery        VITAMIN C PO      Take 500 mg by mouth daily.        VITAMIN D3 PO      Take 1,000 Units by mouth daily

## 2018-07-03 DIAGNOSIS — R19.7 DIARRHEA OF PRESUMED INFECTIOUS ORIGIN: ICD-10-CM

## 2018-07-03 PROCEDURE — 87506 IADNA-DNA/RNA PROBE TQ 6-11: CPT | Performed by: INTERNAL MEDICINE

## 2018-07-11 ENCOUNTER — OFFICE VISIT (OUTPATIENT)
Dept: FAMILY MEDICINE | Facility: CLINIC | Age: 78
End: 2018-07-11
Payer: COMMERCIAL

## 2018-07-11 VITALS
SYSTOLIC BLOOD PRESSURE: 108 MMHG | HEIGHT: 67 IN | HEART RATE: 80 BPM | WEIGHT: 145 LBS | BODY MASS INDEX: 22.76 KG/M2 | TEMPERATURE: 98.1 F | DIASTOLIC BLOOD PRESSURE: 66 MMHG | OXYGEN SATURATION: 97 %

## 2018-07-11 DIAGNOSIS — F17.201 TOBACCO DEPENDENCE IN REMISSION: ICD-10-CM

## 2018-07-11 DIAGNOSIS — R63.4 LOSS OF WEIGHT: ICD-10-CM

## 2018-07-11 DIAGNOSIS — R19.7 DIARRHEA, UNSPECIFIED TYPE: Primary | ICD-10-CM

## 2018-07-11 DIAGNOSIS — R91.8 PULMONARY NODULES: ICD-10-CM

## 2018-07-11 PROCEDURE — 99214 OFFICE O/P EST MOD 30 MIN: CPT | Performed by: INTERNAL MEDICINE

## 2018-07-11 NOTE — MR AVS SNAPSHOT
"              After Visit Summary   7/11/2018    Cecily Ivory    MRN: [id number]           Patient Information     Date Of Birth          1940        Visit Information        Provider Department      7/11/2018 1:00 PM Lisy Khalil MD [facility]        Today's Diagnoses     Diarrhea, unspecified type    -  1    Loss of weight        Tobacco dependence in remission        Pulmonary nodules           Follow-ups after your visit        Future tests that were ordered for you today     Open Future Orders        Priority Expected Expires Ordered    Clostridium difficile Toxin B PCR Routine  8/11/2018 7/12/2018    CT Chest/Abdomen/Pelvis w Contrast Routine  7/11/2019 7/11/2018            Who to contact     If you have questions or need follow up information about today's clinic visit or your schedule please contact [facility] directly at [phone].  Normal or non-critical lab and imaging results will be communicated to you by MyChart, letter or phone within 4 business days after the clinic has received the results. If you do not hear from us within 7 days, please contact the clinic through MyChart or phone. If you have a critical or abnormal lab result, we will notify you by phone as soon as possible.  Submit refill requests through [employer] or call your pharmacy and they will forward the refill request to us. Please allow 3 business days for your refill to be completed.          Additional Information About Your Visit        Care EveryWhere ID     This is your Care EveryWhere ID. This could be used by other organizations to access your [place] medical records  [id number]        Your Vitals Were     Pulse Temperature Height Pulse Oximetry BMI (Body Mass Index)       80 98.1  F (36.7  C) (Oral) 5' 7\" (1.702 m) 97% 22.71 kg/m2        Blood Pressure from Last 3 Encounters:   07/12/18 126/79   07/11/18 108/66   07/02/18 110/76    Weight from Last 3 Encounters:   07/12/18 147 lb (66.7 " kg)   07/11/18 145 lb (65.8 kg)   07/02/18 146 lb 6.4 oz (66.4 kg)              Today, you had the following     No orders found for display       Primary Care Provider Office Phone # Fax #    Ellis Ruiz [phone] [address]  [facility] [address]        Equal Access to Services     [facility] : Hadii aad ku hadasho Soomaali, waaxda luqadaha, qaybta kaalmada adeegyada, waxay idiin hayaan adeeg kharash la'aan ah. So [facility] [phone].    ATENCIÓN: Si deandre noe, tiene a thacker disposición servicios gratuitos de asistencia lingüística. Llame al [phone].    We comply with applicable federal civil rights laws and [place] laws. We do not discriminate on the basis of race, color, national origin, age, disability, sex, sexual orientation, or gender identity.            Thank you!     Thank you for choosing [facility]  for your care. Our goal is always to provide you with excellent care. Hearing back from our patients is one way we can continue to improve our services. Please take a few minutes to complete the written survey that you may receive in the mail after your visit with us. Thank you!             Your Updated Medication List - Protect others around you: Learn how to safely use, store and throw away your medicines at www.disposemymeds.org.          This list is accurate as of 7/11/18 11:59 PM.  Always use your most recent med list.                   Brand Name Dispense Instructions for use Diagnosis    Biotin 5000 MCG Caps      Take 1 tablet by mouth daily        Calcium carb-Vitamin D 500 mg [place]-200 units 500-200 MG-UNIT per tablet    OSCAL with D;Oyster Shell Calcium    100 tablet    Take 1 tablet by mouth 2 times daily (with meals). Takes 750mg daily.        CENTRUM SILVER per tablet      Take 1 tablet by mouth daily        CLARITIN 10 MG tablet   Generic drug:  loratadine      Take 1 tablet (10 mg) by mouth daily as needed for allergies Occasional  use        CYANOCOBALAMIN PO      Take 1,000 mcg by mouth daily        ferrous sulfate 325 (65 Fe) MG tablet    IRON     Take by mouth daily (with breakfast)    Generalized muscle weakness       loperamide 2 MG tablet    IMODIUM A-D    30 tablet    Take 1 tablet (2 mg) by mouth 3 times daily as needed for diarrhea    Diarrhea of presumed infectious origin       lovastatin 40 MG tablet    MEVACOR    90 tablet    TAKE ONE TABLET BY MOUTH EVERY NIGHT AT BEDTIME    Hyperlipidemia LDL goal <130       magnesium 100 MG Caps      Take 1 tablet by mouth daily        naproxen 500 MG tablet    NAPROSYN     Take 500 mg by mouth 2 times daily as needed        traZODone 50 MG tablet    DESYREL     Take 1 tablet (50 mg) by mouth nightly as needed for sleep Following knee surgery        VITAMIN C PO      Take 500 mg by mouth daily.        VITAMIN D3 PO      Take 1,000 Units by mouth daily

## 2018-07-11 NOTE — PROGRESS NOTES
SUBJECTIVE:   Cecily Ivory is a 77 year old female who presents to clinic today for the following health issues:      Chief Complaint   Patient presents with     Follow Up For      Diarrhea of presumed infectious origin          HPI:   Patient Cecily Ivory is a very pleasant 77 year old female with history of recent persistent diarrhea symptoms who presents to Internal Medicine clinic today for evaluation of her diarrhea and weight loss symptoms. Regarding the patient's diarrhea symptoms, the patient reports that her diarrhea has resolved previously with OTC Imodium. However, the patient's diarrhea symptoms recurred again yesterday with watery diarrhea. Patient also has been concerned about persistent abnormal weight loss since a recent knee surgery. Patient's weight has once again decreased by 1 lb since last week. Patient has lost a total of almost 10 lbs since the knee surgery without her trying to lose weight according to her report. Patient used to smoke tobacco and quit years ago. Patient denies any chest pain, headaches, fever or chills.       Current Medications:     Current Outpatient Prescriptions   Medication Sig Dispense Refill     Ascorbic Acid (VITAMIN C PO) Take 500 mg by mouth daily.       Biotin 5000 MCG CAPS Take 1 tablet by mouth daily        calcium carb 1250 mg, 500 mg [place],/vitamin D 200 units (OSCAL WITH D) 500-200 MG-UNIT per tablet Take 1 tablet by mouth 2 times daily (with meals). Takes 750mg daily. 100 tablet 3     Cholecalciferol (VITAMIN D3 PO) Take 1,000 Units by mouth daily        CYANOCOBALAMIN PO Take 1,000 mcg by mouth daily       ferrous sulfate (IRON) 325 (65 Fe) MG tablet Take by mouth daily (with breakfast)       loperamide (IMODIUM A-D) 2 MG tablet Take 1 tablet (2 mg) by mouth 3 times daily as needed for diarrhea 30 tablet 1     loratadine (CLARITIN) 10 MG tablet Take 1 tablet (10 mg) by mouth daily as needed for allergies Occasional use       lovastatin (MEVACOR) 40  MG tablet TAKE ONE TABLET BY MOUTH EVERY NIGHT AT BEDTIME 90 tablet 3     magnesium 100 MG CAPS Take 1 tablet by mouth daily        Multiple Vitamins-Minerals (CENTRUM SILVER) per tablet Take 1 tablet by mouth daily       naproxen (NAPROSYN) 500 MG tablet Take 500 mg by mouth 2 times daily as needed        traZODone (DESYREL) 50 MG tablet Take 1 tablet (50 mg) by mouth nightly as needed for sleep Following knee surgery           Allergies:      Allergies   Allergen Reactions     Latex Cough     Seasonal Allergies      YEAR ROUND ALLERGIES     Sulfa Drugs Unknown            Past Medical History:     Past Medical History:   Diagnosis Date     Abdominal pain 2009, 2013    ct liver and renal cyst and 2mm lung nodule, repeat ct done 2013 and no significantly abnl.     Anxiety 2014    added med 3/14     Arthritis      ASCUS of cervix with negative high risk HPV 03/2018     Carotid bruit 2011    us nl     Gastritis 2011    egd done by [facility] [facility]     GERD (gastroesophageal reflux disease) 2011     History of colonoscopy 2004, 2014    nl     Hypercholesteremia 2000    stopped lovastatin 2015, added lipitor 3/16     Insomnia     using otc      Lumbar spinal stenosis 2016    Dr. Wilde, had epidural     Lung nodule 2009, 2013    seen on ct for abd pain, fu done 3/13 and 2 nodules stable and benign, one new one needs fu 1 year.  fu done 3/14 and fu 1 year and then done; fu done 3/15 and unchanged, no fu needed     Odynophagia 2004    egd nl     Other chronic pain      Peripheral neuropathies     Dr. Kim     Screening 2006, 2017    nl dexa     Vaginal dysplasia     chronic VAIN I, many colpos of vagina.  Now we only do an annual pap of vagina, no colpo since stable long term         Past Surgical History:     Past Surgical History:   Procedure Laterality Date     ANKLE SURGERY  2000     APPENDECTOMY  13     ARTHROPLASTY KNEE Left 5/14/2018    Procedure: ARTHROPLASTY KNEE;  Left total knee arthroplasty;  Surgeon: William Pollard MD;   Location: [facility] OR     BREAST BIOPSY, CORE RT/LT       C VAGINAL HYSTERECTOMY  1995     GYN SURGERY      BSO     HC UGI ENDOSCOPY, SIMPLE EXAM  03/15/11    [facility]     LAPAROSCOPIC CHOLECYSTECTOMY  2008     NOSE SURGERY  1990     right knee arthroscopy  2007     thumb surgery Left 2/2015         Family Medical History:     Family History   Problem Relation Age of Onset     Hyperlipidemia Mother      Endocrine Disease Brother      Diabetes Brother          Social History:     Social History     Social History     Marital status:      Spouse name: N/A     Number of children: 1     Years of education: N/A     Occupational History     , retired [employer]      Retired     Social History Main Topics     Smoking status: Former Smoker     Packs/day: 1.50     Years: 20.00     Types: Cigarettes     Quit date: 2/7/1992     Smokeless tobacco: Never Used     Alcohol use 0.0 oz/week     0 Standard drinks or equivalent per week      Comment: 5-6 drinks weekly     Drug use: No     Sexual activity: Not Currently     Partners: Male      Comment: vag hyst, BSO     Other Topics Concern     Not on file     Social History Narrative           Review of System:     Constitutional: Negative for fever or chills, positive for weight loss  Skin: Negative for rashes  Ears/Nose/Throat: Negative for nasal congestion, sore throat  Respiratory: No shortness of breath, dyspnea on exertion, cough, or hemoptysis  Cardiovascular: Negative for chest pain  Gastrointestinal: Negative for nausea, vomiting. Positive for diarrhea  Genitourinary: Negative for dysuria, hematuria  Musculoskeletal: Negative for myalgias  Neurologic: Negative for headaches  Psychiatric: Negative for depression, anxiety  Hematologic/Lymphatic/Immunologic: Negative  Endocrine: Negative  Behavioral: Negative for tobacco use       Physical Exam:   /66 (BP Location: Left arm, Cuff Size: Adult Regular)  Pulse 80  Temp 98.1  F  "(36.7  C) (Oral)  Ht 5' 7\" (1.702 m)  Wt 145 lb (65.8 kg)  SpO2 97%  BMI 22.71 kg/m2    GENERAL: alert and no distress  EYES: eyes grossly normal to inspection, and conjunctivae and sclerae normal  HENT: Normocephalic atraumatic. Nose and mouth without ulcers or lesions  NECK: supple  RESP: lungs clear to auscultation   CV: regular rate and rhythm, normal S1 S2  LYMPH: no peripheral edema   ABDOMEN: nondistended  MS: no gross musculoskeletal defects noted  SKIN: no suspicious lesions or rashes  NEURO: Alert & Oriented x 3.   PSYCH: mentation appears normal, affect normal        Diagnostic Test Results:     Diagnostic Test Results:  Results for orders placed or performed in visit on 07/03/18   Enteric Bacteria and Virus Panel by BRYAN Stool   Result Value Ref Range    Campylobacter group by BRYAN Not Detected NDET^Not Detected    Salmonella species by BRYAN Not Detected NDET^Not Detected    Shigella species by BRYAN Not Detected NDET^Not Detected    Vibrio group by BRYAN Not Detected NDET^Not Detected    Rotavirus A by BRYAN Not Detected NDET^Not Detected    Shiga toxin 1 gene by BRYAN Not Detected NDET^Not Detected    Shiga toxin 2 gene by BRYAN Not Detected NDET^Not Detected    Norovirus I and II by BRYAN Not Detected NDET^Not Detected    Yersinia enterocolitica by BRYAN Not Detected NDET^Not Detected    Enteric pathogen comment       Testing performed by multiplexed, qualitative PCR using the [employer] Enteric   Pathogens Nucleic Acid Test. Results should not be used as the sole basis for diagnosis,   treatment, or other patient management decisions.         CT CHEST/ABDOMEN/PELVIS WITH CONTRAST   7/12/2018 8:57 AM      HISTORY:  Persistent weight loss, diarrhea. Loss of weight. Diarrhea,  unspecified type.     TECHNIQUE:  71 mL Isovue-370. CT images of the chest, abdomen, and  pelvis after nonionic intravenous contrast. Radiation dose for this  scan was reduced using automated exposure control, adjustment of the  mA " and/or kV according to patient size, or iterative reconstruction  technique.     COMPARISON: 3/19/2015, 3/17/2014.     FINDINGS:      Chest: 5 mm nodule along the right major fissure (series 7, image 159)  is unchanged. 5 mm nodule in the right lower lobe (series 7, image  196) is unchanged. No other pulmonary nodule or mass. Thyroid appears  normal. No mediastinal, hilar, or axillary adenopathy. No pleural or  pericardial effusion.     Abdomen and pelvis: Cyst in the left lateral segment of the liver is  unchanged. The gallbladder has been removed. The spleen, pancreas, and  adrenal glands are unremarkable. No hydronephrosis or solid renal  mass. Small bilateral renal cysts are unchanged. No abdominal or  retroperitoneal adenopathy. No bowel obstruction, free air, or  ascites. No pelvic mass, lymphadenopathy, or free fluid. The uterus  has been removed.     Bones: There are degenerative changes of the skeletal structures  without suspicious abnormality.         IMPRESSION:  1. No acute abnormality demonstrated in the chest, abdomen, or pelvis.  2. Small right-sided pulmonary nodules are unchanged from 2014 and  require no additional follow-up.  3. Prior cholecystectomy and hysterectomy.     RADHA THOMPSON MD      ASSESSMENT/PLAN:       (R19.7) Diarrhea, unspecified type  (primary encounter diagnosis)  (R63.4) Loss of weight  Comment: persistent diarrhea and weight loss symptoms of unclear etiology. Recent stool culture study is negative.  Plan: I have ordered CT Chest Abdomen Pelvis w/o & w Contrast to screen for abnormalities which shows No acute abnormality demonstrated in the chest, abdomen, or pelvis.      (F17.201) Tobacco dependence in remission  (R91.8) Pulmonary nodules  Comment: CT scan shows findings of lung nodules, patient also has already quit smoking tobacco many years ago.  Plan: according to radiology report, CT again shows Small right-sided pulmonary nodules are unchanged from 2014 and require no  additional follow-up.        Follow Up Plan:     Patient is instructed to return to Internal Medicine clinic for follow-up visit in 1 day.        Lisy Khalil MD  Internal Medicine  [facility]

## 2018-07-12 ENCOUNTER — HOSPITAL ENCOUNTER (OUTPATIENT)
Dept: CT IMAGING | Facility: CLINIC | Age: 78
Discharge: HOME | End: 2018-07-12
Attending: INTERNAL MEDICINE | Admitting: INTERNAL MEDICINE
Payer: MEDICARE

## 2018-07-12 ENCOUNTER — OFFICE VISIT (OUTPATIENT)
Dept: FAMILY MEDICINE | Facility: CLINIC | Age: 78
End: 2018-07-12
Payer: COMMERCIAL

## 2018-07-12 VITALS
OXYGEN SATURATION: 97 % | HEIGHT: 67 IN | DIASTOLIC BLOOD PRESSURE: 79 MMHG | WEIGHT: 147 LBS | HEART RATE: 86 BPM | TEMPERATURE: 97.8 F | BODY MASS INDEX: 23.07 KG/M2 | SYSTOLIC BLOOD PRESSURE: 126 MMHG

## 2018-07-12 DIAGNOSIS — R19.7 DIARRHEA, UNSPECIFIED TYPE: ICD-10-CM

## 2018-07-12 DIAGNOSIS — R68.2 DRY MOUTH: ICD-10-CM

## 2018-07-12 DIAGNOSIS — R53.83 OTHER FATIGUE: Primary | ICD-10-CM

## 2018-07-12 DIAGNOSIS — R63.4 LOSS OF WEIGHT: ICD-10-CM

## 2018-07-12 LAB
CREAT BLD-MCNC: 0.4 MG/DL (ref 0.52–1.04)
ERYTHROCYTE [DISTWIDTH] IN BLOOD BY AUTOMATED COUNT: 12.9 % (ref 10–15)
GFR SERPL CREATININE-BSD FRML MDRD: >90 ML/MIN/1.7M2
HBA1C MFR BLD: 5.3 % (ref 0–5.6)
HCT VFR BLD AUTO: 39.8 % (ref 35–47)
HGB BLD-MCNC: 12.8 G/DL (ref 11.7–15.7)
MCH RBC QN AUTO: 29.6 PG (ref 26.5–33)
MCHC RBC AUTO-ENTMCNC: 32.2 G/DL (ref 31.5–36.5)
MCV RBC AUTO: 92 FL (ref 78–100)
PLATELET # BLD AUTO: 275 10E9/L (ref 150–450)
RBC # BLD AUTO: 4.32 10E12/L (ref 3.8–5.2)
WBC # BLD AUTO: 6.8 10E9/L (ref 4–11)

## 2018-07-12 PROCEDURE — 84443 ASSAY THYROID STIM HORMONE: CPT | Performed by: INTERNAL MEDICINE

## 2018-07-12 PROCEDURE — 25000128 H RX IP 250 OP 636: Performed by: INTERNAL MEDICINE

## 2018-07-12 PROCEDURE — 99214 OFFICE O/P EST MOD 30 MIN: CPT | Performed by: INTERNAL MEDICINE

## 2018-07-12 PROCEDURE — 83036 HEMOGLOBIN GLYCOSYLATED A1C: CPT | Performed by: INTERNAL MEDICINE

## 2018-07-12 PROCEDURE — 25000125 ZZHC RX 250: Performed by: INTERNAL MEDICINE

## 2018-07-12 PROCEDURE — 74177 CT ABD & PELVIS W/CONTRAST: CPT

## 2018-07-12 PROCEDURE — 85027 COMPLETE CBC AUTOMATED: CPT | Performed by: INTERNAL MEDICINE

## 2018-07-12 PROCEDURE — 80048 BASIC METABOLIC PNL TOTAL CA: CPT | Performed by: INTERNAL MEDICINE

## 2018-07-12 PROCEDURE — 36415 COLL VENOUS BLD VENIPUNCTURE: CPT | Performed by: INTERNAL MEDICINE

## 2018-07-12 PROCEDURE — 82565 ASSAY OF CREATININE: CPT

## 2018-07-12 PROCEDURE — 99207 ZZC RSCC CODE FOR CODING REVIEW: CPT | Performed by: INTERNAL MEDICINE

## 2018-07-12 RX ORDER — MIRTAZAPINE 15 MG/1
15 TABLET, FILM COATED ORAL AT BEDTIME
Qty: 30 TABLET | Refills: 1 | Status: SHIPPED | OUTPATIENT
Start: 2018-07-12 | End: 2018-11-09

## 2018-07-12 RX ORDER — IOPAMIDOL 755 MG/ML
71 INJECTION, SOLUTION INTRAVASCULAR ONCE
Status: COMPLETED | OUTPATIENT
Start: 2018-07-12 | End: 2018-07-12

## 2018-07-12 RX ADMIN — SODIUM CHLORIDE, PRESERVATIVE FREE 60 ML: 5 INJECTION INTRAVENOUS at 08:53

## 2018-07-12 RX ADMIN — IOPAMIDOL 71 ML: 755 INJECTION, SOLUTION INTRAVENOUS at 08:53

## 2018-07-12 NOTE — PATIENT INSTRUCTIONS
Start the new prescription called mirtazapine and take one at bedtime, call if you have side effects.    Turn in the stool test.    I will send you all the results.    Ellis Ruiz M.D.

## 2018-07-12 NOTE — MR AVS SNAPSHOT
After Visit Summary   7/12/2018    Cecily Ivory    MRN: [id number]           Patient Information     Date Of Birth          1940        Visit Information        Provider Department      7/12/2018 2:00 PM Ellis Ruiz MD [facility]        Today's Diagnoses     Other fatigue    -  1    Diarrhea, unspecified type        Dry mouth        Loss of weight          Care Instructions    Start the new prescription called mirtazapine and take one at bedtime, call if you have side effects.    Turn in the stool test.    I will send you all the results.    Ellis Ruiz M.D.            Follow-ups after your visit        Future tests that were ordered for you today     Open Future Orders        Priority Expected Expires Ordered    Clostridium difficile Toxin B PCR Routine  8/11/2018 7/12/2018    CT Chest/Abdomen/Pelvis w Contrast Routine  7/11/2019 7/11/2018            Who to contact     If you have questions or need follow up information about today's clinic visit or your schedule please contact [facility] directly at [phone].  Normal or non-critical lab and imaging results will be communicated to you by MyChart, letter or phone within 4 business days after the clinic has received the results. If you do not hear from us within 7 days, please contact the clinic through MyChart or phone. If you have a critical or abnormal lab result, we will notify you by phone as soon as possible.  Submit refill requests through [email or web address] or call your pharmacy and they will forward the refill request to us. Please allow 3 business days for your refill to be completed.          Additional Information About Your Visit        Care EveryWhere ID     This is your Care EveryWhere ID. This could be used by other organizations to access your [place] medical records  [id number]        Your Vitals Were     Pulse Temperature Height Pulse Oximetry Breastfeeding? BMI (Body Mass Index)    86 97.8  F  "(36.6  C) (Oral) 5' 7\" (1.702 m) 97% No 23.02 kg/m2       Blood Pressure from Last 3 Encounters:   07/12/18 126/79   07/11/18 108/66   07/02/18 110/76    Weight from Last 3 Encounters:   07/12/18 147 lb (66.7 kg)   07/11/18 145 lb (65.8 kg)   07/02/18 146 lb 6.4 oz (66.4 kg)              We Performed the Following     Basic metabolic panel     CBC with platelets     Hemoglobin A1c     TSH with free T4 reflex          Today's Medication Changes          These changes are accurate as of 7/12/18  2:14 PM.  If you have any questions, ask your nurse or doctor.               Start taking these medicines.        Dose/Directions    mirtazapine 15 MG tablet   Commonly known as:  REMERON   Used for:  Loss of weight   Started by:  Ellis Ruiz MD        Dose:  15 mg   Take 1 tablet (15 mg) by mouth At Bedtime   Quantity:  30 tablet   Refills:  1         Stop taking these medicines if you haven't already. Please contact your care team if you have questions.     traZODone 50 MG tablet   Commonly known as:  DESYREL   Stopped by:  Ellis Ruiz MD                Where to get your medicines      These medications were sent to [id number] [place], [place]  [facility] [address] [address]     Phone:  [phone]     mirtazapine 15 MG tablet                Primary Care Provider Office Phone # Fax #    Ellis Ruiz [phone] [address] [address]        Equal Access to Services     [facility] AH: Hadii dragan spear hadasho Soomaali, waaxda luqadaha, qaybta kaalmada summer chowdary. So [facility] [phone].    ATENCIÓN: Si habla español, tiene a thacker disposición servicios gratuitos de asistencia lingüística. Llame al [phone].    We comply with applicable federal civil rights laws and [place] laws. We do not discriminate on the basis of race, color, national origin, age, disability, sex, sexual " orientation, or gender identity.            Thank you!     Thank you for choosing [facility]  for your care. Our goal is always to provide you with excellent care. Hearing back from our patients is one way we can continue to improve our services. Please take a few minutes to complete the written survey that you may receive in the mail after your visit with us. Thank you!             Your Updated Medication List - Protect others around you: Learn how to safely use, store and throw away your medicines at www.disposemymeds.org.          This list is accurate as of 7/12/18  2:14 PM.  Always use your most recent med list.                   Brand Name Dispense Instructions for use Diagnosis    Biotin 5000 MCG Caps      Take 1 tablet by mouth daily        Calcium carb-Vitamin D 500 mg [place]-200 units 500-200 MG-UNIT per tablet    OSCAL with D;Oyster Shell Calcium    100 tablet    Take 1 tablet by mouth 2 times daily (with meals). Takes 750mg daily.        CENTRUM SILVER per tablet      Take 1 tablet by mouth daily        CLARITIN 10 MG tablet   Generic drug:  loratadine      Take 1 tablet (10 mg) by mouth daily as needed for allergies Occasional use        CYANOCOBALAMIN PO      Take 1,000 mcg by mouth daily        ferrous sulfate 325 (65 Fe) MG tablet    IRON     Take by mouth daily (with breakfast)    Generalized muscle weakness       loperamide 2 MG tablet    IMODIUM A-D    30 tablet    Take 1 tablet (2 mg) by mouth 3 times daily as needed for diarrhea    Diarrhea of presumed infectious origin       lovastatin 40 MG tablet    MEVACOR    90 tablet    TAKE ONE TABLET BY MOUTH EVERY NIGHT AT BEDTIME    Hyperlipidemia LDL goal <130       magnesium 100 MG Caps      Take 1 tablet by mouth daily        mirtazapine 15 MG tablet    REMERON    30 tablet    Take 1 tablet (15 mg) by mouth At Bedtime    Loss of weight       naproxen 500 MG tablet    NAPROSYN     Take 500 mg by mouth 2 times daily as needed        VITAMIN  C PO      Take 500 mg by mouth daily.        VITAMIN D3 PO      Take 1,000 Units by mouth daily

## 2018-07-12 NOTE — LETTER
[facility]  [address] [facility]  [address]  [place]  [address]  Tel: [phone]    July 13, 2018    Cecily Ivory  [address]  [place] [address]        Dear Ms. Ivory,    It was a pleasure seeing you.  I wanted to get back to you with your test results.  I have enclosed a copy for your records.     Your labs look very good.  Your thyroid test is normal and your hemoglobin has come up so you are no longer anemic.  Your blood salts and kidney tests are fine.     Your blood sugar was slightly elevated but this was not fasting.  The other test to check for diabetes, the hemoglobin A1c which looks back over the last 3 months is very normal.  The bottom line is that you do not have diabetes.     I am happy to bring you this excellent report.  Please let me know if you are not feeling better in the next week or two.       Sincerely,    Ellis Ruiz MD/ale[place]ander    Results for orders placed or performed in visit on 07/12/18   Basic metabolic panel   Result Value Ref Range    Sodium 142 133 - 144 mmol/L    Potassium 4.2 3.4 - 5.3 mmol/L    Chloride 107 94 - 109 mmol/L    Carbon Dioxide 28 20 - 32 mmol/L    Anion Gap 7 3 - 14 mmol/L    Glucose 128 (H) 70 - 99 mg/dL    Urea Nitrogen 17 7 - 30 mg/dL    Creatinine 0.75 0.52 - 1.04 mg/dL    GFR Estimate 75 >60 mL/min/1.7m2    GFR Estimate If Black >90 >60 mL/min/1.7m2    Calcium 8.4 (L) 8.5 - 10.1 mg/dL   CBC with platelets   Result Value Ref Range    WBC 6.8 4.0 - 11.0 10e9/L    RBC Count 4.32 3.8 - 5.2 10e12/L    Hemoglobin 12.8 11.7 - 15.7 g/dL    Hematocrit 39.8 35.0 - 47.0 %    MCV 92 78 - 100 fl    MCH 29.6 26.5 - 33.0 pg    MCHC 32.2 31.5 - 36.5 g/dL    RDW 12.9 10.0 - 15.0 %    Platelet Count 275 150 - 450 10e9/L   Hemoglobin A1c   Result Value Ref Range    Hemoglobin A1C 5.3 0 - 5.6 %   TSH with free T4 reflex   Result Value Ref Range    TSH 1.05 0.40 - 4.00 mU/L           Enclosure: Lab Results

## 2018-07-12 NOTE — PROGRESS NOTES
The patient is here for follow-up with weakness, excessive thirst, diarrhea, no energy and weight loss.  I reviewed the notes from Dr. Khalil, as well as the stool test and a CT that was just done.    The patient notes that she feels weak and is thirsty all the time with no energy.  She is not having fevers or night sweats.  No chest pain or shortness of breath.  No abdominal pain or nausea or vomiting.  In terms of her bowels sometimes they are more firm than others and sometimes are loose but not bloody or black.  She did a stool cultures but has not done the C. difficile yet.  She did have the recent surgery as noted.  She is very worried about diabetes as the cause of her symptoms including dry mouth.  She does not feel like she is depressed.    As noted the patient had a CT is done.  These are essentially normal.    Past Medical History:   Diagnosis Date     Abdominal pain 2009, 2013    ct liver and renal cyst and 2mm lung nodule, repeat ct done 2013 and no significantly abnl.     Anxiety 2014    added med 3/14     Arthritis      ASCUS of cervix with negative high risk HPV 03/2018     Carotid bruit 2011    us nl     Gastritis 2011    egd done by [facility]     GERD (gastroesophageal reflux disease) 2011     History of colonoscopy 2004, 2014    nl     Hypercholesteremia 2000    stopped lovastatin 2015, added lipitor 3/16     Insomnia     using otc      Lumbar spinal stenosis 2016    Dr. Wilde, had epidural     Lung nodule 2009, 2013    seen on ct for abd pain, fu done 3/13 and 2 nodules stable and benign, one new one needs fu 1 year.  fu done 3/14 and fu 1 year and then done; fu done 3/15 and unchanged, no fu needed     Odynophagia 2004    egd nl     Other chronic pain      Peripheral neuropathies     Dr. Kim     Screening 2006, 2017    nl dexa     Vaginal dysplasia     chronic VAIN I, many colpos of vagina.  Now we only do an annual pap of vagina, no colpo since stable long term     Weight loss 07/2018    ct chest, abd  and pelvis neg     Past Surgical History:   Procedure Laterality Date     ANKLE SURGERY  2000     APPENDECTOMY  13     ARTHROPLASTY KNEE Left 5/14/2018    Procedure: ARTHROPLASTY KNEE;  Left total knee arthroplasty;  Surgeon: William Pollard MD;  Location: [facility] OR     BREAST BIOPSY, CORE RT/LT       C VAGINAL HYSTERECTOMY  1995     GYN SURGERY      BSO     HC UGI ENDOSCOPY, SIMPLE EXAM  03/15/11    [facility]     LAPAROSCOPIC CHOLECYSTECTOMY  2008     NOSE SURGERY  1990     right knee arthroscopy  2007     thumb surgery Left 2/2015     Social History     Social History     Marital status:      Spouse name: N/A     Number of children: 1     Years of education: N/A     Occupational History     , retired [employer]      Retired     Social History Main Topics     Smoking status: Former Smoker     Packs/day: 1.50     Years: 20.00     Types: Cigarettes     Quit date: 2/7/1992     Smokeless tobacco: Never Used     Alcohol use 0.0 oz/week     0 Standard drinks or equivalent per week      Comment: 5-6 drinks weekly     Drug use: No     Sexual activity: Not Currently     Partners: Male      Comment: vag hyst, BSO     Other Topics Concern     Not on file     Social History Narrative     Current Outpatient Prescriptions   Medication Sig Dispense Refill     Ascorbic Acid (VITAMIN C PO) Take 500 mg by mouth daily.       Biotin 5000 MCG CAPS Take 1 tablet by mouth daily        calcium carb 1250 mg, 500 mg [place],/vitamin D 200 units (OSCAL WITH D) 500-200 MG-UNIT per tablet Take 1 tablet by mouth 2 times daily (with meals). Takes 750mg daily. 100 tablet 3     Cholecalciferol (VITAMIN D3 PO) Take 1,000 Units by mouth daily        CYANOCOBALAMIN PO Take 1,000 mcg by mouth daily       ferrous sulfate (IRON) 325 (65 Fe) MG tablet Take by mouth daily (with breakfast)       loperamide (IMODIUM A-D) 2 MG tablet Take 1 tablet (2 mg) by mouth 3 times daily as needed for diarrhea 30 tablet 1      "loratadine (CLARITIN) 10 MG tablet Take 1 tablet (10 mg) by mouth daily as needed for allergies Occasional use       lovastatin (MEVACOR) 40 MG tablet TAKE ONE TABLET BY MOUTH EVERY NIGHT AT BEDTIME 90 tablet 3     magnesium 100 MG CAPS Take 1 tablet by mouth daily        mirtazapine (REMERON) 15 MG tablet Take 1 tablet (15 mg) by mouth At Bedtime 30 tablet 1     Multiple Vitamins-Minerals (CENTRUM SILVER) per tablet Take 1 tablet by mouth daily       naproxen (NAPROSYN) 500 MG tablet Take 500 mg by mouth 2 times daily as needed        Allergies   Allergen Reactions     Latex Cough     Seasonal Allergies      YEAR ROUND ALLERGIES     Sulfa Drugs Unknown     FAMILY HISTORY NOTED AND REVIEWED    REVIEW OF SYSTEMS: above    PHYSICAL EXAM    /79  Pulse 86  Temp 97.8  F (36.6  C) (Oral)  Ht 5' 7\" (1.702 m)  Wt 147 lb (66.7 kg)  SpO2 97%  Breastfeeding? No  BMI 23.02 kg/m2    Patient appears non toxic  Mouth - tongue midline and within normal limits, mucous membranes and posterior pharynx within normal limits, no lesions seen.  Neck - no masses, lesions or tenderness  Nodes - no supraclavicular, cervical or axially adenopathy .  Lungs - clear, normal flow  Cardiovascular - regular rate and rhythm, no murmer, rub or gallop, no jvp or edema, carotids within normal limits, no bruits.  Abdomen - normal active bowel sounds, soft, non tender, no masses, guarding or rebound, no hepatosplenomegaly      Labs sent    ASSESSMENT:  1. Weakness, fatigue, suspect due to recent surgery, no signs malig cause, doubt anemia, toxic, metabolic etc  2. Weight loss, not really much and no signs malig cause  3. Diarrhea, may be ibs, doubt colitis, tumor, will check c. Diff  4. Dry mouth, doubt dm    PLAN:  Labs and stool  If neg and continued diarrhea then colon      Ellis Ruiz M.D.                  "

## 2018-07-13 LAB
ANION GAP SERPL CALCULATED.3IONS-SCNC: 7 MMOL/L (ref 3–14)
BUN SERPL-MCNC: 17 MG/DL (ref 7–30)
CALCIUM SERPL-MCNC: 8.4 MG/DL (ref 8.5–10.1)
CHLORIDE SERPL-SCNC: 107 MMOL/L (ref 94–109)
CO2 SERPL-SCNC: 28 MMOL/L (ref 20–32)
CREAT SERPL-MCNC: 0.75 MG/DL (ref 0.52–1.04)
GFR SERPL CREATININE-BSD FRML MDRD: 75 ML/MIN/1.7M2
GLUCOSE SERPL-MCNC: 128 MG/DL (ref 70–99)
POTASSIUM SERPL-SCNC: 4.2 MMOL/L (ref 3.4–5.3)
SODIUM SERPL-SCNC: 142 MMOL/L (ref 133–144)
TSH SERPL DL<=0.005 MIU/L-ACNC: 1.05 MU/L (ref 0.4–4)

## 2018-07-13 NOTE — PROGRESS NOTES
It was a pleasure seeing you.  I wanted to get back to you with your test results.  I have enclosed a copy for your records.    Your labs look very good.  Your thyroid test is normal and your hemoglobin has come up so you are no longer anemic.  Your blood salts and kidney tests are fine.    Your blood sugar was slightly elevated but this was not fasting.  The other test to check for diabetes, the hemoglobin A1c which looks back over the last 3 months is very normal.  The bottom line is that you do not have diabetes.    I am happy to bring you this excellent report.  Please let me know if you are not feeling better in the next week or two.

## 2018-07-24 ENCOUNTER — PATIENT OUTREACH (OUTPATIENT)
Dept: CARE COORDINATION | Facility: CLINIC | Age: 78
End: 2018-07-24

## 2018-07-24 NOTE — PROGRESS NOTES
Clinic Care Coordination Contact  [facility]/Voicemail       Clinical Data: Care Coordinator Outreach  Outreach attempted x 1.  Left message on voicemail with call back information and requested return call.  Plan: Care Coordinator will try to reach patient again in 3-5 business days.    Cinthya Gore RN  Clinic Care Coordinator  [phone]  [email or web address]

## 2018-07-27 NOTE — PROGRESS NOTES
Clinic Care Coordination Contact    Clinic Care Coordination Contact  OUTREACH    Chief Complaint   Patient presents with     Clinic Care Coordination - Follow-up        Clinical Concerns:  Current Medical Concerns:  RNCC placed a call to the patient to follow up on her last office on 7/12. Previously the patient had been having increased fatigue, weakness, and weightloss. Per the patient and chart the patient is feeling better. Patients hemoglobin has been increased which could be helping her fatigue. Patient state she still does not eat much but its getting better. Patient denied any immediate concerns. Patient understands to the call the clinic should another referral be made.     Current Behavioral Concerns: No concerns.     Education Provided to patient: Care Coordination and to call the clinic with concerns.         Patient/Caregiver understanding: Patient verbalized understanding, engaged in AIDET communication behavior during encounter.             Plan:   RNCC will do no further outreaches at this time. Patient denied the need for care coordination services. Patient understands to call the clinic with any concerns. Patient was given the CC direct phone number.     Cinthya Gore RN  Clinic Care Coordinator  [phone]  [email or web address]

## 2018-10-26 ENCOUNTER — TRANSFERRED RECORDS (OUTPATIENT)
Dept: HEALTH INFORMATION MANAGEMENT | Facility: CLINIC | Age: 78
End: 2018-10-26

## 2018-10-29 ENCOUNTER — TRANSFERRED RECORDS (OUTPATIENT)
Dept: HEALTH INFORMATION MANAGEMENT | Facility: CLINIC | Age: 78
End: 2018-10-29

## 2018-11-04 ENCOUNTER — TELEPHONE (OUTPATIENT)
Dept: FAMILY MEDICINE | Facility: CLINIC | Age: 78
End: 2018-11-04

## 2018-11-04 ENCOUNTER — NURSE TRIAGE (OUTPATIENT)
Dept: NURSING | Facility: CLINIC | Age: 78
End: 2018-11-04

## 2018-11-04 PROCEDURE — 87493 C DIFF AMPLIFIED PROBE: CPT | Performed by: INTERNAL MEDICINE

## 2018-11-04 NOTE — TELEPHONE ENCOUNTER
Cecily has an appointment to see MD Wyman on Friday.  Cecily was told by MD Ruiz if she has a loose stool to bring in.  Cecily is calling and states that she will bring stool to clinic in am tomorrow as she is refrigerating stool currently.

## 2018-11-04 NOTE — TELEPHONE ENCOUNTER
Clinic Action Needed:  No FYI  FNA Triage Call  Presenting Problem:    Cecily has an appointment to see MD Wyman on Friday.  Cecily was told by MD Ruiz if she has a loose stool to bring in.  Cecily is calling and states that she will bring stool to clinic in am tomorrow as she is refrigerating stool currently.      Routed to:  RN Pool  Please be sure to close this encounter once this patient's issue/question has been addressed.    Emeli Wheat RN/[facility]

## 2018-11-05 DIAGNOSIS — R19.7 DIARRHEA, UNSPECIFIED TYPE: ICD-10-CM

## 2018-11-05 LAB
C DIFF TOX B STL QL: NEGATIVE
SPECIMEN SOURCE: NORMAL

## 2018-11-09 ENCOUNTER — OFFICE VISIT (OUTPATIENT)
Dept: FAMILY MEDICINE | Facility: CLINIC | Age: 78
End: 2018-11-09
Payer: COMMERCIAL

## 2018-11-09 ENCOUNTER — TELEPHONE (OUTPATIENT)
Dept: FAMILY MEDICINE | Facility: CLINIC | Age: 78
End: 2018-11-09

## 2018-11-09 VITALS
TEMPERATURE: 97.7 F | WEIGHT: 145 LBS | OXYGEN SATURATION: 99 % | HEART RATE: 78 BPM | BODY MASS INDEX: 22.76 KG/M2 | DIASTOLIC BLOOD PRESSURE: 71 MMHG | HEIGHT: 67 IN | SYSTOLIC BLOOD PRESSURE: 138 MMHG

## 2018-11-09 DIAGNOSIS — R19.7 DIARRHEA, UNSPECIFIED TYPE: Primary | ICD-10-CM

## 2018-11-09 PROCEDURE — 99213 OFFICE O/P EST LOW 20 MIN: CPT | Performed by: INTERNAL MEDICINE

## 2018-11-09 NOTE — TELEPHONE ENCOUNTER
Reason for Call:  Other medication question      Detailed comments: pt states that she was just in to see dr florez and that he recommended that she take Metamucil.  Pt wants to know what dose she should take. Please call pt and advise    Phone Number Patient can be reached at: Home number on file [phone] (home)    Best Time:anytime    Can we leave a detailed message on this number? YES    Call taken on 11/9/2018 at 4:16 PM by Martine Kilgore

## 2018-11-09 NOTE — PROGRESS NOTES
The patient is here for ongoing diarrhea.  This started after her knee replacement.  She has 4-6 stools a day than a generally formed.  Occasionally they can be loose.  Stool cultures and C. difficile have been negative.  Her colon exam was normal in the past as noted.  No abdominal pain or nausea or vomiting.  No fevers or night sweats.  No bloody or black stools.  No travel or family history.  No diet changes.  No nonsteroidals.  She had weight loss but this is essentially resolved.  A prior CT this year was negative.  She is to take 2 fiber pills a day before the surgery but has not been taking this.    Past Medical History:   Diagnosis Date     Abdominal pain 2009, 2013    ct liver and renal cyst and 2mm lung nodule, repeat ct done 2013 and no significantly abnl.     Anxiety 2014    added med 3/14     Arthritis      ASCUS of cervix with negative high risk HPV 03/2018     Carotid bruit 2011    us nl     Gastritis 2011    egd done by [facility]     GERD (gastroesophageal reflux disease) 2011     History of colonoscopy 2004, 2014    nl     Hypercholesteremia 2000    stopped lovastatin 2015, added lipitor 3/16     Insomnia     using otc      Lumbar spinal stenosis 2016    Dr. Wilde, had epidural     Lung nodule 2009, 2013    seen on ct for abd pain, fu done 3/13 and 2 nodules stable and benign, one new one needs fu 1 year.  fu done 3/14 and fu 1 year and then done; fu done 3/15 and unchanged, no fu needed     Odynophagia 2004    egd nl     Other chronic pain      Peripheral neuropathies     Dr. Kim     Screening 2006, 2017    nl dexa     Vaginal dysplasia     chronic VAIN I, many colpos of vagina.  Now we only do an annual pap of vagina, no colpo since stable long term     Weight loss 07/2018    ct chest, abd and pelvis neg     Past Surgical History:   Procedure Laterality Date     ANKLE SURGERY  2000     APPENDECTOMY  13     ARTHROPLASTY KNEE Left 5/14/2018    Procedure: ARTHROPLASTY KNEE;  Left total knee arthroplasty;   Surgeon: William Pollard MD;  Location: [facility] OR     BREAST BIOPSY, CORE RT/LT       C VAGINAL HYSTERECTOMY  1995     GYN SURGERY      BSO     HC UGI ENDOSCOPY, SIMPLE EXAM  03/15/11    [facility]     LAPAROSCOPIC CHOLECYSTECTOMY  2008     NOSE SURGERY  1990     right knee arthroscopy  2007     thumb surgery Left 2/2015     Social History     Social History     Marital status:      Spouse name: N/A     Number of children: 1     Years of education: N/A     Occupational History     , retired [employer]      Retired     Social History Main Topics     Smoking status: Former Smoker     Packs/day: 1.50     Years: 20.00     Types: Cigarettes     Quit date: 2/7/1992     Smokeless tobacco: Never Used     Alcohol use 0.0 oz/week     0 Standard drinks or equivalent per week      Comment: 5-6 drinks weekly     Drug use: No     Sexual activity: Not Currently     Partners: Male      Comment: vag hyst, BSO     Other Topics Concern     Not on file     Social History Narrative     Current Outpatient Prescriptions   Medication Sig Dispense Refill     Ascorbic Acid (VITAMIN C PO) Take 500 mg by mouth daily.       Biotin 5000 MCG CAPS Take 1 tablet by mouth daily        calcium carb 1250 mg, 500 mg [place],/vitamin D 200 units (OSCAL WITH D) 500-200 MG-UNIT per tablet Take 1 tablet by mouth 2 times daily (with meals). Takes 750mg daily. 100 tablet 3     Cholecalciferol (VITAMIN D3 PO) Take 1,000 Units by mouth daily        CYANOCOBALAMIN PO Take 1,000 mcg by mouth daily       loratadine (CLARITIN) 10 MG tablet Take 1 tablet (10 mg) by mouth daily as needed for allergies Occasional use       lovastatin (MEVACOR) 40 MG tablet TAKE ONE TABLET BY MOUTH EVERY NIGHT AT BEDTIME 90 tablet 3     magnesium 100 MG CAPS Take 1 tablet by mouth daily        Multiple Vitamins-Minerals (CENTRUM SILVER) per tablet Take 1 tablet by mouth daily       Allergies   Allergen Reactions     Latex Cough     Seasonal  "Allergies      YEAR ROUND ALLERGIES     Sulfa Drugs Unknown     FAMILY HISTORY NOTED AND REVIEWED    REVIEW OF SYSTEMS: above    PHYSICAL EXAM    /71 (BP Location: Right arm, Patient Position: Chair, Cuff Size: Adult Regular)  Pulse 78  Temp 97.7  F (36.5  C) (Oral)  Ht 5' 7\" (1.702 m)  Wt 145 lb (65.8 kg)  SpO2 99%  Breastfeeding? No  BMI 22.71 kg/m2    Patient appears non toxic  Lungs - clear, normal flow  Cardiovascular - regular rate and rhythm, no murmer, rub or gallop, no jvp or edema, carotids within normal limits, no bruits.  Abdomen - normal active bowel sounds, soft, non tender, no masses, guarding or rebound, no hepatosplenomegaly    Labs noted    ASSESSMENT:  Diarrhea, suspect ibs, doubt colitis or cancer    PLAN:  Fiber daily  Call me in 10 days with update, if not gone then colonoscopy      Ellis Ruiz M.D.          "

## 2018-11-09 NOTE — PATIENT INSTRUCTIONS
Start taking metamucil, use the powder not the pills, take this once a day.  Please call me in 10 to 14 days and let me know how that is working.    Ellis Ruiz M.D.

## 2018-11-09 NOTE — TELEPHONE ENCOUNTER
Spoke with patient:     The instructions on her Metamucil bottle state 1-2 rounded tsp, she inquires if she should take 1 or 2 teaspoons and in how much water.     Advised to start with 1 rounded tsp in 8 ounces of water to see if that helps her. Increase to 2 tsps if no improvement in stools    Mihaela WHITMAN RN

## 2018-11-09 NOTE — MR AVS SNAPSHOT
After Visit Summary   11/9/2018    Cecily Ivory    MRN: [id number]           Patient Information     Date Of Birth          1940        Visit Information        Provider Department      11/9/2018 2:30 PM Ellis Ruiz MD [facility]        Care Instructions    Start taking metamucil, use the powder not the pills, take this once a day.  Please call me in 10 to 14 days and let me know how that is working.    Ellis Ruiz M.D.            Follow-ups after your visit        Follow-up notes from your care team     Return in about 4 months (around 3/9/2019) for Physical Exam.      Your next 10 appointments already scheduled     Mar 26, 2019 10:45 AM CDT   MA SCREENING DIGITAL BILATERAL with [id number]   [facility] ([facility])    [address] [place] [address], [address]  [facility] [id number] [phone]           How do I prepare for my exam? (Food and drink instructions) No Food and Drink Restrictions.  How do I prepare for my exam? (Other instructions) Do not use any powder, lotion or deodorant under your arms or on your breast. If you do, we will ask you to remove it before your exam.  What should I wear: Wear comfortable, two-piece clothing.  How long does the exam take: Most scans will take 15 minutes.  What should I bring: Bring any previous mammograms from other facilities or have them mailed to the breast center.  Do I need a :  No  is needed.  What do I need to tell my doctor: If you have any allergies, tell your care team.  What should I do after the exam: No restrictions, You may resume normal activities.  What is this test: This test is an x-ray of the breast to look for breast disease. The breast is pressed between two plates to flatten and spread the tissue. An X-ray is taken of the breast from different angles.  Who should I call with questions: If you have any questions, please call the Imaging Department where you will  "have your exam. Directions, parking instructions, and other information is available on our website, [email or web address].  Other information about my exam Three-dimensional (3D) mammograms are available at [facility] locations in formerly [facility], [facility], [place], [facility] and [place].  Health locations include [place] and [facility] in [place].  Benefits of 3D mammograms include * Improved rate of cancer detection * Decreases your chance of having to go back for more tests, which means fewer: * \"False-positive\" results (This means that there is an abnormal area but it isn't cancer.) * Invasive testing procedures, such as a biopsy or surgery * Can provide clearer images of the breast if you have dense breast tissue.  *3D mammography is an optional exam that anyone can have with a 2D mammogram. It doesn't replace or take the place of a 2D mammogram. 2D mammograms remain an effective screening test for all women.  Not all insurance companies cover the cost of a 3D mammogram. Check with your insurance. Three-dimensional (3D) mammograms are available at [facility] locations in formerly [facility], [facility], [facility], and [place]. Health locations include [place] and [facility] in [place]. Benefits of 3D mammograms include: - Improved rate of cancer detection - Decreases your chance of having to go back for more tests, which means fewer: - \"False-positive\" results (This means that there is an abnormal area but it isn't cancer.) - Invasive testing procedures, such as a biopsy or surgery - Can provide clearer images of the breast if you have dense breast tissue. 3D mammography is an optional exam that anyone can have with a 2D mammogram. It doesn't replace or take the place of a 2D mammogram. 2D mammograms remain an effective screening test for all women.  Not all insurance companies cover the cost of a 3D " "mammogram. Check with your insurance.              Who to contact     If you have questions or need follow up information about today's clinic visit or your schedule please contact [facility] directly at [phone].  Normal or non-critical lab and imaging results will be communicated to you by MyChart, letter or phone within 4 business days after the clinic has received the results. If you do not hear from us within 7 days, please contact the clinic through MyChart or phone. If you have a critical or abnormal lab result, we will notify you by phone as soon as possible.  Submit refill requests through [employer] or call your pharmacy and they will forward the refill request to us. Please allow 3 business days for your refill to be completed.          Additional Information About Your Visit        Care EveryWhere ID     This is your Care EveryWhere ID. This could be used by other organizations to access your [facility] medical records  [id number]        Your Vitals Were     Pulse Temperature Height Pulse Oximetry Breastfeeding? BMI (Body Mass Index)    78 97.7  F (36.5  C) (Oral) 5' 7\" (1.702 m) 99% No 22.71 kg/m2       Blood Pressure from Last 3 Encounters:   11/09/18 138/71   07/12/18 126/79   07/11/18 108/66    Weight from Last 3 Encounters:   11/09/18 145 lb (65.8 kg)   07/12/18 147 lb (66.7 kg)   07/11/18 145 lb (65.8 kg)              Today, you had the following     No orders found for display         Today's Medication Changes          These changes are accurate as of 11/9/18  2:32 PM.  If you have any questions, ask your nurse or doctor.               Stop taking these medicines if you haven't already. Please contact your care team if you have questions.     ferrous sulfate 325 (65 Fe) MG tablet   Commonly known as:  IRON   Stopped by:  Ellis Ruiz MD           loperamide 2 MG tablet   Commonly known as:  IMODIUM A-D   Stopped by:  Ellis Ruiz MD           mirtazapine 15 MG " tablet   Commonly known as:  REMERON   Stopped by:  Ellis Ruiz MD           naproxen 500 MG tablet   Commonly known as:  NAPROSYN   Stopped by:  Ellis Ruiz MD                    Primary Care Provider Office Phone # Fax #    Ellis Ruiz [phone] [address] [facility] [address]  [facility] [address]        Equal Access to Services     [facility]: Hadii aad ku hadasho Soomaali, waaxda luqadaha, qaybta kaalmada adeegyada, waxay idiin hayaan adeeg kharash la'aan ah. So [facility] [phone].    ATENCIÓN: Si deandre noe, tiene a thacker disposición servicios gratuitos de asistencia lingüística. Llame al [phone].    We comply with applicable federal civil rights laws and [place] laws. We do not discriminate on the basis of race, color, national origin, age, disability, sex, sexual orientation, or gender identity.            Thank you!     Thank you for choosing [facility]  for your care. Our goal is always to provide you with excellent care. Hearing back from our patients is one way we can continue to improve our services. Please take a few minutes to complete the written survey that you may receive in the mail after your visit with us. Thank you!             Your Updated Medication List - Protect others around you: Learn how to safely use, store and throw away your medicines at www.disposemymeds.org.          This list is accurate as of 11/9/18  2:32 PM.  Always use your most recent med list.                   Brand Name Dispense Instructions for use Diagnosis    Biotin 5000 MCG Caps      Take 1 tablet by mouth daily        calcium carbonate 500 mg-vitamin D 200 units 500-200 MG-UNIT per tablet    OSCAL with D;OYSTER SHELL CALCIUM    100 tablet    Take 1 tablet by mouth 2 times daily (with meals). Takes 750mg daily.        CENTRUM SILVER per tablet      Take 1 tablet by mouth daily        CLARITIN 10 MG tablet   Generic drug:  loratadine      Take 1 tablet (10 mg)  by mouth daily as needed for allergies Occasional use        CYANOCOBALAMIN PO      Take 1,000 mcg by mouth daily        lovastatin 40 MG tablet    MEVACOR    90 tablet    TAKE ONE TABLET BY MOUTH EVERY NIGHT AT BEDTIME    Hyperlipidemia LDL goal <130       magnesium 100 MG Caps      Take 1 tablet by mouth daily        VITAMIN C PO      Take 500 mg by mouth daily.        VITAMIN D3 PO      Take 1,000 Units by mouth daily

## 2018-12-17 ENCOUNTER — TELEPHONE (OUTPATIENT)
Dept: FAMILY MEDICINE | Facility: CLINIC | Age: 78
End: 2018-12-17

## 2018-12-17 NOTE — TELEPHONE ENCOUNTER
Reason for Call:  Other appointment    Detailed comments: The patient needs a Pre-op for Cataract removal on 1/16 and 1/23 she wants to be fit in please   (PT informed that he is over booked)    Phone Number Patient can be reached at: Home number on file [phone] (home)    Best Time: anytime    Can we leave a detailed message on this number? YES    Call taken on 12/17/2018 at 4:11 PM by Yamilka Alejandre

## 2018-12-18 NOTE — TELEPHONE ENCOUNTER
Huddled with Jenni to have patient come today, told Jenni does not need pre op now as rescheduling surgery    Ellis Ruiz M.D.

## 2018-12-27 ENCOUNTER — TELEPHONE (OUTPATIENT)
Dept: FAMILY MEDICINE | Facility: CLINIC | Age: 78
End: 2018-12-27

## 2018-12-27 DIAGNOSIS — Z13.0 SCREENING FOR DEFICIENCY ANEMIA: ICD-10-CM

## 2018-12-27 DIAGNOSIS — E78.5 HYPERLIPIDEMIA LDL GOAL <130: Primary | ICD-10-CM

## 2018-12-27 DIAGNOSIS — Z79.899 MEDICATION MANAGEMENT: ICD-10-CM

## 2018-12-27 NOTE — TELEPHONE ENCOUNTER
I called patient back.  Dr. Ruiz has been known to do a pre-op and physical in one visit.  I went ahead and scheduled her for fasting labs the week prior to her appointment.  Please enter the fasting lab orders she will need to complete.      Ana Perez MA

## 2018-12-27 NOTE — TELEPHONE ENCOUNTER
Reason for Call:  Other call back    Detailed comments: patient needs to have a pre op px done for cataract surgery.  She scheduled this for 4/9/18.  She also wants to use this for her annual physical.  I explained that the pre op is different from an annual px. She believes that Dr. Ruiz has let her do them both together in the past.  She refused to listen, stating that she wanted me to ask Dr. Ruiz!    Patient would like a call back to let her know if Dr. Ruiz can do her pre op and px together.    Patient is aware that Dr. Ruiz is out of the clinic this week.    Phone Number Patient can be reached at: Home number on file [phone] (home)    Best Time: anytime    Can we leave a detailed message on this number? YES    Call taken on 12/27/2018 at 1:52 PM by Anne-Marie Granger  .

## 2019-01-22 ENCOUNTER — TRANSFERRED RECORDS (OUTPATIENT)
Dept: HEALTH INFORMATION MANAGEMENT | Facility: CLINIC | Age: 79
End: 2019-01-22

## 2019-02-07 DIAGNOSIS — E78.5 HYPERLIPIDEMIA LDL GOAL <130: ICD-10-CM

## 2019-02-07 RX ORDER — LOVASTATIN 40 MG
TABLET ORAL
Qty: 90 TABLET | Refills: 0 | Status: SHIPPED | OUTPATIENT
Start: 2019-02-07 | End: 2019-04-09

## 2019-02-07 NOTE — TELEPHONE ENCOUNTER
Reason for Call:  Medication or medication refill:    Do you use a [facility]?  Name of the pharmacy and phone number for the current request:  [facility] - [place] - [address] AT PORTAL TO REGISTERED [facility] SITES    Name of the medication requested: lovastatin (MEVACOR) 40 MG tablet    [facility] mail order called, said they sent original request in January.      Call taken on 2/7/2019 at 10:50 AM by Gisela Leon

## 2019-02-07 NOTE — TELEPHONE ENCOUNTER
"Lovastatin 40 mg    Last Written Prescription Date:  03/13/18  Last Fill Quantity: 90 tablets,  # refills: 3   Last office visit: 11/9/2018 with prescribing provider:  Joseph   Future Office Visit:   Next 5 appointments (look out 90 days)    Apr 09, 2019 11:30 AM CDT  Pre-Op physical with Ellis Ruiz MD  [facility] [address] [facility] [address]  [phone]         Requested Prescriptions   Pending Prescriptions Disp Refills     lovastatin (MEVACOR) 40 MG tablet 90 tablet 3     Sig: TAKE ONE TABLET BY MOUTH EVERY NIGHT AT BEDTIME    [facility] Protocol Passed - 2/7/2019 12:49 PM       Passed - LDL on file in past 12 months    Recent Labs   Lab Test 03/07/18  0827   LDL 95            Passed - No abnormal creatine kinase in past 12 months    No lab results found.            Passed - Recent (12 mo) or future (30 days) visit within the authorizing provider's specialty    Patient had office visit in the last 12 months or has a visit in the next 30 days with authorizing provider or within the authorizing provider's specialty.  See \"Patient Info\" tab in inbasket, or \"Choose Columns\" in Meds & Orders section of the refill encounter.             Passed - Medication is active on med list       Passed - Patient is age 18 or older       Passed - No active pregnancy on record       Passed - No positive pregnancy test in past 12 months          "

## 2019-02-07 NOTE — TELEPHONE ENCOUNTER
A 90 day supply is given, patient is due for an office visit.  Patient has appointment scheduled for 4/9/2019.  RAFAEL Hansen, RN  [facility]

## 2019-02-25 ENCOUNTER — OFFICE VISIT (OUTPATIENT)
Dept: FAMILY MEDICINE | Facility: CLINIC | Age: 79
End: 2019-02-25
Payer: COMMERCIAL

## 2019-02-25 VITALS
BODY MASS INDEX: 24.17 KG/M2 | WEIGHT: 154 LBS | OXYGEN SATURATION: 97 % | SYSTOLIC BLOOD PRESSURE: 119 MMHG | HEIGHT: 67 IN | HEART RATE: 71 BPM | TEMPERATURE: 97.3 F | DIASTOLIC BLOOD PRESSURE: 75 MMHG

## 2019-02-25 DIAGNOSIS — Z29.8 * * * SBE PROPHYLAXIS * * *: ICD-10-CM

## 2019-02-25 DIAGNOSIS — T14.8XXA SKIN ABRASION: Primary | ICD-10-CM

## 2019-02-25 PROCEDURE — 99212 OFFICE O/P EST SF 10 MIN: CPT | Performed by: INTERNAL MEDICINE

## 2019-02-25 ASSESSMENT — MIFFLIN-ST. JEOR: SCORE: 1221.17

## 2019-02-25 NOTE — PROGRESS NOTES
The patient presents for a skin abrasion over her left knee.  One [place] the club [place] in some netting and landed on her left knee.  Since then she has had an open skin area.  It is not draining pus.  Seem like it was better this morning but is better now.  She is not ill.  The knee is not hurting.    Past Medical History:   Diagnosis Date     Abdominal pain 2009, 2013    ct liver and renal cyst and 2mm lung nodule, repeat ct done 2013 and no significantly abnl.     Anxiety 2014    added med 3/14     Arthritis      ASCUS of cervix with negative high risk HPV 03/2018     Carotid bruit 2011    us nl     Gastritis 2011    egd done by [facility]     GERD (gastroesophageal reflux disease) 2011     History of colonoscopy 2004, 2014    nl     Hypercholesteremia 2000    stopped lovastatin 2015, added lipitor 3/16     Insomnia     using otc      Lumbar spinal stenosis 2016    Dr. Wilde, had epidural     Lung nodule 2009, 2013    seen on ct for abd pain, fu done 3/13 and 2 nodules stable and benign, one new one needs fu 1 year.  fu done 3/14 and fu 1 year and then done; fu done 3/15 and unchanged, no fu needed     Odynophagia 2004    egd nl     Other chronic pain      Peripheral neuropathies     Dr. Kim     Screening 2006, 2017    nl dexa     Vaginal dysplasia     chronic VAIN I, many colpos of vagina.  Now we only do an annual pap of vagina, no colpo since stable long term     Weight loss 07/2018    ct chest, abd and pelvis neg     Past Surgical History:   Procedure Laterality Date     ANKLE SURGERY  2000     APPENDECTOMY  13     ARTHROPLASTY KNEE Left 5/14/2018    Procedure: ARTHROPLASTY KNEE;  Left total knee arthroplasty;  Surgeon: William Pollard MD;  Location: [facility] OR     BREAST BIOPSY, CORE RT/LT       C VAGINAL HYSTERECTOMY  1995     GYN SURGERY      BSO     HC UGI ENDOSCOPY, SIMPLE EXAM  03/15/11    [facility]     LAPAROSCOPIC CHOLECYSTECTOMY  2008     NOSE SURGERY  1990     right knee  arthroscopy  2007     thumb surgery Left 2015     Social History     Socioeconomic History     Marital status:      Spouse name: Not on file     Number of children: 1     Years of education: Not on file     Highest education level: Not on file   Occupational History     Occupation: , retired     Employer: [employer]     Employer: RETIRED   Social Needs     Financial resource strain: Not on file     Food insecurity:     Worry: Not on file     Inability: Not on file     Transportation needs:     Medical: Not on file     Non-medical: Not on file   Tobacco Use     Smoking status: Former Smoker     Packs/day: 1.50     Years: 20.00     Pack years: 30.00     Types: Cigarettes     Last attempt to quit: 1992     Years since quittin.0     Smokeless tobacco: Never Used   Substance and Sexual Activity     Alcohol use: Yes     Alcohol/week: 0.0 oz     Comment: 5-6 drinks weekly     Drug use: No     Sexual activity: Not Currently     Partners: Male     Comment: vag hyst, BSO   Lifestyle     Physical activity:     Days per week: Not on file     Minutes per session: Not on file     Stress: Not on file   Relationships     Social connections:     Talks on phone: Not on file     Gets together: Not on file     Attends [employer] service: Not on file     Active member of club or organization: Not on file     Attends meetings of clubs or organizations: Not on file     Relationship status: Not on file     Intimate partner violence:     Fear of current or ex partner: Not on file     Emotionally abused: Not on file     Physically abused: Not on file     Forced sexual activity: Not on file   Other Topics Concern     Parent/sibling w/ CABG, MI or angioplasty before 65F 55M? Not Asked   Social History Narrative     Not on file     Current Outpatient Medications   Medication Sig Dispense Refill     amoxicillin (AMOXIL) 500 MG capsule Take 4 capsules (2,000 mg) by mouth once for 1 dose 4 capsule 3      "Ascorbic Acid (VITAMIN C PO) Take 500 mg by mouth daily.       aspirin 81 MG EC tablet Take 1 tablet (81 mg) by mouth 2 times daily 84 tablet 1     Biotin 5000 MCG CAPS Take 1 tablet by mouth daily        calcium carb 1250 mg, 500 mg [place],/vitamin D 200 units (OSCAL WITH D) 500-200 MG-UNIT per tablet Take 1 tablet by mouth 2 times daily (with meals). Takes 750mg daily. 100 tablet 3     Cholecalciferol (VITAMIN D3 PO) Take 1,000 Units by mouth daily        CYANOCOBALAMIN PO Take 1,000 mcg by mouth daily       loratadine (CLARITIN) 10 MG tablet Take 1 tablet (10 mg) by mouth daily as needed for allergies Occasional use       lovastatin (MEVACOR) 40 MG tablet TAKE ONE TABLET BY MOUTH EVERY NIGHT AT BEDTIME 90 tablet 0     magnesium 100 MG CAPS Take 1 tablet by mouth daily        Multiple Vitamins-Minerals (CENTRUM SILVER) per tablet Take 1 tablet by mouth daily       Allergies   Allergen Reactions     Latex Cough     Seasonal Allergies      YEAR ROUND ALLERGIES     Sulfa Drugs Unknown     FAMILY HISTORY NOTED AND REVIEWED    REVIEW OF SYSTEMS: above    PHYSICAL EXAM    /75 (BP Location: Right arm, Patient Position: Sitting, Cuff Size: Adult Regular)   Pulse 71   Temp 97.3  F (36.3  C) (Tympanic)   Ht 1.702 m (5' 7\")   Wt 69.9 kg (154 lb)   SpO2 97%   Breastfeeding? No   BMI 24.12 kg/m      Patient appears non toxic  Very superficial abrasion over left knee scar area, dime sized, not red, pussy, prior incision intact from knee surgery    ASSESSMENT:  Skin abrasion, superficial, not infected    PLAN:  Keep it covered  Call if red, pus or not resolving soon  Up to date tdshravan Ruiz M.D.        "

## 2019-03-19 ENCOUNTER — OFFICE VISIT (OUTPATIENT)
Dept: OBGYN | Facility: CLINIC | Age: 79
End: 2019-03-19
Payer: COMMERCIAL

## 2019-03-19 VITALS
BODY MASS INDEX: 24.17 KG/M2 | WEIGHT: 154 LBS | DIASTOLIC BLOOD PRESSURE: 70 MMHG | HEIGHT: 67 IN | SYSTOLIC BLOOD PRESSURE: 130 MMHG

## 2019-03-19 DIAGNOSIS — Z01.419 ENCOUNTER FOR GYNECOLOGICAL EXAMINATION WITHOUT ABNORMAL FINDING: Primary | ICD-10-CM

## 2019-03-19 DIAGNOSIS — Z87.411 HISTORY OF VAGINAL DYSPLASIA: ICD-10-CM

## 2019-03-19 PROCEDURE — 99213 OFFICE O/P EST LOW 20 MIN: CPT | Performed by: OBSTETRICS & GYNECOLOGY

## 2019-03-19 ASSESSMENT — MIFFLIN-ST. JEOR: SCORE: 1221.17

## 2019-03-19 NOTE — PROGRESS NOTES
SUBJECTIVE:                                                   Cecily Ivory is a 78 year old female who presents to clinic today for the following health issue(s):  Patient presents with:  Repeat Pap Smear      Additional information:     HPI:  Pt here for vaginal exam. Hx of VAIN I in past with negative colpos. Las pap was ASCUS neg HPV. Pap before that was ASCUS-H with negative HPV.   No bleeding or issues.     No LMP recorded. Patient has had a hysterectomy..   Patient is sexually active, .   reports that she quit smoking about 27 years ago. Her smoking use included cigarettes. She has a 30.00 pack-year smoking history. she has never used smokeless tobacco.  STD testing offered?  Declined  Health maintenance updated:  yes    Today's PHQ-2 Score:   PHQ-2 (  [employer]) 2018   Q1: Little interest or pleasure in doing things 0   Q2: Feeling down, depressed or hopeless 0   PHQ-2 Score 0     Today's PHQ-9 Score:   PHQ-9 SCORE 3/20/2018   PHQ-9 Total Score 3     Today's BRANDY-7 Score:   BRANDY-7 SCORE 3/20/2018   Total Score 2       Problem list and histories reviewed & adjusted, as indicated.  Additional history: as documented.    Patient Active Problem List   Diagnosis     Pain in joint, lower leg     Peripheral neuropathy     Carotid bruit     Hyperlipidemia LDL goal <130     HL (hearing loss)     Dysphonia     Anxiety     Insomnia     Vaginal dysplasia     GERD (gastroesophageal reflux disease)     Lumbar spinal stenosis     Abnormal vaginal Pap smear     ASCUS of cervix with negative high risk HPV     Degenerative arthritis of knee     Past Surgical History:   Procedure Laterality Date     ANKLE SURGERY       APPENDECTOMY  13     ARTHROPLASTY KNEE Left 2018    Procedure: ARTHROPLASTY KNEE;  Left total knee arthroplasty;  Surgeon: William Pollard MD;  Location: [facility] OR     BREAST BIOPSY, CORE RT/LT       C VAGINAL HYSTERECTOMY       GYN SURGERY      BSO     HC UGI ENDOSCOPY, SIMPLE EXAM   "03/15/11    [facility]     LAPAROSCOPIC CHOLECYSTECTOMY  2008     NOSE SURGERY  1990     right knee arthroscopy  2007     thumb surgery Left 2015      Social History     Tobacco Use     Smoking status: Former Smoker     Packs/day: 1.50     Years: 20.00     Pack years: 30.00     Types: Cigarettes     Last attempt to quit: 1992     Years since quittin.1     Smokeless tobacco: Never Used   Substance Use Topics     Alcohol use: Yes     Alcohol/week: 0.0 oz     Comment: 5-6 drinks weekly      Problem (# of Occurrences) Relation (Name,Age of Onset)    Diabetes (1) Brother    Endocrine Disease (1) Brother    Hyperlipidemia (1) Mother            Current Outpatient Medications   Medication Sig     Ascorbic Acid (VITAMIN C PO) Take 500 mg by mouth daily.     aspirin 81 MG EC tablet Take 1 tablet (81 mg) by mouth 2 times daily     Biotin 5000 MCG CAPS Take 1 tablet by mouth daily      calcium carb 1250 mg, 500 mg [place],/vitamin D 200 units (OSCAL WITH D) 500-200 MG-UNIT per tablet Take 1 tablet by mouth 2 times daily (with meals). Takes 750mg daily.     Cholecalciferol (VITAMIN D3 PO) Take 1,000 Units by mouth daily      CYANOCOBALAMIN PO Take 1,000 mcg by mouth daily     loratadine (CLARITIN) 10 MG tablet Take 1 tablet (10 mg) by mouth daily as needed for allergies Occasional use     lovastatin (MEVACOR) 40 MG tablet TAKE ONE TABLET BY MOUTH EVERY NIGHT AT BEDTIME     magnesium 100 MG CAPS Take 1 tablet by mouth daily      Multiple Vitamins-Minerals (CENTRUM SILVER) per tablet Take 1 tablet by mouth daily     No current facility-administered medications for this visit.      Allergies   Allergen Reactions     Latex Cough     Seasonal Allergies      YEAR ROUND ALLERGIES     Sulfa Drugs Unknown       ROS:  12 point review of systems negative other than symptoms noted below.    OBJECTIVE:     /70   Ht 1.702 m (5' 7\")   Wt 69.9 kg (154 lb)   BMI 24.12 kg/m    Body mass index is 24.12 " kg/m .    Exam:  Constitutional:  Appearance: Well nourished, well developed alert, in no acute distress  Neurologic/Psychiatric:  Mental Status:  Oriented X3   Pelvic Exam:  External Genitalia:     Normal appearance for age, no discharge present, no tenderness present, no inflammatory lesions present, color normal  Vagina:     Normal vaginal vault without central or paravaginal defects, no discharge present, no inflammatory lesions present, no masses present. ATROPHIC  Bladder:     Nontender to palpation  Urethra:   Urethral Body:  Urethra palpation normal, urethra structural support normal   Urethral Meatus:  No erythema or lesions present  Cervix:     Surgically absent  Uterus:     Surgically absent  Adnexa:     No adnexal tenderness present, no adnexal masses present  Perineum:     Perineum within normal limits, no evidence of trauma, no rashes or skin lesions present  Anus:     Anus within normal limits, no hemorrhoids present  Inguinal Lymph Nodes:     No lymphadenopathy present  Pubic Hair:     Normal pubic hair distribution for age  Genitalia and Groin:     No rashes present, no lesions present, no areas of discoloration, no masses present       In-Clinic Test Results:  No results found for this or any previous visit (from the past 24 hour(s)).    ASSESSMENT/PLAN:                                                        ICD-10-CM    1. Encounter for gynecological examination without abnormal finding Z01.419    2. History of vaginal dysplasia Z87.411        No further paps. Pt is 78 with only a hx of VAIN I. Last 2 paps negative for HPV. Visual inspection of vagina is normal. NO lesions.   Plan exam every 2 years.     Gómez Rice MD  [facility]

## 2019-03-26 ENCOUNTER — HOSPITAL ENCOUNTER (OUTPATIENT)
Dept: MAMMOGRAPHY | Facility: CLINIC | Age: 79
Discharge: HOME | End: 2019-03-26
Attending: INTERNAL MEDICINE | Admitting: INTERNAL MEDICINE
Payer: COMMERCIAL

## 2019-03-26 DIAGNOSIS — Z12.31 VISIT FOR SCREENING MAMMOGRAM: ICD-10-CM

## 2019-03-26 PROCEDURE — 77067 SCR MAMMO BI INCL CAD: CPT

## 2019-03-30 ENCOUNTER — APPOINTMENT (OUTPATIENT)
Dept: GENERAL RADIOLOGY | Facility: CLINIC | Age: 79
End: 2019-03-30
Attending: EMERGENCY MEDICINE
Payer: COMMERCIAL

## 2019-03-30 ENCOUNTER — HOSPITAL ENCOUNTER (EMERGENCY)
Facility: CLINIC | Age: 79
Discharge: HOME | End: 2019-03-30
Attending: EMERGENCY MEDICINE | Admitting: EMERGENCY MEDICINE
Payer: COMMERCIAL

## 2019-03-30 VITALS
SYSTOLIC BLOOD PRESSURE: 120 MMHG | HEART RATE: 59 BPM | WEIGHT: 148 LBS | TEMPERATURE: 98.6 F | OXYGEN SATURATION: 96 % | HEIGHT: 67 IN | RESPIRATION RATE: 18 BRPM | BODY MASS INDEX: 23.23 KG/M2 | DIASTOLIC BLOOD PRESSURE: 61 MMHG

## 2019-03-30 DIAGNOSIS — S16.1XXA CERVICAL STRAIN, INITIAL ENCOUNTER: ICD-10-CM

## 2019-03-30 DIAGNOSIS — S29.011A MUSCLE STRAIN OF CHEST WALL, INITIAL ENCOUNTER: ICD-10-CM
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
## 2019-03-30 DIAGNOSIS — S80.02XA CONTUSION OF LEFT KNEE, INITIAL ENCOUNTER: ICD-10-CM

## 2019-03-30 PROCEDURE — 71101 X-RAY EXAM UNILAT RIBS/CHEST: CPT | Mod: LT

## 2019-03-30 PROCEDURE — 72040 X-RAY EXAM NECK SPINE 2-3 VW: CPT

## 2019-03-30 PROCEDURE — 73562 X-RAY EXAM OF KNEE 3: CPT | Mod: LT

## 2019-03-30 PROCEDURE — 25000132 ZZH RX MED GY IP 250 OP 250 PS 637: Performed by: EMERGENCY MEDICINE

## 2019-03-30 PROCEDURE — 99285 EMERGENCY DEPT VISIT HI MDM: CPT

## 2019-03-30 RX ORDER — IBUPROFEN 600 MG/1
600 TABLET, FILM COATED ORAL ONCE
Status: COMPLETED | OUTPATIENT
Start: 2019-03-30 | End: 2019-03-30

## 2019-03-30 RX ORDER — CYCLOBENZAPRINE HCL 5 MG
5 TABLET ORAL ONCE
Status: COMPLETED | OUTPATIENT
Start: 2019-03-30 | End: 2019-03-30

## 2019-03-30 RX ORDER — CYCLOBENZAPRINE HCL 5 MG
5 TABLET ORAL 3 TIMES DAILY PRN
Qty: 15 TABLET | Refills: 0 | Status: SHIPPED | OUTPATIENT
Start: 2019-03-30 | End: 2019-04-09

## 2019-03-30 RX ADMIN — IBUPROFEN 600 MG: 600 TABLET ORAL at 10:35

## 2019-03-30 RX ADMIN — CYCLOBENZAPRINE HYDROCHLORIDE 5 MG: 5 TABLET, FILM COATED ORAL at 11:21

## 2019-03-30 ASSESSMENT — MIFFLIN-ST. JEOR: SCORE: 1193.95

## 2019-03-30 NOTE — ED AVS SNAPSHOT
Emergency Department  [address] [place] [address]  Phone:  [phone]  Fax:  [phone]                                    Cecily Ivory   MRN: [id number]    Department:   Emergency Department   Date of Visit:  3/30/2019           After Visit Summary Signature Page    I have received my discharge instructions, and my questions have been answered. I have discussed any challenges I see with this plan with the nurse or doctor.    ..........................................................................................................................................  Patient/Patient Representative Signature      ..........................................................................................................................................  Patient Representative Print Name and Relationship to Patient    ..................................................               ................................................  Date                                   Time    ..........................................................................................................................................  Reviewed by Signature/Title    ...................................................              ..............................................  Date                                               Time          22EPIC Rev 08/18
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
## 2019-04-04 DIAGNOSIS — Z79.899 MEDICATION MANAGEMENT: ICD-10-CM

## 2019-04-04 DIAGNOSIS — Z13.0 SCREENING FOR DEFICIENCY ANEMIA: ICD-10-CM

## 2019-04-04 DIAGNOSIS — E78.5 HYPERLIPIDEMIA LDL GOAL <130: ICD-10-CM

## 2019-04-04 LAB
ALBUMIN SERPL-MCNC: 3.8 G/DL (ref 3.4–5)
ALP SERPL-CCNC: 69 U/L (ref 40–150)
ALT SERPL W P-5'-P-CCNC: 27 U/L (ref 0–50)
ANION GAP SERPL CALCULATED.3IONS-SCNC: 6 MMOL/L (ref 3–14)
AST SERPL W P-5'-P-CCNC: 26 U/L (ref 0–45)
BILIRUB SERPL-MCNC: 0.2 MG/DL (ref 0.2–1.3)
BUN SERPL-MCNC: 23 MG/DL (ref 7–30)
CALCIUM SERPL-MCNC: 8.7 MG/DL (ref 8.5–10.1)
CHLORIDE SERPL-SCNC: 111 MMOL/L (ref 94–109)
CHOLEST SERPL-MCNC: 170 MG/DL
CO2 SERPL-SCNC: 26 MMOL/L (ref 20–32)
CREAT SERPL-MCNC: 0.72 MG/DL (ref 0.52–1.04)
ERYTHROCYTE [DISTWIDTH] IN BLOOD BY AUTOMATED COUNT: 13 % (ref 10–15)
GFR SERPL CREATININE-BSD FRML MDRD: 81 ML/MIN/{1.73_M2}
GLUCOSE SERPL-MCNC: 95 MG/DL (ref 70–99)
HCT VFR BLD AUTO: 42.3 % (ref 35–47)
HDLC SERPL-MCNC: 68 MG/DL
HGB BLD-MCNC: 14.2 G/DL (ref 11.7–15.7)
LDLC SERPL CALC-MCNC: 74 MG/DL
MCH RBC QN AUTO: 31 PG (ref 26.5–33)
MCHC RBC AUTO-ENTMCNC: 33.6 G/DL (ref 31.5–36.5)
MCV RBC AUTO: 92 FL (ref 78–100)
NONHDLC SERPL-MCNC: 102 MG/DL
PLATELET # BLD AUTO: 208 10E9/L (ref 150–450)
POTASSIUM SERPL-SCNC: 4.3 MMOL/L (ref 3.4–5.3)
PROT SERPL-MCNC: 7.2 G/DL (ref 6.8–8.8)
RBC # BLD AUTO: 4.58 10E12/L (ref 3.8–5.2)
SODIUM SERPL-SCNC: 143 MMOL/L (ref 133–144)
TRIGL SERPL-MCNC: 141 MG/DL
WBC # BLD AUTO: 6.9 10E9/L (ref 4–11)

## 2019-04-04 PROCEDURE — 85027 COMPLETE CBC AUTOMATED: CPT | Performed by: INTERNAL MEDICINE

## 2019-04-04 PROCEDURE — 80053 COMPREHEN METABOLIC PANEL: CPT | Performed by: INTERNAL MEDICINE

## 2019-04-04 PROCEDURE — 36415 COLL VENOUS BLD VENIPUNCTURE: CPT | Performed by: INTERNAL MEDICINE

## 2019-04-04 PROCEDURE — 80061 LIPID PANEL: CPT | Performed by: INTERNAL MEDICINE

## 2019-04-09 ENCOUNTER — OFFICE VISIT (OUTPATIENT)
Dept: FAMILY MEDICINE | Facility: CLINIC | Age: 79
End: 2019-04-09
Payer: COMMERCIAL

## 2019-04-09 VITALS
SYSTOLIC BLOOD PRESSURE: 131 MMHG | DIASTOLIC BLOOD PRESSURE: 65 MMHG | HEART RATE: 67 BPM | OXYGEN SATURATION: 97 % | WEIGHT: 148 LBS | BODY MASS INDEX: 23.23 KG/M2 | TEMPERATURE: 97 F | HEIGHT: 67 IN

## 2019-04-09 DIAGNOSIS — R19.8 IRREGULAR BOWEL HABITS: ICD-10-CM

## 2019-04-09 DIAGNOSIS — Z00.00 ROUTINE GENERAL MEDICAL EXAMINATION AT A HEALTH CARE FACILITY: Primary | ICD-10-CM

## 2019-04-09 DIAGNOSIS — E78.5 HYPERLIPIDEMIA LDL GOAL <130: ICD-10-CM

## 2019-04-09 DIAGNOSIS — Z01.818 PREOP GENERAL PHYSICAL EXAM: ICD-10-CM

## 2019-04-09 DIAGNOSIS — R87.610 ASCUS OF CERVIX WITH NEGATIVE HIGH RISK HPV: ICD-10-CM

## 2019-04-09 PROCEDURE — 99397 PER PM REEVAL EST PAT 65+ YR: CPT | Performed by: INTERNAL MEDICINE

## 2019-04-09 PROCEDURE — 99214 OFFICE O/P EST MOD 30 MIN: CPT | Mod: 25 | Performed by: INTERNAL MEDICINE

## 2019-04-09 RX ORDER — LOVASTATIN 40 MG
TABLET ORAL
Qty: 90 TABLET | Refills: 0 | Status: SHIPPED | OUTPATIENT
Start: 2019-04-09 | End: 2019-10-10

## 2019-04-09 ASSESSMENT — ACTIVITIES OF DAILY LIVING (ADL): CURRENT_FUNCTION: NO ASSISTANCE NEEDED

## 2019-04-09 ASSESSMENT — MIFFLIN-ST. JEOR: SCORE: 1193.95

## 2019-05-09 ENCOUNTER — OFFICE VISIT (OUTPATIENT)
Dept: FAMILY MEDICINE | Facility: CLINIC | Age: 79
End: 2019-05-09
Payer: COMMERCIAL

## 2019-05-09 VITALS
TEMPERATURE: 98.2 F | SYSTOLIC BLOOD PRESSURE: 126 MMHG | WEIGHT: 148 LBS | DIASTOLIC BLOOD PRESSURE: 70 MMHG | OXYGEN SATURATION: 96 % | HEIGHT: 67 IN | HEART RATE: 71 BPM | BODY MASS INDEX: 23.23 KG/M2

## 2019-05-09 DIAGNOSIS — Z11.59 SCREENING FOR VIRAL DISEASE: Primary | ICD-10-CM

## 2019-05-09 PROCEDURE — 86765 RUBEOLA ANTIBODY: CPT | Performed by: INTERNAL MEDICINE

## 2019-05-09 PROCEDURE — 86735 MUMPS ANTIBODY: CPT | Performed by: INTERNAL MEDICINE

## 2019-05-09 PROCEDURE — 86762 RUBELLA ANTIBODY: CPT | Performed by: INTERNAL MEDICINE

## 2019-05-09 PROCEDURE — 99212 OFFICE O/P EST SF 10 MIN: CPT | Performed by: INTERNAL MEDICINE

## 2019-05-09 PROCEDURE — 36415 COLL VENOUS BLD VENIPUNCTURE: CPT | Performed by: INTERNAL MEDICINE

## 2019-05-09 ASSESSMENT — MIFFLIN-ST. JEOR: SCORE: 1193.95

## 2019-05-09 NOTE — LETTER
[place] [address] [facility]  [address]  [place]  [address]  Tel: [phone]    May 13, 2019    Cecily GOINS [place]  [address]  [place] [address]        Dear Ms. Ivory,    It was a pleasure seeing you.  I wanted to get back to you with your test results.  I have enclosed a copy for your records.    You are immune to measles, mumps and rubella so no vaccines are needed.  If you have any questions please call me.    If you have any further questions or problems, please contact our office.      Sincerely,    Ellis Ruiz MD/NILSON          Enclosure: Lab Results  Results for orders placed or performed in visit on 05/09/19   Rubeola Antibody IgG   Result Value Ref Range    Rubeola (Measles) Antibody IgG >8.0 (H) 0.0 - 0.8 AI   Rubella Antibody IgG Quantitative   Result Value Ref Range    Rubella Antibody IgG Quantitative >250 IU/mL   Mumps Antibody IgG   Result Value Ref Range    Mumps Antibody IgG 3.0 (H) 0.0 - 0.8 AI

## 2019-05-09 NOTE — PROGRESS NOTES
Patient is here to discuss whether or not she needs anything regarding measles.  She was born in [facility] and is not sure if she was immunized.  She is concerned because of the recent outbreaks.  She is traveling to [place] this Saturday.    She is otherwise doing well but does have a lot of stress.  She is a  and recently sold her house so will be moving.    No exam but I discussed with the patient the options regarding measles for 10 to 15 minutes.  My suggestion is we did check antibody levels today which she agrees to    ASSESSMENT:  Screen for immunity to viral process    PLAN:  Ab level today    Ellis Ruiz M.D.

## 2019-05-11 LAB
MEV IGG SER QL IA: >8 AI (ref 0–0.8)
MUV IGG SER QL IA: 3 AI (ref 0–0.8)
RUBV IGG SERPL IA-ACNC: >250 IU/ML

## 2019-05-11 NOTE — RESULT ENCOUNTER NOTE
It was a pleasure seeing you.  I wanted to get back to you with your test results.  I have enclosed a copy for your records.    You are immune to measles, mumps and rubella so no vaccines are needed.  If you have any questions please call me.

## 2019-05-23 ENCOUNTER — TRANSFERRED RECORDS (OUTPATIENT)
Dept: HEALTH INFORMATION MANAGEMENT | Facility: CLINIC | Age: 79
End: 2019-05-23

## 2019-08-16 DIAGNOSIS — Z29.8 * * * SBE PROPHYLAXIS * * *: ICD-10-CM

## 2019-08-16 RX ORDER — AMOXICILLIN 500 MG/1
2000 CAPSULE ORAL ONCE
Qty: 4 CAPSULE | Refills: 2 | Status: SHIPPED | OUTPATIENT
Start: 2019-08-16 | End: 2019-09-03

## 2019-08-16 NOTE — TELEPHONE ENCOUNTER
Routing refill request to provider for review/approval because:  Drug not on the [facility] refill protocol - pt taking for pre-med dental abx     Brenda QUACH RN

## 2019-08-16 NOTE — TELEPHONE ENCOUNTER
"Amoxil not on current med list.  Was wrongly  discontinued  Uses for dental appts.  The source prescription was discontinued on 3/19/2019 by Gloria Avery CMA for the following reason: Medication Reconciliation Clean Up.      03/19/19 1359 Discontinue Gloria Avery CMA Reason: Medication Reconciliation Clean Up   08/16/19 1633 Reorder Renata Sol CMA To Order: [id number]   Outpatient Medication Detail      Disp Refills Start End DAVE   amoxicillin (AMOXIL) 500 MG capsule (Discontinued) 4 capsule 3 5/1/2018 3/19/2019 --   Sig - Route: Take 4 capsules (2,000 mg) by mouth once for 1 dose - Oral   Sent to pharmacy as: amoxicillin (AMOXIL) 500 MG capsule   Class: E-Prescribe   Reason for Discontinue: Medication Reconciliation Clean Up           Last Written Prescription Date:  05/01/2018  Last Fill Quantity: 4,  # refills: 3   Last office visit: 5/9/2019 with prescribing provider:     Future Office Visit:      Requested Prescriptions   Pending Prescriptions Disp Refills     amoxicillin (AMOXIL) 500 MG capsule [Pharmacy Med Name: Amoxicillin Oral Capsule 500 MG] 4 capsule 2     Sig: Take 4 capsules (2,000 mg) by mouth once for 1 dose       Oral Acne/Rosacea Medications Protocol Failed - 8/16/2019  4:39 PM        Failed - Confirmation of diagnosis is required     Please confirm diagnosis is acne or rosacea.     If NOT acne or rosacea; refer request to provider for further evaluation.    If diagnosis IS acne or rosacea, OK to refill BASED ON PREVIOUS REFILL CLINICAL NOTE RECOMMENDATION.          Failed - Medication is active on med list        Passed - Patient is 12 years of age or older        Passed - Recent (12 mo) or future (30 days) visit within the authorizing provider's specialty     Patient had office visit in the last 12 months or has a visit in the next 30 days with authorizing provider or within the authorizing provider's specialty.  See \"Patient Info\" tab in inbasket, or \"Choose Columns\" in Meds & Orders " section of the refill encounter.              Passed - No active pregnancy on record        Passed - No positive prenancy test is past 12 months

## 2019-09-03 ENCOUNTER — TELEPHONE (OUTPATIENT)
Dept: FAMILY MEDICINE | Facility: CLINIC | Age: 79
End: 2019-09-03

## 2019-09-03 ENCOUNTER — OFFICE VISIT (OUTPATIENT)
Dept: PODIATRY | Facility: CLINIC | Age: 79
End: 2019-09-03
Payer: COMMERCIAL

## 2019-09-03 VITALS
BODY MASS INDEX: 24.03 KG/M2 | WEIGHT: 153.1 LBS | HEIGHT: 67 IN | DIASTOLIC BLOOD PRESSURE: 69 MMHG | SYSTOLIC BLOOD PRESSURE: 124 MMHG | HEART RATE: 63 BPM

## 2019-09-03 DIAGNOSIS — M79.89 SOFT TISSUE MASS: ICD-10-CM

## 2019-09-03 DIAGNOSIS — M79.671 RIGHT FOOT PAIN: ICD-10-CM

## 2019-09-03 PROCEDURE — 99203 OFFICE O/P NEW LOW 30 MIN: CPT | Performed by: PODIATRIST

## 2019-09-03 ASSESSMENT — MIFFLIN-ST. JEOR: SCORE: 1217.09

## 2019-09-03 NOTE — PROGRESS NOTES
"PATIENT HISTORY:  Cecily Ivory is a 78 year old female who presents to clinic for R 1st toe \"growth\" for 1 week.  Wore sandals prior to this starting.  She \"shaved some callus\" from the area.  Better with rest.  6/10 pain.  No injury.      Review of Systems:  Patient denies fever, chills, rash, wound, stiffness, limping, numbness, weakness, heart burn, blood in stool, chest pain with activity, calf pain when walking, shortness of breath with activity, chronic cough, easy bleeding/bruising, swelling of ankles, excessive thirst, fatigue, depression, anxiety.       PAST MEDICAL HISTORY:   Past Medical History:   Diagnosis Date     Abdominal pain 2009, 2013    ct liver and renal cyst and 2mm lung nodule, repeat ct done 2013 and no significantly abnl.     Anxiety 2014    added med 3/14     Arthritis      ASCUS of cervix with negative high risk HPV 03/2018     Carotid bruit 2011    us nl     Gastritis 2011    egd done by [facility]     GERD (gastroesophageal reflux disease) 2011     History of colonoscopy 2004, 2014    nl     Hypercholesteremia 2000    stopped lovastatin 2015, added lipitor 3/16     Insomnia     using otc      Lumbar spinal stenosis 2016    Dr. Wilde, had epidural     Lung nodule 2009, 2013    seen on ct for abd pain, fu done 3/13 and 2 nodules stable and benign, one new one needs fu 1 year.  fu done 3/14 and fu 1 year and then done; fu done 3/15 and unchanged, no fu needed     Odynophagia 2004    egd nl     Other chronic pain      Peripheral neuropathies     Dr. Kim     Screening 2006, 2017    nl dexa     Vaginal dysplasia     chronic VAIN I, many colpos of vagina.  Now we only do an annual pap of vagina, no colpo since stable long term     Weight loss 07/2018    ct chest, abd and pelvis neg        PAST SURGICAL HISTORY:   Past Surgical History:   Procedure Laterality Date     ANKLE SURGERY  2000     APPENDECTOMY  13     ARTHROPLASTY KNEE Left 5/14/2018    Procedure: ARTHROPLASTY KNEE;  Left total knee " arthroplasty;  Surgeon: William Pollard MD;  Location: [facility] OR     BREAST BIOPSY, CORE RT/LT       C VAGINAL HYSTERECTOMY  1995     CATARACT IOL, RT/LT  04/2019     GYN SURGERY      BSO     HC UGI ENDOSCOPY, SIMPLE EXAM  03/15/11    [facility]     HYSTERECTOMY, PAP NO LONGER INDICATED       LAPAROSCOPIC CHOLECYSTECTOMY  2008     NOSE SURGERY  1990     right knee arthroscopy  2007     thumb surgery Left 2/2015        MEDICATIONS:   Current Outpatient Medications:      Ascorbic Acid (VITAMIN C PO), Take 500 mg by mouth daily., Disp: , Rfl:      Biotin 5000 MCG CAPS, Take 1 tablet by mouth daily , Disp: , Rfl:      calcium carb 1250 mg, 500 mg [place],/vitamin D 200 units (OSCAL WITH D) 500-200 MG-UNIT per tablet, Take 1 tablet by mouth 2 times daily (with meals). Takes 750mg daily., Disp: 100 tablet, Rfl: 3     Cholecalciferol (VITAMIN D3 PO), Take 1,000 Units by mouth daily , Disp: , Rfl:      CYANOCOBALAMIN PO, Take 1,000 mcg by mouth daily, Disp: , Rfl:      loratadine (CLARITIN) 10 MG tablet, Take 1 tablet (10 mg) by mouth daily as needed for allergies Occasional use, Disp: , Rfl:      lovastatin (MEVACOR) 40 MG tablet, TAKE ONE TABLET BY MOUTH EVERY NIGHT AT BEDTIME, Disp: 90 tablet, Rfl: 0     magnesium 100 MG CAPS, Take 1 tablet by mouth daily , Disp: , Rfl:      Multiple Vitamins-Minerals (CENTRUM SILVER) per tablet, Take 1 tablet by mouth daily, Disp: , Rfl:      ALLERGIES:    Allergies   Allergen Reactions     Latex Cough     Seasonal Allergies      YEAR ROUND ALLERGIES     Sulfa Drugs Unknown        SOCIAL HISTORY:   Social History     Socioeconomic History     Marital status:      Spouse name: Not on file     Number of children: 1     Years of education: Not on file     Highest education level: Not on file   Occupational History     Occupation: , retired     Employer: [employer]     Employer: RETIRED   Social Needs     Financial resource strain: Not on file      "Food insecurity:     Worry: Not on file     Inability: Not on file     Transportation needs:     Medical: Not on file     Non-medical: Not on file   Tobacco Use     Smoking status: Former Smoker     Packs/day: 1.50     Years: 20.00     Pack years: 30.00     Types: Cigarettes     Last attempt to quit: 1992     Years since quittin.5     Smokeless tobacco: Never Used   Substance and Sexual Activity     Alcohol use: Yes     Alcohol/week: 0.0 oz     Comment: 5-6 drinks weekly     Drug use: No     Sexual activity: Not Currently     Partners: Male     Comment: vag hyst, BSO   Lifestyle     Physical activity:     Days per week: Not on file     Minutes per session: Not on file     Stress: Not on file   Relationships     Social connections:     Talks on phone: Not on file     Gets together: Not on file     Attends [employer] service: Not on file     Active member of club or organization: Not on file     Attends meetings of clubs or organizations: Not on file     Relationship status: Not on file     Intimate partner violence:     Fear of current or ex partner: Not on file     Emotionally abused: Not on file     Physically abused: Not on file     Forced sexual activity: Not on file   Other Topics Concern     Parent/sibling w/ CABG, MI or angioplasty before 65F 55M? Not Asked   Social History Narrative     Not on file        FAMILY HISTORY:   Family History   Problem Relation Age of Onset     Hyperlipidemia Mother         EXAM:/69 (BP Location: Left arm, Patient Position: Sitting, Cuff Size: Adult Regular)   Pulse 63   Ht 1.702 m (5' 7\")   Wt 69.4 kg (153 lb 1.6 oz)   BMI 23.98 kg/m      General appearance: Patient is alert and fully cooperative with history & exam.  No sign of distress is noted during the visit.     Psychiatric: Affect is pleasant & appropriate.  Patient appears motivated to improve health.     Respiratory: Breathing is regular & unlabored while sitting.     HEENT: Hearing is intact to spoken " word.  Speech is clear.  No gross evidence of visual impairment that would impact ambulation.     Dermatologic: Skin is intact to R foot without significant lesions, rash or abrasion.  No paronychia or evidence of soft tissue infection is noted.     Vascular: DP & PT pulses are intact & regular on the R.  No significant edema or varicosities noted.  CFT and skin temperature are normal to both lower extremities.     Neurologic: Lower extremity sensation is intact to light touch.  No evidence of weakness or contracture in the lower extremities.  No evidence of neuropathy.     Musculoskeletal: Small <1cm palpable enlargement/mass adjacent to EHL tendon over dorsal R 1st toe just proximal to IPJ.  Tenderness to palpation.  No pain to 1st toe bone.  Patient is ambulatory without assistive device or brace.  No gross ankle deformity noted.  No foot or ankle joint effusion is noted.     ASSESSMENT: R 1st toe pain, possible mass     PLAN:  Reviewed patient's chart in epic.  Discussed condition and treatment options including pros and cons.    Possible local ganglion; other etiology possible.  This may also be simply local inflammation from shoegear/pressure.  Different shoes that don't rub, RICE advised.  Offered imaging, US vs MR.  Pt declined for now.  Advised imaging if not improved within 2 wks.  May consider aspiration vs surgical removal pending findings/progress.  Pt agrees with plan.  F/u if worsening or failing to improve.     Johnny Qureshi DPM, FACFAS

## 2019-09-03 NOTE — PATIENT INSTRUCTIONS
If not improved in 2 weeks, please call to schedule ultrasound or MRI (preferred).              Body Mass Index (BMI)  Many things can cause foot and ankle problems. Foot structure, activity level, foot mechanics and injuries are common causes of pain.  One very important issue that often goes unmentioned is body weight. Extra weight can cause increased stress on muscles, ligaments, bones and tendons. Sometimes just a few extra pounds is all it takes to put one over her/his threshold. Without reducing that stress, it can be difficult to alleviate pain.   Some people are uncomfortable addressing this issue, but we feel it is important for you to think about it. As Foot & Ankle specialists, our job is addressing the lower extremity problem and possible causes.   Regarding extra body weight, we encourage patients to discuss diet and weight management plans with their primary care doctors. It is this team approach that gives you the best opportunity for pain relief and getting you back on your feet.

## 2019-09-03 NOTE — LETTER
"    9/3/2019         RE: Cecily Ivory  [address]  [place] [address]        Dear Colleague,    Thank you for referring your patient, Cecily Ivory, to the [facility]. Please see a copy of my visit note below.    PATIENT HISTORY:  Cecily Ivory is a 78 year old female who presents to clinic for R 1st toe \"growth\" for 1 week.  Wore sandals prior to this starting.  She \"shaved some callus\" from the area.  Better with rest.  6/10 pain.  No injury.      Review of Systems:  Patient denies fever, chills, rash, wound, stiffness, limping, numbness, weakness, heart burn, blood in stool, chest pain with activity, calf pain when walking, shortness of breath with activity, chronic cough, easy bleeding/bruising, swelling of ankles, excessive thirst, fatigue, depression, anxiety.       PAST MEDICAL HISTORY:   Past Medical History:   Diagnosis Date     Abdominal pain 2009, 2013    ct liver and renal cyst and 2mm lung nodule, repeat ct done 2013 and no significantly abnl.     Anxiety 2014    added med 3/14     Arthritis      ASCUS of cervix with negative high risk HPV 03/2018     Carotid bruit 2011    us nl     Gastritis 2011    egd done by [place] [facility]     GERD (gastroesophageal reflux disease) 2011     History of colonoscopy 2004, 2014    nl     Hypercholesteremia 2000    stopped lovastatin 2015, added lipitor 3/16     Insomnia     using otc      Lumbar spinal stenosis 2016    Dr. Wilde, had epidural     Lung nodule 2009, 2013    seen on ct for abd pain, fu done 3/13 and 2 nodules stable and benign, one new one needs fu 1 year.  fu done 3/14 and fu 1 year and then done; fu done 3/15 and unchanged, no fu needed     Odynophagia 2004    egd nl     Other chronic pain      Peripheral neuropathies     Dr. Kim     Screening 2006, 2017    nl dexa     Vaginal dysplasia     chronic VAIN I, many colpos of vagina.  Now we only do an annual pap of vagina, no colpo since stable long term     Weight loss " 07/2018    ct chest, abd and pelvis neg        PAST SURGICAL HISTORY:   Past Surgical History:   Procedure Laterality Date     ANKLE SURGERY  2000     APPENDECTOMY  13     ARTHROPLASTY KNEE Left 5/14/2018    Procedure: ARTHROPLASTY KNEE;  Left total knee arthroplasty;  Surgeon: William Pollard MD;  Location: [facility] OR     BREAST BIOPSY, CORE RT/LT       C VAGINAL HYSTERECTOMY  1995     CATARACT IOL, RT/LT  04/2019     GYN SURGERY      BSO     [facility]GI ENDOSCOPY, SIMPLE EXAM  03/15/11    [facility]     HYSTERECTOMY, PAP NO LONGER INDICATED       LAPAROSCOPIC CHOLECYSTECTOMY  2008     NOSE SURGERY  1990     right knee arthroscopy  2007     thumb surgery Left 2/2015        MEDICATIONS:   Current Outpatient Medications:      Ascorbic Acid (VITAMIN C PO), Take 500 mg by mouth daily., Disp: , Rfl:      Biotin 5000 MCG CAPS, Take 1 tablet by mouth daily , Disp: , Rfl:      calcium carb 1250 mg, 500 mg [place],/vitamin D 200 units (OSCAL WITH D) 500-200 MG-UNIT per tablet, Take 1 tablet by mouth 2 times daily (with meals). Takes 750mg daily., Disp: 100 tablet, Rfl: 3     Cholecalciferol (VITAMIN D3 PO), Take 1,000 Units by mouth daily , Disp: , Rfl:      CYANOCOBALAMIN PO, Take 1,000 mcg by mouth daily, Disp: , Rfl:      loratadine (CLARITIN) 10 MG tablet, Take 1 tablet (10 mg) by mouth daily as needed for allergies Occasional use, Disp: , Rfl:      lovastatin (MEVACOR) 40 MG tablet, TAKE ONE TABLET BY MOUTH EVERY NIGHT AT BEDTIME, Disp: 90 tablet, Rfl: 0     magnesium 100 MG CAPS, Take 1 tablet by mouth daily , Disp: , Rfl:      Multiple Vitamins-Minerals (CENTRUM SILVER) per tablet, Take 1 tablet by mouth daily, Disp: , Rfl:      ALLERGIES:    Allergies   Allergen Reactions     Latex Cough     Seasonal Allergies      YEAR ROUND ALLERGIES     Sulfa Drugs Unknown        SOCIAL HISTORY:   Social History     Socioeconomic History     Marital status:      Spouse name: Not on file     Number of children: 1      "Years of education: Not on file     Highest education level: Not on file   Occupational History     Occupation: , retired     Employer: [employer]     Employer: RETIRED   Social Needs     Financial resource strain: Not on file     Food insecurity:     Worry: Not on file     Inability: Not on file     Transportation needs:     Medical: Not on file     Non-medical: Not on file   Tobacco Use     Smoking status: Former Smoker     Packs/day: 1.50     Years: 20.00     Pack years: 30.00     Types: Cigarettes     Last attempt to quit: 1992     Years since quittin.5     Smokeless tobacco: Never Used   Substance and Sexual Activity     Alcohol use: Yes     Alcohol/week: 0.0 oz     Comment: 5-6 drinks weekly     Drug use: No     Sexual activity: Not Currently     Partners: Male     Comment: vag hyst, BSO   Lifestyle     Physical activity:     Days per week: Not on file     Minutes per session: Not on file     Stress: Not on file   Relationships     Social connections:     Talks on phone: Not on file     Gets together: Not on file     Attends Buddhism service: Not on file     Active member of club or organization: Not on file     Attends meetings of clubs or organizations: Not on file     Relationship status: Not on file     Intimate partner violence:     Fear of current or ex partner: Not on file     Emotionally abused: Not on file     Physically abused: Not on file     Forced sexual activity: Not on file   Other Topics Concern     Parent/sibling w/ CABG, MI or angioplasty before 65F 55M? Not Asked   Social History Narrative     Not on file        FAMILY HISTORY:   Family History   Problem Relation Age of Onset     Hyperlipidemia Mother         EXAM:/69 (BP Location: Left arm, Patient Position: Sitting, Cuff Size: Adult Regular)   Pulse 63   Ht 1.702 m (5' 7\")   Wt 69.4 kg (153 lb 1.6 oz)   BMI 23.98 kg/m       General appearance: Patient is alert and fully cooperative with history " & exam.  No sign of distress is noted during the visit.     Psychiatric: Affect is pleasant & appropriate.  Patient appears motivated to improve health.     Respiratory: Breathing is regular & unlabored while sitting.     HEENT: Hearing is intact to spoken word.  Speech is clear.  No gross evidence of visual impairment that would impact ambulation.     Dermatologic: Skin is intact to R foot without significant lesions, rash or abrasion.  No paronychia or evidence of soft tissue infection is noted.     Vascular: DP & PT pulses are intact & regular on the R.  No significant edema or varicosities noted.  CFT and skin temperature are normal to both lower extremities.     Neurologic: Lower extremity sensation is intact to light touch.  No evidence of weakness or contracture in the lower extremities.  No evidence of neuropathy.     Musculoskeletal: Small <1cm palpable enlargement/mass adjacent to EHL tendon over dorsal R 1st toe just proximal to IPJ.  Tenderness to palpation.  No pain to 1st toe bone.  Patient is ambulatory without assistive device or brace.  No gross ankle deformity noted.  No foot or ankle joint effusion is noted.     ASSESSMENT: R 1st toe pain, possible mass     PLAN:  Reviewed patient's chart in epic.  Discussed condition and treatment options including pros and cons.    Possible local ganglion; other etiology possible.  This may also be simply local inflammation from shoegear/pressure.  Different shoes that don't rub, RICE advised.  Offered imaging, US vs MR.  Pt declined for now.  Advised imaging if not improved within 2 wks.  May consider aspiration vs surgical removal pending findings/progress.  Pt agrees with plan.  F/u if worsening or failing to improve.     Johnny Qureshi DPM, FACFAS          Again, thank you for allowing me to participate in the care of your patient.        Sincerely,        Johnny Qureshi DPM

## 2019-09-18 ENCOUNTER — TELEPHONE (OUTPATIENT)
Dept: FAMILY MEDICINE | Facility: CLINIC | Age: 79
End: 2019-09-18
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
## 2019-09-18 NOTE — TELEPHONE ENCOUNTER
I am thinking may be best just to see ortho for this, tco, is she ok with this    Ellis Ruiz M.D.
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
## 2019-09-18 NOTE — TELEPHONE ENCOUNTER
Spoke with patient and notified of below message     She is asking if PCP can recommend a particular [facility] provider? She would prefer this vs just going to [facility]     Please advise    Brenda QUACH RN

## 2019-09-18 NOTE — TELEPHONE ENCOUNTER
Detailed message left notifying pt PCP recommends Dr. Jonah Pearson and also gave her number for his office     Asked that she call back if any questions    Brenda QUACH RN

## 2019-09-20 ENCOUNTER — OFFICE VISIT (OUTPATIENT)
Dept: PODIATRY | Facility: CLINIC | Age: 79
End: 2019-09-20
Payer: COMMERCIAL

## 2019-09-20 VITALS
SYSTOLIC BLOOD PRESSURE: 126 MMHG | HEIGHT: 67 IN | DIASTOLIC BLOOD PRESSURE: 80 MMHG | BODY MASS INDEX: 24.01 KG/M2 | HEART RATE: 73 BPM | WEIGHT: 153 LBS

## 2019-09-20 DIAGNOSIS — M79.89 SOFT TISSUE MASS: Primary | ICD-10-CM

## 2019-09-20 PROCEDURE — 99214 OFFICE O/P EST MOD 30 MIN: CPT | Performed by: PODIATRIST

## 2019-09-20 ASSESSMENT — MIFFLIN-ST. JEOR: SCORE: 1211.63

## 2019-09-20 NOTE — PATIENT INSTRUCTIONS
Thank you for choosing [facility]!    Follow up after MRI    DR. SANTOS'S CLINIC LOCATIONS     MONDAY  [place] TUESDAY & FRIDAY AM  PARTH   [address]   [address]   [place] [address] [place] [address]   [phone]  [phone]  [phone]       WEDNESDAY  [place] SCHEDULE SURGERY: [phone]   [address] APPOINTMENTS: [phone]   [place] [address] BILLING QUESTIONS: [phone]   [phone]         [facility] SCHEDULING  They should be calling you within 24 hours to schedule your scan.  If not, please call the location discussed at your appointment.    1) [facility]:       [phone]      [address] [place] [address]    2) [facility]:      [phone]      [address]      [place] [address]    3) [facility]:       [phone]      [address] [place] [address]    * We will call you with the results. Please allow 24-48 hours for the results to be read and final.          BODY WEIGHT AND YOUR FEET  The following information is included in the after visit summary for all patients. Body weight can be a sensitive issue to discuss in clinic, but we think the following information is very important. Although we focus on the feet and ankles, we do support the overall health of our patients.     Many things can cause foot and ankle problems. Foot structure, activity level, foot mechanics and injuries are common causes of pain. One very important issue that often goes unmentioned, is body weight. Extra weight can cause increased stress on muscles, ligaments, bones and tendons. Sometimes just a few extra pounds is all it takes to put one over her/his threshold. Without reducing that stress, it can be difficult to alleviate pain. As Foot & Ankle specialists, our job is addressing the lower  extremity problem and possible causes. Regarding extra body weight, we encourage patients to discuss diet and weight management plans with their primary care doctors. It is this team approach that gives you the best opportunity for pain relief and getting you back on your feet.      [facility] has a Comprehensive Weight Management Program. This program includes counseling, education, non-surgical and surgical approaches to weight loss. If you are interested in learning more either talk to you primary care provider or call [phone].

## 2019-09-20 NOTE — LETTER
"    9/20/2019         RE: Cecily Ivory  [address]  [place] [address]        Dear Colleague,    Thank you for referring your patient, Cecily Ivory, to the [facility]. Please see a copy of my visit note below.    PATIENT HISTORY:  Cecily Ivory is a 79 year old female who presents to clinic for R foot cyst/mass recheck.  Pt states it has decreased in size, but still remains.  She thinks her shoes were irritating the area.  3 week duration.  Different shoes have helped.  No fevers, chills, injury.  Nonsmoker, retired.  No related family hx.       EXAM:Vitals: /80   Pulse 73   Ht 1.702 m (5' 7\")   Wt 69.4 kg (153 lb)   BMI 23.96 kg/m     BMI= Body mass index is 23.96 kg/m .    General appearance: Patient is alert and fully cooperative with history & exam.  No sign of distress is noted during the visit.     Dermatologic: Skin is intact to R foot without significant lesions, rash or abrasion.  No paronychia or evidence of soft tissue infection is noted.     Vascular: DP & PT pulses are intact & regular on the R.  No significant edema or varicosities noted.  CFT and skin temperature are normal to both lower extremities.     Neurologic: Lower extremity sensation is intact to light touch.  No evidence of weakness or contracture in the lower extremities.  No evidence of neuropathy.     Musculoskeletal: Small <1cm palpable enlargement/mass adjacent to EHL tendon over dorsal R 1st toe just proximal to IPJ, smaller than prior.   Mild tenderness to palpation.  No pain to 1st toe bone.  Patient is ambulatory without assistive device or brace.  No gross ankle deformity noted.  No foot or ankle joint effusion is noted.     ASSESSMENT: R 1st toe pain, possible mass     PLAN:  Reviewed patient's chart in epic.  Discussed condition and treatment options including pros and cons.     Will proceed with MRI for workup at this time, as pt remains concerned.  Still suspect ganglion " cyst given decrease in size.  F/u after MRI.  Discussed possible aspiration, surgical resection.    Johnny Qureshi DPM, FACFAS              Again, thank you for allowing me to participate in the care of your patient.        Sincerely,        Johnny Qureshi DPM

## 2019-09-21 NOTE — PROGRESS NOTES
"PATIENT HISTORY:  Cecily Ivory is a 79 year old female who presents to clinic for R foot cyst/mass recheck.  Pt states it has decreased in size, but still remains.  She thinks her shoes were irritating the area.  3 week duration.  Different shoes have helped.  No fevers, chills, injury.  Nonsmoker, retired.  No related family hx.       EXAM:Vitals: /80   Pulse 73   Ht 1.702 m (5' 7\")   Wt 69.4 kg (153 lb)   BMI 23.96 kg/m    BMI= Body mass index is 23.96 kg/m .    General appearance: Patient is alert and fully cooperative with history & exam.  No sign of distress is noted during the visit.     Dermatologic: Skin is intact to R foot without significant lesions, rash or abrasion.  No paronychia or evidence of soft tissue infection is noted.     Vascular: DP & PT pulses are intact & regular on the R.  No significant edema or varicosities noted.  CFT and skin temperature are normal to both lower extremities.     Neurologic: Lower extremity sensation is intact to light touch.  No evidence of weakness or contracture in the lower extremities.  No evidence of neuropathy.     Musculoskeletal: Small <1cm palpable enlargement/mass adjacent to EHL tendon over dorsal R 1st toe just proximal to IPJ, smaller than prior.  Mild tenderness to palpation.  No pain to 1st toe bone.  Patient is ambulatory without assistive device or brace.  No gross ankle deformity noted.  No foot or ankle joint effusion is noted.     ASSESSMENT: R 1st toe pain, possible mass     PLAN:  Reviewed patient's chart in epic.  Discussed condition and treatment options including pros and cons.     Will proceed with MRI for workup at this time, as pt remains concerned.  Still suspect ganglion cyst given decrease in size.  F/u after MRI.  Discussed possible aspiration, surgical resection.    Johnny Qureshi, LINNETTE, FACFAS            "

## 2019-09-23 ENCOUNTER — TRANSFERRED RECORDS (OUTPATIENT)
Dept: HEALTH INFORMATION MANAGEMENT | Facility: CLINIC | Age: 79
End: 2019-09-23

## 2019-10-10 ENCOUNTER — HOSPITAL ENCOUNTER (OUTPATIENT)
Dept: MRI IMAGING | Facility: CLINIC | Age: 79
Discharge: HOME | End: 2019-10-10
Attending: PODIATRIST | Admitting: PODIATRIST
Payer: COMMERCIAL

## 2019-10-10 DIAGNOSIS — E78.5 HYPERLIPIDEMIA LDL GOAL <130: ICD-10-CM

## 2019-10-10 DIAGNOSIS — M79.89 SOFT TISSUE MASS: ICD-10-CM

## 2019-10-10 PROCEDURE — 73718 MRI LOWER EXTREMITY W/O DYE: CPT | Mod: RT

## 2019-10-10 NOTE — TELEPHONE ENCOUNTER
"lovastatin (MEVACOR) 40 MG tablet 90 tablet 0 4/9/2019     Last Written Prescription Date:  4/9/2019  Last Fill Quantity: 90,  # refills: 0   Last office visit: 5/9/2019 with prescribing provider: ADRIENNE Ruiz    Future Office Visit: Unknown    Requested Prescriptions   Pending Prescriptions Disp Refills     lovastatin (MEVACOR) 40 MG tablet [Pharmacy Med Name: LOVASTATIN TAB 40MG] 90 tablet 0     Sig: TAKE 1 TABLET NIGHTLY AT   BEDTIME       Statins Protocol Passed - 10/10/2019  4:38 PM        Passed - LDL on file in past 12 months     Recent Labs   Lab Test 04/04/19  0847   LDL 74             Passed - No abnormal creatine kinase in past 12 months     No lab results found.             Passed - Recent (12 mo) or future (30 days) visit within the authorizing provider's specialty     Patient has had an office visit with the authorizing provider or a provider within the authorizing providers department within the previous 12 mos or has a future within next 30 days. See \"Patient Info\" tab in inbasket, or \"Choose Columns\" in Meds & Orders section of the refill encounter.              Passed - Medication is active on med list        Passed - Patient is age 18 or older        Passed - No active pregnancy on record        Passed - No positive pregnancy test in past 12 months          "

## 2019-10-11 ENCOUNTER — TELEPHONE (OUTPATIENT)
Dept: PODIATRY | Facility: CLINIC | Age: 79
End: 2019-10-11

## 2019-10-11 RX ORDER — LOVASTATIN 40 MG
TABLET ORAL
Qty: 90 TABLET | Refills: 1 | Status: ON HOLD | OUTPATIENT
Start: 2019-10-11 | End: 2020-01-08

## 2019-10-11 NOTE — TELEPHONE ENCOUNTER
Notes recorded by Johnny Qureshi DPM on 10/11/2019 at 12:24 PM CDT  Please call pt.  MRI did not show any distinct mass at area of concern over the 1st toe area.  This seems to be resolving, so I would advise monitoring for now.  Avoid shoes that rub on the area.  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  Follow up in clinic with any concerns/if worsening or failing to improve.    Consent to communicate on file.     Left voicemail with the above information from Dr. Qureshi. Asked that she call back for any further concerns.     ASHANTI Marquez RN

## 2019-12-19 ENCOUNTER — TRANSFERRED RECORDS (OUTPATIENT)
Dept: HEALTH INFORMATION MANAGEMENT | Facility: CLINIC | Age: 79
End: 2019-12-19

## 2020-01-07 ENCOUNTER — APPOINTMENT (OUTPATIENT)
Dept: GENERAL RADIOLOGY | Facility: CLINIC | Age: 80
DRG: 280 | End: 2020-01-07
Attending: EMERGENCY MEDICINE
Payer: COMMERCIAL

## 2020-01-07 ENCOUNTER — HOSPITAL ENCOUNTER (INPATIENT)
Facility: CLINIC | Age: 80
LOS: 1 days | Discharge: HOME | DRG: 280 | End: 2020-01-08
Attending: EMERGENCY MEDICINE | Admitting: INTERNAL MEDICINE
Payer: COMMERCIAL

## 2020-01-07 ENCOUNTER — APPOINTMENT (OUTPATIENT)
Dept: CARDIOLOGY | Facility: CLINIC | Age: 80
DRG: 280 | End: 2020-01-07
Attending: INTERNAL MEDICINE
Payer: COMMERCIAL

## 2020-01-07 DIAGNOSIS — N39.0 URINARY TRACT INFECTION WITHOUT HEMATURIA, SITE UNSPECIFIED: ICD-10-CM

## 2020-01-07 DIAGNOSIS — I21.4 NSTEMI (NON-ST ELEVATED MYOCARDIAL INFARCTION) (H): ICD-10-CM

## 2020-01-07 DIAGNOSIS — I51.81 TAKOTSUBO CARDIOMYOPATHY: Primary | ICD-10-CM

## 2020-01-07 DIAGNOSIS — F41.9 ANXIETY: ICD-10-CM

## 2020-01-07 LAB
ALBUMIN SERPL-MCNC: 3.2 G/DL (ref 3.4–5)
ALP SERPL-CCNC: 50 U/L (ref 40–150)
ALT SERPL W P-5'-P-CCNC: 25 U/L (ref 0–50)
ANION GAP SERPL CALCULATED.3IONS-SCNC: 6 MMOL/L (ref 3–14)
AST SERPL W P-5'-P-CCNC: 28 U/L (ref 0–45)
BASOPHILS # BLD AUTO: 0 10E9/L (ref 0–0.2)
BASOPHILS NFR BLD AUTO: 0.2 %
BILIRUB SERPL-MCNC: 0.5 MG/DL (ref 0.2–1.3)
BUN SERPL-MCNC: 15 MG/DL (ref 7–30)
CALCIUM SERPL-MCNC: 8.4 MG/DL (ref 8.5–10.1)
CHLORIDE SERPL-SCNC: 108 MMOL/L (ref 94–109)
CHOLEST SERPL-MCNC: 183 MG/DL
CO2 SERPL-SCNC: 25 MMOL/L (ref 20–32)
CREAT SERPL-MCNC: 0.61 MG/DL (ref 0.52–1.04)
DIFFERENTIAL METHOD BLD: ABNORMAL
EOSINOPHIL # BLD AUTO: 0.1 10E9/L (ref 0–0.7)
EOSINOPHIL NFR BLD AUTO: 0.4 %
ERYTHROCYTE [DISTWIDTH] IN BLOOD BY AUTOMATED COUNT: 13.3 % (ref 10–15)
GFR SERPL CREATININE-BSD FRML MDRD: 87 ML/MIN/{1.73_M2}
GLUCOSE SERPL-MCNC: 136 MG/DL (ref 70–99)
HCT VFR BLD AUTO: 39.7 % (ref 35–47)
HDLC SERPL-MCNC: 92 MG/DL
HGB BLD-MCNC: 13.4 G/DL (ref 11.7–15.7)
IMM GRANULOCYTES # BLD: 0 10E9/L (ref 0–0.4)
IMM GRANULOCYTES NFR BLD: 0.2 %
INTERPRETATION ECG - MUSE: NORMAL
INTERPRETATION ECG - MUSE: NORMAL
LDLC SERPL CALC-MCNC: 82 MG/DL
LIPASE SERPL-CCNC: 85 U/L (ref 73–393)
LYMPHOCYTES # BLD AUTO: 1.3 10E9/L (ref 0.8–5.3)
LYMPHOCYTES NFR BLD AUTO: 10 %
MCH RBC QN AUTO: 31.1 PG (ref 26.5–33)
MCHC RBC AUTO-ENTMCNC: 33.8 G/DL (ref 31.5–36.5)
MCV RBC AUTO: 92 FL (ref 78–100)
MONOCYTES # BLD AUTO: 0.9 10E9/L (ref 0–1.3)
MONOCYTES NFR BLD AUTO: 7.1 %
NEUTROPHILS # BLD AUTO: 10.3 10E9/L (ref 1.6–8.3)
NEUTROPHILS NFR BLD AUTO: 82.1 %
NONHDLC SERPL-MCNC: 91 MG/DL
PLATELET # BLD AUTO: 204 10E9/L (ref 150–450)
POTASSIUM SERPL-SCNC: 3.4 MMOL/L (ref 3.4–5.3)
PROT SERPL-MCNC: 6.5 G/DL (ref 6.8–8.8)
RBC # BLD AUTO: 4.31 10E12/L (ref 3.8–5.2)
SODIUM SERPL-SCNC: 139 MMOL/L (ref 133–144)
TRIGL SERPL-MCNC: 46 MG/DL
TROPONIN I SERPL-MCNC: 2.96 UG/L (ref 0–0.04)
TROPONIN I SERPL-MCNC: 4.45 UG/L (ref 0–0.04)
WBC # BLD AUTO: 12.6 10E9/L (ref 4–11)

## 2020-01-07 PROCEDURE — 84484 ASSAY OF TROPONIN QUANT: CPT | Performed by: EMERGENCY MEDICINE

## 2020-01-07 PROCEDURE — 99285 EMERGENCY DEPT VISIT HI MDM: CPT | Mod: 25

## 2020-01-07 PROCEDURE — 84484 ASSAY OF TROPONIN QUANT: CPT | Performed by: INTERNAL MEDICINE

## 2020-01-07 PROCEDURE — C1769 GUIDE WIRE: HCPCS | Performed by: INTERNAL MEDICINE

## 2020-01-07 PROCEDURE — 93458 L HRT ARTERY/VENTRICLE ANGIO: CPT | Performed by: INTERNAL MEDICINE

## 2020-01-07 PROCEDURE — 71046 X-RAY EXAM CHEST 2 VIEWS: CPT

## 2020-01-07 PROCEDURE — 25000128 H RX IP 250 OP 636: Performed by: EMERGENCY MEDICINE

## 2020-01-07 PROCEDURE — 25000132 ZZH RX MED GY IP 250 OP 250 PS 637: Performed by: HOSPITALIST

## 2020-01-07 PROCEDURE — 99207 ZZC APP CREDIT; MD BILLING SHARED VISIT: CPT | Performed by: HOSPITALIST

## 2020-01-07 PROCEDURE — 85520 HEPARIN ASSAY: CPT | Performed by: HOSPITALIST

## 2020-01-07 PROCEDURE — 21000001 ZZH R&B HEART CARE

## 2020-01-07 PROCEDURE — B2111ZZ FLUOROSCOPY OF MULTIPLE CORONARY ARTERIES USING LOW OSMOLAR CONTRAST: ICD-10-PCS | Performed by: INTERNAL MEDICINE

## 2020-01-07 PROCEDURE — 80061 LIPID PANEL: CPT | Performed by: INTERNAL MEDICINE

## 2020-01-07 PROCEDURE — 85025 COMPLETE CBC W/AUTO DIFF WBC: CPT | Performed by: EMERGENCY MEDICINE

## 2020-01-07 PROCEDURE — 25000128 H RX IP 250 OP 636: Performed by: INTERNAL MEDICINE

## 2020-01-07 PROCEDURE — 93306 TTE W/DOPPLER COMPLETE: CPT

## 2020-01-07 PROCEDURE — 25000132 ZZH RX MED GY IP 250 OP 250 PS 637: Performed by: PHYSICIAN ASSISTANT

## 2020-01-07 PROCEDURE — 25000132 ZZH RX MED GY IP 250 OP 250 PS 637: Performed by: INTERNAL MEDICINE

## 2020-01-07 PROCEDURE — 83690 ASSAY OF LIPASE: CPT | Performed by: EMERGENCY MEDICINE

## 2020-01-07 PROCEDURE — 93306 TTE W/DOPPLER COMPLETE: CPT | Mod: 26 | Performed by: INTERNAL MEDICINE

## 2020-01-07 PROCEDURE — 4A023N7 MEASUREMENT OF CARDIAC SAMPLING AND PRESSURE, LEFT HEART, PERCUTANEOUS APPROACH: ICD-10-PCS | Performed by: INTERNAL MEDICINE

## 2020-01-07 PROCEDURE — 27210794 ZZH OR GENERAL SUPPLY STERILE: Performed by: INTERNAL MEDICINE

## 2020-01-07 PROCEDURE — 36415 COLL VENOUS BLD VENIPUNCTURE: CPT | Performed by: INTERNAL MEDICINE

## 2020-01-07 PROCEDURE — 25800030 ZZH RX IP 258 OP 636: Performed by: INTERNAL MEDICINE

## 2020-01-07 PROCEDURE — 80053 COMPREHEN METABOLIC PANEL: CPT | Performed by: EMERGENCY MEDICINE

## 2020-01-07 PROCEDURE — 99222 1ST HOSP IP/OBS MODERATE 55: CPT | Mod: 25 | Performed by: INTERNAL MEDICINE

## 2020-01-07 PROCEDURE — 25500064 ZZH RX 255 OP 636: Performed by: INTERNAL MEDICINE

## 2020-01-07 PROCEDURE — 99152 MOD SED SAME PHYS/QHP 5/>YRS: CPT | Performed by: INTERNAL MEDICINE

## 2020-01-07 PROCEDURE — 93005 ELECTROCARDIOGRAM TRACING: CPT

## 2020-01-07 PROCEDURE — 93005 ELECTROCARDIOGRAM TRACING: CPT | Mod: 76

## 2020-01-07 PROCEDURE — 96374 THER/PROPH/DIAG INJ IV PUSH: CPT

## 2020-01-07 PROCEDURE — 99223 1ST HOSP IP/OBS HIGH 75: CPT | Mod: AI | Performed by: INTERNAL MEDICINE

## 2020-01-07 PROCEDURE — 25000125 ZZHC RX 250: Performed by: INTERNAL MEDICINE

## 2020-01-07 PROCEDURE — C1894 INTRO/SHEATH, NON-LASER: HCPCS | Performed by: INTERNAL MEDICINE

## 2020-01-07 RX ORDER — PROCHLORPERAZINE 25 MG
12.5 SUPPOSITORY, RECTAL RECTAL EVERY 12 HOURS PRN
Status: DISCONTINUED | OUTPATIENT
Start: 2020-01-07 | End: 2020-01-08 | Stop reason: HOSPADM

## 2020-01-07 RX ORDER — ONDANSETRON 4 MG/1
4 TABLET, ORALLY DISINTEGRATING ORAL EVERY 6 HOURS PRN
Status: DISCONTINUED | OUTPATIENT
Start: 2020-01-07 | End: 2020-01-08 | Stop reason: HOSPADM

## 2020-01-07 RX ORDER — VERAPAMIL HYDROCHLORIDE 2.5 MG/ML
INJECTION, SOLUTION INTRAVENOUS
Status: DISCONTINUED | OUTPATIENT
Start: 2020-01-07 | End: 2020-01-07 | Stop reason: HOSPADM

## 2020-01-07 RX ORDER — LORAZEPAM 0.5 MG/1
0.5 TABLET ORAL
Status: DISCONTINUED | OUTPATIENT
Start: 2020-01-07 | End: 2020-01-07 | Stop reason: HOSPADM

## 2020-01-07 RX ORDER — LOVASTATIN 20 MG
40 TABLET ORAL AT BEDTIME
Status: DISCONTINUED | OUTPATIENT
Start: 2020-01-07 | End: 2020-01-07 | Stop reason: CLARIF

## 2020-01-07 RX ORDER — NALOXONE HYDROCHLORIDE 0.4 MG/ML
.1-.4 INJECTION, SOLUTION INTRAMUSCULAR; INTRAVENOUS; SUBCUTANEOUS
Status: DISCONTINUED | OUTPATIENT
Start: 2020-01-07 | End: 2020-01-07

## 2020-01-07 RX ORDER — SODIUM CHLORIDE 9 MG/ML
INJECTION, SOLUTION INTRAVENOUS CONTINUOUS
Status: DISCONTINUED | OUTPATIENT
Start: 2020-01-07 | End: 2020-01-07 | Stop reason: HOSPADM

## 2020-01-07 RX ORDER — SERTRALINE HYDROCHLORIDE 25 MG/1
25 TABLET, FILM COATED ORAL DAILY
Status: DISCONTINUED | OUTPATIENT
Start: 2020-01-07 | End: 2020-01-08 | Stop reason: HOSPADM

## 2020-01-07 RX ORDER — NALOXONE HYDROCHLORIDE 0.4 MG/ML
.2-.4 INJECTION, SOLUTION INTRAMUSCULAR; INTRAVENOUS; SUBCUTANEOUS
Status: DISCONTINUED | OUTPATIENT
Start: 2020-01-07 | End: 2020-01-07

## 2020-01-07 RX ORDER — PROCHLORPERAZINE MALEATE 5 MG
5 TABLET ORAL EVERY 6 HOURS PRN
Status: DISCONTINUED | OUTPATIENT
Start: 2020-01-07 | End: 2020-01-08 | Stop reason: HOSPADM

## 2020-01-07 RX ORDER — HEPARIN SODIUM 10000 [USP'U]/100ML
800 INJECTION, SOLUTION INTRAVENOUS CONTINUOUS
Status: DISCONTINUED | OUTPATIENT
Start: 2020-01-07 | End: 2020-01-07

## 2020-01-07 RX ORDER — IOPAMIDOL 755 MG/ML
INJECTION, SOLUTION INTRAVASCULAR
Status: DISCONTINUED | OUTPATIENT
Start: 2020-01-07 | End: 2020-01-07 | Stop reason: HOSPADM

## 2020-01-07 RX ORDER — METOPROLOL TARTRATE 25 MG/1
25 TABLET, FILM COATED ORAL 2 TIMES DAILY
Status: DISCONTINUED | OUTPATIENT
Start: 2020-01-07 | End: 2020-01-07

## 2020-01-07 RX ORDER — NITROGLYCERIN 5 MG/ML
VIAL (ML) INTRAVENOUS
Status: DISCONTINUED | OUTPATIENT
Start: 2020-01-07 | End: 2020-01-07 | Stop reason: HOSPADM

## 2020-01-07 RX ORDER — FENTANYL CITRATE 50 UG/ML
INJECTION, SOLUTION INTRAMUSCULAR; INTRAVENOUS
Status: DISCONTINUED | OUTPATIENT
Start: 2020-01-07 | End: 2020-01-07 | Stop reason: HOSPADM

## 2020-01-07 RX ORDER — LORATADINE 10 MG/1
10 TABLET ORAL DAILY PRN
Status: DISCONTINUED | OUTPATIENT
Start: 2020-01-07 | End: 2020-01-08 | Stop reason: HOSPADM

## 2020-01-07 RX ORDER — HEPARIN SODIUM 1000 [USP'U]/ML
INJECTION, SOLUTION INTRAVENOUS; SUBCUTANEOUS
Status: DISCONTINUED | OUTPATIENT
Start: 2020-01-07 | End: 2020-01-07 | Stop reason: HOSPADM

## 2020-01-07 RX ORDER — ONDANSETRON 2 MG/ML
4 INJECTION INTRAMUSCULAR; INTRAVENOUS EVERY 6 HOURS PRN
Status: DISCONTINUED | OUTPATIENT
Start: 2020-01-07 | End: 2020-01-08 | Stop reason: HOSPADM

## 2020-01-07 RX ORDER — FENTANYL CITRATE 50 UG/ML
25-50 INJECTION, SOLUTION INTRAMUSCULAR; INTRAVENOUS
Status: ACTIVE | OUTPATIENT
Start: 2020-01-07 | End: 2020-01-07

## 2020-01-07 RX ORDER — SODIUM CHLORIDE 9 MG/ML
INJECTION, SOLUTION INTRAVENOUS CONTINUOUS
Status: DISCONTINUED | OUTPATIENT
Start: 2020-01-07 | End: 2020-01-08

## 2020-01-07 RX ORDER — LIDOCAINE 40 MG/G
CREAM TOPICAL
Status: DISCONTINUED | OUTPATIENT
Start: 2020-01-07 | End: 2020-01-07

## 2020-01-07 RX ORDER — SIMVASTATIN 20 MG
20 TABLET ORAL AT BEDTIME
Status: DISCONTINUED | OUTPATIENT
Start: 2020-01-07 | End: 2020-01-08 | Stop reason: HOSPADM

## 2020-01-07 RX ORDER — ASPIRIN 81 MG/1
81 TABLET ORAL DAILY
Status: DISCONTINUED | OUTPATIENT
Start: 2020-01-08 | End: 2020-01-07

## 2020-01-07 RX ORDER — HYDROMORPHONE HYDROCHLORIDE 1 MG/ML
.3-.5 INJECTION, SOLUTION INTRAMUSCULAR; INTRAVENOUS; SUBCUTANEOUS EVERY 4 HOURS PRN
Status: DISCONTINUED | OUTPATIENT
Start: 2020-01-07 | End: 2020-01-08 | Stop reason: HOSPADM

## 2020-01-07 RX ORDER — FLUMAZENIL 0.1 MG/ML
0.2 INJECTION, SOLUTION INTRAVENOUS
Status: ACTIVE | OUTPATIENT
Start: 2020-01-07 | End: 2020-01-07

## 2020-01-07 RX ORDER — POTASSIUM CHLORIDE 1500 MG/1
20 TABLET, EXTENDED RELEASE ORAL
Status: DISCONTINUED | OUTPATIENT
Start: 2020-01-07 | End: 2020-01-07 | Stop reason: HOSPADM

## 2020-01-07 RX ORDER — NITROGLYCERIN 0.4 MG/1
0.4 TABLET SUBLINGUAL EVERY 5 MIN PRN
Status: DISCONTINUED | OUTPATIENT
Start: 2020-01-07 | End: 2020-01-08 | Stop reason: HOSPADM

## 2020-01-07 RX ORDER — NALOXONE HYDROCHLORIDE 0.4 MG/ML
.1-.4 INJECTION, SOLUTION INTRAMUSCULAR; INTRAVENOUS; SUBCUTANEOUS
Status: DISCONTINUED | OUTPATIENT
Start: 2020-01-07 | End: 2020-01-08 | Stop reason: HOSPADM

## 2020-01-07 RX ORDER — ATORVASTATIN CALCIUM 40 MG/1
40 TABLET, FILM COATED ORAL EVERY EVENING
Status: DISCONTINUED | OUTPATIENT
Start: 2020-01-07 | End: 2020-01-07

## 2020-01-07 RX ORDER — ASPIRIN 81 MG/1
81 TABLET ORAL DAILY
Status: DISCONTINUED | OUTPATIENT
Start: 2020-01-07 | End: 2020-01-07

## 2020-01-07 RX ORDER — LIDOCAINE 40 MG/G
CREAM TOPICAL
Status: DISCONTINUED | OUTPATIENT
Start: 2020-01-07 | End: 2020-01-08 | Stop reason: HOSPADM

## 2020-01-07 RX ORDER — LISINOPRIL 2.5 MG/1
2.5 TABLET ORAL DAILY
Status: DISCONTINUED | OUTPATIENT
Start: 2020-01-07 | End: 2020-01-08 | Stop reason: HOSPADM

## 2020-01-07 RX ORDER — ACETAMINOPHEN 325 MG/1
650 TABLET ORAL EVERY 4 HOURS PRN
Status: DISCONTINUED | OUTPATIENT
Start: 2020-01-07 | End: 2020-01-07

## 2020-01-07 RX ORDER — ACETAMINOPHEN 325 MG/1
650 TABLET ORAL EVERY 4 HOURS PRN
Status: DISCONTINUED | OUTPATIENT
Start: 2020-01-07 | End: 2020-01-08 | Stop reason: HOSPADM

## 2020-01-07 RX ORDER — ATROPINE SULFATE 0.1 MG/ML
0.5 INJECTION INTRAVENOUS EVERY 5 MIN PRN
Status: ACTIVE | OUTPATIENT
Start: 2020-01-07 | End: 2020-01-07

## 2020-01-07 RX ORDER — LORAZEPAM 2 MG/ML
0.5 INJECTION INTRAMUSCULAR
Status: DISCONTINUED | OUTPATIENT
Start: 2020-01-07 | End: 2020-01-07 | Stop reason: HOSPADM

## 2020-01-07 RX ORDER — POLYETHYLENE GLYCOL 3350 17 G/17G
17 POWDER, FOR SOLUTION ORAL DAILY PRN
Status: DISCONTINUED | OUTPATIENT
Start: 2020-01-07 | End: 2020-01-08 | Stop reason: HOSPADM

## 2020-01-07 RX ADMIN — SIMVASTATIN 20 MG: 20 TABLET, FILM COATED ORAL at 21:05

## 2020-01-07 RX ADMIN — SERTRALINE HYDROCHLORIDE 25 MG: 25 TABLET ORAL at 21:05

## 2020-01-07 RX ADMIN — Medication 3000 UNITS: at 04:37

## 2020-01-07 RX ADMIN — HEPARIN SODIUM 800 UNITS/HR: 10000 INJECTION, SOLUTION INTRAVENOUS at 04:38

## 2020-01-07 RX ADMIN — LISINOPRIL 2.5 MG: 2.5 TABLET ORAL at 13:43

## 2020-01-07 RX ADMIN — METOPROLOL TARTRATE 12.5 MG: 25 TABLET, FILM COATED ORAL at 21:05

## 2020-01-07 RX ADMIN — HUMAN ALBUMIN MICROSPHERES AND PERFLUTREN 9 ML: 10; .22 INJECTION, SOLUTION INTRAVENOUS at 11:45

## 2020-01-07 RX ADMIN — SODIUM CHLORIDE: 9 INJECTION, SOLUTION INTRAVENOUS at 09:39

## 2020-01-07 RX ADMIN — MELATONIN 5 MG TABLET 5 MG: at 21:06

## 2020-01-07 ASSESSMENT — ACTIVITIES OF DAILY LIVING (ADL)
ADLS_ACUITY_SCORE: 10
TRANSFERRING: 0-->INDEPENDENT
FALL_HISTORY_WITHIN_LAST_SIX_MONTHS: NO
ADLS_ACUITY_SCORE: 10
RETIRED_COMMUNICATION: 0-->UNDERSTANDS/COMMUNICATES WITHOUT DIFFICULTY
SWALLOWING: 0-->SWALLOWS FOODS/LIQUIDS WITHOUT DIFFICULTY
ADLS_ACUITY_SCORE: 10
DRESS: 0-->INDEPENDENT
AMBULATION: 0-->INDEPENDENT
ADLS_ACUITY_SCORE: 10
TOILETING: 0-->INDEPENDENT
RETIRED_EATING: 0-->INDEPENDENT
BATHING: 0-->INDEPENDENT
COGNITION: 0 - NO COGNITION ISSUES REPORTED

## 2020-01-07 ASSESSMENT — ENCOUNTER SYMPTOMS
VOMITING: 1
COUGH: 0
FEVER: 0
NAUSEA: 1

## 2020-01-07 NOTE — H&P
INTERNAL MEDICINE H&P    H&P dictated:  #[id number]    Marcio Montgomery Jr., MD  [phone] (p)

## 2020-01-07 NOTE — PROGRESS NOTES
"[facility]    Medicine Progress Note - Hospitalist Service       Date of Admission:  1/7/2020  Assessment & Plan   Ms. Cecily Ivory 79-year-old female patient with history including hyperlipidemia and osteoarthritis who presents with chest pain and found with elevated troponin.      Stress-induced cardiomyopathy with EF estimated at 35-40% with severe apical hypokinesis    Acute systolic heart failure  NSTEMI, type 2 - clean coronaries    The patient presented with chest pain.  On initial evaluation, vital signs were stable.  EKG did show some diffuse nonspecific T-wave abnormality, but nothing acutely ischemic.  Labs showed troponin 4.447 --> 2.9.    - heparin off given clean Cath  - Echo: moderate to severe anterior, septal, and apical and distal inferior  wall hypokinesis. The visual ejection fraction is estimated at 40-45%.   - Cath EF 35-40%  - statin continued  - Cardiology following - started ACE inhibitor and metoprolol bid (with holding parameters)  - noted severe anxiety - no known recent trigger or event; family at bedside says she is quite \"high strung\" and would benefit from trial of anti-anxiety med  - she is agreeable to try sertraline 25 starting this evening, if she changes her mind - she can revisit the issue upon outpatient follow up.  She knows that it may take a few weeks to get full effect    Possible generalized anxiety  - sertraline 25 hs, outpatient follow up    Right scapular petechial rash/mild bruise, asymptomatic  Noticed on pulm auscultation exam. No vesicles, nontender - NOT dermatomal distribution - likely from adhesive patch whilst in cath lab earlier today  - monitor     Hyperlipidemia.    - Continue lovastatin as above.    Hyperglycemia  136 on adm, can be rechecked on outpatient basis  - patient is quite active and walks daily      Diet: Regular Diet Adult    DVT Prophylaxis: Pneumatic Compression Devices and Ambulate every shift  Saenz Catheter: not " present  Code Status: Full Code      Disposition Plan   Expected discharge: likely tomorrow  Entered: Vamshi Russ MD 01/07/2020, 3:49 PM       The patient's care was discussed with the Bedside Nurse, Patient and Patient's Family.    Vamshi Russ MD  Hospitalist Service  [facility]    ______________________________________________________________________    Interval History   Seen and examined. No further chest pressure or pain. Clean cath today, Stress CM. No f/c/sob. serotonin specific reuptake inhibitor to start tonight. Family at bedside.    Data reviewed today: I reviewed all medications, new labs and imaging results over the last 24 hours. I personally reviewed no images or EKG's today.    Physical Exam   Vital Signs: Temp: 98  F (36.7  C) Temp src: Oral BP: 111/58 Pulse: 74 Heart Rate: 75 Resp: 18 SpO2: 91 % O2 Device: None (Room air)    Weight: 151 lbs 4.8 oz    Gen: NAD, pleasant  HEENT: Normocephalic, EOMI, MMM  Resp: no crackles,  no wheezes, no increased work of resp  CV: S1S2 heard, reg rhythm, reg rate, no pedal edema  Abdo: soft, nontender, nondistended, bowel sounds present  Ext: calves nontender, well perfused  Neuro: AAOx3, CN grossly intact, no facial asymmetry      Data   Recent Labs   Lab 01/07/20  0626 01/07/20  0157 01/07/20  0122   WBC  --   --  12.6*   HGB  --   --  13.4   MCV  --   --  92   PLT  --   --  204   NA  --  139  --    POTASSIUM  --  3.4  --    CHLORIDE  --  108  --    CO2  --  25  --    BUN  --  15  --    CR  --  0.61  --    ANIONGAP  --  6  --    SUAGR  --  8.4*  --    GLC  --  136*  --    ALBUMIN  --  3.2*  --    PROTTOTAL  --  6.5*  --    BILITOTAL  --  0.5  --    ALKPHOS  --  50  --    ALT  --  25  --    AST  --  28  --    LIPASE  --  85  --    TROPI 2.958* 4.447*  --      Recent Results (from the past 24 hour(s))   Chest XR,  PA & LAT    Narrative    EXAM: XR CHEST 2 VW  LOCATION: Health system  DATE/TIME: 1/7/2020 2:27 AM    INDICATION:  Chest pain  COMPARISON: 2019      Impression    IMPRESSION: Heart size is normal. Lungs are clear of infiltrate, CHF, or effusion. No acute bony abnormalities.   Cardiac Catheterization    Narrative    1) No coronary artery disease  2) Stress-induced cardiomyopathy with EF estimated at 35-40% with severe   apical hypokinesis     Echocardiogram Complete    Narrative    [id number]  [id number]  [id number]  [id number]^DARIEN^SYLVAIN^CASSIA           [facility]  Echocardiography Laboratory  [address] [place] [address]        Name: REID PERDUE  MRN: [id number]  : 1940  Study Date: 2020 11:10 AM  Age: 79 yrs  Gender: Female  Patient Location: [facility]  Reason For Study: MI - Acute  Ordering Physician: SYLVAIN LEDEZMA  Referring Physician: Ellis Ruiz  Performed By: Faye Martin     BSA: 1.8 m2  Height: 67 in  Weight: 150 lb  HR: 66  BP: 108/54 mmHg  _____________________________________________________________________________  __        Procedure  Complete Portable Echo Adult. Optison (NDC #[id number]) given intravenously.  _____________________________________________________________________________  __        Interpretation Summary     There is moderate to severe anterior, septal, and apical and distal inferior  wall hypokinesis. The visual ejection fraction is estimated at 40-45%. No BP  EF traced.  There is mild to moderate (1-2+) aortic regurgitation. No aortic stenosis is  present.     _____________________________________________________________________________  __        Left Ventricle  The left ventricle is normal in size. There is normal left ventricular wall  thickness. The visual ejection fraction is estimated at 40-45%. Diastolic  Doppler findings (E/E' ratio and/or other parameters) suggest left ventricular  filling pressures are indeterminate. There is moderate to severe anterior,  septal, and apical wall hypokinesis.     Right Ventricle  The right ventricle  is normal in size and function.     Atria  Normal left atrial size. Right atrial size is normal.     Mitral Valve  There is trace mitral regurgitation.        Tricuspid Valve  There is trace to mild tricuspid regurgitation. The right ventricular systolic  pressure is approximated at 22.8 mmHg plus the right atrial pressure.     Aortic Valve  The aortic valve is trileaflet. There is mild to moderate (1-2+) aortic  regurgitation. No aortic stenosis is present.     Pulmonic Valve  The pulmonic valve is not well visualized.     Vessels  The aortic root is normal size.     Pericardium  There is no pericardial effusion.     _____________________________________________________________________________  __  MMode/2D Measurements & Calculations  IVSd: 0.99 cm  LVIDd: 4.5 cm  LVIDs: 3.1 cm  LVPWd: 1.0 cm  FS: 31.7 %  LV mass(C)d: 158.7 grams  LV mass(C)dI: 88.7 grams/m2     Ao root diam: 3.3 cm  LA dimension: 2.4 cm  asc Aorta Diam: 3.5 cm  LA/Ao: 0.73  LA Volume (BP): 53.8 ml  LA Volume Index (BP): 30.1 ml/m2  RWT: 0.46        Doppler Measurements & Calculations  MV E max marissa: 58.3 cm/sec  MV A max marissa: 92.5 cm/sec  MV E/A: 0.63  MV dec time: 0.23 sec     AI P1/2t: 519.9 msec  PA acc time: 0.06 sec  TR max marissa: 238.5 cm/sec  TR max P.8 mmHg  E/E' av.3  Lateral E/e': 9.1  Medial E/e': 13.6           _____________________________________________________________________________  __           Report approved by: Camille Dunn 2020 11:59 AM

## 2020-01-07 NOTE — PLAN OF CARE
A&O x4. Pt denied pain. VSS, on RA. Up w/ SBA. Tele: SR.  Angio today, no intervention- stress induced cardiomyopathy. L radial site WDL, CMS intact. TR band removed at 1300. Plan for possible discharge tomorrow. Continue to monitor.

## 2020-01-07 NOTE — PHARMACY-ADMISSION MEDICATION HISTORY
Pharmacy Medication History  Admission medication history interview status for the 1/7/2020  admission is complete. See EPIC admission navigator for prior to admission medications     Medication history sources: Patient, Surescripts and clinic notes  Medication history source reliability: Moderate  Adherence assessment: unable to ascertain    Significant changes made to the medication list:  Changed biotin to three times per week and added eye drops      Additional medication history information:   Pt was not sure of her statin, but said it was prescribed by Dr. Day. Records were for lovastatin    Medication reconciliation completed by provider prior to medication history? No    Time spent in this activity: 15 minutes      Prior to Admission medications    Medication Sig Last Dose Taking? Auth Provider   Ascorbic Acid (VITAMIN C PO) Take 500 mg by mouth daily. Past Week at Unknown time Yes Reported, Patient   Biotin 5000 MCG CAPS Take 1 tablet by mouth three times a week  Past Week at Unknown time Yes Reported, Patient   Carboxymethylcellulose Sodium (THERATEARS) 0.25 % SOLN Place 1 drop into both eyes every evening Past Week at Unknown time Yes Unknown, Entered By History   Cholecalciferol (VITAMIN D3 PO) Take 1,000 Units by mouth daily  Past Week at Unknown time Yes Reported, Patient   CYANOCOBALAMIN PO Take 1,000 mcg by mouth daily Past Week at Unknown time Yes Reported, Patient   loratadine (CLARITIN) 10 MG tablet Take 10 mg by mouth daily as needed for allergies  Past Week at Unknown time Yes Ellis Ruiz MD   lovastatin (MEVACOR) 40 MG tablet TAKE 1 TABLET NIGHTLY AT   BEDTIME 1/6/2020 at Unknown time Yes Ellis Ruiz MD   magnesium 100 MG CAPS Take 1 tablet by mouth daily  Past Week at Unknown time Yes Reported, Patient   Multiple Vitamins-Minerals (CENTRUM SILVER) per tablet Take 1 tablet by mouth daily Past Week at Unknown time Yes Reported, Patient   calcium carb 1250 mg, 500 mg  [place],/vitamin D 200 units (OSCAL WITH D) 500-200 MG-UNIT per tablet Take 1 tablet by mouth 2 times daily (with meals). Takes 750mg daily. Past wk Yes Ellis Ruiz MD

## 2020-01-07 NOTE — CONSULTS
[facility]    Cardiology Consultation     Date of Admission:  1/7/2020    Assessment & Plan   Cecily Ivory is a 79 year old female who was admitted on 1/7/2020.    1.  Non-ST elevation myocardial infarction  The pathophysiology of acute coronary syndrome was discussed with patient in detail including how plaque rupture and progression plays a role, the role of microvascular obstruction, the need for antiplatelet agents, anticoagulation, statins and beta blockers. Role of invasive approach and intervention was discussed.  Risks and benefits of left heart catheterization and coronary angiogram were discussed with the patient in detail. 0.1-0.3% (for diagnostic angio) and 1-2% (for PCI)  risk of stroke, MI, death, emergent bypass for diagnostic angio, risk of contrast induced allergic reaction, renal dysfunction, vascular complications were discussed. Pros and cons of bare metal Vs drug eluting stents was discussed. Patient understands and wishes to proceed with it.  EKG was reviewed by me and revealed sinus rhythm with left anterior fascicular block.  Peak troponin so far is 4.47.      Cameron Giron MD    Primary Care Physician   Ellis Ruiz    Reason for Consult   Reason for consult: I was asked by hospitalist to evaluate this patient for NSTEMI.    History of Present Illness   Cecily Ivory is a 79 year old female with a history of GERD and hypercholesterolemia who arrived via EMS and presents with left-sided chest pain.     Patient was trying to sleep last night and had left-sided precordial chest pressure pain.  He described as hurting.  It was moderate intensity without any referred pain.  There was also nausea and vomiting to begin with.  It lasted from 10 PM to 1 AM and she called paramedics and was brought to the emergency room.  Eventually the pain resolved.  Her initial troponin came back elevated and she was admitted for non-ST elevation myocardial infarction.   . The patient  was given 1 spray of nitroglycerin and 4 mg of Zofran en route to the ED.    2.  Elevated glucose  Will have to check her fasting glucose tomorrow.    3.  Hyperlipidemia  Continue statin    Patient Active Problem List   Diagnosis     Pain in joint, lower leg     Peripheral neuropathy     Carotid bruit     Hyperlipidemia LDL goal <130     HL (hearing loss)     Dysphonia     Anxiety     Insomnia     Vaginal dysplasia     GERD (gastroesophageal reflux disease)     Lumbar spinal stenosis     ASCUS of cervix with negative high risk HPV     Degenerative arthritis of knee     NSTEMI (non-ST elevated myocardial infarction) (H)       Past Medical History   I have reviewed this patient's medical history and updated it with pertinent information if needed.   Past Medical History:   Diagnosis Date     Abdominal pain 2009, 2013    ct liver and renal cyst and 2mm lung nodule, repeat ct done 2013 and no significantly abnl.     Anxiety 2014    added med 3/14     Arthritis      ASCUS of cervix with negative high risk HPV 03/2018     Carotid bruit 2011    us nl     Gastritis 2011    egd done by [facility]     GERD (gastroesophageal reflux disease) 2011     History of colonoscopy 2004, 2014    nl     Hypercholesteremia 2000    stopped lovastatin 2015, added lipitor 3/16     Insomnia     using otc      Lumbar spinal stenosis 2016    Dr. Wilde, had epidural     Lung nodule 2009, 2013    seen on ct for abd pain, fu done 3/13 and 2 nodules stable and benign, one new one needs fu 1 year.  fu done 3/14 and fu 1 year and then done; fu done 3/15 and unchanged, no fu needed     Odynophagia 2004    egd nl     Other chronic pain      Peripheral neuropathies     Dr. Kim     Screening 2006, 2017    nl dexa     Vaginal dysplasia     chronic VAIN I, many colpos of vagina.  Now we only do an annual pap of vagina, no colpo since stable long term     Weight loss 07/2018    ct chest, abd and pelvis neg       Past Surgical History   I have reviewed this  patient's surgical history and updated it with pertinent information if needed.  Past Surgical History:   Procedure Laterality Date     ANKLE SURGERY  2000     APPENDECTOMY  13     ARTHROPLASTY KNEE Left 5/14/2018    Procedure: ARTHROPLASTY KNEE;  Left total knee arthroplasty;  Surgeon: William Pollard MD;  Location: [facility] OR     BREAST BIOPSY, CORE RT/LT       C VAGINAL HYSTERECTOMY  1995     CATARACT IOL, RT/LT  04/2019     GYN SURGERY      BSO     [facility] UGI ENDOSCOPY, SIMPLE EXAM  03/15/11    [facility] [place]     HYSTERECTOMY, PAP NO LONGER INDICATED       LAPAROSCOPIC CHOLECYSTECTOMY  2008     NOSE SURGERY  1990     right knee arthroscopy  2007     thumb surgery Left 2/2015       Prior to Admission Medications   Prior to Admission Medications   Prescriptions Last Dose Informant Patient Reported? Taking?   Ascorbic Acid (VITAMIN C PO)   Yes No   Sig: Take 500 mg by mouth daily.   Biotin 5000 MCG CAPS   Yes No   Sig: Take 1 tablet by mouth daily    CYANOCOBALAMIN PO   Yes No   Sig: Take 1,000 mcg by mouth daily   Cholecalciferol (VITAMIN D3 PO)   Yes No   Sig: Take 1,000 Units by mouth daily    Multiple Vitamins-Minerals (CENTRUM SILVER) per tablet   Yes No   Sig: Take 1 tablet by mouth daily   calcium carb 1250 mg, 500 mg [place],/vitamin D 200 units (OSCAL WITH D) 500-200 MG-UNIT per tablet   Yes No   Sig: Take 1 tablet by mouth 2 times daily (with meals). Takes 750mg daily.   loratadine (CLARITIN) 10 MG tablet   Yes No   Sig: Take 1 tablet (10 mg) by mouth daily as needed for allergies Occasional use   lovastatin (MEVACOR) 40 MG tablet   No No   Sig: TAKE 1 TABLET NIGHTLY AT   BEDTIME   magnesium 100 MG CAPS   Yes No   Sig: Take 1 tablet by mouth daily       Facility-Administered Medications: None     Current Facility-Administered Medications   Medication Dose Route Frequency     [START ON 1/8/2020] aspirin  81 mg Oral Daily     sodium chloride (PF)  3 mL Intracatheter Q8H     Current Facility-Administered  Medications   Medication Last Rate     HEParin 800 Units/hr (01/07/20 0756)     - MEDICATION INSTRUCTIONS -       Allergies   Allergies   Allergen Reactions     Latex Cough     Seasonal Allergies      YEAR ROUND ALLERGIES     Sulfa Drugs Unknown       Social History    reports that she quit smoking about 27 years ago. Her smoking use included cigarettes. She has a 30.00 pack-year smoking history. She has never used smokeless tobacco. She reports current alcohol use. She reports that she does not use drugs.    Family History   Family History   Problem Relation Age of Onset     Hyperlipidemia Mother        Review of Systems   The comprehensive 10 point Review of Systems is negative other than noted in the HPI or here.     Physical Exam   Vital Signs with Ranges  Temp:  [98.2  F (36.8  C)-98.7  F (37.1  C)] 98.2  F (36.8  C)  Pulse:  [72-81] 79  Heart Rate:  [73-79] 79  Resp:  [11-21] 18  BP: (102-134)/(47-79) 109/61  SpO2:  [92 %-95 %] 93 %  Wt Readings from Last 4 Encounters:   01/07/20 68.6 kg (151 lb 4.8 oz)   09/20/19 69.4 kg (153 lb)   09/03/19 69.4 kg (153 lb 1.6 oz)   05/09/19 67.1 kg (148 lb)     No intake/output data recorded.      Vitals: /61   Pulse 79   Temp 98.2  F (36.8  C) (Oral)   Resp 18   Wt 68.6 kg (151 lb 4.8 oz)   SpO2 93%   BMI 23.70 kg/m      Constitutional:   alert   Eyes:   extra-ocular muscles intact   ENT:   normocepalic, without obvious abnormality   Neck:   skin normal   Hematologic / Lymphatic:   no cervical lymphadenopathy   Back:   symmetric   Lungs:   clear to auscultation   Cardiovascular:   normal S1 and S2 and S4 present   Abdomen:   soft and non-distended     Neurologic:   Motor Exam:  moves all extremities well and symmetrically   Neuropsychiatric:   Orientation: oriented to self, place, time and situation   Skin:   no rashes       Recent Labs   Lab 01/07/20  0626 01/07/20  0157   TROPI 2.958* 4.447*       Recent Labs   Lab 01/07/20  0626 01/07/20  0157 01/07/20  0122    WBC  --   --  12.6*   HGB  --   --  13.4   MCV  --   --  92   PLT  --   --  204   NA  --  139  --    POTASSIUM  --  3.4  --    CHLORIDE  --  108  --    CO2  --  25  --    BUN  --  15  --    CR  --  0.61  --    GFRESTIMATED  --  87  --    GFRESTBLACK  --  >90  --    ANIONGAP  --  6  --    SUGAR  --  8.4*  --    GLC  --  136*  --    ALBUMIN  --  3.2*  --    PROTTOTAL  --  6.5*  --    BILITOTAL  --  0.5  --    ALKPHOS  --  50  --    ALT  --  25  --    AST  --  28  --    LIPASE  --  85  --    TROPI 2.958* 4.447*  --      Recent Labs   Lab Test 01/07/20  0626 04/04/19  0847  03/10/15  0839 03/06/14  0846   CHOL 183 170   < > 159 171   HDL 92 68   < > 91 67   LDL 82 74   < > 49 81   TRIG 46 141   < > 96 113   CHOLHDLRATIO  --   --   --  1.7 2.5    < > = values in this interval not displayed.     Recent Labs   Lab 01/07/20  0122   WBC 12.6*   HGB 13.4   HCT 39.7   MCV 92        No results for input(s): PH, PHV, PO2, PO2V, SAT, PCO2, PCO2V, HCO3, HCO3V in the last 168 hours.  No results for input(s): NTBNPI, NTBNP in the last 168 hours.  No results for input(s): DD in the last 168 hours.  No results for input(s): SED, CRP in the last 168 hours.  Recent Labs   Lab 01/07/20  0122        No results for input(s): TSH in the last 168 hours.  No results for input(s): COLOR, APPEARANCE, URINEGLC, URINEBILI, URINEKETONE, SG, UBLD, URINEPH, PROTEIN, UROBILINOGEN, NITRITE, LEUKEST, RBCU, WBCU in the last 168 hours.    Imaging:  Recent Results (from the past 48 hour(s))   Chest XR,  PA & LAT    Narrative    EXAM: XR CHEST 2 VW  LOCATION: [facility]  DATE/TIME: 1/7/2020 2:27 AM    INDICATION: Chest pain  COMPARISON: 03/30/2019      Impression    IMPRESSION: Heart size is normal. Lungs are clear of infiltrate, CHF, or effusion. No acute bony abnormalities.       Echo:  No results found for this or any previous visit (from the past 4320 hour(s)).

## 2020-01-07 NOTE — H&P
Admitted:     01/07/2020      PRIMARY CARE PHYSICIAN:  Dr. Ellis Ruiz.      CHIEF COMPLAINT:  Chest pains.      HISTORY OF PRESENT ILLNESS:  Ms. Cecily Ivory is a 79-year-old female patient with history noted below including hyperlipidemia, who presents with the above complaints.  Last evening 01/06, she had chest pain that kept her from sleeping.  This lasted for a few hours.  She finally called EMS and was brought to [facility] for further evaluation.      The patient seen in the ER by Dr. Lee.  Vital signs checked showed temperature 98.7, heart rate 81, blood pressure 130/77, respiratory rate 16, O2 saturations 95% on room air.  She had an EKG which showed some nonspecific T-wave changes diffusely but nothing acutely ischemic.  White count was elevated at 12.6.  Troponin checked was 4.447.  Given these findings, heparin drip was ordered and request for admission was made.      The patient denies any fevers or chills.  No abdominal pain or bloody stools, nausea or vomiting.  No diarrhea.  The patient is typically very active.  She walks 12,000 steps a day.  She walks at [employer] around the basketball court or in a mall or in her apartment complex.  She does this regularly and has not been having any exertional symptomatology.     PAST MEDICAL HISTORY:   1.  Hyperlipidemia.   2.  Osteoarthritis.  She has had her left knee replaced.  Also has left hip osteoarthritis which she gets injections few times a year.   3.  History of GERD and gastritis.   4.  History noted of peripheral neuropathy.   5.  History of depression/anxiety.   6.  History of ASCUS of the cervix.    7.  History of lung nodules.      PAST SURGICAL HISTORY:    NOSE SURGERY  1990 49 - 50y      C VAGINAL HYSTERECTOMY  1995 54 - 55y      ANKLE SURGERY  2000 59 - 60y      right knee arthroscopy  2007 66 - 67y      LAPAROSCOPIC CHOLECYSTECTOMY  2008 67 - 68y       UGI ENDOSCOPY, SIMPLE EXAM  03/15/11 70y [facility]      APPENDECTOMY  13 72 - 73y      thumb surgery Left 2/2015 74y      ARTHROPLASTY KNEE Left 5/14/2018 77y Procedure: ARTHROPLASTY KNEE;  Left total knee arthroplasty;  Surgeon: William Pollard MD;  Location: [facility] OR     CATARACT IOL, RT/LT  04/2019 78y      BREAST BIOPSY, CORE RT/LT         GYN SURGERY    BSO     HYSTERECTOMY, PAP NO LONGER INDICATED           ALLERGIES:  LATEX, SEASONAL ALLERGIES, AND SULFA DRUGS.      HOME MEDICATIONS (not reconciled yet):    Prior to Admission Medications   Prescriptions Last Dose Informant Patient Reported? Taking?   Ascorbic Acid (VITAMIN C PO)   Yes No   Sig: Take 500 mg by mouth daily.   Biotin 5000 MCG CAPS   Yes No   Sig: Take 1 tablet by mouth daily    CYANOCOBALAMIN PO   Yes No   Sig: Take 1,000 mcg by mouth daily   Cholecalciferol (VITAMIN D3 PO)   Yes No   Sig: Take 1,000 Units by mouth daily    Multiple Vitamins-Minerals (CENTRUM SILVER) per tablet   Yes No   Sig: Take 1 tablet by mouth daily   calcium carb 1250 mg, 500 mg [place],/vitamin D 200 units (OSCAL WITH D) 500-200 MG-UNIT per tablet   Yes No   Sig: Take 1 tablet by mouth 2 times daily (with meals). Takes 750mg daily.   loratadine (CLARITIN) 10 MG tablet   Yes No   Sig: Take 1 tablet (10 mg) by mouth daily as needed for allergies Occasional use   lovastatin (MEVACOR) 40 MG tablet   No No   Sig: TAKE 1 TABLET NIGHTLY AT   BEDTIME   magnesium 100 MG CAPS   Yes No   Sig: Take 1 tablet by mouth daily       Facility-Administered Medications: None     SOCIAL HISTORY:  The patient does not smoke.  She drinks alcohol a few times a week.  She is .  Has 1 child.  She is retired, used to work as a  at [employer].  She exercises regularly as above.      FAMILY HISTORY:  No history of premature CAD.  Otherwise reviewed and not felt to be contributory.      REVIEW OF SYSTEMS:  As in HPI, otherwise 10 point review of systems was negative.      PHYSICAL EXAMINATION:   VITAL SIGNS:  Temperature  98.7, heart rate 79, blood pressure 134/70, respiration rate 16, O2 saturations 95% on room air.   GENERAL:  Alert and oriented, a pleasant 79-year-old female patient lying in bed.  Conversant.   HEENT:  Pupils equal, round, reactive.  No scleral icterus or conjunctival injection.  Oropharynx reveals no erythema or exudate.   NECK:  No bruits, JVD or adenopathy.   HEART:  Regular rate and rhythm without murmurs, rubs, gallops.   LUNGS:  Grossly clear, without crackles or wheezes.   ABDOMEN:  Soft, nontender, nondistended, positive bowel sounds, no femoral bruits.   EXTREMITIES:  Show trace edema without knee joint swelling or skin rash.   NEUROLOGIC:  Reveals no gross focal motor or sensory deficits.      LABORATORY DATA AND IMAGING:   Results for orders placed or performed during the hospital encounter of 01/07/20   CBC with platelets differential     Status: Abnormal   Result Value Ref Range    WBC 12.6 (H) 4.0 - 11.0 10e9/L    RBC Count 4.31 3.8 - 5.2 10e12/L    Hemoglobin 13.4 11.7 - 15.7 g/dL    Hematocrit 39.7 35.0 - 47.0 %    MCV 92 78 - 100 fl    MCH 31.1 26.5 - 33.0 pg    MCHC 33.8 31.5 - 36.5 g/dL    RDW 13.3 10.0 - 15.0 %    Platelet Count 204 150 - 450 10e9/L    Diff Method Automated Method     % Neutrophils 82.1 %    % Lymphocytes 10.0 %    % Monocytes 7.1 %    % Eosinophils 0.4 %    % Basophils 0.2 %    % Immature Granulocytes 0.2 %    Absolute Neutrophil 10.3 (H) 1.6 - 8.3 10e9/L    Absolute Lymphocytes 1.3 0.8 - 5.3 10e9/L    Absolute Monocytes 0.9 0.0 - 1.3 10e9/L    Absolute Eosinophils 0.1 0.0 - 0.7 10e9/L    Absolute Basophils 0.0 0.0 - 0.2 10e9/L    Abs Immature Granulocytes 0.0 0 - 0.4 10e9/L   Troponin I     Status: Abnormal   Result Value Ref Range    Troponin I ES 4.447 (HH) 0.000 - 0.045 ug/L   Comprehensive metabolic panel     Status: Abnormal   Result Value Ref Range    Sodium 139 133 - 144 mmol/L    Potassium 3.4 3.4 - 5.3 mmol/L    Chloride 108 94 - 109 mmol/L    Carbon Dioxide 25 20 -  32 mmol/L    Anion Gap 6 3 - 14 mmol/L    Glucose 136 (H) 70 - 99 mg/dL    Urea Nitrogen 15 7 - 30 mg/dL    Creatinine 0.61 0.52 - 1.04 mg/dL    GFR Estimate 87 >60 mL/min/[1.73_m2]    GFR Estimate If Black >90 >60 mL/min/[1.73_m2]    Calcium 8.4 (L) 8.5 - 10.1 mg/dL    Bilirubin Total 0.5 0.2 - 1.3 mg/dL    Albumin 3.2 (L) 3.4 - 5.0 g/dL    Protein Total 6.5 (L) 6.8 - 8.8 g/dL    Alkaline Phosphatase 50 40 - 150 U/L    ALT 25 0 - 50 U/L    AST 28 0 - 45 U/L   Lipase     Status: None   Result Value Ref Range    Lipase 85 73 - 393 U/L   EKG 12 lead     Status: None (Preliminary result)   Result Value Ref Range    Interpretation ECG Click View Image link to view waveform and result        Recent Results (from the past 24 hour(s))   Chest XR,  PA & LAT    Narrative    EXAM: XR CHEST 2 VW  LOCATION: [facility]  DATE/TIME: 1/7/2020 2:27 AM    INDICATION: Chest pain  COMPARISON: 03/30/2019      Impression    IMPRESSION: Heart size is normal. Lungs are clear of infiltrate, CHF, or effusion. No acute bony abnormalities.     EKG, which I personally reviewed, showed sinus rhythm with diffuse nonspecific T-wave abnormality, but no acute ischemic changes.      ASSESSMENT AND PLAN:  Ms. Cecily Ivory 79-year-old female patient with history including hyperlipidemia and osteoarthritis who presents with chest pain and found with elevated troponin.     1.  Acute non-ST elevation myocardial infarction.  The patient presented with chest pain.  On initial evaluation, vital signs were stable.  EKG did show some diffuse nonspecific T-wave abnormality, but nothing acutely ischemic.  Labs showed troponin 4.447.  At this time, will continue heparin drip, order aspirin daily, continue prior to admission lovastatin.  PRN nitroglycerin will be ordered.  Follow troponins.  Check a repeat lipid panel.  Echocardiogram will be ordered.  Cardiology consult.  Keep n.p.o. for now.     2.  Hyperlipidemia.  Check lipid panel as above.   Continue lovastatin as above.    3.  Prophylaxis.  Pneumo boots and ambulation.  The patient will be on heparin drip.     4.  CODE STATUS:  Full code.         SYLVAIN LEDEZMA JR., MD             D: 2020   T: 2020   MT: MARIPOSA      Name:     REID PERDUE   MRN:      [id number]        Account:      [id number]   :      1940        Admitted:     2020                   Document: [id number]       cc: Ellis Ruiz MD

## 2020-01-07 NOTE — ED PROVIDER NOTES
History     Chief Complaint:    Chest Pain     The history is provided by the patient.      Cecily Ivory is a 79 year old female with a history of GERD and hypercholesterolemia who arrived via EMS and presents with left-sided chest pain. The patient states that her chest pain began at 2200 last night and she was not able to sleep. She became concerned and EMS was called to her senior living facility. The patient reports nausea and vomiting as well. She denies any cough, fever, or congestion. She denies any chest pain currently. The patient was given 1 spray of nitroglycerin and 4 mg of Zofran en route to the ED.    Allergies:  Latex  Seasonal Allergies  Sulfa Drugs     Medications:    Biotin  Oscal with D  Cyanocobalamin  Claritin  Lipitor  Mevacor  Magnesium    Past Medical History:    Dysphonia  Anxiety  Arthritis  Carotid bruit  Gastritis   GERD  Hypercholesterolemia  Insomnia  Lumbar spinal stenosis  Lung nodule  Odynophagia  Hearing loss  Other chronic pain  Peripheral neuropathies  Vaginal dysplasia  Weight loss  Degenerative arthritis of knee    Past Surgical History:    Ankle surgery  Appendectomy  Left knee arthroplasty  Breast biopsy, core, bilateral  Vaginal hysterectomy  Cataract removal, bilateral  Gyn surgery, BSO  UGI endoscopy  Laparoscopic cholecystectomy  Nose surgery  Right knee arthroscopy  Thumb surgery, left    Family History:    Hyperlipidemia - mother    Social History:  Negative for tobacco use - former smoker, quit 1992.  Positive for alcohol use -  5-6 drinks/week.  Negative for drug use.  Marital Status:   [5]     Review of Systems   Constitutional: Negative for fever.   HENT: Negative for congestion.    Respiratory: Negative for cough.    Cardiovascular: Positive for chest pain (not currently).   Gastrointestinal: Positive for nausea and vomiting.   All other systems reviewed and are negative.        Physical Exam     Patient Vitals for the past 24 hrs:   BP Temp Temp src  Pulse Resp SpO2 Weight   01/07/20 0123 -- 98.7  F (37.1  C) Oral -- -- -- 68 kg (150 lb)   01/07/20 0115 134/79 -- -- 79 -- -- --   01/07/20 0114 -- -- -- -- -- 95 % --   01/07/20 0113 130/77 -- -- 81 16 -- --     Physical Exam  General: Appears well-developed and well-nourished.   Head: No signs of trauma.   CV: Normal rate and regular rhythm.    Resp: Effort normal and breath sounds normal. No respiratory distress.   GI: Soft. There is no tenderness.  No rebound or guarding.  Normal bowel sounds.  No CVA tenderness.  MSK: Normal range of motion. no edema. No Calf tenderness.  Mild ecchymosis/erythema to dorsum of left foot.  Neuro: The patient is alert and oriented.  Speech normal.  GCS 15  Skin: Skin is warm and dry. No rash noted.   Psych: normal mood and affect. behavior is normal.       Emergency Department Course     ECG (1:15:42):  Rate 81 bpm. SC interval 144. QRS duration 96. QT/QTc 422/490. P-R-T axes 49 -72 45. Normal sinus rhythm. Possible Left atrial enlargement. Left anterior fascicular block. Anterolateral infarct, age undetermined. Abnormal ECG. Interpreted at 0127 by Reddy Lee MD.    ECG (2:28:41):  Rate 77 bpm. SC interval 138. QRS duration 96. QT/QTc 412/466. P-R-T axes 39 23 41. Normal sinus rhythm. Possible Left atrial enlargement. Nonspecific ST and T wave abnormality. Abnormal ECG. Repeat EKG. Interpreted at 0230 by Reddy Lee MD.    Imaging:  Radiology findings were communicated with the patient who voiced understanding of the findings.    XR  Chest, PA & LAT:   Heart size is normal. Lungs are clear of infiltrate, CHF, or effusion. No acute bony abnormalities, as per radiology.     Laboratory:  Laboratory findings were communicated with the patient who voiced understanding of the findings.    CMP: Glucose 136 H, Calcium, albumin 3.2 L, Protein total 6.5 L o/w WNL (Creatinine 0.61)  Troponin I (0226): 4.447  CBC: WBC 12.6 H o/w WNL (HGB 13.4, )  Lipase:  85    Emergency Department Course:  Past medical records, nursing notes, and vitals reviewed.    0135: I performed an exam of the patient as documented above.     EKG obtained in the ED, see results above.     IV was inserted and blood was drawn for laboratory testing, results above.    The patient was sent for a chest x-ray while in the emergency department, results above.     0241: I rechecked the patient and discussed the results of her workup thus far.     I personally reviewed the laboratory, EKG, and imaging results with the Patient and answered all related questions prior to admission.     Findings and plan explained to the Patient who consents to admission.     0249: Discussed the patient with Dr. Montgomery, who will admit the patient to a [facility] bed for further monitoring, evaluation, and treatment.     0259: Patient rechecked and updated.    Impression & Plan     Medical Decision Making:  Cecily Ivory is a 79-year-old woman who presents due to chest pain.  Symptoms began when she tried to go to bed and given its continuation, she called EMS.  She received aspirin and nitroglycerin with EMS and had resolution of her symptoms.  At the time of my evaluation she was pain-free.  EKG did not show any ST segment elevations or other concerning findings.  Blood work was obtained that did show an elevated troponin. I had concern that patient has had an NSTEMI.  Repeat EKG did not show any dynamic changes and she continued to be pain-free.  Patient was admitted to the hospitalist service.    Diagnosis:    ICD-10-CM   1. NSTEMI (non-ST elevated myocardial infarction) (H) I21.4     Disposition:  Admitted to [facility].    Scribe Disclosure:  MARTY, Promise Jones, am serving as a scribe at 1:28 AM on 1/7/2020 to document services personally performed by Reddy Lee MD based on my observations and the provider's statements to me.     Promise Jones  1/7/2020    EMERGENCY DEPARTMENT       Reddy Lee MD  01/07/20  2738

## 2020-01-07 NOTE — PLAN OF CARE
Pt admitted from ED. Heparin drip infusing at 800 units/hr. Denies any pain/sob. Up with SBA. Tele SR. Trending trops. Plan for cardiology consult and ECHO. Will continue to monitor.

## 2020-01-07 NOTE — ED NOTES
[facility]  ED Nurse Handoff Report    ED Chief complaint: Chest Pain (L sided CP since 10p along with nausea.  given 1 spray of ntg and 4 of zofran.)      ED Diagnosis:   Final diagnoses:   NSTEMI (non-ST elevated myocardial infarction) (H)       Code Status: Full Code    Allergies:   Allergies   Allergen Reactions     Latex Cough     Seasonal Allergies      YEAR ROUND ALLERGIES     Sulfa Drugs Unknown       Patient Story: L sided CP since 10 p, unable to sleep, given nitrogycerin spray and zofran by EMS  Focused Assessment:  L sided CP non radiating    Treatments and/or interventions provided: heparin  Patient's response to treatments and/or interventions:     To be done/followed up on inpatient unit:  *[id number]    Does this patient have any cognitive concerns?: no    Activity level - Baseline/Home:  Independent  Activity Level - Current:   Independent    Patient's Preferred language: English   Needed?: No    Isolation: None  Infection: Not Applicable  Bariatric?: No    Vital Signs:   Vitals:    01/07/20 0430 01/07/20 0455 01/07/20 0456 01/07/20 0500   BP: 102/50 116/63  109/61   Pulse: 73 79     Resp: 18      Temp:   98.2  F (36.8  C)    TempSrc:   Oral    SpO2: 93%      Weight:           Cardiac Rhythm:     Was the PSS-3 completed:   Yes  What interventions are required if any? none              Family Comments: no family here  OBS brochure/video discussed/provided to patient/family: N/A              Name of person given brochure if not patient:               Relationship to patient:     For the majority of the shift this patient's behavior was Green.   Behavioral interventions performed were none.    ED NURSE PHONE NUMBER: [id number]

## 2020-01-08 ENCOUNTER — APPOINTMENT (OUTPATIENT)
Dept: PHYSICAL THERAPY | Facility: CLINIC | Age: 80
DRG: 280 | End: 2020-01-08
Attending: PHYSICIAN ASSISTANT
Payer: COMMERCIAL

## 2020-01-08 ENCOUNTER — APPOINTMENT (OUTPATIENT)
Dept: GENERAL RADIOLOGY | Facility: CLINIC | Age: 80
DRG: 280 | End: 2020-01-08
Attending: HOSPITALIST
Payer: COMMERCIAL

## 2020-01-08 VITALS
TEMPERATURE: 98.3 F | OXYGEN SATURATION: 94 % | RESPIRATION RATE: 16 BRPM | BODY MASS INDEX: 23.7 KG/M2 | DIASTOLIC BLOOD PRESSURE: 55 MMHG | HEART RATE: 61 BPM | SYSTOLIC BLOOD PRESSURE: 109 MMHG | WEIGHT: 151.3 LBS

## 2020-01-08 LAB
ALBUMIN UR-MCNC: 30 MG/DL
ANION GAP SERPL CALCULATED.3IONS-SCNC: 2 MMOL/L (ref 3–14)
APPEARANCE UR: ABNORMAL
BACTERIA #/AREA URNS HPF: ABNORMAL /HPF
BASOPHILS # BLD AUTO: 0 10E9/L (ref 0–0.2)
BASOPHILS NFR BLD AUTO: 0.1 %
BILIRUB UR QL STRIP: NEGATIVE
BUN SERPL-MCNC: 14 MG/DL (ref 7–30)
CALCIUM SERPL-MCNC: 8.4 MG/DL (ref 8.5–10.1)
CHLORIDE SERPL-SCNC: 112 MMOL/L (ref 94–109)
CHOLEST SERPL-MCNC: 185 MG/DL
CO2 SERPL-SCNC: 28 MMOL/L (ref 20–32)
COLOR UR AUTO: YELLOW
CREAT SERPL-MCNC: 0.66 MG/DL (ref 0.52–1.04)
DIFFERENTIAL METHOD BLD: NORMAL
EOSINOPHIL # BLD AUTO: 0.1 10E9/L (ref 0–0.7)
EOSINOPHIL NFR BLD AUTO: 0.8 %
ERYTHROCYTE [DISTWIDTH] IN BLOOD BY AUTOMATED COUNT: 13.6 % (ref 10–15)
GFR SERPL CREATININE-BSD FRML MDRD: 85 ML/MIN/{1.73_M2}
GLUCOSE SERPL-MCNC: 115 MG/DL (ref 70–99)
GLUCOSE UR STRIP-MCNC: NEGATIVE MG/DL
HCT VFR BLD AUTO: 39.2 % (ref 35–47)
HDLC SERPL-MCNC: 91 MG/DL
HGB BLD-MCNC: 12.9 G/DL (ref 11.7–15.7)
HGB UR QL STRIP: ABNORMAL
IMM GRANULOCYTES # BLD: 0 10E9/L (ref 0–0.4)
IMM GRANULOCYTES NFR BLD: 0.1 %
KETONES UR STRIP-MCNC: NEGATIVE MG/DL
LDLC SERPL CALC-MCNC: 74 MG/DL
LEUKOCYTE ESTERASE UR QL STRIP: ABNORMAL
LMWH PPP CHRO-ACNC: <0.1 IU/ML
LYMPHOCYTES # BLD AUTO: 2.1 10E9/L (ref 0.8–5.3)
LYMPHOCYTES NFR BLD AUTO: 26.8 %
MCH RBC QN AUTO: 31.1 PG (ref 26.5–33)
MCHC RBC AUTO-ENTMCNC: 32.9 G/DL (ref 31.5–36.5)
MCV RBC AUTO: 95 FL (ref 78–100)
MONOCYTES # BLD AUTO: 0.7 10E9/L (ref 0–1.3)
MONOCYTES NFR BLD AUTO: 8.7 %
MUCOUS THREADS #/AREA URNS LPF: PRESENT /LPF
NEUTROPHILS # BLD AUTO: 5 10E9/L (ref 1.6–8.3)
NEUTROPHILS NFR BLD AUTO: 63.5 %
NITRATE UR QL: NEGATIVE
NONHDLC SERPL-MCNC: 94 MG/DL
NRBC # BLD AUTO: 0 10*3/UL
NRBC BLD AUTO-RTO: 0 /100
PH UR STRIP: 6 PH (ref 5–7)
PLATELET # BLD AUTO: 188 10E9/L (ref 150–450)
POTASSIUM SERPL-SCNC: 3.9 MMOL/L (ref 3.4–5.3)
RBC # BLD AUTO: 4.15 10E12/L (ref 3.8–5.2)
RBC #/AREA URNS AUTO: 29 /HPF (ref 0–2)
SODIUM SERPL-SCNC: 142 MMOL/L (ref 133–144)
SOURCE: ABNORMAL
SP GR UR STRIP: 1.01 (ref 1–1.03)
SQUAMOUS #/AREA URNS AUTO: 1 /HPF (ref 0–1)
TRIGL SERPL-MCNC: 98 MG/DL
UROBILINOGEN UR STRIP-MCNC: NORMAL MG/DL (ref 0–2)
WBC # BLD AUTO: 7.8 10E9/L (ref 4–11)
WBC #/AREA URNS AUTO: >182 /HPF (ref 0–5)
WBC CLUMPS #/AREA URNS HPF: PRESENT /HPF

## 2020-01-08 PROCEDURE — 80048 BASIC METABOLIC PNL TOTAL CA: CPT | Performed by: INTERNAL MEDICINE

## 2020-01-08 PROCEDURE — 87088 URINE BACTERIA CULTURE: CPT | Performed by: INTERNAL MEDICINE

## 2020-01-08 PROCEDURE — 81001 URINALYSIS AUTO W/SCOPE: CPT | Performed by: HOSPITALIST

## 2020-01-08 PROCEDURE — 97161 PT EVAL LOW COMPLEX 20 MIN: CPT | Mod: GP

## 2020-01-08 PROCEDURE — 99239 HOSP IP/OBS DSCHRG MGMT >30: CPT | Performed by: HOSPITALIST

## 2020-01-08 PROCEDURE — 87086 URINE CULTURE/COLONY COUNT: CPT | Performed by: INTERNAL MEDICINE

## 2020-01-08 PROCEDURE — 25000132 ZZH RX MED GY IP 250 OP 250 PS 637: Performed by: PHYSICIAN ASSISTANT

## 2020-01-08 PROCEDURE — 99231 SBSQ HOSP IP/OBS SF/LOW 25: CPT | Performed by: PHYSICIAN ASSISTANT

## 2020-01-08 PROCEDURE — 36415 COLL VENOUS BLD VENIPUNCTURE: CPT | Performed by: INTERNAL MEDICINE

## 2020-01-08 PROCEDURE — 73630 X-RAY EXAM OF FOOT: CPT | Mod: LT

## 2020-01-08 PROCEDURE — 85025 COMPLETE CBC W/AUTO DIFF WBC: CPT | Performed by: INTERNAL MEDICINE

## 2020-01-08 PROCEDURE — 85520 HEPARIN ASSAY: CPT | Performed by: INTERNAL MEDICINE

## 2020-01-08 PROCEDURE — 97530 THERAPEUTIC ACTIVITIES: CPT | Mod: GP

## 2020-01-08 PROCEDURE — 87186 SC STD MICRODIL/AGAR DIL: CPT | Performed by: INTERNAL MEDICINE

## 2020-01-08 PROCEDURE — 80061 LIPID PANEL: CPT | Performed by: INTERNAL MEDICINE

## 2020-01-08 PROCEDURE — 25000132 ZZH RX MED GY IP 250 OP 250 PS 637: Performed by: HOSPITALIST

## 2020-01-08 PROCEDURE — 97110 THERAPEUTIC EXERCISES: CPT | Mod: GP

## 2020-01-08 RX ORDER — CEFUROXIME AXETIL 500 MG/1
500 TABLET ORAL 2 TIMES DAILY
Qty: 14 TABLET | Refills: 0 | Status: SHIPPED | OUTPATIENT
Start: 2020-01-08 | End: 2020-01-14

## 2020-01-08 RX ORDER — METOPROLOL TARTRATE 25 MG/1
12.5 TABLET, FILM COATED ORAL 2 TIMES DAILY
Qty: 60 TABLET | Refills: 0 | Status: ON HOLD | OUTPATIENT
Start: 2020-01-08 | End: 2020-01-10

## 2020-01-08 RX ORDER — SIMVASTATIN 20 MG
20 TABLET ORAL AT BEDTIME
Qty: 30 TABLET | Refills: 0 | Status: SHIPPED | OUTPATIENT
Start: 2020-01-08 | End: 2020-01-14

## 2020-01-08 RX ORDER — SERTRALINE HYDROCHLORIDE 25 MG/1
25 TABLET, FILM COATED ORAL DAILY
Qty: 30 TABLET | Refills: 0 | Status: SHIPPED | OUTPATIENT
Start: 2020-01-08 | End: 2020-02-10

## 2020-01-08 RX ORDER — LISINOPRIL 2.5 MG/1
2.5 TABLET ORAL DAILY
Qty: 30 TABLET | Refills: 0 | Status: ON HOLD | OUTPATIENT
Start: 2020-01-09 | End: 2020-01-10

## 2020-01-08 RX ADMIN — METOPROLOL TARTRATE 12.5 MG: 25 TABLET, FILM COATED ORAL at 09:02

## 2020-01-08 RX ADMIN — LISINOPRIL 2.5 MG: 2.5 TABLET ORAL at 09:02

## 2020-01-08 ASSESSMENT — ACTIVITIES OF DAILY LIVING (ADL)
ADLS_ACUITY_SCORE: 9

## 2020-01-08 NOTE — PLAN OF CARE
Pt A/O, ambulated in hallway SBA. VSS on RA. Pt denies CP/SOB. Left radial site post-angio ecchymotic but soft, CMS intact. PIV removed, no s/s infection. All discharge instructions reviewed including medications and follow-up instructions. All questions answered. Pt discharged home with all belongings.

## 2020-01-08 NOTE — PROGRESS NOTES
"[facility] Cardiology Progress Note  Date of Service: 01/08/2020  Primary Cardiologist: will be Dr. Mack Tony BRISEIDA Ivory is a 79 year old female admitted on 1/7/2020 with chest pain, NSTEMI.    Subjective: Feeling well, denies chest pain, dyspnea, or lightheadedness - however states that at baseline, if she stands still for a \"long time\" she will get dizzy.     Assessment:  1. 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 Trop peak @ 4.5. Angiogram negative for CAD with normal LVEDP. Started on low-dose lisinopril + Toprol.   2. Chest pain, NSTEMI in setting of above  3. Severe anxiety - potential trigger for stress-induced CMP. Previously untreated, started on sertraline here.   4. Mild-moderate AI  5. Hyperlipidemia - LDL 74 on lovastatin.   6. Marginal BP on CMP medications - currently asymptomatic, does relay history of orthostatic symptoms on no meds.    Plan:   1. Cardiac rehab today to ensure she is tolerating medications. If so, she will be OK for home from a cardiac standpoint. Would have her take Toprol in the AM and lisinopril in the PM at home.   2. States she already follows a relatively low sodium diet and weighs daily. Educated on signs/symptoms of CHF and when to call.  3. May continue PTA statin.   4. Follow-up arranged with me on 1/14 @ 2:30p + labs prior. Will plan to repeat TTE in ~ 1 month.   5. Please arrange f/u with PCP or psychiatry for anxiety.     Deya Dow PA-C  [facility]  Pager: [phone]  Text Page  (7:30am - 4pm M-F)   __________________________________________________________________________    Physical Exam   Temp: 98.3  F (36.8  C) Temp src: Oral BP: 97/56 Pulse: 74 Heart Rate: 66 Resp: 16 SpO2: 94 % O2 Device: None (Room air)    Vitals:    01/07/20 0123 01/07/20 0559   Weight: 68 kg (150 lb) 68.6 kg (151 lb 4.8 oz)       GENERAL:  The patient is in no apparent distress. Flat / anxious affect.   NECK: CVP appears normal, no masses or " thyromegaly.  PULMONARY:  There is a normal respiratory effort. Clear lungs to auscultation bilaterally.   CARDIOVASCULAR:  RRR, normal S1 S2, no m/r/g.  GI:  Non tender abdomen with normoactive bowel sounds and no hepatosplenomegaly. There are no masses palpable.   EXTREMITIES:  No clubbing, cyanosis or edema.  VASCULAR: 2+ Pulses bilaterally in upper and lower extremities. L radial cath access site with ecchymosis, no erythema, edema, or tenderness.     Medications     - MEDICATION INSTRUCTIONS -       sodium chloride 75 mL/hr at 01/07/20 1041       lisinopril  2.5 mg Oral Daily     metoprolol tartrate  12.5 mg Oral BID     sertraline  25 mg Oral Daily     simvastatin  20 mg Oral At Bedtime       Data   Most Recent 3 CBC's:  Recent Labs   Lab Test 01/08/20  0546 01/07/20  0122 04/04/19  0847   WBC 7.8 12.6* 6.9   HGB 12.9 13.4 14.2   MCV 95 92 92    204 208     Most Recent 3 BMP's:  Recent Labs   Lab Test 01/08/20  0546 01/07/20  0157 04/04/19  0847    139 143   POTASSIUM 3.9 3.4 4.3   CHLORIDE 112* 108 111*   CO2 28 25 26   BUN 14 15 23   CR 0.66 0.61 0.72   ANIONGAP 2* 6 6   SUGAR 8.4* 8.4* 8.7   * 136* 95     Most Recent 3 Troponin's:  Recent Labs   Lab Test 01/07/20  0626 01/07/20  0157   TROPI 2.958* 4.447*     Most Recent 3 BNP's:No lab results found.  Most Recent TSH and T4:  Recent Labs   Lab Test 07/12/18  1414   TSH 1.05

## 2020-01-08 NOTE — PROGRESS NOTES
Left anterior foot swollen. Pt endorses pain when moving toes and with ambulation. Area slightly reddened and warm to the touch. Dr. Rodrigues assessed and plan for foot xray prior to discharge.

## 2020-01-08 NOTE — DISCHARGE SUMMARY
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               Unresulted Labs Ordered in the Past 30 Days of this Admission     Date and Time Order Name Status Description    1/8/2020 1400 Urine Culture Aerobic Bacterial In process       These results will be followed up by PCP.    Discharge Disposition   Discharged to home  Condition at discharge: Stable    Hospital Course   Ms. Cecily Ivory 79-year-old female patient with history including hyperlipidemia and osteoarthritis who presents with chest pain and found with elevated troponin.      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 On initial evaluation, vital signs were stable.  EKG did show some diffuse nonspecific T-wave abnormality, but nothing acutely ischemic.  Labs showed troponin 4.447. Heparin drip was started, troponin subsequent was 2.9.  Echo: moderate to severe anterior, septal, and apical and distal inferior wall hypokinesis. LVEF 40-45%.   - Underwent coronary angiogram, clean coronaries noted. Heparin drip was discontinued. Suspected stress induced CMP.    - statin continued as Simvastatin.   - Cardiology also started low dose ACE inhibitor and metoprolol bid, BP soft at baseline.     Possible generalized anxiety  - noted severe anxiety - no known  "recent trigger or event; family at bedside says she is quite \"high strung\" and would benefit from trial of anti-anxiety med   - sertraline 25 hs, outpatient follow up and titrate.      Right scapular petechial rash/mild bruise, asymptomatic  Noticed on pulm auscultation exam. No vesicles, nontender - NOT dermatomal distribution - likely from adhesive patch whilst in cath lab earlier today  - monitor     Hyperlipidemia.    - Continue lovastatin as above.     Hyperglycemia  136 on adm, can be rechecked on outpatient basis  - patient is quite active and walks daily    UTI  Reported frequency and dysuria, UA abnormal, 7 days of ceftin prescribed, culture to follow .     Lt foot swelling:  No trauma, mild tenderness, XR negative for osseous abnormality. Advised to use ice pack and tylenol, and follow up with PCP/       Consultations This Hospital Stay   PHARMACY TO DOSE HEPARIN  CARDIOLOGY IP CONSULT  PHARMACY TO DOSE HEPARIN  PHARMACY IP CONSULT  PHARMACY IP CONSULT  CARDIAC REHAB IP CONSULT  SMOKING CESSATION PROGRAM IP CONSULT  SMOKING CESSATION PROGRAM IP CONSULT    Code Status   Full Code    Time Spent on this Encounter   I, Taty Rodrigues MD, personally saw the patient today and spent greater than 30 minutes discharging this patient.       Taty Rodrigues MD  [facility]  ______________________________________________________________________    Physical Exam   Vital Signs: Temp: 98.3  F (36.8  C) Temp src: Oral BP: 109/55 Pulse: 61 Heart Rate: 59 Resp: 16 SpO2: 94 % O2 Device: None (Room air)    Weight: 151 lbs 4.8 oz    General: AAOx3, appears comfortable.  HEENT: PERRLA EOMI. Mucosa moist.   Lungs: Bilateral equal air entry. Clear to auscultation, normal work of breathing.   CVS: S1S2 regular, no tachycardia or murmur.   Abdomen: Soft, NT, ND. BS heard.  MSK: minimal swelling over the dorsum of Lt foot, non tender.   Neuro: AAOX3. CN 2-12 normal. Strength symmetrical.  Skin: No rash. Bruise " over the Lt radial artery puncture site.       Primary Care Physician   Ellis Ruiz    Discharge Orders      Basic metabolic panel     Discharge Order: F/U with Cardiac  JACOB      Reason for your hospital stay    Stress induced cardiomyopathy.     Follow-up and recommended labs and tests     Follow up with primary care provider, Ellis Ruiz, within 7 days to evaluate medication change and for hospital follow- up.     Activity    Your activity upon discharge: activity as tolerated.     When to contact your care team    Call your primary doctor if you have any of the following:  increased shortness of breath or dizziness, recurrent chest pain.     Diet    Follow this diet upon discharge: Orders Placed This Encounter      Regular Diet Adult       Significant Results and Procedures   Most Recent 3 CBC's:  Recent Labs   Lab Test 01/08/20  0546 01/07/20  0122 04/04/19  0847   WBC 7.8 12.6* 6.9   HGB 12.9 13.4 14.2   MCV 95 92 92    204 208     Most Recent 3 BMP's:  Recent Labs   Lab Test 01/08/20  0546 01/07/20  0157 04/04/19  0847    139 143   POTASSIUM 3.9 3.4 4.3   CHLORIDE 112* 108 111*   CO2 28 25 26   BUN 14 15 23   CR 0.66 0.61 0.72   ANIONGAP 2* 6 6   SUGAR 8.4* 8.4* 8.7   * 136* 95     Most Recent 2 LFT's:  Recent Labs   Lab Test 01/07/20  0157 04/04/19  0847   AST 28 26   ALT 25 27   ALKPHOS 50 69   BILITOTAL 0.5 0.2     Most Recent 3 Troponin's:  Recent Labs   Lab Test 01/07/20  0626 01/07/20  0157   TROPI 2.958* 4.447*     Most Recent 6 Bacteria Isolates From Any Culture (See EPIC Reports for Culture Details):  Recent Labs   Lab Test 06/12/13  1258 06/26/12  0915   CULT <10,000 colonies/mL Lactose fermenting gram negative rods No further  identification Susceptibility testing not routinely done 10 to 50,000 colonies/mL Escherichia coli     Most Recent TSH and T4:  Recent Labs   Lab Test 07/12/18  1414   TSH 1.05     Most Recent 6 glucoses:  Recent Labs   Lab Test 01/08/20  0546  20  0157 19  0847 18  1414 18  1417 18  1309   * 136* 95 128* 151* 100*     Most Recent Urinalysis:  Recent Labs   Lab Test 20  1400 18  1418   COLOR Yellow Yellow   APPEARANCE Slightly Cloudy Clear   URINEGLC Negative Negative   URINEBILI Negative Negative   URINEKETONE Negative Trace*   SG 1.014 >1.030   UBLD Moderate* Negative   URINEPH 6.0 5.0   PROTEIN 30* Negative   UROBILINOGEN  --  0.2   NITRITE Negative Negative   LEUKEST Large* Negative   RBCU 29*  --    WBCU >182*  --    ,   Results for orders placed or performed during the hospital encounter of 20   Chest XR,  PA & LAT    Narrative    EXAM: XR CHEST 2 VW  LOCATION: [facility]  DATE/TIME: 2020 2:27 AM    INDICATION: Chest pain  COMPARISON: 2019      Impression    IMPRESSION: Heart size is normal. Lungs are clear of infiltrate, CHF, or effusion. No acute bony abnormalities.   XR Foot Left G/E 3 Views    Narrative    LEFT FOOT THREE OR MORE VIEWS   2020 1:52 PM     HISTORY:  Swelling and pain, dorsum left foot.      Impression    IMPRESSION: Unremarkable exam.   Echocardiogram Complete    Narrative    [id number]  [id number]  [id number]^DARIEN^SYLVAIN^CASSIA           [facility]  Echocardiography Laboratory  [address] [place] [address]        Name: REID PERDUE  MRN: [id number]  : 1940  Study Date: 2020 11:10 AM  Age: 79 yrs  Gender: Female  Patient Location: [facility]  Reason For Study: MI - Acute  Ordering Physician: SYLVAIN LEDEZMA  Referring Physician: Ellis Ruiz  Performed By: Faye Martin     BSA: 1.8 m2  Height: 67 in  Weight: 150 lb  HR: 66  BP: 108/54 mmHg  _____________________________________________________________________________  __        Procedure  Complete Portable Echo Adult. Optison (NDC #[id number]) given  intravenously.  _____________________________________________________________________________  __        Interpretation Summary     There is moderate to severe anterior, septal, and apical and distal inferior  wall hypokinesis. The visual ejection fraction is estimated at 40-45%. No BP  EF traced.  There is mild to moderate (1-2+) aortic regurgitation. No aortic stenosis is  present.     _____________________________________________________________________________  __        Left Ventricle  The left ventricle is normal in size. There is normal left ventricular wall  thickness. The visual ejection fraction is estimated at 40-45%. Diastolic  Doppler findings (E/E' ratio and/or other parameters) suggest left ventricular  filling pressures are indeterminate. There is moderate to severe anterior,  septal, and apical wall hypokinesis.     Right Ventricle  The right ventricle is normal in size and function.     Atria  Normal left atrial size. Right atrial size is normal.     Mitral Valve  There is trace mitral regurgitation.        Tricuspid Valve  There is trace to mild tricuspid regurgitation. The right ventricular systolic  pressure is approximated at 22.8 mmHg plus the right atrial pressure.     Aortic Valve  The aortic valve is trileaflet. There is mild to moderate (1-2+) aortic  regurgitation. No aortic stenosis is present.     Pulmonic Valve  The pulmonic valve is not well visualized.     Vessels  The aortic root is normal size.     Pericardium  There is no pericardial effusion.     _____________________________________________________________________________  __  MMode/2D Measurements & Calculations  IVSd: 0.99 cm  LVIDd: 4.5 cm  LVIDs: 3.1 cm  LVPWd: 1.0 cm  FS: 31.7 %  LV mass(C)d: 158.7 grams  LV mass(C)dI: 88.7 grams/m2     Ao root diam: 3.3 cm  LA dimension: 2.4 cm  asc Aorta Diam: 3.5 cm  LA/Ao: 0.73  LA Volume (BP): 53.8 ml  LA Volume Index (BP): 30.1 ml/m2  RWT: 0.46        Doppler Measurements &  Calculations  MV E max marissa: 58.3 cm/sec  MV A max marissa: 92.5 cm/sec  MV E/A: 0.63  MV dec time: 0.23 sec     AI P1/2t: 519.9 msec  PA acc time: 0.06 sec  TR max marissa: 238.5 cm/sec  TR max P.8 mmHg  E/E' av.3  Lateral E/e': 9.1  Medial E/e': 13.6           _____________________________________________________________________________  __           Report approved by: Camille Dunn 2020 11:59 AM      Cardiac Catheterization    Narrative    1) No coronary artery disease  2) Stress-induced cardiomyopathy with EF estimated at 35-40% with severe   apical hypokinesis         Discharge Medications   Current Discharge Medication List      START taking these medications    Details   cefuroxime (CEFTIN) 500 MG tablet Take 1 tablet (500 mg) by mouth 2 times daily for 7 days  Qty: 14 tablet, Refills: 0    Associated Diagnoses: Urinary tract infection without hematuria, site unspecified      lisinopril (PRINIVIL/ZESTRIL) 2.5 MG tablet Take 1 tablet (2.5 mg) by mouth daily  Qty: 30 tablet, Refills: 0    Associated Diagnoses: NSTEMI (non-ST elevated myocardial infarction) (H)      metoprolol tartrate (LOPRESSOR) 25 MG tablet Take 0.5 tablets (12.5 mg) by mouth 2 times daily  Qty: 60 tablet, Refills: 0    Associated Diagnoses: NSTEMI (non-ST elevated myocardial infarction) (H)      sertraline (ZOLOFT) 25 MG tablet Take 1 tablet (25 mg) by mouth daily  Qty: 30 tablet, Refills: 0    Associated Diagnoses: Anxiety      simvastatin (ZOCOR) 20 MG tablet Take 1 tablet (20 mg) by mouth At Bedtime  Qty: 30 tablet, Refills: 0    Associated Diagnoses: NSTEMI (non-ST elevated myocardial infarction) (H)         CONTINUE these medications which have NOT CHANGED    Details   Ascorbic Acid (VITAMIN C PO) Take 500 mg by mouth daily.      Biotin 5000 MCG CAPS Take 1 tablet by mouth three times a week       calcium carb 1250 mg, 500 mg [place],/vitamin D 200 units (OSCAL WITH D) 500-200 MG-UNIT per tablet Take 1 tablet by mouth 2 times  daily (with meals)   Qty: 100 tablet, Refills: 3      Carboxymethylcellulose Sodium (THERATEARS) 0.25 % SOLN Place 1 drop into both eyes every evening      Cholecalciferol (VITAMIN D3 PO) Take 1,000 Units by mouth daily       CYANOCOBALAMIN PO Take 1,000 mcg by mouth daily      loratadine (CLARITIN) 10 MG tablet Take 10 mg by mouth daily as needed for allergies       magnesium 100 MG CAPS Take 1 tablet by mouth daily       Multiple Vitamins-Minerals (CENTRUM SILVER) per tablet Take 1 tablet by mouth daily         STOP taking these medications       lovastatin (MEVACOR) 40 MG tablet Comments:   Reason for Stopping:             Allergies   Allergies   Allergen Reactions     Latex Cough     Seasonal Allergies      YEAR ROUND ALLERGIES     Sulfa Drugs Unknown

## 2020-01-08 NOTE — PLAN OF CARE
Discharge Planner PT   Patient plan for discharge: Home as soon as possible  Current status: Pt is 79 year old female adm on 1/7/19 with stress induced cardiomyopathy.  At baseline, pt lives alone in senior housing, is active and tries to walk daily. PT/CR orders received, evaluation completed, and treatment initiated. Pt with low BP throughout but denies symptoms. Pt independent with all mobility tasks, tolerated ambulation x6 minutes in hallway without symptoms and vital signs stable.   Barriers to return to prior living situation: None  Recommendations for discharge: Home  Rationale for recommendations: Pt able to complete all mobility necessary for discharge to home.       Entered by: Ana Winston 01/08/2020 12:28 PM

## 2020-01-08 NOTE — PROGRESS NOTES
01/08/20 1153   Quick Adds   Type of Visit Initial PT Evaluation   Living Environment   Lives With alone   Living Arrangements apartment   Home Accessibility no concerns   Transportation Anticipated car, drives self;family or friend will provide   Living Environment Comment Lives in senior living apartment   Self-Care   Usual Activity Tolerance good   Current Activity Tolerance moderate   Regular Exercise Yes   Activity/Exercise Type walking   Exercise Amount/Frequency 30 mins;3-5 times/wk   Equipment Currently Used at Home none   Activity/Exercise/Self-Care Comment Pt reports she tries to achieve 10,000 steps a day, walks either at the mall or fitness center or the halls of her apartment building. Pt independent with ADLs, 2 meals a week provided in dining room of facility, otherwise eats ready to eat meals   Functional Level Prior   Ambulation 0-->independent   Transferring 0-->independent   Fall history within last six months no   Prior Functional Level Comment Independent without use of assistive device   General Information   Onset of Illness/Injury or Date of Surgery - Date 01/07/20   Referring Physician Deya Dow PA-C   Patient/Family Goals Statement To get back to walking at mall/fitness center   Pertinent History of Current Problem (include personal factors and/or comorbidities that impact the POC) Pt is 79 year old female adm on 1/7/2020 with chest pain, nausea, and vomitting. Pt to cath lab which showed minimal disease, diagnosed with stress-induced cardiomyopathy, no known trigger.   Precautions/Limitations no known precautions/limitations   Cognitive Status Examination   Orientation orientation to person, place and time   Level of Consciousness alert   Pain Assessment   Patient Currently in Pain No   Integumentary/Edema   Integumentary/Edema no deficits were identifed   Posture    Posture Forward head position;Protracted shoulders   Range of Motion (ROM)   ROM Comment B LE ROM WFL  "  Strength   Strength Comments Strength grossly WFL, not formally assessed   Bed Mobility   Bed Mobility Comments Independent   Transfer Skills   Transfer Comments Independent   Gait   Gait Comments Independent   Balance   Balance Comments No LOB with functional activities   Sensory Examination   Sensory Perception Comments Denies numbness/tingling   General Therapy Interventions   Planned Therapy Interventions risk factor education;home program guidelines;progressive activity/exercise   Clinical Impression   Criteria for Skilled Therapeutic Intervention yes, treatment indicated   PT Diagnosis Impaired activity tolerance   Influenced by the following impairments Decreased endurance   Functional limitations due to impairments Decreased ability to participate in daily tasks   Clinical Presentation Stable/Uncomplicated   Clinical Presentation Rationale Current presentation, [facility]   Clinical Decision Making (Complexity) Low complexity   Therapy Frequency Daily   Predicted Duration of Therapy Intervention (days/wks) 2 days   Anticipated Discharge Disposition Home   Risk & Benefits of therapy have been explained Yes   Patient, Family & other staff in agreement with plan of care Yes   [facility] \"6 Clicks\"   2016, Trustees of [facility], under license to [employer].  All rights reserved.   6 Clicks Short Forms Basic Mobility Inpatient Short Form   [facility]  \"6 Clicks\" V.2 Basic Mobility Inpatient Short Form   1. Turning from your back to your side while in a flat bed without using bedrails? 4 - None   2. Moving from lying on your back to sitting on the side of a flat bed without using bedrails? 4 - None   3. Moving to and from a bed to a chair (including a wheelchair)? 4 - None   4. Standing up from a chair using your arms (e.g., wheelchair, or bedside chair)? 4 - None   5. To walk in hospital room? 4 - None   6. Climbing 3-5 steps with a railing? 4 - None   Basic Mobility Raw Score " (Score out of 24.Lower scores equate to lower levels of function) 24   Total Evaluation Time   Total Evaluation Time (Minutes) 10

## 2020-01-08 NOTE — PLAN OF CARE
Alert and oriented x 4. VS stable but BP low (93/47) post initiation on new cardiac medications. Patient will be up with cardiac rehab this morning and possible discharge later today.

## 2020-01-08 NOTE — DISCHARGE INSTRUCTIONS
Cardiac Angiogram Discharge Instructions - Radial    After you go home:      Have an adult stay with you until tomorrow.    Drink extra fluids for 2 days.    You may resume your normal diet.    No smoking       For 24 hours - due to the sedation you received:    Relax and take it easy.    Do NOT make any important or legal decisions.    Do NOT drive or operate machines at home or at work.    Do NOT drink alcohol.    Care of Wrist Puncture Site:      For the first 24 hrs - check the puncture site every 1-2 hours while awake.    It is normal to have soreness at the puncture site and mild tingling in your hand for up to 3 days.    Remove the bandaid after 24 hours. If there is minor oozing, apply another bandaid and remove it after 12 hours.    You may shower tomorrow.  Do NOT take a bath, or use a hot tub or pool for at least 3 days. Do NOT scrub the site. Do not use lotion or powder near the puncture site.           Activity:        For 2 days:     do not use your hand or arm to support your weight (such as rising from a chair)     do not bend your wrist (such as lifting a garage door).    do not lift more than 5 pounds or exercise your arm (such as tennis, golf or bowling).    Do NOT do any heavy activity such as exercise, lifting, or straining.     Bleeding:      If you start bleeding from the site in your wrist, sit down and press firmly on/above the site for 10 minutes.     Once bleeding stops, keep arm still for 2 hours.     Call [facility] as soon as you can.       Call 911 right away if you have heavy bleeding or bleeding that does not stop.      Medicines:      If you are taking an antiplatelet medication such as Plavix, Brilinta or Effient, do not stop taking it until you talk to your cardiologist.        If you are on Metformin (Glucophage), do not restart it until you have blood tests (within 2 to 3 days after discharge).  After you have your blood drawn, you may restart the Metformin.     Take your  medications, including blood thinners, unless your provider tells you not to.  If you take Coumadin (Warfarin), have your INR checked by your provider in  3-5 days. Call your clinic to schedule this.    If you have stopped any medicines, check with your provider about when to restart them.    Follow Up Appointments:      Follow up with [facility] Nurse Practitioner at [facility] of patient preference in 7-10 days.    Call the clinic if:      You have a large or growing hard lump around the site.    The site is red, swollen, hot or tender.    Blood or fluid is draining from the site.    You have chills or a fever greater than 101 F (38 C).    Your arm feels numb, cool or changes color.    You have hives, a rash or unusual itching.    Any questions or concerns.          [facility] [place]:    [phone] [facility] (7 days a week)

## 2020-01-08 NOTE — PLAN OF CARE
VSS. Pt denies pain. She is up with SBA to remind her to put pressure on her L radial site. She is forgetful. She was agitated with her roommate due to frequent and loud telephone conversations. She was restless in the night due to her roommate being awake and loud. Her radial site is WDL and CDI. SR. Continue to monitor.

## 2020-01-09 ENCOUNTER — TELEPHONE (OUTPATIENT)
Dept: CARDIOLOGY | Facility: CLINIC | Age: 80
End: 2020-01-09

## 2020-01-09 ENCOUNTER — APPOINTMENT (OUTPATIENT)
Dept: GENERAL RADIOLOGY | Facility: CLINIC | Age: 80
End: 2020-01-09
Attending: EMERGENCY MEDICINE
Payer: COMMERCIAL

## 2020-01-09 ENCOUNTER — HOSPITAL ENCOUNTER (OUTPATIENT)
Facility: CLINIC | Age: 80
Setting detail: OBSERVATION
Discharge: HOME | End: 2020-01-10
Attending: EMERGENCY MEDICINE | Admitting: INTERNAL MEDICINE
Payer: COMMERCIAL

## 2020-01-09 ENCOUNTER — TELEPHONE (OUTPATIENT)
Dept: FAMILY MEDICINE | Facility: CLINIC | Age: 80
End: 2020-01-09

## 2020-01-09 DIAGNOSIS — I21.4 NSTEMI (NON-ST ELEVATED MYOCARDIAL INFARCTION) (H): Primary | ICD-10-CM

## 2020-01-09 DIAGNOSIS — R79.89 ELEVATED TROPONIN: ICD-10-CM

## 2020-01-09 DIAGNOSIS — R94.31 T WAVE INVERSION IN EKG: ICD-10-CM

## 2020-01-09 DIAGNOSIS — I21.4 NSTEMI (NON-ST ELEVATED MYOCARDIAL INFARCTION) (H): ICD-10-CM

## 2020-01-09 DIAGNOSIS — I51.81 TAKOTSUBO CARDIOMYOPATHY: ICD-10-CM

## 2020-01-09 PROBLEM — R07.82 INTERCOSTAL PAIN: Status: ACTIVE | Noted: 2020-01-09

## 2020-01-09 LAB
ANION GAP SERPL CALCULATED.3IONS-SCNC: 6 MMOL/L (ref 3–14)
BASOPHILS # BLD AUTO: 0 10E9/L (ref 0–0.2)
BASOPHILS NFR BLD AUTO: 0.1 %
BUN SERPL-MCNC: 16 MG/DL (ref 7–30)
CALCIUM SERPL-MCNC: 8.4 MG/DL (ref 8.5–10.1)
CHLORIDE SERPL-SCNC: 111 MMOL/L (ref 94–109)
CO2 SERPL-SCNC: 24 MMOL/L (ref 20–32)
CREAT SERPL-MCNC: 0.6 MG/DL (ref 0.52–1.04)
DIFFERENTIAL METHOD BLD: NORMAL
EOSINOPHIL # BLD AUTO: 0 10E9/L (ref 0–0.7)
EOSINOPHIL NFR BLD AUTO: 0.4 %
ERYTHROCYTE [DISTWIDTH] IN BLOOD BY AUTOMATED COUNT: 13.4 % (ref 10–15)
GFR SERPL CREATININE-BSD FRML MDRD: 88 ML/MIN/{1.73_M2}
GLUCOSE SERPL-MCNC: 99 MG/DL (ref 70–99)
HCT VFR BLD AUTO: 39.8 % (ref 35–47)
HGB BLD-MCNC: 12.8 G/DL (ref 11.7–15.7)
IMM GRANULOCYTES # BLD: 0 10E9/L (ref 0–0.4)
IMM GRANULOCYTES NFR BLD: 0.1 %
INTERPRETATION ECG - MUSE: NORMAL
LYMPHOCYTES # BLD AUTO: 1.6 10E9/L (ref 0.8–5.3)
LYMPHOCYTES NFR BLD AUTO: 21.7 %
MAGNESIUM SERPL-MCNC: 2.1 MG/DL (ref 1.6–2.3)
MCH RBC QN AUTO: 30.8 PG (ref 26.5–33)
MCHC RBC AUTO-ENTMCNC: 32.2 G/DL (ref 31.5–36.5)
MCV RBC AUTO: 96 FL (ref 78–100)
MONOCYTES # BLD AUTO: 0.6 10E9/L (ref 0–1.3)
MONOCYTES NFR BLD AUTO: 8.1 %
NEUTROPHILS # BLD AUTO: 5 10E9/L (ref 1.6–8.3)
NEUTROPHILS NFR BLD AUTO: 69.6 %
NRBC # BLD AUTO: 0 10*3/UL
NRBC BLD AUTO-RTO: 0 /100
NT-PROBNP SERPL-MCNC: 3097 PG/ML (ref 0–1800)
PLATELET # BLD AUTO: 203 10E9/L (ref 150–450)
POTASSIUM SERPL-SCNC: 3.7 MMOL/L (ref 3.4–5.3)
RBC # BLD AUTO: 4.15 10E12/L (ref 3.8–5.2)
SODIUM SERPL-SCNC: 141 MMOL/L (ref 133–144)
TROPONIN I SERPL-MCNC: 0.19 UG/L (ref 0–0.04)
TROPONIN I SERPL-MCNC: 0.2 UG/L (ref 0–0.04)
TROPONIN I SERPL-MCNC: 0.21 UG/L (ref 0–0.04)
WBC # BLD AUTO: 7.2 10E9/L (ref 4–11)

## 2020-01-09 PROCEDURE — 99220 ZZC INITIAL OBSERVATION CARE,LEVL III: CPT | Performed by: INTERNAL MEDICINE

## 2020-01-09 PROCEDURE — G0378 HOSPITAL OBSERVATION PER HR: HCPCS

## 2020-01-09 PROCEDURE — 93005 ELECTROCARDIOGRAM TRACING: CPT

## 2020-01-09 PROCEDURE — 99285 EMERGENCY DEPT VISIT HI MDM: CPT | Mod: 25

## 2020-01-09 PROCEDURE — 83880 ASSAY OF NATRIURETIC PEPTIDE: CPT | Performed by: INTERNAL MEDICINE

## 2020-01-09 PROCEDURE — 84484 ASSAY OF TROPONIN QUANT: CPT | Performed by: INTERNAL MEDICINE

## 2020-01-09 PROCEDURE — 25000132 ZZH RX MED GY IP 250 OP 250 PS 637: Performed by: INTERNAL MEDICINE

## 2020-01-09 PROCEDURE — 83735 ASSAY OF MAGNESIUM: CPT | Performed by: EMERGENCY MEDICINE

## 2020-01-09 PROCEDURE — 71046 X-RAY EXAM CHEST 2 VIEWS: CPT

## 2020-01-09 PROCEDURE — 80048 BASIC METABOLIC PNL TOTAL CA: CPT | Performed by: EMERGENCY MEDICINE

## 2020-01-09 PROCEDURE — 36415 COLL VENOUS BLD VENIPUNCTURE: CPT | Performed by: INTERNAL MEDICINE

## 2020-01-09 PROCEDURE — 84484 ASSAY OF TROPONIN QUANT: CPT | Performed by: EMERGENCY MEDICINE

## 2020-01-09 PROCEDURE — 85025 COMPLETE CBC W/AUTO DIFF WBC: CPT | Performed by: EMERGENCY MEDICINE

## 2020-01-09 RX ORDER — SIMVASTATIN 20 MG
20 TABLET ORAL AT BEDTIME
Status: DISCONTINUED | OUTPATIENT
Start: 2020-01-09 | End: 2020-01-10 | Stop reason: HOSPADM

## 2020-01-09 RX ORDER — LISINOPRIL 2.5 MG/1
2.5 TABLET ORAL DAILY
Status: DISCONTINUED | OUTPATIENT
Start: 2020-01-10 | End: 2020-01-10

## 2020-01-09 RX ORDER — ACETAMINOPHEN 325 MG/1
650 TABLET ORAL EVERY 4 HOURS PRN
Status: DISCONTINUED | OUTPATIENT
Start: 2020-01-09 | End: 2020-01-10 | Stop reason: HOSPADM

## 2020-01-09 RX ORDER — NITROGLYCERIN 0.4 MG/1
0.4 TABLET SUBLINGUAL EVERY 5 MIN PRN
Status: DISCONTINUED | OUTPATIENT
Start: 2020-01-09 | End: 2020-01-10 | Stop reason: HOSPADM

## 2020-01-09 RX ORDER — SERTRALINE HYDROCHLORIDE 25 MG/1
25 TABLET, FILM COATED ORAL DAILY
Status: DISCONTINUED | OUTPATIENT
Start: 2020-01-09 | End: 2020-01-09

## 2020-01-09 RX ORDER — ASPIRIN 81 MG/1
162 TABLET, CHEWABLE ORAL ONCE
Status: DISCONTINUED | OUTPATIENT
Start: 2020-01-09 | End: 2020-01-10 | Stop reason: HOSPADM

## 2020-01-09 RX ORDER — ALUMINA, MAGNESIA, AND SIMETHICONE 2400; 2400; 240 MG/30ML; MG/30ML; MG/30ML
30 SUSPENSION ORAL EVERY 4 HOURS PRN
Status: DISCONTINUED | OUTPATIENT
Start: 2020-01-09 | End: 2020-01-10 | Stop reason: HOSPADM

## 2020-01-09 RX ORDER — ASPIRIN 81 MG/1
81 TABLET ORAL DAILY
Status: DISCONTINUED | OUTPATIENT
Start: 2020-01-10 | End: 2020-01-10 | Stop reason: HOSPADM

## 2020-01-09 RX ORDER — NALOXONE HYDROCHLORIDE 0.4 MG/ML
.1-.4 INJECTION, SOLUTION INTRAMUSCULAR; INTRAVENOUS; SUBCUTANEOUS
Status: DISCONTINUED | OUTPATIENT
Start: 2020-01-09 | End: 2020-01-10 | Stop reason: HOSPADM

## 2020-01-09 RX ORDER — CEFUROXIME AXETIL 500 MG/1
500 TABLET ORAL 2 TIMES DAILY
Status: DISCONTINUED | OUTPATIENT
Start: 2020-01-09 | End: 2020-01-10 | Stop reason: HOSPADM

## 2020-01-09 RX ORDER — LIDOCAINE 40 MG/G
CREAM TOPICAL
Status: DISCONTINUED | OUTPATIENT
Start: 2020-01-09 | End: 2020-01-10 | Stop reason: HOSPADM

## 2020-01-09 RX ORDER — HYDROCODONE BITARTRATE AND ACETAMINOPHEN 5; 325 MG/1; MG/1
1-2 TABLET ORAL EVERY 4 HOURS PRN
Status: DISCONTINUED | OUTPATIENT
Start: 2020-01-09 | End: 2020-01-10 | Stop reason: HOSPADM

## 2020-01-09 RX ORDER — ACETAMINOPHEN 650 MG/1
650 SUPPOSITORY RECTAL EVERY 4 HOURS PRN
Status: DISCONTINUED | OUTPATIENT
Start: 2020-01-09 | End: 2020-01-10 | Stop reason: HOSPADM

## 2020-01-09 RX ADMIN — METOPROLOL TARTRATE 12.5 MG: 25 TABLET, FILM COATED ORAL at 20:12

## 2020-01-09 RX ADMIN — CEFUROXIME AXETIL 500 MG: 500 TABLET ORAL at 20:12

## 2020-01-09 RX ADMIN — SIMVASTATIN 20 MG: 20 TABLET, FILM COATED ORAL at 21:09

## 2020-01-09 ASSESSMENT — ENCOUNTER SYMPTOMS
MYALGIAS: 0
NUMBNESS: 0
VOMITING: 0
LIGHT-HEADEDNESS: 0
ABDOMINAL PAIN: 0
DIZZINESS: 0
NAUSEA: 0
WEAKNESS: 1
BACK PAIN: 0
SHORTNESS OF BREATH: 0

## 2020-01-09 ASSESSMENT — MIFFLIN-ST. JEOR: SCORE: 1192.58

## 2020-01-09 NOTE — TELEPHONE ENCOUNTER
Called pharmacy- To ensure Medication was dispensed with discharge meds- confirmed, yes, Simvastatin was processed at the same time as other medications.     Called Pt to discuss- No answer, left VM to return call to clinic.     Mihaela WHITMAN RN

## 2020-01-09 NOTE — ED TRIAGE NOTES
Pt discharged last night w/ takotsubo. Today comes in with sudden onset sharp upper left chest pain near shoulder and has slowly been improving. EMS gave 324 aspirin.

## 2020-01-09 NOTE — TELEPHONE ENCOUNTER
Discussed with patient - states she has the Lovastatin not Simvastatin, was not given this with other discharge meds; will plan to continue that then ask cardiology which med she should be on at her next visit with them    Brenda QUACH RN

## 2020-01-09 NOTE — PROGRESS NOTES
RECEIVING UNIT ED HANDOFF REVIEW    ED Nurse Handoff Report was reviewed by: Opal Gooden RN on January 9, 2020 at 5:47 PM

## 2020-01-09 NOTE — TELEPHONE ENCOUNTER
"To PCP: Upon review of Hospital discharge instructions/Meds- pt read off the bottle labels of meds she was sent home with. This did NOT include the Simvastatin that is listed on her paperwork to have started. (see list below)     Would you like Pt to start Simvastatin? 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    - NSTEMI, type 2 - clean coronaries   - Generalized anxiety  - Hyperlipidemia  - UTI  - Lt foot swelling    START taking:  cefuroxime 500 MG tablet (CEFTIN)  lisinopril 2.5 MG tablet (PRINIVIL/Zestril)  metoprolol tartrate 25 MG tablet (LOPRESSOR)  sertraline 25 MG tablet (ZOLOFT)  simvastatin 20 MG tablet (ZOCOR)    STOP taking:  lovastatin 40 MG tablet (MEVACOR)    Thank you,   Mihaela WHITMAN RN      Also- referral for MTM and Care Coordination placed     ED / Discharge Outreach Protocol    Patient Contact    Pt returned called       Hospital/TCU/ED for chronic condition Discharge Protocol    \"Hi, my name is Mihaela Norman RN, a registered nurse, and I am calling from [facility].  I am calling to follow up and see how things are going for you after your recent emergency visit/hospital/TCU stay.\"    Tell me how you are doing now that you are home?\"     Feeling a little weak this morning, but will have breakfast     She does not have the heart pain that prompted her to go to the ER.      Pt will have breakfast and call back if she does not feel an improvement in her strength or if things worsen.       Discharge Instructions    \"Let's review your discharge instructions.  What is/are the follow-up recommendations?  Pt. Response: F/u with Cardiology on 1/14- scheduled   F/u with PCP on 1/17- scheduled     \"Has an appointment with your primary care provider been scheduled?\"   Yes. (confirm)    \"When you see the provider, I would recommend that you bring your medications with " "you.\"    Medications    \"Tell me what changed about your medicines when you discharged?\"    Changes to chronic meds?    2 or more - [facility] MTM referral needed    \"What questions do you have about your medications?\"    She is overwhelmed by number of new medication  - Questions cannot be easily answered - Epic MTM referral needed     New diagnoses of heart failure, COPD, diabetes, or MI?    Yes - Care Coordination Referral needed              Post Discharge Medication Reconciliation Status: unable to reconcile discharge medications due to Complexity .    Was MTM referral placed (*Make sure to put transitions as reason for referral)?   Yes - \"The Medication Therapy  will call you within the next few days to schedule an appointment with an MTM pharmacist to discuss your medicines and make sure they are working as well as they possibly can for you. This visit can be done in a [place] clinic or on the phone and is at no charge to you.\"    Call Summary    \"What questions or concerns do you have about your recent visit and your follow-up care?\"     Will discus simvastatin with PCP     \"If you have questions or things don't continue to improve, we encourage you contact us through the main clinic number (give number).  Even if the clinic is not open, triage nurses are available 24/7 to help you.     We would like you to know that our clinic has extended hours (provide information).  We also have urgent care (provide details on closest location and hours/contact info)\"      \"Thank you for your time and take care!\"             "

## 2020-01-09 NOTE — ED NOTES
Bed: ED01  Expected date:   Expected time:   Means of arrival:   Comments:  [facility] - 79F CP - ETA 6min

## 2020-01-09 NOTE — TELEPHONE ENCOUNTER
Patient was evaluated by cardiology while inpatient for chest pain, elevated troponin and NSTEMI. 1/7/20: coronary showed no CAD. Suggestive of Tako Tsubo CM. EF 35% with sever apical hypokinesis in presence of severe untreated anxiety. Started on Sertraline and low dose Lisinopril and Metoprolol. Writer attempted to call patient to discuss any post hospital d/c questions she may have, review medication changes, and confirm f/u appts, but no answer. VM left to return my phone call with any chest pain, SOB or lightheadedness or with any medication questions. Informed that LRA access site should be without signs of infection, swelling or drainage. RN left reminder with patient that she has an apt scheduled on 1/14/20 with DC Deya Dow with labs prior. Patient advised to call clinic with any cardiac related questions or concerns prior to this dc'doris. NANCY Martinez RN.

## 2020-01-09 NOTE — TELEPHONE ENCOUNTER
Next 5 appointments (look out 90 days)    Jan 14, 2020  2:30 PM CST  Return Visit with Deya Dow PA-C  [facility] ([facility]) [address]  [place] [id number] [phone] OPT 2   Jan 17, 2020  1:30 PM CST  [facility] Visit with Ellis Ruiz MD  [facility] [address] [place] [id number] [phone]        ED / Discharge Outreach Protocol    Patient Contact    Attempt # 1    Was call answered?  No.  Left message on voicemail with information to call triage back.    Brenda QUACH RN

## 2020-01-09 NOTE — PHARMACY-ADMISSION MEDICATION HISTORY
Pharmacy Medication History  Admission medication history interview status for the 1/9/2020  admission is complete. See EPIC admission navigator for prior to admission medications     Medication history sources: Patient and D/C list from 1/8/20  Medication history source reliability: Good  Adherence assessment: Good    Additional medication history information:   Pt dc'd from [facility] yesterday    Medication reconciliation completed by provider prior to medication history? No    Time spent in this activity: 10 minutes      Prior to Admission medications    Medication Sig Last Dose Taking? Auth Provider   Ascorbic Acid (VITAMIN C PO) Take 500 mg by mouth daily. 1/9/2020 at Unknown time Yes Reported, Patient   Biotin 5000 MCG CAPS Take 1 tablet by mouth three times a week  1/9/2020 at Unknown time Yes Reported, Patient   calcium carb 1250 mg, 500 mg [place],/vitamin D 200 units (OSCAL WITH D) 500-200 MG-UNIT per tablet Take 1 tablet by mouth 2 times daily (with meals)  1/9/2020 at Unknown time Yes Ellis Ruiz MD   Carboxymethylcellulose Sodium (THERATEARS) 0.25 % SOLN Place 1 drop into both eyes every evening 1/8/2020 at Unknown time Yes Unknown, Entered By History   cefuroxime (CEFTIN) 500 MG tablet Take 1 tablet (500 mg) by mouth 2 times daily for 7 days 1/9/2020 at Unknown time Yes Taty Rodrigues MD   Cholecalciferol (VITAMIN D3 PO) Take 1,000 Units by mouth daily  1/9/2020 at Unknown time Yes Reported, Patient   CYANOCOBALAMIN PO Take 1,000 mcg by mouth daily 1/9/2020 at Unknown time Yes Reported, Patient   lisinopril (PRINIVIL/ZESTRIL) 2.5 MG tablet Take 1 tablet (2.5 mg) by mouth daily 1/9/2020 at Unknown time Yes Taty Rodrigues MD   loratadine (CLARITIN) 10 MG tablet Take 10 mg by mouth daily as needed for allergies  prn Yes Ellis Ruiz MD   magnesium 100 MG CAPS Take 1 tablet by mouth daily  1/9/2020 at Unknown time Yes Reported, Patient   metoprolol tartrate (LOPRESSOR) 25 MG tablet Take 0.5  tablets (12.5 mg) by mouth 2 times daily 1/9/2020 at Unknown time Yes Taty Rodrigues MD   Multiple Vitamins-Minerals (CENTRUM SILVER) per tablet Take 1 tablet by mouth daily 1/9/2020 at Unknown time Yes Reported, Patient   sertraline (ZOLOFT) 25 MG tablet Take 1 tablet (25 mg) by mouth daily 1/9/2020 at Unknown time Yes Taty Rodrigues MD   simvastatin (ZOCOR) 20 MG tablet Take 1 tablet (20 mg) by mouth At Bedtime 1/8/2020 at Unknown time Yes Taty Rodrigues MD

## 2020-01-09 NOTE — H&P
[facility]    History and Physical  Hospitalist       Date of Admission:  1/9/2020    Assessment & Plan   Cecily Ivory is a 79 year old female with a recent diagnosis of Takotsubo cardiomyopathy and angiogram presents to the emergency department with complaint of 1 episode of left-sided chest pain.    Active Problems:    Stress-induced cardiomyopathy    Intercostal pain  Abnormal ekg     NSTEMI (non-ST elevated myocardial infarction) (H)  --Patient had recent diagnosis of Takotsubo cardiomyopathy 1/7. with clean coronary arteries, presenting with another episode of chest pain.  She is on metoprolol and lisinopril, dose will be continued, will cycle her troponins, cardiology was notified about this patient in the ER and they suggested observing overnight and obtaining limited echocardiogram, that has been ordered will request cardiology consult  --We will get PT to evaluate patient, possibly discharge home if troponins remain stable and no new EKG changes.   --Her troponin is trending down to 0.2 which is stable    Hyperlipidemia LDL goal <130  --- Continue statin    Anxiety  --Takes sertraline, currently held this as  her QT is prolonged     GERD (gastroesophageal reflux disease)  --continue proton pump inhibitor       DVT Prophylaxis: Pneumatic Compression Devices  Code Status: Full Code    Disposition: Expected discharge in 24  hours    Ibis Estrella MD    Primary Care Physician   Ellis Ruiz    Chief Complaint   Chest pain 1 episode     History is obtained from the patient    History of Present Illness   Cecily Ivory is a 79 year old with a history of anxiety, arthritis,, chronic pain presents to the emergency department with complaints of left-sided chest pain.  She was recently admitted at [facility] on 1/7 and discharged on 1/8 for chest pain, at that time EKG did show nonspecific T wave abnormality, her troponins were elevated at 4.47, heparin drip was started,  echocardiogram showed moderate to severe anterior septal apical and distal inferior wall hypokinesis with the ejection fraction of 40 to 45% age, patient underwent coronary angiogram with clean coronaries, heparin drip was discontinued and she was diagnosed with a stress-induced cardiomyopathy, her medications were adjusted including adding lisinopril and metoprolol and the patient was discharged home.    At home patient did apparently well on the day of admission she had breakfast and was resting and suddenly noticed a short episode of left-sided chest pain mostly in the axillary area which is constant and achy and lasted less than a minute, patient does have a significant anxiety, she was started on sertraline for anxiety during her recent hospitalization on the eighth, she also had some petechial rash and bruises which remained the same.  Patient was also started on antibiotics for a UTI, urine culture is still pending.    When patient had the chest pain she called EMS and they had given 325 mg of aspirin in route to the ED her EKG showed diffuse T wave changes, this was shown to cardiology on-call and they suggested admitting the patient for cycling the troponins, a obtaining a limited echocardiogram and a cardiology consult.  Past Medical History    I have reviewed this patient's medical history and updated it with pertinent information if needed.   Past Medical History:   Diagnosis Date     Abdominal pain 2009, 2013    ct liver and renal cyst and 2mm lung nodule, repeat ct done 2013 and no significantly abnl.     Anxiety 2014    added med 3/14     Arthritis      ASCUS of cervix with negative high risk HPV 03/2018     Carotid bruit 2011    us nl     Gastritis 2011    egd done by [facility]     GERD (gastroesophageal reflux disease) 2011     History of colonoscopy 2004, 2014    nl     Hypercholesteremia 2000    stopped lovastatin 2015, added lipitor 3/16     Insomnia     using otc      Lumbar spinal stenosis 2016     Dr. Wilde, had epidural     Lung nodule 2009, 2013    seen on ct for abd pain, fu done 3/13 and 2 nodules stable and benign, one new one needs fu 1 year.  fu done 3/14 and fu 1 year and then done; fu done 3/15 and unchanged, no fu needed     Odynophagia 2004    egd nl     Other chronic pain      Peripheral neuropathies     Dr. Kim     Screening 2006, 2017    nl dexa     Vaginal dysplasia     chronic VAIN I, many colpos of vagina.  Now we only do an annual pap of vagina, no colpo since stable long term     Weight loss 07/2018    ct chest, abd and pelvis neg       Past Surgical History   I have reviewed this patient's surgical history and updated it with pertinent information if needed.  Past Surgical History:   Procedure Laterality Date     ANKLE SURGERY  2000     APPENDECTOMY  13     ARTHROPLASTY KNEE Left 5/14/2018    Procedure: ARTHROPLASTY KNEE;  Left total knee arthroplasty;  Surgeon: William Pollard MD;  Location: [facility] OR     BREAST BIOPSY, CORE RT/LT       C VAGINAL HYSTERECTOMY  1995     CATARACT IOL, RT/LT  04/2019     GYN SURGERY      BSO     [facility] UGI ENDOSCOPY, SIMPLE EXAM  03/15/11    [facility]     HYSTERECTOMY, PAP NO LONGER INDICATED       LAPAROSCOPIC CHOLECYSTECTOMY  2008     NOSE SURGERY  1990     right knee arthroscopy  2007     thumb surgery Left 2/2015       Prior to Admission Medications   Prior to Admission Medications   Prescriptions Last Dose Informant Patient Reported? Taking?   Ascorbic Acid (VITAMIN C PO)   Yes No   Sig: Take 500 mg by mouth daily.   Biotin 5000 MCG CAPS   Yes No   Sig: Take 1 tablet by mouth three times a week    CYANOCOBALAMIN PO   Yes No   Sig: Take 1,000 mcg by mouth daily   Carboxymethylcellulose Sodium (THERATEARS) 0.25 % SOLN   Yes No   Sig: Place 1 drop into both eyes every evening   Cholecalciferol (VITAMIN D3 PO)   Yes No   Sig: Take 1,000 Units by mouth daily    Multiple Vitamins-Minerals (CENTRUM SILVER) per tablet   Yes No   Sig: Take 1 tablet by  mouth daily   calcium carb 1250 mg, 500 mg [place],/vitamin D 200 units (OSCAL WITH D) 500-200 MG-UNIT per tablet   Yes No   Sig: Take 1 tablet by mouth 2 times daily (with meals)    cefuroxime (CEFTIN) 500 MG tablet   No No   Sig: Take 1 tablet (500 mg) by mouth 2 times daily for 7 days   lisinopril (PRINIVIL/ZESTRIL) 2.5 MG tablet   No No   Sig: Take 1 tablet (2.5 mg) by mouth daily   loratadine (CLARITIN) 10 MG tablet   Yes No   Sig: Take 10 mg by mouth daily as needed for allergies    magnesium 100 MG CAPS   Yes No   Sig: Take 1 tablet by mouth daily    metoprolol tartrate (LOPRESSOR) 25 MG tablet   No No   Sig: Take 0.5 tablets (12.5 mg) by mouth 2 times daily   sertraline (ZOLOFT) 25 MG tablet   No No   Sig: Take 1 tablet (25 mg) by mouth daily   simvastatin (ZOCOR) 20 MG tablet   No No   Sig: Take 1 tablet (20 mg) by mouth At Bedtime      Facility-Administered Medications: None     Allergies   Allergies   Allergen Reactions     Latex Cough     Seasonal Allergies      YEAR ROUND ALLERGIES     Sulfa Drugs Unknown       Social History   I have reviewed this patient's social history and updated it with pertinent information if needed. Cecily Ivory  reports that she quit smoking about 27 years ago. Her smoking use included cigarettes. She has a 30.00 pack-year smoking history. She has never used smokeless tobacco. She reports current alcohol use. She reports that she does not use drugs.    Family History   I have reviewed this patient's family history and updated it with pertinent information if needed.   Family History   Problem Relation Age of Onset     Hyperlipidemia Mother        Review of Systems   The 10 point Review of Systems is negative other than noted in the HPI or here.     Physical Exam   Temp: 98.3  F (36.8  C) Temp src: Oral BP: 108/62   Heart Rate: 64 Resp: 16 SpO2: 96 %      Vital Signs with Ranges  Temp:  [98.3  F (36.8  C)] 98.3  F (36.8  C)  Heart Rate:  [64] 64  Resp:  [16] 16  BP: (108)/(62)  108/62  SpO2:  [96 %] 96 %  0 lbs 0 oz    Constitutional: Awake, alert, cooperative, no apparent distress.  Eyes: Conjunctiva and pupils examined and normal.  HEENT: Moist mucous membranes, normal dentition.  Respiratory: Clear to auscultation bilaterally, no crackles or wheezing.  Cardiovascular: Regular rate and rhythm, normal S1 and S2, and no murmur noted.  GI: Soft, non-distended, non-tender, normal bowel sounds.  Lymph/Hematologic: No anterior cervical or supraclavicular adenopathy.  Skin: No rashes, no cyanosis, no edema.  Musculoskeletal: No joint swelling, erythema or tenderness.  Neurologic: Cranial nerves 2-12 intact, normal strength and sensation.  Psychiatric: Alert, oriented to person, place and time, no obvious anxiety or depression.    Data   Data reviewed today:  I personally reviewed ekg : Sinus rhythm, prolonged QT interval with diffuse T wave inversions mostly in the inferior and anterolateral leads.  Recent Labs   Lab 01/09/20  1306 01/08/20  0546 01/07/20  0626 01/07/20  0157 01/07/20  0122   WBC 7.2 7.8  --   --  12.6*   HGB 12.8 12.9  --   --  13.4   MCV 96 95  --   --  92    188  --   --  204    142  --  139  --    POTASSIUM 3.7 3.9  --  3.4  --    CHLORIDE 111* 112*  --  108  --    CO2 24 28  --  25  --    BUN 16 14  --  15  --    CR 0.60 0.66  --  0.61  --    ANIONGAP 6 2*  --  6  --    SUGAR 8.4* 8.4*  --  8.4*  --    GLC 99 115*  --  136*  --    ALBUMIN  --   --   --  3.2*  --    PROTTOTAL  --   --   --  6.5*  --    BILITOTAL  --   --   --  0.5  --    ALKPHOS  --   --   --  50  --    ALT  --   --   --  25  --    AST  --   --   --  28  --    LIPASE  --   --   --  85  --    TROPI 0.211*  --  2.958* 4.447*  --        Imaging:  Recent Results (from the past 24 hour(s))   XR Chest 2 Views    Narrative    CHEST TWO VIEWS   1/9/2020 2:16 PM     HISTORY: Chest pain.    COMPARISON: Chest x-ray on 1/7/2020      Impression    IMPRESSION: PA and lateral views of the chest were  obtained.  Cardiomediastinal silhouette is within normal limits. No suspicious  focal pulmonary opacities. Small left pleural effusion. No right  pleural effusion. No significant pneumothorax.    NATALIIA LACY MD

## 2020-01-09 NOTE — ED PROVIDER NOTES
History     Chief Complaint:  Chest Pain      HPI   Cecily Ivory is a 79 year old female who presents via EMS with chest pain. Per chart review the patient was here on 1/7 with chest pain admitted for NSTEMI, she was discharged on 1/8 with takotsubo. This morning the patient says that she woke up feeling weak. She says that she was able to eat breakfast, but after a while when she was just sitting down she had a sudden sharp pain in her upper left chest near her shoulder that lasted 5 minutes. EMS gave the patient 324 mg aspirin en route to the ED.  The patient denies any pain with changes in position. She denies any shortness of breath, nausea or vomiting, abdominal pain, back pain, arm or leg pain, numbness or tingling, or any lightheadedness or dizziness.     Allergies:  Latex  Seasonal allergies  Sulfa drugs      Medications:    Ceftin  Cyanocobalamin  Lisinopril  Claritin  Lopressor  Zoloft  Zocor      Past Medical History:    Dysphonia  Anxiety  Arthritis  Carotid bruit  Gastritis   GERD  Hypercholesterolemia  Insomnia  Lumbar spinal stenosis  Lung nodule  Odynophagia  Hearing loss  Other chronic pain  Peripheral neuropathies  Vaginal dysplasia  Weight loss  Degenerative arthritis of knee  NSTEMI     Past Surgical History:    Ankle surgery  Appendectomy  Left knee arthroplasty  Breast biopsy, core, bilateral  Vaginal hysterectomy  Cataract removal, bilateral  Gyn surgery, BSO  UGI endoscopy  Laparoscopic cholecystectomy  Nose surgery  Right knee arthroscopy  Thumb surgery, left    Family History:    Hyperlipidemia     Social History:  The patient was accompanied to the ED by daughter.  Smoking Status: Former Smoker  Smokeless Tobacco: Never Used  Alcohol Use: Positive  Drug Use: Negative  PCP: Ellis Ruiz   Marital Status:        Review of Systems   Respiratory: Negative for shortness of breath.    Cardiovascular: Positive for chest pain.   Gastrointestinal: Negative for abdominal pain,  nausea and vomiting.   Musculoskeletal: Negative for back pain and myalgias.   Neurological: Positive for weakness. Negative for dizziness, light-headedness and numbness.   All other systems reviewed and are negative.      Physical Exam     Patient Vitals for the past 24 hrs:   BP Temp Temp src Heart Rate Resp SpO2   01/09/20 1256 108/62 98.3  F (36.8  C) Oral 64 16 96 %   01/09/20 1255 -- 98.3  F (36.8  C) Oral -- -- --        Physical Exam  General: Resting on the bed.  Head: No obvious trauma to head.  Ears, Nose, Throat:  External ears normal.  Nose normal.     Eyes:  Conjunctivae clear.  Pupils are equal, round, and reactive.   Neck: Normal range of motion.  Neck supple.   CV: Regular rate and rhythm.  No murmurs.   2+ radial and DP pulses.  Tender left anterior superior chest wall to palpation    Respiratory: Effort normal and breath sounds normal.  No wheezing or crackles.   Gastrointestinal: Soft.  No distension. There is no tenderness.    Musculoskeletal: Non tender non edematous calves  Neuro: Alert. Moving all extremities appropriately.  Normal speech.    Skin: Skin is warm and dry.  No rash noted.     Emergency Department Course     ECG:  ECG taken at 1251, ECG read at 1333  Normal sinus rhythm  Possible left atrial enlargement  Left anterior fascicular block  T wave abnormality consider inferior ischemia  T wave abnormality consider anterolateral ischemia  Prolonged QT  Abnormal ECG  New diffuse T wave inversion  Rate 65 bpm. MN interval 138 ms. QRS duration 94 ms. QT/QTc 516/536 ms. P-R-T axes 62 -69 230.    Imaging:  Radiology findings were communicated with the patient who voiced understanding of the findings.    XR Chest 2 Views  PA and lateral views of the chest were obtained.  Cardiomediastinal silhouette is within normal limits. No suspicious  focal pulmonary opacities. Small left pleural effusion. No right  pleural effusion. No significant pneumothorax.  Reading per  radiology    Laboratory:  Laboratory findings were communicated with the patient who voiced understanding of the findings.    CBC: WBC 7.2, HGB 12.8,   BMP: chloride 111 (H), calcium 8.4 (L) o/w WNL (Creatinine 0.60)  Troponin (Collected 1306): 0.211 (HH)   Magnesium: 2.1    Emergency Department Course:    1303 Nursing notes and vitals reviewed.    1306 IV was inserted and blood was drawn for laboratory testing, results above.     1314 I performed an exam of the patient as documented above.     1416 The patient was sent for a XR while in the emergency department, results above.      1414 I spoke with Dr. Ronquillo of the cardiologist service regarding patient's presentation, findings, and plan of care.     1424 Patient rechecked and updated.      1430 I spoke with Dr. Estrella of the hospitalist service regarding patient's presentation, findings, and plan of care.      The patient is admitted into the care of Dr. Estrella.     Impression & Plan      Medical Decision Makin-year-old female with a recent history of tachycardia [place] cardiomyopathy presents with pricking chest pain.  Vital signs are reassuring.  Broad differential was pursued including but not limited to post cath complication, dissection, acute coronary syndrome, PE, pneumonia, pneumothorax, effusion, ACS, etc.  Overall patient's well-appearing nontoxic.  She is chest pain-free at this time.  CBC shows no leukocytosis or anemia.  BMP shows no acute electrolyte, metabolic, renal dysfunction.  Magnesium is normal.  Chest x-ray shows no acute pneumonia, pneumothorax etc.  Small pleural effusion but there is no large effusion noted.  Patient has symmetric pulses throughout, no tearing pain, no neurologic findings, stable cardiomediastinal silhouette, no indication of dissection.  She is pain-free at this time therefore limited concern for post cath complication.  EKG has diffuse T wave inversions that are new.  Troponin is improved at 0.211, this  is down from prior at 2.958.  Discussed with cardiology, unclear if EKG changes are evolving cardiomyopathy, concern with new inferior changes prompting further work up and obs admit.  Not concerning for ischemic changes as patient had recent negative cath.  They recommend repeat serial troponins, limited echocardiogram and cardiology consult. Patient is pain-free.  She will be admitted to the hospital service who graciously accepted.      Diagnosis:    ICD-10-CM    1. T wave inversion in EKG R94.31    2. Takotsubo cardiomyopathy I51.81    3. Elevated troponin R79.89      Disposition:   Findings and plan explained to the Patient who consents to admission. Discussed the patient with Dr. Estrella, who will admit the patient to a Observation bed for further monitoring, evaluation, and treatment.     Scribe Disclosure:  I, Dionicio West, am serving as a scribe at 1:06 PM on 1/9/2020 to document services personally performed by Hallie Payton MD based on my observations and the provider's statements to me.       EMERGENCY DEPARTMENT       Hallie Payton MD  01/09/20 1052

## 2020-01-09 NOTE — ED NOTES
[facility]  ED Nurse Handoff Report    ED Chief complaint: Chest Pain      ED Diagnosis:   Final diagnoses:   T wave inversion in EKG   Takotsubo cardiomyopathy   Elevated troponin       Code Status: Full Code    Allergies:   Allergies   Allergen Reactions     Latex Cough     Seasonal Allergies      YEAR ROUND ALLERGIES     Sulfa Drugs Unknown       Patient Story: Pt discharged from hospital on 1/8 for dx of takotsubo comes in today for a sudden onset left upper chest pain/shoulder pain that has resolved.   Focused Assessment:  Resp:WNL Cardiac: SR on monitor, t-wave inversion. Denies chest pain. Neuro:WNL    Results for orders placed or performed during the hospital encounter of 01/09/20 (from the past 24 hour(s))   EKG 12-lead, tracing only   Result Value Ref Range    Interpretation ECG Click View Image link to view waveform and result    CBC with platelets differential   Result Value Ref Range    WBC 7.2 4.0 - 11.0 10e9/L    RBC Count 4.15 3.8 - 5.2 10e12/L    Hemoglobin 12.8 11.7 - 15.7 g/dL    Hematocrit 39.8 35.0 - 47.0 %    MCV 96 78 - 100 fl    MCH 30.8 26.5 - 33.0 pg    MCHC 32.2 31.5 - 36.5 g/dL    RDW 13.4 10.0 - 15.0 %    Platelet Count 203 150 - 450 10e9/L    Diff Method Automated Method     % Neutrophils 69.6 %    % Lymphocytes 21.7 %    % Monocytes 8.1 %    % Eosinophils 0.4 %    % Basophils 0.1 %    % Immature Granulocytes 0.1 %    Nucleated RBCs 0 0 /100    Absolute Neutrophil 5.0 1.6 - 8.3 10e9/L    Absolute Lymphocytes 1.6 0.8 - 5.3 10e9/L    Absolute Monocytes 0.6 0.0 - 1.3 10e9/L    Absolute Eosinophils 0.0 0.0 - 0.7 10e9/L    Absolute Basophils 0.0 0.0 - 0.2 10e9/L    Abs Immature Granulocytes 0.0 0 - 0.4 10e9/L    Absolute Nucleated RBC 0.0    Basic metabolic panel   Result Value Ref Range    Sodium 141 133 - 144 mmol/L    Potassium 3.7 3.4 - 5.3 mmol/L    Chloride 111 (H) 94 - 109 mmol/L    Carbon Dioxide 24 20 - 32 mmol/L    Anion Gap 6 3 - 14 mmol/L    Glucose 99 70 - 99  mg/dL    Urea Nitrogen 16 7 - 30 mg/dL    Creatinine 0.60 0.52 - 1.04 mg/dL    GFR Estimate 88 >60 mL/min/[1.73_m2]    GFR Estimate If Black >90 >60 mL/min/[1.73_m2]    Calcium 8.4 (L) 8.5 - 10.1 mg/dL   Troponin I   Result Value Ref Range    Troponin I ES 0.211 (HH) 0.000 - 0.045 ug/L   Magnesium   Result Value Ref Range    Magnesium 2.1 1.6 - 2.3 mg/dL   XR Chest 2 Views    Narrative    CHEST TWO VIEWS   1/9/2020 2:16 PM     HISTORY: Chest pain.    COMPARISON: Chest x-ray on 1/7/2020      Impression    IMPRESSION: PA and lateral views of the chest were obtained.  Cardiomediastinal silhouette is within normal limits. No suspicious  focal pulmonary opacities. Small left pleural effusion. No right  pleural effusion. No significant pneumothorax.    NATALIIA LACY MD         Treatments and/or interventions provided: none  Patient's response to treatments and/or interventions:     To be done/followed up on inpatient unit:      Does this patient have any cognitive concerns?: none    Activity level - Baseline/Home:  Independent  Activity Level - Current:   Independent    Patient's Preferred language: English   Needed?: No    Isolation: None  Infection: Not Applicable  Bariatric?: No    Vital Signs:   Vitals:    01/09/20 1256 01/09/20 1445 01/09/20 1600 01/09/20 1700   BP: 108/62 134/66 126/67 124/47   Pulse:    74   Resp: 16 12 15 20   Temp: 98.3  F (36.8  C)      TempSrc: Oral      SpO2: 96% 95% 96% 97%       Cardiac Rhythm:     Was the PSS-3 completed:   Yes  What interventions are required if any?               Family Comments: friend at bedside  OBS brochure/video discussed/provided to patient/family: Yes              Name of person given brochure if not patient:               Relationship to patient:     For the majority of the shift this patient's behavior was Green.   Behavioral interventions performed were .    ED NURSE PHONE NUMBER:

## 2020-01-09 NOTE — TELEPHONE ENCOUNTER
Chief Complaint: Nstemi (Non-St Elevated Myocardial Infarction) (H), Takotsubo Cardiomyopathy,WED 08-JAN-2020 ,ed/ip 0 / 1    [phone] (home)

## 2020-01-09 NOTE — TELEPHONE ENCOUNTER
Yes this is not clear when I reviewed the discharge summary.  Please continue whichever statin she has and then discuss with cardiology at her appt next week.

## 2020-01-09 NOTE — PLAN OF CARE
Cardiac Rehab Discharge Summary    Reason for therapy discharge:    Discharged to home.    Progress towards therapy goal(s). See goals on Care Plan in [facility] electronic health record for goal details.  Goals not met.  Barriers to achieving goals:   discharge on same date as initial evaluation.    Therapy recommendation(s):    Continue home exercise program.

## 2020-01-10 ENCOUNTER — PATIENT OUTREACH (OUTPATIENT)
Dept: CARE COORDINATION | Facility: CLINIC | Age: 80
End: 2020-01-10

## 2020-01-10 ENCOUNTER — APPOINTMENT (OUTPATIENT)
Dept: CARDIOLOGY | Facility: CLINIC | Age: 80
End: 2020-01-10
Attending: INTERNAL MEDICINE
Payer: COMMERCIAL

## 2020-01-10 VITALS
BODY MASS INDEX: 23.35 KG/M2 | RESPIRATION RATE: 18 BRPM | SYSTOLIC BLOOD PRESSURE: 111 MMHG | DIASTOLIC BLOOD PRESSURE: 53 MMHG | OXYGEN SATURATION: 92 % | HEIGHT: 67 IN | WEIGHT: 148.8 LBS | TEMPERATURE: 98.4 F | HEART RATE: 74 BPM

## 2020-01-10 DIAGNOSIS — I21.4 NSTEMI (NON-ST ELEVATED MYOCARDIAL INFARCTION) (H): Primary | ICD-10-CM

## 2020-01-10 LAB
BACTERIA SPEC CULT: ABNORMAL
Lab: ABNORMAL
SPECIMEN SOURCE: ABNORMAL

## 2020-01-10 PROCEDURE — 25000132 ZZH RX MED GY IP 250 OP 250 PS 637: Performed by: INTERNAL MEDICINE

## 2020-01-10 PROCEDURE — 93308 TTE F-UP OR LMTD: CPT | Mod: 26 | Performed by: INTERNAL MEDICINE

## 2020-01-10 PROCEDURE — 99217 ZZC OBSERVATION CARE DISCHARGE: CPT | Performed by: PHYSICIAN ASSISTANT

## 2020-01-10 PROCEDURE — G0378 HOSPITAL OBSERVATION PER HR: HCPCS

## 2020-01-10 PROCEDURE — 25500064 ZZH RX 255 OP 636: Performed by: INTERNAL MEDICINE

## 2020-01-10 PROCEDURE — 93321 DOPPLER ECHO F-UP/LMTD STD: CPT | Mod: 26 | Performed by: INTERNAL MEDICINE

## 2020-01-10 PROCEDURE — 40000893 ZZH STATISTIC PT IP EVAL DEFER: Performed by: PHYSICAL THERAPIST

## 2020-01-10 PROCEDURE — 93325 DOPPLER ECHO COLOR FLOW MAPG: CPT | Mod: 26 | Performed by: INTERNAL MEDICINE

## 2020-01-10 PROCEDURE — 99213 OFFICE O/P EST LOW 20 MIN: CPT | Performed by: INTERNAL MEDICINE

## 2020-01-10 PROCEDURE — 93321 DOPPLER ECHO F-UP/LMTD STD: CPT

## 2020-01-10 RX ORDER — METOPROLOL SUCCINATE 25 MG/1
25 TABLET, EXTENDED RELEASE ORAL DAILY
Status: DISCONTINUED | OUTPATIENT
Start: 2020-01-10 | End: 2020-01-10 | Stop reason: HOSPADM

## 2020-01-10 RX ORDER — METOPROLOL SUCCINATE 25 MG/1
25 TABLET, EXTENDED RELEASE ORAL DAILY
Status: DISCONTINUED | OUTPATIENT
Start: 2020-01-10 | End: 2020-01-10

## 2020-01-10 RX ORDER — METOPROLOL TARTRATE 25 MG/1
25 TABLET, FILM COATED ORAL 2 TIMES DAILY
Qty: 60 TABLET | Refills: 0 | Status: SHIPPED | OUTPATIENT
Start: 2020-01-10 | End: 2020-01-14

## 2020-01-10 RX ADMIN — METOPROLOL SUCCINATE 25 MG: 25 TABLET, EXTENDED RELEASE ORAL at 14:30

## 2020-01-10 RX ADMIN — CEFUROXIME AXETIL 500 MG: 500 TABLET ORAL at 09:01

## 2020-01-10 RX ADMIN — HUMAN ALBUMIN MICROSPHERES AND PERFLUTREN 9 ML: 10; .22 INJECTION, SOLUTION INTRAVENOUS at 08:46

## 2020-01-10 RX ADMIN — ACETAMINOPHEN: 325 TABLET, FILM COATED ORAL at 00:34

## 2020-01-10 RX ADMIN — ASPIRIN 81 MG: 81 TABLET, DELAYED RELEASE ORAL at 09:01

## 2020-01-10 NOTE — PHARMACY
"The following home medications were NOT continued on inpatient admission per \"Discontinuation of nonessential home medications during hospitalization\" policy: Magnesium supplement    If a therapeutic holiday is deemed inappropriate per the prescriber, please notify the pharmacist regarding the medication order.    The pharmacist is available to answer any questions and/or concerns the patient may have regarding discontinuation of non-essential medications.    Please ensure that these medications are restarted as needed upon discharge via the medication reconciliation discharge process and included on the discharge medication reconciliation report.    Thank you,  Shania Estes, [facility]    "

## 2020-01-10 NOTE — PROGRESS NOTES
Pt a&o, up indep in room, ate well at dinner, denies pain.  Pt states no pain since arrival but understands being admitted to obs.  Pt on abx for uti, medication given from home with RX approval.  Pt trops elevated but decreasing, md paged with FYI of abnormal.  Pt to have echo in am and cards to follow.  Continue to monitor and POC

## 2020-01-10 NOTE — PLAN OF CARE
Observation goals PRIOR TO DISCHARGE     Comments: List all goals to be met before discharge home:     - Serial troponins and stress test complete- Not met    - Seen and cleared by consultant if applicable -No, cardio consult pending    - Adequate pain control on oral analgesia -Met, denies any pain    - Vital signs normal or at patient baseline -Met    - Safe disposition plan has been identified -Not met    - Nurse to notify provider when observation goals have been met and patient is ready for discharge.

## 2020-01-10 NOTE — PLAN OF CARE
Discharge Planner PT   Patient plan for discharge: return home  Current status: PT: Order received; Per chart review and conversation with nurse/medical team during Obs rounds, patient has no current IP PT needs; Up independently in room; O x 4. Order completed.  Barriers to return to prior living situation: none anticipated  Recommendations for discharge: defer to medical team  Rationale for recommendations: Patient has returned to baseline mobility; no PT needs identified at this time.       Entered by: Missy Zendejas 01/10/2020 10:16 AM

## 2020-01-10 NOTE — PROGRESS NOTES
Echo is unchanged but wall motion already improving.  Can be discharged from Cardiology standpoint if she is ambulating without symptoms.  Follow-up has been arranged.

## 2020-01-10 NOTE — PLAN OF CARE
Observation goals PRIOR TO DISCHARGE    Comments: List all goals to be met before discharge home:     - Serial troponins and stress test complete: Not met    - Seen and cleared by consultant if applicable : Not met    - Adequate pain control on oral analgesia : Partially met    - Vital signs normal or at patient baseline : Not met    - Safe disposition plan has been identified : Yes

## 2020-01-10 NOTE — PLAN OF CARE
PRIMARY DIAGNOSIS: CHEST PAIN  OUTPATIENT/OBSERVATION GOALS TO BE MET BEFORE DISCHARGE:  1. Ruled out acute coronary syndrome (negative or stable Troponin):  No  2. Pain Status: Pain free.  3. Appropriate provocative testing performed: No  - Stress Test Procedure: Echo  - Interpretation of cardiac rhythm per telemetry tech: SR    4. Cleared by Consultants (if applicable):No  5. Return to near baseline physical activity: Yes  Discharge Planner Nurse   Safe discharge environment identified: No  Barriers to discharge: Yes       Entered by: Opal Gooden 01/09/2020 8:34 PM     Please review provider order for any additional goals.   Nurse to notify provider when observation goals have been met and patient is ready for discharge.

## 2020-01-10 NOTE — PROVIDER NOTIFICATION
MD KAREN nowak paged:    pt trop came back elevated but decreasing and N Terminal PRO BNP elevated. Out of normal range so page sent.  LAURAI page

## 2020-01-10 NOTE — PROGRESS NOTES
Clinic Care Coordination Contact    Situation: Patient chart reviewed by care coordinator.    Background: Triage CC referral below :     Reason for Referral: Care Transition: Recent Dx of NSTEMI    Additional pertinent details:  New medications    Assessment: Patient admitted to the hospital yesterday T wave inversion in EKG    Plan/Recommendations: CC will follow up when discharged from the hospital     [facility]     María Castillo RN Care Coordinator   [facility]   Phone: [phone]  Email :  [email or web address]

## 2020-01-10 NOTE — PLAN OF CARE
Pt discharging home at this time accompanied by pt's daughter. AVS, home meds and education given. Questions answered. Pt is missing her Insurance card, [facility] called the ED but in vain. Pt relation number given to pt and pt's daughter. Pt discharging home with all her belongings.

## 2020-01-10 NOTE — PLAN OF CARE
Observation goals PRIOR TO DISCHARGE     Comments: List all goals to be met before discharge home:     - Serial troponins and stress test complete- Not met    - Seen and cleared by consultant if applicable -No, cardio consult pending    - Adequate pain control on oral analgesia -Met, denies any pain    - Vital signs normal or at patient baseline -Met    - Safe disposition plan has been identified -Not met    - Nurse to notify provider when observation goals have been met and patient is ready for discharge.        Pt is A&OX3, disoriented to situation and forgetful at time, soft B.P otherwise VSS on RA, denies any pain, Up with SBA/ind, amb to bathroom and voiding O.K.Tolerating cardiac diet, no caffeine,tele-SB, trops elevated but trending down, MD aware.Swelling on top of lt foot noted.Uses own meds while here.Plan is cardio and PT consults and echo today.PIV is s/l on rt AC.

## 2020-01-10 NOTE — PROVIDER NOTIFICATION
MD JONES Paged:    OBS 14 MP requesting medication to help her sleep.  at home she would take 2 Tylenol pm as one does not work.

## 2020-01-10 NOTE — PLAN OF CARE
Observation goals PRIOR TO DISCHARGE    Comments: List all goals to be met before discharge home:      - Serial troponins and stress test complete: Met     - Seen and cleared by consultant if applicable : Not met     - Adequate pain control on oral analgesia : Met     - Vital signs normal or at patient baseline : Met     - Safe disposition plan has been identified : Yes

## 2020-01-10 NOTE — DISCHARGE SUMMARY
[facility]    Discharge Summary  Hospitalist    Date of Admission:  1/9/2020  Date of Discharge:  1/10/2020  Discharging Provider: Nicola Spears PA-C    Discharge Diagnoses      T wave inversion in EKG  Takotsubo cardiomyopathy  Elevated troponin  NSTEMI (non-ST elevated myocardial infarction) (H)    History of Present Illness   Cecily Ivory is an 79 year old female who presented with chest pain.    HPI from admission H&P:  Cecily Ivory is a 79 year old with a history of anxiety, arthritis,, chronic pain presents to the emergency department with complaints of left-sided chest pain.  She was recently admitted at [facility] on 1/7 and discharged on 1/8 for chest pain, at that time EKG did show nonspecific T wave abnormality, her troponins were elevated at 4.47, heparin drip was started, echocardiogram showed moderate to severe anterior septal apical and distal inferior wall hypokinesis with the ejection fraction of 40 to 45% age, patient underwent coronary angiogram with clean coronaries, heparin drip was discontinued and she was diagnosed with a stress-induced cardiomyopathy, her medications were adjusted including adding lisinopril and metoprolol and the patient was discharged home.     At home patient did apparently well on the day of admission she had breakfast and was resting and suddenly noticed a short episode of left-sided chest pain mostly in the axillary area which is constant and achy and lasted less than a minute, patient does have a significant anxiety, she was started on sertraline for anxiety during her recent hospitalization on the eighth, she also had some petechial rash and bruises which remained the same.  Patient was also started on antibiotics for a UTI, urine culture is still pending.     When patient had the chest pain she called EMS and they had given 325 mg of aspirin in route to the ED her EKG showed diffuse T wave changes, this was shown to cardiology  on-call and they suggested admitting the patient for cycling the troponins, a obtaining a limited echocardiogram and a cardiology consult.    Hospital Course   Cecily Ivory was admitted on 1/9/2020.  The following problems were addressed during her hospitalization:    Stress-induced cardiomyopathy  Intercostal pain  NSTEMI  Patient had recent diagnosis of Takotsubo cardiomyopathy 1/7. Angio that admission with clean coronary arteries, presenting with another episode of chest pain. EKG nonischemic. She was admitted under observation status. Serial troponins elevated but markedly improved from earlier in week when diagnosed with takutsobu CM. Pt was chest pain free following admission. Limited TTE indicated ongoing reduced EF with persistent but improved WMAs. She was seen by Cardiology, lisinopril discontinued and metoprolol increased. She is to follow-up with Cardiology as scheduled.    Chronic medical conditions that remained stable:  Hyperlipidemia LDL goal <130  Anxiety  GERD     Nicola Spears PA-C    Significant Results and Procedures   As noted    Pending Results   These results will be followed up by myself  Unresulted Labs Ordered in the Past 30 Days of this Admission     Date and Time Order Name Status Description    1/8/2020 1400 Urine Culture Aerobic Bacterial Preliminary           Code Status   Full Code       Primary Care Physician   Ellis Ruiz    Physical Exam   Temp: 98.4  F (36.9  C) Temp src: Oral BP: 111/53 Pulse: 74 Heart Rate: 79 Resp: 18 SpO2: 92 % O2 Device: None (Room air)    Vitals:    01/09/20 1837   Weight: 67.5 kg (148 lb 12.8 oz)     Vital Signs with Ranges  Temp:  [97  F (36.1  C)-99.6  F (37.6  C)] 98.4  F (36.9  C)  Pulse:  [74] 74  Heart Rate:  [63-82] 79  Resp:  [12-20] 18  BP: (102-139)/(43-67) 111/53  SpO2:  [92 %-97 %] 92 %  I/O last 3 completed shifts:  In: 250 [P.O.:250]  Out: -     GEN: well-developed, well-nourished, appears comfortable  PULM: lungs CTA bilaterally, no  increased work of breathing, no wheeze, rales, rhonchi  CV: RRR, S1 & S2, no murmur  GI: soft, nontender, nondistended, no guarding or rigidity, +BS in all 4 quadrants  SKIN: warm & dry without rash, wound, or pedal edema, no bruising or bleeding    Discharge Disposition   Discharged to home  Condition at discharge: Stable    Consultations This Hospital Stay   CARDIOLOGY IP CONSULT  PHYSICAL THERAPY ADULT IP CONSULT    Time Spent on this Encounter   I, Nicola Spears PA-C, personally saw the patient today and spent greater than 30 minutes discharging this patient.    Discharge Orders      Reason for your hospital stay    Chest pain, not likely cardiac related. Your echo looks improved from prior, and you are encouraged to participate in regular walking activities and follow-up with cardiology as scheduled.     Follow-up and recommended labs and tests     Follow up with primary care provider, Ellis Ruiz, within 2-4 weeks, for hospital follow- up.     Activity    Your activity upon discharge: activity as tolerated     Diet    Follow this diet upon discharge: Orders Placed This Encounter      Combination Diet Low Saturated Fat Na <2400mg Diet, No Caffeine Diet     Discharge Medications   Current Discharge Medication List      CONTINUE these medications which have CHANGED    Details   metoprolol tartrate (LOPRESSOR) 25 MG tablet Take 1 tablet (25 mg) by mouth 2 times daily  Qty: 60 tablet, Refills: 0    Associated Diagnoses: NSTEMI (non-ST elevated myocardial infarction) (H)         CONTINUE these medications which have NOT CHANGED    Details   Ascorbic Acid (VITAMIN C PO) Take 500 mg by mouth daily.      Biotin 5000 MCG CAPS Take 1 tablet by mouth three times a week       calcium carb 1250 mg, 500 mg [place],/vitamin D 200 units (OSCAL WITH D) 500-200 MG-UNIT per tablet Take 1 tablet by mouth 2 times daily (with meals)   Qty: 100 tablet, Refills: 3      Carboxymethylcellulose Sodium (THERATEARS) 0.25 % SOLN Place 1 drop  into both eyes every evening      cefuroxime (CEFTIN) 500 MG tablet Take 1 tablet (500 mg) by mouth 2 times daily for 7 days  Qty: 14 tablet, Refills: 0    Associated Diagnoses: Urinary tract infection without hematuria, site unspecified      Cholecalciferol (VITAMIN D3 PO) Take 1,000 Units by mouth daily       CYANOCOBALAMIN PO Take 1,000 mcg by mouth daily      loratadine (CLARITIN) 10 MG tablet Take 10 mg by mouth daily as needed for allergies       magnesium 100 MG CAPS Take 1 tablet by mouth daily       Multiple Vitamins-Minerals (CENTRUM SILVER) per tablet Take 1 tablet by mouth daily      sertraline (ZOLOFT) 25 MG tablet Take 1 tablet (25 mg) by mouth daily  Qty: 30 tablet, Refills: 0    Associated Diagnoses: Anxiety      simvastatin (ZOCOR) 20 MG tablet Take 1 tablet (20 mg) by mouth At Bedtime  Qty: 30 tablet, Refills: 0    Associated Diagnoses: NSTEMI (non-ST elevated myocardial infarction) (H)         STOP taking these medications       lisinopril (PRINIVIL/ZESTRIL) 2.5 MG tablet Comments:   Reason for Stopping:             Allergies   Allergies   Allergen Reactions     Latex Cough     Seasonal Allergies      YEAR ROUND ALLERGIES     Sulfa Drugs Unknown     Data   Most Recent 3 CBC's:  Recent Labs   Lab Test 01/09/20  1306 01/08/20  0546 01/07/20  0122   WBC 7.2 7.8 12.6*   HGB 12.8 12.9 13.4   MCV 96 95 92    188 204      Most Recent 3 BMP's:  Recent Labs   Lab Test 01/09/20  1306 01/08/20  0546 01/07/20  0157    142 139   POTASSIUM 3.7 3.9 3.4   CHLORIDE 111* 112* 108   CO2 24 28 25   BUN 16 14 15   CR 0.60 0.66 0.61   ANIONGAP 6 2* 6   SUGAR 8.4* 8.4* 8.4*   GLC 99 115* 136*     Most Recent 2 LFT's:  Recent Labs   Lab Test 01/07/20  0157 04/04/19  0847   AST 28 26   ALT 25 27   ALKPHOS 50 69   BILITOTAL 0.5 0.2     Most Recent INR's and Anticoagulation Dosing History:  Anticoagulation Dose History     Recent Dosing and Labs Latest Ref Rng & Units 5/14/2018 5/15/2018 5/16/2018 5/17/2018     Warfarin 5 mg - 5 mg 5 mg 5 mg 5 mg    INR 0.86 - 1.14 1.06 1.12 1.34(H) 1.49(H)        Most Recent 3 Troponin's:  Recent Labs   Lab Test 20  2131 20  1852 20  1306   TROPI 0.193* 0.197* 0.211*     Most Recent Cholesterol Panel:  Recent Labs   Lab Test 20  0546   CHOL 185   LDL 74   HDL 91   TRIG 98     Most Recent 6 Bacteria Isolates From Any Culture (See EPIC Reports for Culture Details):  Recent Labs   Lab Test 20  1400 13  1258 12  0915   CULT >100,000 colonies/mL  Escherichia coli  Susceptibility testing in progress  *  50,000 to 100,000 colonies/mL  Strain 2  Escherichia coli  Susceptibility testing in progress  *  Plus  10,000 to 50,000 colonies/mL  mixed urogenital jane  Susceptibility testing not routinely done   <10,000 colonies/mL Lactose fermenting gram negative rods No further  identification Susceptibility testing not routinely done 10 to 50,000 colonies/mL Escherichia coli     Most Recent TSH, T4 and A1c Labs:  Recent Labs   Lab Test 18  1414   TSH 1.05   A1C 5.3     Results for orders placed or performed during the hospital encounter of 20   XR Chest 2 Views    Narrative    CHEST TWO VIEWS   2020 2:16 PM     HISTORY: Chest pain.    COMPARISON: Chest x-ray on 2020      Impression    IMPRESSION: PA and lateral views of the chest were obtained.  Cardiomediastinal silhouette is within normal limits. No suspicious  focal pulmonary opacities. Small left pleural effusion. No right  pleural effusion. No significant pneumothorax.    NATALIIA LACY MD   Echocardiogram Limited    Narrative    [id number]  [id number]  [id number]  [id number]^FAREED^[facility]  Echocardiography Laboratory  [facility] [place] [address]        Name: REID PERDUE  MRN: [id number]  : 1940  Study Date: 01/10/2020 08:05 AM  Age: 79 yrs  Gender: Female  Patient Location: [facility]  Reason For Study: Cardiomyopathy  Ordering  Physician: MAKENZIE GUERRIER  Referring Physician: Ellis Ruiz  Performed By: Eileen Lorenzana     BSA: 1.8 m2  Height: 67 in  Weight: 148 lb  HR: 65  _____________________________________________________________________________  __        Procedure  Limited Portable Echo Adult. Optison (NDC #3776-8062) given intravenously.  _____________________________________________________________________________  __        Interpretation Summary     1. The left ventricle is normal in size. The visual ejection fraction is  estimated at 40%. Distal inferior, distal anteroseptal, and apical  hypokinesis. Base contracts well.  2. Normal RV.  3. There is mild to moderate (1-2+) aortic regurgitation.     Echo from 1-7-20 showed EF 40-45% with near akinesis of the distal LV. This is  likely a stress cardiomyopathy pattern, some interval improvement on current  echo, wall motion abnormalities are not as severe.  _____________________________________________________________________________  __        Left Ventricle  The left ventricle is normal in size. The visual ejection fraction is  estimated at 40%. Distal inferior, distal anteroseptal, and apical  hypokinesis. Base contracts well.     Right Ventricle  The right ventricle is normal in structure, function and size.     Mitral Valve  The mitral valve is normal in structure and function.     Tricuspid Valve  There is mild (1+) tricuspid regurgitation.        Aortic Valve  There is mild to moderate (1-2+) aortic regurgitation.     Pericardium  There is no pericardial effusion.     Rhythm  Sinus rhythm was noted.  _____________________________________________________________________________  __                                Report approved by: Camille Wooten 01/10/2020 10:01 AM                    _____________________________________________________________________________  __
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
## 2020-01-14 ENCOUNTER — TELEPHONE (OUTPATIENT)
Dept: CARDIOLOGY | Facility: CLINIC | Age: 80
End: 2020-01-14

## 2020-01-14 ENCOUNTER — OFFICE VISIT (OUTPATIENT)
Dept: CARDIOLOGY | Facility: CLINIC | Age: 80
End: 2020-01-14
Payer: COMMERCIAL

## 2020-01-14 VITALS
WEIGHT: 150.3 LBS | DIASTOLIC BLOOD PRESSURE: 70 MMHG | SYSTOLIC BLOOD PRESSURE: 118 MMHG | BODY MASS INDEX: 23.59 KG/M2 | HEART RATE: 60 BPM | HEIGHT: 67 IN

## 2020-01-14 DIAGNOSIS — I51.81 TAKOTSUBO CARDIOMYOPATHY: ICD-10-CM

## 2020-01-14 LAB
ANION GAP SERPL CALCULATED.3IONS-SCNC: 10.1 MMOL/L (ref 6–17)
BUN SERPL-MCNC: 15 MG/DL (ref 7–30)
CALCIUM SERPL-MCNC: 9.5 MG/DL (ref 8.5–10.5)
CHLORIDE SERPL-SCNC: 105 MMOL/L (ref 98–107)
CO2 SERPL-SCNC: 30 MMOL/L (ref 23–29)
CREAT SERPL-MCNC: 0.82 MG/DL (ref 0.7–1.3)
GFR SERPL CREATININE-BSD FRML MDRD: 68 ML/MIN/{1.73_M2}
GLUCOSE SERPL-MCNC: 99 MG/DL (ref 70–105)
POTASSIUM SERPL-SCNC: 4.1 MMOL/L (ref 3.5–5.1)
SODIUM SERPL-SCNC: 141 MMOL/L (ref 136–145)

## 2020-01-14 PROCEDURE — 80048 BASIC METABOLIC PNL TOTAL CA: CPT | Performed by: PHYSICIAN ASSISTANT

## 2020-01-14 PROCEDURE — 36415 COLL VENOUS BLD VENIPUNCTURE: CPT | Performed by: PHYSICIAN ASSISTANT

## 2020-01-14 PROCEDURE — 99214 OFFICE O/P EST MOD 30 MIN: CPT | Performed by: PHYSICIAN ASSISTANT

## 2020-01-14 RX ORDER — METOPROLOL SUCCINATE 25 MG/1
25 TABLET, EXTENDED RELEASE ORAL DAILY
Qty: 90 TABLET | Refills: 1 | Status: SHIPPED | OUTPATIENT
Start: 2020-01-14 | End: 2020-05-19

## 2020-01-14 RX ORDER — LOVASTATIN 40 MG
40 TABLET ORAL AT BEDTIME
COMMUNITY
Start: 2020-01-14 | End: 2020-04-24

## 2020-01-14 ASSESSMENT — MIFFLIN-ST. JEOR: SCORE: 1199.39

## 2020-01-14 NOTE — PATIENT INSTRUCTIONS
Today's Plan:   - Please stop the medication metoprolol / Lopressor (short-acting version) which you are taking 25 mg twice daily, and start the medication Toprol XL (long-acting version) at 25 mg once daily.   - You can go back to your old cholesterol medication, Lovastatin 40 mg daily.  - Return to see me with another echocardiogram prior in 1 month.   - Advance your activity as you are able.     If you have questions or concerns please call my nurse team at [phone].  Scheduling phone number: [phone]  Reminder: Please bring in all current medications, over the counter supplements and vitamin bottles to your next appointment.    It was a pleasure seeing you today!     Deya Dow PA-C
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
## 2020-01-14 NOTE — TELEPHONE ENCOUNTER
Patient called and wanted to confirm medications she should be taking and changes that were made today. RN reviewed changes that were made today and confirmed the rest of her medications and doses that were on her med list. Patient had no further questions at this time and will  her new rx's today at [facility] pharmacy. Patient will call clinic with any further questions/concerns.     1/14/20 OV JACOB Deya  In summary, Cecily Ivory presents today for f/u takotsubo cardiomyopathy.      1. Takotsubo CMP, EF ~ 40% - unclear trigger. On Lopressor. Euvolemic.  2. Anxiety, recently initiated on sertraline.  3. Hyperlipidemia, LDL 74 on low-intensity statin for primary prevention - PCP managing.  4. Mild-moderate AI     Plan:  - Stop Lopressor, start Toprol 25 daily.   - Encouraged activity advancement as able.      Follow-up:  - Repeat TTE in 1 month, follow up with me thereafter.   - Dr. Giron in 3 months.

## 2020-01-15 ENCOUNTER — PATIENT OUTREACH (OUTPATIENT)
Dept: CARE COORDINATION | Facility: CLINIC | Age: 80
End: 2020-01-15

## 2020-01-15 DIAGNOSIS — I21.4 NSTEMI (NON-ST ELEVATED MYOCARDIAL INFARCTION) (H): Primary | ICD-10-CM

## 2020-01-15 NOTE — PROGRESS NOTES
Patient returned call and she left a message on CC VM that she will be at Dr Ruiz's appointment 1/17    CC will follow up after PCP appointment 1/17/2020     [facility]     María Castillo  RN Care Coordinator   [facility] / [facility] -[facility]   Phone: [phone]  Email :  [email or web address]

## 2020-01-15 NOTE — PROGRESS NOTES
Clinic Care Coordination Contact  [facility]/Voicemail    Referral Source: Care Team  Hospital admission for observation 1/9-1/10/2020-  Chest pain Most likely not cardiac related Echo improved from prior   Encourage regular walking follow up with Cardiologist   Clinical Data: Care Coordinator Outreach  Outreach attempted x 1.  Left message on patient's voicemail with call back information and requested return call.  Plan: . Care Coordinator will try to reach patient again in 1-2 business days.    [facility]     María Castillo  RN Care Coordinator   [facility] / [facility]   Phone: [phone]  Email :  [email or web address]

## 2020-01-17 ENCOUNTER — OFFICE VISIT (OUTPATIENT)
Dept: FAMILY MEDICINE | Facility: CLINIC | Age: 80
End: 2020-01-17
Payer: COMMERCIAL

## 2020-01-17 VITALS
TEMPERATURE: 98.7 F | WEIGHT: 151 LBS | BODY MASS INDEX: 23.7 KG/M2 | DIASTOLIC BLOOD PRESSURE: 64 MMHG | OXYGEN SATURATION: 93 % | HEART RATE: 56 BPM | SYSTOLIC BLOOD PRESSURE: 102 MMHG | HEIGHT: 67 IN

## 2020-01-17 DIAGNOSIS — I51.81 TAKOTSUBO CARDIOMYOPATHY: ICD-10-CM

## 2020-01-17 DIAGNOSIS — M79.672 LEFT FOOT PAIN: Primary | ICD-10-CM

## 2020-01-17 PROCEDURE — 99214 OFFICE O/P EST MOD 30 MIN: CPT | Performed by: INTERNAL MEDICINE

## 2020-01-17 ASSESSMENT — MIFFLIN-ST. JEOR: SCORE: 1202.56

## 2020-01-17 NOTE — PROGRESS NOTES
Subjective     Cecily Ivory is a 79 year old female who presents to clinic today for the following health issues:    HPI       Hospital Follow-up Visit:    Hospital/Nursing Home/IP Rehab Facility: 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  Follow up needed: echo in 1 month  Other Healthcare Providers Involved in Patient s Care:         Specialist appointment - cards end of Jan  Update since discharge: improved.     Post Discharge Medication Reconciliation: discharge medications reconciled, continue medications without change.  Plan of care communicated with patient     Coding guidelines for this visit:  Type of Medical   Decision Making Face-to-Face Visit       within 7 Days of discharge Face-to-Face Visit        within 14 days of discharge   Moderate Complexity 00662 [id number]   High Complexity 08533 02202          The patient presents for hospital follow-up.  I reviewed the discharge summary as well labs.    The patient was admitted for chest pain and was found to have several wall motion these with a decreased ejection fraction.  She had an coronary angiogram that showed clean coronaries and it was felt that this was due to Takotsubow cardiomyopathy.  She was readmitted a day later but then was discharged without changes.  Metoprolol and lisinopril and Zoloft were added to her regimen.  Her blood pressure was too low so lisinopril was stopped.  The Zoloft was for  anxiety.    At this point she is doing well.  She did have slight pain under her left armpit earlier but not now.  No chest pain or shortness of breath, PND or edema.  No dizziness.  No fevers or coughs.    With respect to the anxiety she feels it is quite mild.    She has had some swelling and redness on the top of the left foot for some time.    Past Medical History:   Diagnosis Date     Abdominal pain 2009, 2013    ct liver and renal cyst and 2mm lung nodule, repeat ct done 2013 and no significantly abnl.     Anxiety 2014    added med 3/14     Arthritis      ASCUS of cervix with negative high risk HPV 03/2018     Carotid bruit 2011    us nl     Gastritis 2011    egd done by [facility]     GERD (gastroesophageal reflux disease) 2011     History of colonoscopy 2004, 2014    nl     Hypercholesteremia 2000    stopped lovastatin 2015, added lipitor 3/16     Insomnia     using otc      Lumbar spinal stenosis 2016    Dr. Wilde, had epidural     Lung nodule 2009, 2013    seen on ct for abd pain, fu done 3/13 and 2 nodules stable and benign, one new one needs fu 1 year.  fu done 3/14 and fu 1 year and then done; fu done 3/15 and unchanged, no fu needed     Odynophagia 2004    egd nl     Other chronic pain      Peripheral neuropathies     Dr. Kim     Screening 2006, 2017    nl dexa     Takotsubo cardiomyopathy 01/2020    hosp fsd, ef 40%, mod ai, clean coronaries     Vaginal dysplasia     chronic VAIN I, many colpos of vagina.  Now we only do an annual pap of vagina, no colpo since stable long term     Weight loss 07/2018    ct chest, abd and pelvis neg     Past Surgical History:   Procedure Laterality Date     ANKLE SURGERY  2000     APPENDECTOMY  13     ARTHROPLASTY KNEE Left 5/14/2018    Procedure: ARTHROPLASTY KNEE;  Left total knee arthroplasty;  Surgeon: William Pollard MD;  Location: [facility] OR     BREAST BIOPSY, CORE RT/LT       C VAGINAL HYSTERECTOMY  1995     CATARACT IOL, RT/LT  04/2019     GYN SURGERY      BSO     [facility]  UGI ENDOSCOPY, SIMPLE EXAM  03/15/11    [facility]     HYSTERECTOMY, PAP NO LONGER INDICATED       LAPAROSCOPIC CHOLECYSTECTOMY  2008     NOSE SURGERY       right knee arthroscopy  2007     thumb surgery Left 2015     Social History     Socioeconomic History     Marital status:      Spouse name: Not on file     Number of children: 1     Years of education: Not on file     Highest education level: Not on file   Occupational History     Occupation: , retired     Employer: [employer]     Employer: RETIRED   Social Needs     Financial resource strain: Not on file     Food insecurity:     Worry: Not on file     Inability: Not on file     Transportation needs:     Medical: Not on file     Non-medical: Not on file   Tobacco Use     Smoking status: Former Smoker     Packs/day: 1.50     Years: 20.00     Pack years: 30.00     Types: Cigarettes     Last attempt to quit: 1992     Years since quittin.9     Smokeless tobacco: Never Used   Substance and Sexual Activity     Alcohol use: Yes     Alcohol/week: 0.0 standard drinks     Comment: 5-6 drinks weekly     Drug use: No     Sexual activity: Not Currently     Partners: Male     Comment: vag hyst, BSO   Lifestyle     Physical activity:     Days per week: Not on file     Minutes per session: Not on file     Stress: Not on file   Relationships     Social connections:     Talks on phone: Not on file     Gets together: Not on file     Attends Gnosticist service: Not on file     Active member of club or organization: Not on file     Attends meetings of clubs or organizations: Not on file     Relationship status: Not on file     Intimate partner violence:     Fear of current or ex partner: Not on file     Emotionally abused: Not on file     Physically abused: Not on file     Forced sexual activity: Not on file   Other Topics Concern     Parent/sibling w/ CABG, MI or angioplasty before 65F 55M? Not Asked   Social History  "Narrative     Not on file     Current Outpatient Medications   Medication Sig Dispense Refill     Ascorbic Acid (VITAMIN C PO) Take 500 mg by mouth daily.       Biotin 5000 MCG CAPS Take 1 tablet by mouth three times a week        calcium carb 1250 mg, 500 mg [place],/vitamin D 200 units (OSCAL WITH D) 500-200 MG-UNIT per tablet Take 1 tablet by mouth 2 times daily (with meals)  100 tablet 3     Carboxymethylcellulose Sodium (THERATEARS) 0.25 % SOLN Place 1 drop into both eyes every evening       Cholecalciferol (VITAMIN D3 PO) Take 1,000 Units by mouth daily        CYANOCOBALAMIN PO Take 1,000 mcg by mouth daily       loratadine (CLARITIN) 10 MG tablet Take 10 mg by mouth daily as needed for allergies        lovastatin (MEVACOR) 40 MG tablet Take 1 tablet (40 mg) by mouth At Bedtime       magnesium 100 MG CAPS Take 1 tablet by mouth daily        metoprolol succinate ER (TOPROL-XL) 25 MG 24 hr tablet Take 1 tablet (25 mg) by mouth daily 90 tablet 1     Multiple Vitamins-Minerals (CENTRUM SILVER) per tablet Take 1 tablet by mouth daily       sertraline (ZOLOFT) 25 MG tablet Take 1 tablet (25 mg) by mouth daily 30 tablet 0     Allergies   Allergen Reactions     Latex Cough     Seasonal Allergies      YEAR ROUND ALLERGIES     Sulfa Drugs Unknown     FAMILY HISTORY NOTED AND REVIEWED    REVIEW OF SYSTEMS: above    PHYSICAL EXAM    /64 (BP Location: Left arm, Patient Position: Sitting, Cuff Size: Adult Regular)   Pulse 56   Temp 98.7  F (37.1  C) (Tympanic)   Ht 1.702 m (5' 7\")   Wt 68.5 kg (151 lb)   SpO2 93%   BMI 23.65 kg/m      Patient appears non toxic  Mouth and eyes within normal limits  Neck within normal limits  No supraclavicular, cervical or axillary lymphadenopathy  Lungs clear  cv reglar rate and rhythm, no murmer, rub or gallop, no jvp or edema  Abdomen non-tender  Top of left foot slightly red, no masses, min tendreness, toes and ankle within normal limits.    ASSESSMENT:  1. takotsubo cmyop, " doing well, follow up echo and cards end of the month  2. Anxiety, mild, zoloft added  3. Foot pain, redness, doubt cellulitis or bone infection or fx, med.      PLAN:  Above  For the foot call if worsens, not gone soon  Follow up complete physical exam this April    Ellis Ruiz M.D.

## 2020-01-23 ENCOUNTER — PATIENT OUTREACH (OUTPATIENT)
Dept: CARE COORDINATION | Facility: CLINIC | Age: 80
End: 2020-01-23

## 2020-01-23 DIAGNOSIS — I51.81 TAKOTSUBO CARDIOMYOPATHY: Primary | ICD-10-CM

## 2020-01-23 ASSESSMENT — ACTIVITIES OF DAILY LIVING (ADL): DEPENDENT_IADLS:: INDEPENDENT

## 2020-01-23 NOTE — PROGRESS NOTES
Clinic Care Coordination Contact  [facility]/Voicemail    Referral Source: Care Team    Hospital admission for observation 1/9-1/10/2020-  Chest pain most likely not cardiac related Echo improved from prior   Encourage regular walking follow up with Cardiologist   Clinical Data: Care Coordinator Outreach  Outreach attempted x 2.  Left message on patient's voicemail with call back information and requested return call.    Plan: Care Coordinator will await a return call from the patient   Care Coordinator will do no further outreaches at this time.    [facility]     María Castillo RN Care Coordinator   [facility] / [facility] -[facility]   Phone: [phone]  Email :  [email or web address]

## 2020-01-23 NOTE — LETTER
[facility]  Complex Care Plan  About Me:    Patient Name:  Cecily Ivory    YOB: 1940  Age:         79 year old   Rodolfo MRN:    [id number] Telephone Information:  Home Phone [phone]   Mobile [phone]       Address:  [address] [place] [address] Email address:  [email or web address]      Emergency Contact(s)    Name Relationship Lgl Grd Work Phone Home Phone Mobile Phone   1. MAURO GUTIERREZ Daughter   [phone]   2. DECLINED, PER * Declined               Primary language:  English     needed? No   [place] [facility]:  [phone] op. 1  Other communication barriers: None  Preferred Method of Communication:  Charissa  Current living arrangement:    Mobility Status/ Medical Equipment:      Health Maintenance  Health Maintenance Reviewed: Due/Overdue There are no preventive care reminders to display for this patient.    My Access Plan  Medical Emergency 911   Primary Clinic Line [facility] - [phone]   24 Hour Appointment Line [phone] or  [phone]) (toll-free)   24 Hour Nurse Line [phone] (toll-free)   Preferred Urgent Care     Preferred Hospital     Preferred Pharmacy [id number] - [place], [place] - [address] Behavioral Health Crisis Line The National Suicide Prevention Lifeline at 1-596.916.5093 or 911             My Care Team Members  Patient Care Team       Relationship Specialty Notifications Start End    Ellis Ruiz MD PCP - General Internal Medicine  1/17/12     Phone: [phone] Fax: [phone] [address] [place] [address]    Ellis Ruiz MD Assigned PCP   3/27/16     Phone: [phone] Fax: [phone] [address] [place] [address]    María Castillo, RN Lead Care Coordinator Primary Care - [facility] Admissions 1/10/20     Phone: [phone]                 My Care Plans  Self  Management and Treatment Plan  Goals and (Comments)  Goals        General    Monitoring (pt-stated)     Notes - Note created  1/23/2020 10:54 AM by María Castillo RN    Goal Statement: I will seek medical help if experiencing increased shortness of breath episodes,Chest pain with radiation to jaw arm   Date goal set: 1/23/2020  Measure of Success:No further chest pain episodes and increased strength with daily walks   Barriers: None identified   Strengths:agrees with plan   Date to Achieve By: 2/23/2020  Patient expressed understanding of goal: Yes    Action steps to achieve this goal  1. I will keep future appointments with Echocardiogram 1/31 and Cardiologist 2/10/2020  2. I will walk daily as instructed by emergency room provider   3. I will call clinic Monday 1/27/2020 if redness continues on top of left foot              Action Plans on File: Health Care Directive                       Advance Care Plans/Directives Type: Yes       My Medical and Care Information  Problem List   Patient Active Problem List   Diagnosis     Pain in joint, lower leg     Peripheral neuropathy     Carotid bruit     Hyperlipidemia LDL goal <130     HL (hearing loss)     Dysphonia     Anxiety     Insomnia     Vaginal dysplasia     GERD (gastroesophageal reflux disease)     Lumbar spinal stenosis     ASCUS of cervix with negative high risk HPV     Degenerative arthritis of knee     NSTEMI (non-ST elevated myocardial infarction) (H)     Stress-induced cardiomyopathy     Intercostal pain     Lung nodule     Arthritis     Takotsubo cardiomyopathy      Current Medications and Allergies:  See printed Medication Report.    Care Coordination Start Date: 1/23/2020   Frequency of Care Coordination: weekly   Form Last Updated: 01/23/2020

## 2020-01-23 NOTE — PROGRESS NOTES
Clinic Care Coordination Contact    Clinic Care Coordination Contact  OUTREACH    Referral Information:  Referral Source: Care Team    Primary Diagnosis: Cardiovascular - other    Chief Complaint   Patient presents with     Clinic Care Coordination - Initial     Clinic Care Coordination RN         Universal Utilization: Hospital admission for observation 1/9-1/10/2020-  Chest pain most likely not cardiac related Echo improved from prior   Encourage regular walking follow up with Cardiologist   Clinic Utilization  Difficulty keeping appointments:: No  Compliance Concerns: No  No-Show Concerns: No  No PCP office visit in Past Year: No  Utilization    Last refreshed: 1/23/2020 10:48 AM:  Hospital Admissions 2           Last refreshed: 1/23/2020 10:48 AM:  ED Visits 1           Last refreshed: 1/23/2020 10:48 AM:  No Show Count (past year) 0              Current as of: 1/23/2020 10:48 AM              Clinical Concerns:  Current Medical Concerns:  No further chest pain episodes and is doing her daily walks    Current Behavioral Concerns: Not discussed     Education Provided to patient: encouraged patient to seek medical help with increased symptoms of concern.  Patient agrees with the plan   Pain  Pain (GOAL):: No  Health Maintenance Reviewed:   Clinical Pathway: Clinic Care Coordination Acute MI Assessment    Discharge:    Day of hospital discharge: 1/9/20  What recommendations were made for follow up after your recent hospitalization? Start Metoprolol Echocardiogram and follow up with Cardiologist   Have the follow up appointments been scheduled? Yes  If not, can I help you set up these appointments? No       Interventions during hospitalization:  STEMI  Is there a referral for Cardiac Rehab?  No    Symptom Review:  How have you been feeling now that you are home?  good  Are you having Angina symptoms (ie. Chest pain or pressure not relieved by medication, tingling, numbness, dizziness, fainting, heartburn, cold  sweats, fatigue, anxiety, feeling of doom, irregular heartbeat, shortness of breath, nausea/vomiting and back or jaw pain.)?  No  Have you had to use SL NTG? No  NA  answer yes notify MD    Medications:  Were you started on any new medications?  Yes, Metoprolol   Do you have Nitroglycerin and understand how to use it?  NA  Activity:  What activity guidelines did the physician give you at time of discharge from hospital?  encouraged daily walks         Medication Management:  Reviewed     Functional Status:  Dependent IADLs:: Independent       Mosque or spiritual beliefs that impact treatment:: No   Socioeconomic History     Marital status:      Spouse name: Not on file     Number of children: 1     Years of education: Not on file     Highest education level: Not on file   Occupational History     Occupation: , retired     Employer: [employer]     Employer: RETIRED     Tobacco Use     Smoking status: Former Smoker     Packs/day: 1.50     Years: 20.00     Pack years: 30.00     Types: Cigarettes     Last attempt to quit: 1992     Years since quittin.9     Smokeless tobacco: Never Used   Substance and Sexual Activity     Alcohol use: Yes     Alcohol/week: 0.0 standard drinks     Comment: 5-6 drinks weekly     Drug use: No     Sexual activity: Not Currently     Partners: Male     Comment: vag hyst, BSO          Supplies used at home:: None  Equipment Currently Used at Home: none    Advance Care Plan/Directive  Advanced Care Plan/Directive Status: Not Applicable    Referrals Placed: None     Goals:   Goals        General    Monitoring (pt-stated)     Notes - Note created  2020 10:54 AM by María Castillo RN    Goal Statement: I will seek medical help if experiencing increased shortness of breath episodes,Chest pain with radiation to jaw arm   Date goal set: 2020  Measure of Success:No further chest pain episodes and increased strength with daily walks   Barriers: None  identified   Strengths:agrees with plan   Date to Achieve By: 2/23/2020  Patient expressed understanding of goal: Yes    Action steps to achieve this goal  1. I will keep future appointments with Echocardiogram 1/31 and Cardiologist 2/10/2020  2. I will walk daily as instructed by emergency room provider   3. I will call clinic Monday 1/27/2020 if redness continues on top of left foot             Barriers: None  Strengths: agrees with care plan Patient/Caregiver understanding: Patient expresses good understanding of discharge instructions     Outreach Frequency: weekly  Future Appointments              In 1 week [address] CV Echocardiography, CVIMG    In 2 weeks Deya Dow PA-C [facility] [place], [facility] CLIN          Plan:   CC mailed introductory letter and care plan   CC will follow up in 3-5 business days     [facility]     María Castillo  RN Care Coordinator   [facility] / [facility]   Phone: [phone]  Email :  [email or web address]

## 2020-01-23 NOTE — LETTER
[place] CARE COORDINATION  [address]  [facility] [address]    January 23, 2020    Cecily Ivory  [address] [place] [address]      Dear Cecily,    I am a clinic care coordinator who works with Ellis Ruiz MD at [facility] . I wanted to thank you for spending the time to talk with me.  I wanted to introduce myself and provide you with my contact information so that you can call me with questions or concerns about your health care. Below is a description of clinic care coordination and how I can further assist you.     The clinic care coordinator is a registered nurse and/or  who understand the health care system. The goal of clinic care coordination is to help you manage your health and improve access to the [facility] system in the most efficient manner. The registered nurse can assist you in meeting your health care goals by providing education, coordinating services, and strengthening the communication among your providers. The  can assist you with financial, behavioral, psychosocial, chemical dependency, counseling, and/or psychiatric resources.    Please feel free to contact me at [phone], with any questions or concerns. We at [facility] are focused on providing you with the highest-quality healthcare experience possible and that all starts with you.     Sincerely,     [facility]     María Castillo RN Care Coordinator   [facility] / [facility] -[facility]   Phone: [phone]  Email :  [email or web address]      Enclosed: I have enclosed a copy of the Complex Care Plan. This has helpful information and goals that we have talked about. Please keep this in an easy to access place to use as needed.

## 2020-01-31 ENCOUNTER — HOSPITAL ENCOUNTER (OUTPATIENT)
Dept: CARDIOLOGY | Facility: CLINIC | Age: 80
Discharge: HOME | End: 2020-01-31
Attending: PHYSICIAN ASSISTANT | Admitting: PHYSICIAN ASSISTANT
Payer: COMMERCIAL

## 2020-01-31 DIAGNOSIS — I51.81 TAKOTSUBO CARDIOMYOPATHY: ICD-10-CM

## 2020-01-31 PROCEDURE — 93321 DOPPLER ECHO F-UP/LMTD STD: CPT | Mod: 26 | Performed by: INTERNAL MEDICINE

## 2020-01-31 PROCEDURE — 93325 DOPPLER ECHO COLOR FLOW MAPG: CPT | Mod: 26 | Performed by: INTERNAL MEDICINE

## 2020-01-31 PROCEDURE — 93308 TTE F-UP OR LMTD: CPT | Mod: 26 | Performed by: INTERNAL MEDICINE

## 2020-01-31 PROCEDURE — 25500064 ZZH RX 255 OP 636: Performed by: PHYSICIAN ASSISTANT

## 2020-01-31 PROCEDURE — 93321 DOPPLER ECHO F-UP/LMTD STD: CPT

## 2020-01-31 RX ADMIN — HUMAN ALBUMIN MICROSPHERES AND PERFLUTREN 9 ML: 10; .22 INJECTION, SOLUTION INTRAVENOUS at 10:38

## 2020-02-03 ENCOUNTER — PATIENT OUTREACH (OUTPATIENT)
Dept: CARE COORDINATION | Facility: CLINIC | Age: 80
End: 2020-02-03

## 2020-02-03 DIAGNOSIS — R07.9 CHEST PAIN: Primary | ICD-10-CM

## 2020-02-03 ASSESSMENT — ACTIVITIES OF DAILY LIVING (ADL): DEPENDENT_IADLS:: INDEPENDENT

## 2020-02-03 NOTE — PROGRESS NOTES
Clinic Care Coordination Contact  [facility]/Voicemail       Clinical Data: Care Coordinator Outreach    Hospital admission for observation 1/9-1/10/2020-  Chest pain most likely not cardiac related Echo improved from prior   Encourage regular walking follow up with Cardiologist     Outreach attempted x 1.  Left message on patient's voicemail with call back information and requested return call.    Plan:. Care Coordinator will try to reach patient again in 3-5 business days.    [facility]     María Castillo  RN Care Coordinator   [facility] / [facility]   Phone: [phone]  Email :  [email or web address]

## 2020-02-03 NOTE — PROGRESS NOTES
Clinic Care Coordination RN:     Hospital admission for observation 1/9-1/10/2020-  Chest pain most likely not cardiac related Echo improved from prior   Encourage regular walking follow up with Cardiologist       Incoming call from the patient .  Left a message on CC VM with an update.  Patient states she is getting stronger and is walking over 10,00 steps a day.  Patient will see Deya Dow/Cardiology next week to get the results of the Echocardiogram .      Plan:  CC will follow up after her Cardiology visit     [facility]     María Castillo  RN Care Coordinator   [facility] / [facility]   Phone: [phone]  Email :  [email or web address]

## 2020-02-10 ENCOUNTER — OFFICE VISIT (OUTPATIENT)
Dept: CARDIOLOGY | Facility: CLINIC | Age: 80
End: 2020-02-10
Attending: PHYSICIAN ASSISTANT
Payer: COMMERCIAL

## 2020-02-10 VITALS
WEIGHT: 152 LBS | HEIGHT: 67 IN | SYSTOLIC BLOOD PRESSURE: 132 MMHG | HEART RATE: 61 BPM | BODY MASS INDEX: 23.86 KG/M2 | DIASTOLIC BLOOD PRESSURE: 78 MMHG

## 2020-02-10 DIAGNOSIS — I51.81 TAKOTSUBO CARDIOMYOPATHY: ICD-10-CM

## 2020-02-10 DIAGNOSIS — F41.9 ANXIETY: ICD-10-CM

## 2020-02-10 PROCEDURE — 99214 OFFICE O/P EST MOD 30 MIN: CPT | Performed by: PHYSICIAN ASSISTANT

## 2020-02-10 PROCEDURE — 93000 ELECTROCARDIOGRAM COMPLETE: CPT | Performed by: PHYSICIAN ASSISTANT

## 2020-02-10 RX ORDER — SERTRALINE HYDROCHLORIDE 25 MG/1
25 TABLET, FILM COATED ORAL DAILY
Qty: 30 TABLET | Refills: 0 | Status: SHIPPED | OUTPATIENT
Start: 2020-02-10 | End: 2020-02-20

## 2020-02-10 ASSESSMENT — MIFFLIN-ST. JEOR: SCORE: 1207.1
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
## 2020-02-10 NOTE — PATIENT INSTRUCTIONS
Today's Plan:   - The heart function is improving. Would make no changes today. Please return in 3 months with a limited echo prior to visit with Dr. Giron.   - Please discuss the sertraline with Dr. Day.   - Keep up the good work with routine exercise.     If you have questions or concerns please call my nurse team at [phone].  Scheduling phone number: [phone]  Reminder: Please bring in all current medications, over the counter supplements and vitamin bottles to your next appointment.    It was a pleasure seeing you today!     Deya Dow PA-C

## 2020-02-12 ENCOUNTER — CARE COORDINATION (OUTPATIENT)
Dept: CARDIOLOGY | Facility: CLINIC | Age: 80
End: 2020-02-12

## 2020-02-12 ENCOUNTER — PATIENT OUTREACH (OUTPATIENT)
Dept: CARE COORDINATION | Facility: CLINIC | Age: 80
End: 2020-02-12

## 2020-02-12 DIAGNOSIS — I51.81 TAKOTSUBO CARDIOMYOPATHY: Primary | ICD-10-CM

## 2020-02-12 ASSESSMENT — ACTIVITIES OF DAILY LIVING (ADL): DEPENDENT_IADLS:: INDEPENDENT

## 2020-02-12 NOTE — PROGRESS NOTES
Received call from pt who requested clarification of KERI Arshad's instructions to follow up with Dr. Ruiz regarding the Zoloft.      Per chart review, KERI Arshad sent Rx for 30 tabs of Zoloft with out refills to pt's pharmacy on 2/10/20 with note to pharmacy that all further refills should come from pt's PCP.      Reviewed the above information with pt.  She verbalized understanding and agrees with this plan.    Pt also inquired about her follow up Echo and OV with Dr. Giron.  Pt agreed to be transferred to scheduling to arrange.     MARICRUZ White 2:32 PM 2/12/2020

## 2020-02-12 NOTE — PROGRESS NOTES
Clinic Care Coordination Contact  [facility]/Voicemail    Referral Source: Care Team  1/9/2020-1/10/2020  Discharge Diagnoses        T wave inversion in EKG  Takotsubo cardiomyopathy  Elevated troponin  NSTEMI (non-ST elevated myocardial infarction) (H)     Clinical Data: Care Coordinator Outreach  Outreach attempted x 1.  Left message on patient's voicemail with call back information and requested return call.    Plan: Care Coordinator will await a return call from the patient     [facility]     María Castillo RN Care Coordinator   [facility] / [facility]   Phone: [phone]  Email :  [email or web address]

## 2020-02-13 NOTE — PROGRESS NOTES
Clinic Care Coordination Contact    Follow Up Progress Note   Referral Source: Care Team  1/9/2020-1/10/2020  Discharge Diagnoses        T wave inversion in EKG  Takotsubo cardiomyopathy  Elevated troponin  NSTEMI (non-ST elevated myocardial infarction) (H)     Assessment: Patient reports last Echocardiogram shows her heart beat is not the way it should be and will have a repeat echocardiogram in 3 moths.  Patient continues to walk 10,000 steps a day   Patient reports she has more energy with activity     Goals addressed this encounter:   Goals Addressed                 This Visit's Progress      Monitoring (pt-stated)   50%     Goal Statement: I will seek medical help if experiencing increased shortness of breath episodes,chest pain with radiation to jaw/ arm   Date goal set: 1/23/2020  Measure of Success:No further chest pain episodes and increased strength with daily walks   Barriers: None identified   Strengths:agrees with plan   Date to Achieve By: 5/13/2020  Patient expressed understanding of goal: Yes    Action steps to achieve this goal  1. I will keep future appointments with Echocardiogram in 3 months  and Cardiologist   2. I will walk daily as instructed by emergency room provider (10,00 steps a day)  3. I will call clinic to set up a physical with Dr Ruiz   As of today's date 2/13/2020 goal is met at 51 - 75%.   Goal Status:  Active             Intervention/Education provided during outreach: Encourage patient to call Cardiology office with any questions or concerns      Outreach Frequency: weekly    Plan:   Patient will have a repeat echocardiogram in 3 months   Patient will continue to walk 10,00 steps daily  Patient will seek medical help if increased symptoms of concerns such as  SOB,chest pain   CC will follow up monthly  GERONIMO Castillo RN Care Coordinator    [facility] / [facility] -[facility] -[facility]   Phone: [phone]  Email :   [email or web address]

## 2020-02-18 ENCOUNTER — TELEPHONE (OUTPATIENT)
Dept: CARDIOLOGY | Facility: CLINIC | Age: 80
End: 2020-02-18

## 2020-02-18 DIAGNOSIS — F41.9 ANXIETY: ICD-10-CM

## 2020-02-18 NOTE — TELEPHONE ENCOUNTER
"Prescription refill request came from [facility] pharmacy for sertraline (Zoloft).  Per Deya Dow's dictation on 2/10/20, \"Anxiety, recently initiated on sertraline. Please discuss the sertraline with Dr. Ruiz.\"   Since sertraline is not a cardiac med, prescription was faxed to pharmacy to send to Dr. Ruiz, his fax number was also supplied.  MARICRUZ Villar 2/18/2020    "

## 2020-02-19 NOTE — TELEPHONE ENCOUNTER
"Refill request:    SERTRALINE(ZOLOFT) 25 MG TABLETS. QTY. 30    Note in chart from Shanna Dow re: conversation with patient about side effects.    Pending Prescriptions:                       Disp   Refills    sertraline 25 MG PO tablet                30 tab*0            Sig: Take 1 tablet (25 mg) by mouth daily    Last Written Prescription Date:  02/10/2020  Last Fill Quantity: 30,  # refills: 0   Last office visit: 1/17/2020 with prescribing provider:     Future Office Visit:   Next 5 appointments (look out 90 days)    Mar 19, 2020 10:30 AM CDT  PHYS[facility] with [facility] MD  [facility] [address] [address] [phone]         Requested Prescriptions   Pending Prescriptions Disp Refills     sertraline 25 MG PO tablet 30 tablet 0     Sig: Take 1 tablet (25 mg) by mouth daily       SSRIs Protocol Passed - 2/18/2020  6:20 PM        Passed - Recent (12 mo) or future (30 days) visit within the authorizing provider's specialty     Patient has had an office visit with the authorizing provider or a provider within the authorizing providers department within the previous 12 mos or has a future within next 30 days. See \"Patient Info\" tab in inbasket, or \"Choose Columns\" in Meds & Orders section of the refill encounter.              Passed - Medication is active on med list        Passed - Patient is age 18 or older        Passed - No active pregnancy on record        Passed - No positive pregnancy test in last 12 months          "

## 2020-02-20 RX ORDER — SERTRALINE HYDROCHLORIDE 25 MG/1
25 TABLET, FILM COATED ORAL DAILY
Qty: 30 TABLET | Refills: 5 | Status: SHIPPED | OUTPATIENT
Start: 2020-02-20 | End: 2020-05-26

## 2020-02-20 NOTE — TELEPHONE ENCOUNTER
Routing refill request to provider for review/approval because:  Was rxd by cardiology last with notes to have PCP refill going forward. 2/10/20 Cardiology OV or current med list last celexa 2/10/20  Please authorize if appropriate.  Thanks,  Kaela Farrell RN

## 2020-03-03 ENCOUNTER — DOCUMENTATION ONLY (OUTPATIENT)
Dept: FAMILY MEDICINE | Facility: CLINIC | Age: 80
End: 2020-03-03

## 2020-03-03 DIAGNOSIS — Z00.00 ROUTINE GENERAL MEDICAL EXAMINATION AT A HEALTH CARE FACILITY: Primary | ICD-10-CM

## 2020-03-03 DIAGNOSIS — M79.672 LEFT FOOT PAIN: ICD-10-CM

## 2020-03-03 DIAGNOSIS — I51.81 TAKOTSUBO CARDIOMYOPATHY: ICD-10-CM

## 2020-03-03 DIAGNOSIS — E78.5 HYPERLIPIDEMIA LDL GOAL <130: ICD-10-CM

## 2020-03-12 DIAGNOSIS — Z00.00 ROUTINE GENERAL MEDICAL EXAMINATION AT A HEALTH CARE FACILITY: ICD-10-CM

## 2020-03-12 DIAGNOSIS — I51.81 TAKOTSUBO CARDIOMYOPATHY: ICD-10-CM

## 2020-03-12 DIAGNOSIS — E78.5 HYPERLIPIDEMIA LDL GOAL <130: ICD-10-CM

## 2020-03-12 LAB
ALBUMIN SERPL-MCNC: 3.5 G/DL (ref 3.4–5)
ALP SERPL-CCNC: 74 U/L (ref 40–150)
ALT SERPL W P-5'-P-CCNC: 88 U/L (ref 0–50)
ANION GAP SERPL CALCULATED.3IONS-SCNC: 9 MMOL/L (ref 3–14)
AST SERPL W P-5'-P-CCNC: 39 U/L (ref 0–45)
BILIRUB SERPL-MCNC: 0.5 MG/DL (ref 0.2–1.3)
BUN SERPL-MCNC: 12 MG/DL (ref 7–30)
CALCIUM SERPL-MCNC: 8.9 MG/DL (ref 8.5–10.1)
CHLORIDE SERPL-SCNC: 108 MMOL/L (ref 94–109)
CHOLEST SERPL-MCNC: 220 MG/DL
CO2 SERPL-SCNC: 22 MMOL/L (ref 20–32)
CREAT SERPL-MCNC: 0.7 MG/DL (ref 0.52–1.04)
ERYTHROCYTE [DISTWIDTH] IN BLOOD BY AUTOMATED COUNT: 13.5 % (ref 10–15)
GFR SERPL CREATININE-BSD FRML MDRD: 83 ML/MIN/{1.73_M2}
GLUCOSE SERPL-MCNC: 104 MG/DL (ref 70–99)
HCT VFR BLD AUTO: 40.5 % (ref 35–47)
HDLC SERPL-MCNC: 93 MG/DL
HGB BLD-MCNC: 13.5 G/DL (ref 11.7–15.7)
LDLC SERPL CALC-MCNC: 108 MG/DL
MCH RBC QN AUTO: 31.5 PG (ref 26.5–33)
MCHC RBC AUTO-ENTMCNC: 33.3 G/DL (ref 31.5–36.5)
MCV RBC AUTO: 94 FL (ref 78–100)
NONHDLC SERPL-MCNC: 127 MG/DL
PLATELET # BLD AUTO: 217 10E9/L (ref 150–450)
POTASSIUM SERPL-SCNC: 4.1 MMOL/L (ref 3.4–5.3)
PROT SERPL-MCNC: 7.4 G/DL (ref 6.8–8.8)
RBC # BLD AUTO: 4.29 10E12/L (ref 3.8–5.2)
SODIUM SERPL-SCNC: 139 MMOL/L (ref 133–144)
TRIGL SERPL-MCNC: 94 MG/DL
WBC # BLD AUTO: 6.9 10E9/L (ref 4–11)

## 2020-03-12 PROCEDURE — 80061 LIPID PANEL: CPT | Performed by: INTERNAL MEDICINE

## 2020-03-12 PROCEDURE — 36415 COLL VENOUS BLD VENIPUNCTURE: CPT | Performed by: INTERNAL MEDICINE

## 2020-03-12 PROCEDURE — 85027 COMPLETE CBC AUTOMATED: CPT | Performed by: INTERNAL MEDICINE

## 2020-03-12 PROCEDURE — 80053 COMPREHEN METABOLIC PANEL: CPT | Performed by: INTERNAL MEDICINE

## 2020-03-26 ENCOUNTER — PATIENT OUTREACH (OUTPATIENT)
Dept: CARE COORDINATION | Facility: CLINIC | Age: 80
End: 2020-03-26

## 2020-03-26 DIAGNOSIS — I51.81 TAKOTSUBO CARDIOMYOPATHY: Primary | ICD-10-CM

## 2020-03-26 ASSESSMENT — ACTIVITIES OF DAILY LIVING (ADL): DEPENDENT_IADLS:: INDEPENDENT

## 2020-03-26 NOTE — PROGRESS NOTES
Clinic Care Coordination Contact  [facility]/Voicemail      Referral Source: Care Team  1/9/2020-1/10/2020  Discharge Diagnoses        T wave inversion in EKG  Takotsubo cardiomyopathy  Elevated troponin  NSTEMI (non-ST elevated myocardial infarction) (      Clinical Data: Care Coordinator Outreach  Outreach attempted x 1.  Left message on patient's voicemail with call back information and requested return call.  Plan:. Care Coordinator will await a return call   [facility]     María Castillo RN Care Coordinator   [facility] / [facility]   Phone: [phone]  Email :  [email or web address]

## 2020-04-24 DIAGNOSIS — E78.5 HYPERLIPIDEMIA LDL GOAL <130: Primary | ICD-10-CM

## 2020-04-24 NOTE — TELEPHONE ENCOUNTER
"Last Written Prescription Date:  ?  Last Fill Quantity: ?,  # refills: ?   Last office visit: 1/17/2020 with prescribing provider:  Joseph   Future Office Visit:   Next 5 appointments (look out 90 days)    May 19, 2020  2:45 PM CDT  Return Visit with Cameron Giron MD  [facility] ([facility]) [address]  [place] [id number] [phone] OPT 2   May 28, 2020 10:30 AM CDT  PHYSICAL with Ellis Ruiz MD  [facility] [address] [id number] [phone]         Routing refill request to provider for review/approval because:  Medication is reported/historical      Requested Prescriptions   Pending Prescriptions Disp Refills     lovastatin (MEVACOR) 40 MG tablet       Sig: Take 1 tablet (40 mg) by mouth At Bedtime       Statins Protocol Passed - 4/24/2020  6:23 PM        Passed - LDL on file in past 12 months     Recent Labs   Lab Test 03/12/20  0845   *             Passed - No abnormal creatine kinase in past 12 months     No lab results found.             Passed - Recent (12 mo) or future (30 days) visit within the authorizing provider's specialty     Patient has had an office visit with the authorizing provider or a provider within the authorizing providers department within the previous 12 mos or has a future within next 30 days. See \"Patient Info\" tab in inbasket, or \"Choose Columns\" in Meds & Orders section of the refill encounter.              Passed - Medication is active on med list        Passed - Patient is age 18 or older        Passed - No active pregnancy on record        Passed - No positive pregnancy test in past 12 months             "

## 2020-04-27 RX ORDER — LOVASTATIN 40 MG
40 TABLET ORAL AT BEDTIME
Qty: 90 TABLET | Refills: 3 | Status: SHIPPED | OUTPATIENT
Start: 2020-04-27 | End: 2020-08-28

## 2020-04-27 NOTE — TELEPHONE ENCOUNTER
Routing refill request to provider for review/approval because:  Medication is reported historical by Cardiology       Please review and authorize if appropriate,     Thank you,   Mihaela BELL RN

## 2020-05-12 ENCOUNTER — TELEPHONE (OUTPATIENT)
Dept: CARDIOLOGY | Facility: CLINIC | Age: 80
End: 2020-05-12

## 2020-05-12 NOTE — TELEPHONE ENCOUNTER
PATIENT WELLNESS TELEPHONE SCREENING     Step 1: Answer all screening questions.     1. In the past 3 weeks, have you been exposed to someone with a known positive illness below?  COVID-19 (known or suspected): No    2. Do you have any of the following new symptoms or symptoms that have started within the past 14 days?   Fever (subjective or >100.0)?: No   A new cough: No   Shortness of breath: No   Chills?No   New loss of taste or smell?No   Generalized body aches?No   New persistent headache?No   New sore throat?No   Nausea, vomiting or diarrhea: No    Step 2: If the patient is positive for symptoms, consult the ordering provider/consult IP to determine if the procedure is deemed necessary and inform the patient you will call them back.     Step 3 . If no symptoms, patient informed of the no visitor policy in place. Yes    Step 4. If positive new symptoms, and the procedure is deemed necessary. Notify your manager/supervisor. The patient must be informed to call the procedural department.  A team member with the appropriate PPE will bring the mask to the patient at door 2. The patient will be brought to the procedural department and registered over the phone.

## 2020-05-13 ENCOUNTER — HOSPITAL ENCOUNTER (OUTPATIENT)
Dept: CARDIOLOGY | Facility: CLINIC | Age: 80
Discharge: HOME | End: 2020-05-13
Attending: PHYSICIAN ASSISTANT | Admitting: PHYSICIAN ASSISTANT
Payer: COMMERCIAL

## 2020-05-13 DIAGNOSIS — I51.81 TAKOTSUBO CARDIOMYOPATHY: ICD-10-CM

## 2020-05-13 PROCEDURE — 93308 TTE F-UP OR LMTD: CPT | Mod: 26 | Performed by: INTERNAL MEDICINE

## 2020-05-13 PROCEDURE — 93325 DOPPLER ECHO COLOR FLOW MAPG: CPT | Mod: 26 | Performed by: INTERNAL MEDICINE

## 2020-05-13 PROCEDURE — 93321 DOPPLER ECHO F-UP/LMTD STD: CPT | Mod: 26 | Performed by: INTERNAL MEDICINE

## 2020-05-13 PROCEDURE — 93325 DOPPLER ECHO COLOR FLOW MAPG: CPT

## 2020-05-19 ENCOUNTER — VIRTUAL VISIT (OUTPATIENT)
Dept: CARDIOLOGY | Facility: CLINIC | Age: 80
End: 2020-05-19
Attending: PHYSICIAN ASSISTANT
Payer: COMMERCIAL

## 2020-05-19 VITALS — WEIGHT: 146.2 LBS | BODY MASS INDEX: 22.9 KG/M2

## 2020-05-19 DIAGNOSIS — I51.81 STRESS-INDUCED CARDIOMYOPATHY: Primary | ICD-10-CM

## 2020-05-19 DIAGNOSIS — I51.81 TAKOTSUBO CARDIOMYOPATHY: ICD-10-CM

## 2020-05-19 DIAGNOSIS — E78.5 HYPERLIPIDEMIA LDL GOAL <130: ICD-10-CM

## 2020-05-19 PROCEDURE — 99214 OFFICE O/P EST MOD 30 MIN: CPT | Mod: 95 | Performed by: INTERNAL MEDICINE

## 2020-05-19 RX ORDER — METOPROLOL SUCCINATE 25 MG/1
25 TABLET, EXTENDED RELEASE ORAL DAILY
Qty: 90 TABLET | Refills: 1 | COMMUNITY
Start: 2020-05-19 | End: 2020-05-28

## 2020-05-19 RX ORDER — OFLOXACIN 3 MG/ML
5 SOLUTION AURICULAR (OTIC) DAILY
COMMUNITY
End: 2020-05-27

## 2020-05-19 RX ORDER — CETIRIZINE HYDROCHLORIDE 10 MG/1
10 TABLET ORAL DAILY
COMMUNITY
End: 2020-09-24

## 2020-05-19 NOTE — LETTER
5/19/2020      RE: Cecily Ivory  [address] [place] [address]       Dear Colleague,    Thank you for the opportunity to participate in the care of your patient, Cecily Ivory, at the [facility]. Please see a copy of my visit note below.    I had the pleasure of seeing Aranza Ivory in cardiology clinic follow-up as a virtual video visit.    She is a pleasant 79-year-old female.  She was seen by us earlier this year in the hospital for chest pain and elevated troponin up to 4.5.  Coronary geography revealed normal coronary arteries.  Imaging and LV gram revealed wall motion abnormalities consistent with Takotsubo cardiomyopathy.  She was put on metoprolol and low-dose lisinopril.  Since then, metoprolol has been switched to Toprol-XL 25 mg daily.  She is off lisinopril because of low blood pressures.  There was no particular stressor for her event.  She does have some anxiety and therefore was given Zoloft 25 mg daily which she is tolerating.    She now lives in a condo and has been doing well.  She is able to walk outside on her own.  Denies any chest pain or shortness of breath.  Repeat echocardiogram was reviewed with her.  It was done last week and revealed normal Bystolic function now.  Ejection fraction is improved compared to prior study.  Aortic regurgitation was stable at mild degree.    Physical exam was done via video visit  She is alert and oriented x3.  She is appears in no respiratory distress on respiratory exam.  No accessory muscles used.  JVP is not seen in sitting position.  Abdomen appears nondistended.  Eyes reveal no icterus.  Head was normocephalic and atraumatic.  She is able to move her arms equally well.  Face stable evaluation revealed no lacerations or xanthelasma.    Impression    1.  History of Takotsubo cardiomyopathy  Recent echo reveals normalization of ejection  fraction and wall motion.  She is doing well and is asymptomatic.  I reviewed the echo results with her.  We will decrease her metoprolol XL to 12.5 mg daily now that her EF is normalized.  Eventually, I would like to taper it off or discontinue it.  We will follow-up with her in a year with a repeat echocardiogram prior to visit with me.  With respect to her sertraline, I told her that she can discuss with her primary care physician who she is meeting next week.  She believes that she ate this might help her anxiety but I will defer to the primary care physician.    Hyperlipidemia  Continue  lovastatin and managed by primary care physician.    We will be happy to see her earlier as needed otherwise follow-up in a year.    Sincerely,    Cameron Giron MD      Encounter Diagnoses   Name Primary?     Stress-induced cardiomyopathy Yes     Takotsubo cardiomyopathy        CURRENT MEDICATIONS:  Current Outpatient Medications   Medication Sig Dispense Refill     Ascorbic Acid (VITAMIN C PO) Take 500 mg by mouth daily.       Biotin 5000 MCG CAPS Take 1 tablet by mouth three times a week        calcium carb 1250 mg, 500 mg [employer],/vitamin D 200 units (OSCAL WITH D) 500-200 MG-UNIT per tablet Take 1 tablet by mouth 2 times daily (with meals)  100 tablet 3     Carboxymethylcellulose Sodium (THERATEARS) 0.25 % SOLN Place 1 drop into both eyes every evening       cetirizine (ZYRTEC) 10 MG tablet Take 10 mg by mouth daily       Cholecalciferol (VITAMIN D3 PO) Take 1,000 Units by mouth daily        CYANOCOBALAMIN PO Take 1,000 mcg by mouth daily       lovastatin (MEVACOR) 40 MG tablet Take 1 tablet (40 mg) by mouth At Bedtime 90 tablet 3     magnesium 100 MG CAPS Take 1 tablet by mouth daily        metoprolol succinate ER (TOPROL-XL) 25 MG 24 hr tablet Take 0.5 tablets (12.5 mg) by mouth daily 90 tablet 1     Multiple Vitamins-Minerals (CENTRUM SILVER) per tablet Take 1 tablet by mouth daily       ofloxacin (FLOXIN) 0.3 % otic  solution 5 drops daily       sertraline 25 MG PO tablet Take 1 tablet (25 mg) by mouth daily 30 tablet 5     loratadine (CLARITIN) 10 MG tablet Take 10 mg by mouth daily as needed for allergies          ALLERGIES     Allergies   Allergen Reactions     Latex Cough     Seasonal Allergies      YEAR ROUND ALLERGIES     Sulfa Drugs Unknown       PAST MEDICAL HISTORY:  Past Medical History:   Diagnosis Date     Abdominal pain 2009, 2013    ct liver and renal cyst and 2mm lung nodule, repeat ct done 2013 and no significantly abnl.     Anxiety 2014    added med 3/14     Arthritis      ASCUS of cervix with negative high risk HPV 03/2018     Carotid bruit 2011    us nl     Gastritis 2011    egd done by [facility]     GERD (gastroesophageal reflux disease) 2011     History of colonoscopy 2004, 2014    nl     Hypercholesteremia 2000    stopped lovastatin 2015, added lipitor 3/16     Insomnia     using otc      Lumbar spinal stenosis 2016    Dr. Wilde, had epidural     Lung nodule 2009, 2013    seen on ct for abd pain, fu done 3/13 and 2 nodules stable and benign, one new one needs fu 1 year.  fu done 3/14 and fu 1 year and then done; fu done 3/15 and unchanged, no fu needed     Odynophagia 2004    egd nl     Other chronic pain      Peripheral neuropathies     Dr. Kim     Screening 2006, 2017    nl dexa     Takotsubo cardiomyopathy 01/2020    hosp fsd, ef 40%, mod ai, clean coronaries     Vaginal dysplasia     chronic VAIN I, many colpos of vagina.  Now we only do an annual pap of vagina, no colpo since stable long term     Weight loss 07/2018    ct chest, abd and pelvis neg       PAST SURGICAL HISTORY:  Past Surgical History:   Procedure Laterality Date     ANKLE SURGERY  2000     APPENDECTOMY  13     ARTHROPLASTY KNEE Left 5/14/2018    Procedure: ARTHROPLASTY KNEE;  Left total knee arthroplasty;  Surgeon: William Pollard MD;  Location: [facility] OR     BREAST BIOPSY, CORE RT/LT       C VAGINAL HYSTERECTOMY  1995     CATARACT IOL, RT/LT   2019     CV HEART CATHETERIZATION WITH POSSIBLE INTERVENTION N/A 2020    Procedure: Heart Catheterization with Possible Intervention;  Surgeon: Bibiana Ruggiero MD;  Location:  HEART CARDIAC CATH LAB     GYN SURGERY      BSO     HC UGI ENDOSCOPY, SIMPLE EXAM  03/15/11    [facility]     HYSTERECTOMY, PAP NO LONGER INDICATED       LAPAROSCOPIC CHOLECYSTECTOMY       NOSE SURGERY       right knee arthroscopy  2007     thumb surgery Left 2015       FAMILY HISTORY:  Family History   Problem Relation Age of Onset     Hyperlipidemia Mother        SOCIAL HISTORY:  Social History     Socioeconomic History     Marital status:      Spouse name: None     Number of children: 1     Years of education: None     Highest education level: None   Occupational History     Occupation: , retired     Employer: [employer]     Employer: RETIRED   Social Needs     Financial resource strain: None     Food insecurity     Worry: None     Inability: None     Transportation needs     Medical: None     Non-medical: None   Tobacco Use     Smoking status: Former Smoker     Packs/day: 1.50     Years: 20.00     Pack years: 30.00     Types: Cigarettes     Last attempt to quit: 1992     Years since quittin.2     Smokeless tobacco: Never Used   Substance and Sexual Activity     Alcohol use: Yes     Alcohol/week: 0.0 standard drinks     Comment: 4 drinks weekly     Drug use: No     Sexual activity: Not Currently     Partners: Male     Comment: vag hyst, BSO   Lifestyle     Physical activity     Days per week: None     Minutes per session: None     Stress: None   Relationships     Social connections     Talks on phone: None     Gets together: None     Attends Restoration service: None     Active member of club or organization: None     Attends meetings of clubs or organizations: None     Relationship status: None     Intimate partner violence     Fear of current or ex partner:  None     Emotionally abused: None     Physically abused: None     Forced sexual activity: None   Other Topics Concern     Parent/sibling w/ CABG, MI or angioplasty before 65F 55M? Not Asked   Social History Narrative     None           Please do not hesitate to contact me if you have any questions/concerns.     Sincerely,     Cameron Giron MD

## 2020-05-19 NOTE — PROGRESS NOTES
"Cecily Ivory is a 79 year old female who is being evaluated via a billable video visit.      The patient has been notified of following:     \"This video visit will be conducted via a call between you and your physician/provider. We have found that certain health care needs can be provided without the need for an in-person physical exam.  This service lets us provide the care you need with a video conversation.  If a prescription is necessary we can send it directly to your pharmacy.  If lab work is needed we can place an order for that and you can then stop by our lab to have the test done at a later time.    Video visits are billed at different rates depending on your insurance coverage.  Please reach out to your insurance provider with any questions.    If during the course of the call the physician/provider feels a video visit is not appropriate, you will not be charged for this service.\"    Patient has given verbal consent for Video visit? [phone]  How would you like to obtain your AVS? Mail a copy    Patient would like the video invitation sent by: Text to cell phone: [phone]    Will anyone else be joining your video visit? No     Review Of Systems  Skin: NEGATIVE  Eyes:Ears/Nose/Throat: NEGATIVE  Respiratory: NEGATIVE no issues  Cardiovascular: CP occasionally at rest   Genitourinary:NEGATIVE   Musculoskeletal: NEGATIVE  Neurologic: NEGATIVE  Psychiatric: NEGATIVE  Hematologic/Lymphatic/Immunologic: NEGATIVE  Endocrine:  NEGATIVE  Vitals: Pt reports can't take BP or Pulse at home. Weight today was 146.2lbs      Video-Visit Details    Type of service:  Video Visit    Video Start Time: 2:40 PM  Video End Time:2.56pm  Originating Location (pt. Location): Home    Distant Location (provider location):  Physician Home office    Platform used for Video Visit: Chyna Bhatti LPN      "

## 2020-05-19 NOTE — PROGRESS NOTES
HPI and Plan:   I had the pleasure of seeing Aranza Ivory in cardiology clinic follow-up as a virtual video visit.    She is a pleasant 79-year-old female.  She was seen by us earlier this year in the hospital for chest pain and elevated troponin up to 4.5.  Coronary geography revealed normal coronary arteries.  Imaging and LV gram revealed wall motion abnormalities consistent with Takotsubo cardiomyopathy.  She was put on metoprolol and low-dose lisinopril.  Since then, metoprolol has been switched to Toprol-XL 25 mg daily.  She is off lisinopril because of low blood pressures.  There was no particular stressor for her event.  She does have some anxiety and therefore was given Zoloft 25 mg daily which she is tolerating.    She now lives in a condo and has been doing well.  She is able to walk outside on her own.  Denies any chest pain or shortness of breath.  Repeat echocardiogram was reviewed with her.  It was done last week and revealed normal Bystolic function now.  Ejection fraction is improved compared to prior study.  Aortic regurgitation was stable at mild degree.    Physical exam was done via video visit  She is alert and oriented x3.  She is appears in no respiratory distress on respiratory exam.  No accessory muscles used.  JVP is not seen in sitting position.  Abdomen appears nondistended.  Eyes reveal no icterus.  Head was normocephalic and atraumatic.  She is able to move her arms equally well.  Face stable evaluation revealed no lacerations or xanthelasma.    Impression    1.  History of Takotsubo cardiomyopathy  Recent echo reveals normalization of ejection fraction and wall motion.  She is doing well and is asymptomatic.  I reviewed the echo results with her.  We will decrease her metoprolol XL to 12.5 mg daily now that her EF is normalized.  Eventually, I would like to taper it off or discontinue it.  We will follow-up with her in a year with a repeat echocardiogram prior to visit with me.  With  respect to her sertraline, I told her that she can discuss with her primary care physician who she is meeting next week.  She believes that she ate this might help her anxiety but I will defer to the primary care physician.    Hyperlipidemia  Continue  lovastatin and managed by primary care physician.    We will be happy to see her earlier as needed otherwise follow-up in a year.    Sincerely,    Cameron Giron MD            Encounter Diagnoses   Name Primary?     Stress-induced cardiomyopathy Yes     Takotsubo cardiomyopathy        CURRENT MEDICATIONS:  Current Outpatient Medications   Medication Sig Dispense Refill     Ascorbic Acid (VITAMIN C PO) Take 500 mg by mouth daily.       Biotin 5000 MCG CAPS Take 1 tablet by mouth three times a week        calcium carb 1250 mg, 500 mg [place],/vitamin D 200 units (OSCAL WITH D) 500-200 MG-UNIT per tablet Take 1 tablet by mouth 2 times daily (with meals)  100 tablet 3     Carboxymethylcellulose Sodium (THERATEARS) 0.25 % SOLN Place 1 drop into both eyes every evening       cetirizine (ZYRTEC) 10 MG tablet Take 10 mg by mouth daily       Cholecalciferol (VITAMIN D3 PO) Take 1,000 Units by mouth daily        CYANOCOBALAMIN PO Take 1,000 mcg by mouth daily       lovastatin (MEVACOR) 40 MG tablet Take 1 tablet (40 mg) by mouth At Bedtime 90 tablet 3     magnesium 100 MG CAPS Take 1 tablet by mouth daily        metoprolol succinate ER (TOPROL-XL) 25 MG 24 hr tablet Take 0.5 tablets (12.5 mg) by mouth daily 90 tablet 1     Multiple Vitamins-Minerals (CENTRUM SILVER) per tablet Take 1 tablet by mouth daily       ofloxacin (FLOXIN) 0.3 % otic solution 5 drops daily       sertraline 25 MG PO tablet Take 1 tablet (25 mg) by mouth daily 30 tablet 5     loratadine (CLARITIN) 10 MG tablet Take 10 mg by mouth daily as needed for allergies          ALLERGIES     Allergies   Allergen Reactions     Latex Cough     Seasonal Allergies      YEAR ROUND ALLERGIES     Sulfa Drugs Unknown       PAST  MEDICAL HISTORY:  Past Medical History:   Diagnosis Date     Abdominal pain 2009, 2013    ct liver and renal cyst and 2mm lung nodule, repeat ct done 2013 and no significantly abnl.     Anxiety 2014    added med 3/14     Arthritis      ASCUS of cervix with negative high risk HPV 03/2018     Carotid bruit 2011    us nl     Gastritis 2011    egd done by [facility]     GERD (gastroesophageal reflux disease) 2011     History of colonoscopy 2004, 2014    nl     Hypercholesteremia 2000    stopped lovastatin 2015, added lipitor 3/16     Insomnia     using otc      Lumbar spinal stenosis 2016    Dr. Wilde, had epidural     Lung nodule 2009, 2013    seen on ct for abd pain, fu done 3/13 and 2 nodules stable and benign, one new one needs fu 1 year.  fu done 3/14 and fu 1 year and then done; fu done 3/15 and unchanged, no fu needed     Odynophagia 2004    egd nl     Other chronic pain      Peripheral neuropathies     Dr. Kim     Screening 2006, 2017    nl dexa     Takotsubo cardiomyopathy 01/2020    hosp fsd, ef 40%, mod ai, clean coronaries     Vaginal dysplasia     chronic VAIN I, many colpos of vagina.  Now we only do an annual pap of vagina, no colpo since stable long term     Weight loss 07/2018    ct chest, abd and pelvis neg       PAST SURGICAL HISTORY:  Past Surgical History:   Procedure Laterality Date     ANKLE SURGERY  2000     APPENDECTOMY  13     ARTHROPLASTY KNEE Left 5/14/2018    Procedure: ARTHROPLASTY KNEE;  Left total knee arthroplasty;  Surgeon: William Pollard MD;  Location: [facility] OR     BREAST BIOPSY, CORE RT/LT       C VAGINAL HYSTERECTOMY  1995     CATARACT IOL, RT/LT  04/2019     CV HEART CATHETERIZATION WITH POSSIBLE INTERVENTION N/A 1/7/2020    Procedure: Heart Catheterization with Possible Intervention;  Surgeon: Bibiana Ruggiero MD;  Location:  HEART CARDIAC CATH LAB     GYN SURGERY      BSO     HC UGI ENDOSCOPY, SIMPLE EXAM  03/15/11    [facility]     HYSTERECTOMY, PAP NO LONGER  INDICATED       LAPAROSCOPIC CHOLECYSTECTOMY       NOSE SURGERY       right knee arthroscopy  2007     thumb surgery Left 2015       FAMILY HISTORY:  Family History   Problem Relation Age of Onset     Hyperlipidemia Mother        SOCIAL HISTORY:  Social History     Socioeconomic History     Marital status:      Spouse name: None     Number of children: 1     Years of education: None     Highest education level: None   Occupational History     Occupation: , retired     Employer: [employer]     Employer: RETIRED   Social Needs     Financial resource strain: None     Food insecurity     Worry: None     Inability: None     Transportation needs     Medical: None     Non-medical: None   Tobacco Use     Smoking status: Former Smoker     Packs/day: 1.50     Years: 20.00     Pack years: 30.00     Types: Cigarettes     Last attempt to quit: 1992     Years since quittin.2     Smokeless tobacco: Never Used   Substance and Sexual Activity     Alcohol use: Yes     Alcohol/week: 0.0 standard drinks     Comment: 4 drinks weekly     Drug use: No     Sexual activity: Not Currently     Partners: Male     Comment: vag hyst, BSO   Lifestyle     Physical activity     Days per week: None     Minutes per session: None     Stress: None   Relationships     Social connections     Talks on phone: None     Gets together: None     Attends Anabaptism service: None     Active member of club or organization: None     Attends meetings of clubs or organizations: None     Relationship status: None     Intimate partner violence     Fear of current or ex partner: None     Emotionally abused: None     Physically abused: None     Forced sexual activity: None   Other Topics Concern     Parent/sibling w/ CABG, MI or angioplasty before 65F 55M? Not Asked   Social History Narrative     None

## 2020-05-26 DIAGNOSIS — F41.9 ANXIETY: ICD-10-CM

## 2020-05-26 DIAGNOSIS — I51.81 TAKOTSUBO CARDIOMYOPATHY: ICD-10-CM

## 2020-05-26 NOTE — TELEPHONE ENCOUNTER
Reason for Call:  Medication or medication refill:    Do you use a [facility]?  Name of the pharmacy and phone number for the current request:     [facility] [address] [place] [address] [phone]        Name of the medication requested:   metoprolol succinate ER (TOPROL-XL) 25 MG 24 hr tablet  90 tablet     sertraline 25 MG PO tablet  30 tablet  5          Other request: pt is requesting both of these [facility] so she can have  90 day supply- please call pt when these have been called in so she knows about when to expect them    Can we leave a detailed message on this number? YES    Phone number patient can be reached at: Home number on file [phone] (home)    Best Time: any    Call taken on 5/26/2020 at 11:30 AM by Heavenly Vidal

## 2020-05-27 ENCOUNTER — VIRTUAL VISIT (OUTPATIENT)
Dept: OBGYN | Facility: CLINIC | Age: 80
End: 2020-05-27
Payer: COMMERCIAL

## 2020-05-27 DIAGNOSIS — B37.31 YEAST VAGINITIS: Primary | ICD-10-CM

## 2020-05-27 PROCEDURE — 99213 OFFICE O/P EST LOW 20 MIN: CPT | Mod: TEL | Performed by: OBSTETRICS & GYNECOLOGY

## 2020-05-27 RX ORDER — FLUCONAZOLE 150 MG/1
150 TABLET ORAL DAILY
Qty: 2 TABLET | Refills: 1 | Status: SHIPPED | OUTPATIENT
Start: 2020-05-27 | End: 2020-06-17

## 2020-05-27 NOTE — PROGRESS NOTES
"Cecily Ivory is a 79 year old female who is being evaluated via a billable telephone visit.      The patient has been notified of following:     \"This telephone visit will be conducted via a call between you and your physician/provider. We have found that certain health care needs can be provided without the need for a physical exam.  This service lets us provide the care you need with a short phone conversation.  If a prescription is necessary we can send it directly to your pharmacy.  If lab work is needed we can place an order for that and you can then stop by our lab to have the test done at a later time.    Telephone visits are billed at different rates depending on your insurance coverage. During this emergency period, for some insurers they may be billed the same as an in-person visit.  Please reach out to your insurance provider with any questions.    If during the course of the call the physician/provider feels a telephone visit is not appropriate, you will not be charged for this service.\"    Patient has given verbal consent for Telephone visit?  Yes    What phone number would you like to be contacted at? [phone]  The patient consults us at this time for possible yeast infection.  She has some itchiness and irritation.  She was treated with an antibiotic last week for a infection on her ear.  Her discharge and irritation started after this.  She has no fever or chills and no urgency or urinary frequency.  She denies any hematuria.  We will treat this presumptively as a yeast infection and give her Diflucan for 2 nights.  She will call back if she has not responded.  Phone call duration: 6minutes    Jorge A Valencia MD      "

## 2020-05-28 RX ORDER — SERTRALINE HYDROCHLORIDE 25 MG/1
25 TABLET, FILM COATED ORAL DAILY
Qty: 90 TABLET | Refills: 0 | Status: SHIPPED | OUTPATIENT
Start: 2020-05-28 | End: 2020-06-19

## 2020-05-28 RX ORDER — METOPROLOL SUCCINATE 25 MG/1
25 TABLET, EXTENDED RELEASE ORAL DAILY
Qty: 90 TABLET | Refills: 0 | Status: SHIPPED | OUTPATIENT
Start: 2020-05-28 | End: 2020-06-19

## 2020-05-28 RX ORDER — METOPROLOL SUCCINATE 25 MG/1
12.5 TABLET, EXTENDED RELEASE ORAL DAILY
Qty: 45 TABLET | Refills: 1 | Status: CANCELLED | OUTPATIENT
Start: 2020-05-28

## 2020-05-28 NOTE — TELEPHONE ENCOUNTER
Sent in remainder of refills to new pharmacy. Pt has Px scheduled. LDVM with pt. Rxs sent    Martine Granger RN

## 2020-05-30 ENCOUNTER — NURSE TRIAGE (OUTPATIENT)
Dept: NURSING | Facility: CLINIC | Age: 80
End: 2020-05-30

## 2020-05-30 DIAGNOSIS — N39.0 URINARY TRACT INFECTION: Primary | ICD-10-CM

## 2020-05-30 NOTE — TELEPHONE ENCOUNTER
Spoke with on call for Dr. Day, Dr. Davis and he ordered an antibiotic. He also wants patient to have a UA/UC done at the [facility] in 2 weeks. Orders entered. I have not been able to reach patient after 6 attempts to give her these instructions. I will keep trying!    Additional Information    Negative: Shock suspected (e.g., cold/pale/clammy skin, too weak to stand, low BP, rapid pulse)    Negative: Sounds like a life-threatening emergency to the triager    Negative: Followed a genital area injury    Negative: Followed a genital area injury (penis, scrotum)    Negative: Vaginal discharge    Negative: Pus (white, yellow) or bloody discharge from end of penis    Negative: [1] Taking antibiotic for urinary tract infection (UTI) AND [2] female    Negative: [1] Taking antibiotic for urinary tract infection (UTI) AND [2] male    Negative: [1] Discomfort (pain, burning or stinging) when passing urine AND [2] pregnant    Negative: [1] Discomfort (pain, burning or stinging) when passing urine AND [2] postpartum (from 0 to 6 weeks after delivery)    Negative: [1] Discomfort (pain, burning or stinging) when passing urine AND [2] female    Negative: [1] Discomfort (pain, burning or stinging) when passing urine AND [2] male    Negative: Pain or itching in the vulvar area    Negative: Pain in scrotum is main symptom    Negative: Blood in the urine is main symptom    Negative: Symptoms arising from use of a urinary catheter (Saenz or Coude)    Negative: [1] Unable to urinate (or only a few drops) > 4 hours AND     [2] bladder feels very full (e.g., palpable bladder or strong urge to urinate)    Negative: [1] Decreased urination and [2] drinking very little AND [2] dehydration suspected (e.g., dark urine, no urine > 12 hours, very dry mouth, very lightheaded)    Negative: Patient sounds very sick or weak to the triager    Negative: Fever > 100.5 F (38.1 C)    Urinating more frequently than usual (i.e.,  "frequency)    Negative: [1] Can't control passage of urine (i.e., urinary incontinence) AND [2] new onset (< 2 weeks) or worsening    Negative: Side (flank) or lower back pain present    Answer Assessment - Initial Assessment Questions  1. SYMPTOM: \"What's the main symptom you're concerned about?\" (e.g., frequency, incontinence)      Burning, urgency, frequency, hematuria  2. ONSET: \"When did the  symptoms  start?\"      today  3. PAIN: \"Is there any pain?\" If so, ask: \"How bad is it?\" (Scale: 1-10; mild, moderate, severe)      burning  4. CAUSE: \"What do you think is causing the symptoms?\"      UTI  5. OTHER SYMPTOMS: \"Do you have any other symptoms?\" (e.g., fever, flank pain, blood in urine, pain with urination)      Burning with urination and hematuria  6. PREGNANCY: \"Is there any chance you are pregnant?\" \"When was your last menstrual period?\"      no    Protocols used: URINARY SYMPTOMS-A-AH      "

## 2020-06-02 NOTE — TELEPHONE ENCOUNTER
Pt called back   Notified of message below  She is currently taking the Keflex  Will follow up for UA-UC on 6/15, added to lab schedule    Brenda QUACH RN

## 2020-06-10 ENCOUNTER — DOCUMENTATION ONLY (OUTPATIENT)
Dept: FAMILY MEDICINE | Facility: CLINIC | Age: 80
End: 2020-06-10

## 2020-06-10 DIAGNOSIS — N39.0 URINARY TRACT INFECTION WITHOUT HEMATURIA, SITE UNSPECIFIED: Primary | ICD-10-CM

## 2020-06-10 NOTE — PROGRESS NOTES
There are no orders for this patient for the upcoming lab visit. Please order labs as needed.     Reason for visit UA/URC.    Thank you, lab.

## 2020-06-15 ENCOUNTER — HOSPITAL ENCOUNTER (OUTPATIENT)
Dept: MAMMOGRAPHY | Facility: CLINIC | Age: 80
Discharge: HOME | End: 2020-06-15
Attending: INTERNAL MEDICINE | Admitting: INTERNAL MEDICINE
Payer: COMMERCIAL

## 2020-06-15 DIAGNOSIS — N39.0 URINARY TRACT INFECTION WITHOUT HEMATURIA, SITE UNSPECIFIED: ICD-10-CM

## 2020-06-15 DIAGNOSIS — Z12.31 VISIT FOR SCREENING MAMMOGRAM: ICD-10-CM

## 2020-06-15 LAB
ALBUMIN UR-MCNC: NEGATIVE MG/DL
APPEARANCE UR: CLEAR
BACTERIA #/AREA URNS HPF: ABNORMAL /HPF
BILIRUB UR QL STRIP: NEGATIVE
COLOR UR AUTO: YELLOW
GLUCOSE UR STRIP-MCNC: NEGATIVE MG/DL
HGB UR QL STRIP: NEGATIVE
KETONES UR STRIP-MCNC: NEGATIVE MG/DL
LEUKOCYTE ESTERASE UR QL STRIP: NEGATIVE
NITRATE UR QL: NEGATIVE
NON-SQ EPI CELLS #/AREA URNS LPF: ABNORMAL /LPF
PH UR STRIP: 5.5 PH (ref 5–7)
RBC #/AREA URNS AUTO: ABNORMAL /HPF
SOURCE: ABNORMAL
SP GR UR STRIP: >1.03 (ref 1–1.03)
UROBILINOGEN UR STRIP-ACNC: 0.2 EU/DL (ref 0.2–1)
WBC #/AREA URNS AUTO: ABNORMAL /HPF

## 2020-06-15 PROCEDURE — 77067 SCR MAMMO BI INCL CAD: CPT

## 2020-06-15 PROCEDURE — 87086 URINE CULTURE/COLONY COUNT: CPT | Performed by: INTERNAL MEDICINE

## 2020-06-15 PROCEDURE — 81001 URINALYSIS AUTO W/SCOPE: CPT | Performed by: INTERNAL MEDICINE

## 2020-06-15 NOTE — RESULT ENCOUNTER NOTE
Please notify patient urinalysis [urine microscopy test] done today shows no evidence of urinary tract infection.  Urine culture is pending  Dr. Rosas

## 2020-06-16 ENCOUNTER — TELEPHONE (OUTPATIENT)
Dept: FAMILY MEDICINE | Facility: CLINIC | Age: 80
End: 2020-06-16

## 2020-06-16 LAB
BACTERIA SPEC CULT: NORMAL
SPECIMEN SOURCE: NORMAL

## 2020-06-16 NOTE — RESULT ENCOUNTER NOTE
Please notify patient :urine culture is contaminant. UA negative, if urinary symptoms will need repeat urine test,

## 2020-06-16 NOTE — TELEPHONE ENCOUNTER
----- Message from Miles Rosas MD sent at 6/15/2020  6:36 PM CDT -----  Please notify patient urinalysis [urine microscopy test] done today shows no evidence of urinary tract infection.  Urine culture is pending  Dr. Rosas

## 2020-06-17 DIAGNOSIS — I51.81 TAKOTSUBO CARDIOMYOPATHY: ICD-10-CM

## 2020-06-17 DIAGNOSIS — F41.9 ANXIETY: ICD-10-CM

## 2020-06-17 NOTE — TELEPHONE ENCOUNTER
Notes recorded by Miles Rosas MD on 6/16/2020 at 2:23 PM CDT   Please notify patient :urine culture is contaminant. UA negative, if urinary symptoms will need repeat urine test      Spoke with Pt:   She is feeling just fine, and agrees to call if symptoms arise.     Mihaela WHITMAN RN

## 2020-06-17 NOTE — TELEPHONE ENCOUNTER
Reason for Call:  Medication or medication refill:    Do you use a [facility]?  Name of the pharmacy and phone number for the current request:       [facility] - [place], [place] - [address] [place] [address]    Name of the medication requested: sertraline (ZOLOFT) 25 MG tablet [55142] (Order [id number])     metoprolol succinate ER (TOPROL-XL) 25 MG 24 hr tablet [[id number]] (Order [id number])       Other request: pt is requesting refills be added to her zoloft and she needs a refill on metoprolol. Has about 2 weeks left of metoprolol    Can we leave a detailed message on this number? YES    Phone number patient can be reached at: Home number on file [phone] (home)    Best Time: any    Call taken on 6/17/2020 at 1:20 PM by Carlos Brock

## 2020-06-18 RX ORDER — METOPROLOL SUCCINATE 25 MG/1
25 TABLET, EXTENDED RELEASE ORAL DAILY
Qty: 90 TABLET | Refills: 0 | OUTPATIENT
Start: 2020-06-18

## 2020-06-18 RX ORDER — SERTRALINE HYDROCHLORIDE 25 MG/1
25 TABLET, FILM COATED ORAL DAILY
Qty: 90 TABLET | Refills: 0 | OUTPATIENT
Start: 2020-06-18

## 2020-06-19 RX ORDER — SERTRALINE HYDROCHLORIDE 25 MG/1
25 TABLET, FILM COATED ORAL DAILY
Qty: 90 TABLET | Refills: 0 | Status: SHIPPED | OUTPATIENT
Start: 2020-06-19 | End: 2020-06-29

## 2020-06-19 RX ORDER — METOPROLOL SUCCINATE 25 MG/1
25 TABLET, EXTENDED RELEASE ORAL DAILY
Qty: 90 TABLET | Refills: 0 | Status: SHIPPED | OUTPATIENT
Start: 2020-06-19 | End: 2020-08-28

## 2020-06-19 NOTE — TELEPHONE ENCOUNTER
[facility] She does have refills left at her old pharmacy, but they are not able to [facility].

## 2020-06-19 NOTE — TELEPHONE ENCOUNTER
"    Requested Prescriptions   Pending Prescriptions Disp Refills     sertraline (ZOLOFT) 25 MG tablet 90 tablet 0     Sig: Take 1 tablet (25 mg) by mouth daily       There is no refill protocol information for this order        metoprolol succinate ER (TOPROL-XL) 25 MG 24 hr tablet 90 tablet 0     Sig: Take 1 tablet (25 mg) by mouth daily       There is no refill protocol information for this order      Refused Prescriptions Disp Refills     metoprolol succinate ER (TOPROL-XL) 25 MG 24 hr tablet 90 tablet 0     Sig: Take 1 tablet (25 mg) by mouth daily       Beta-Blockers Protocol Passed - 6/17/2020  1:37 PM        Passed - Blood pressure under 140/90 in past 12 months     BP Readings from Last 3 Encounters:   02/10/20 132/78   01/17/20 102/64   01/14/20 118/70                 Passed - Patient is age 6 or older        Passed - Recent (12 mo) or future (30 days) visit within the authorizing provider's specialty     Patient has had an office visit with the authorizing provider or a provider within the authorizing providers department within the previous 12 mos or has a future within next 30 days. See \"Patient Info\" tab in inbasket, or \"Choose Columns\" in Meds & Orders section of the refill encounter.              Passed - Medication is active on med list           sertraline (ZOLOFT) 25 MG tablet 90 tablet 0     Sig: Take 1 tablet (25 mg) by mouth daily       SSRIs Protocol Passed - 6/17/2020  1:37 PM        Passed - Recent (12 mo) or future (30 days) visit within the authorizing provider's specialty     Patient has had an office visit with the authorizing provider or a provider within the authorizing providers department within the previous 12 mos or has a future within next 30 days. See \"Patient Info\" tab in inbasket, or \"Choose Columns\" in Meds & Orders section of the refill encounter.              Passed - Medication is active on med list        Passed - Patient is age 18 or older        Passed - No active " pregnancy on record        Passed - No positive pregnancy test in last 12 months           rxs approved  Brenda QUACH RN

## 2020-06-29 ENCOUNTER — TELEPHONE (OUTPATIENT)
Dept: FAMILY MEDICINE | Facility: CLINIC | Age: 80
End: 2020-06-29

## 2020-06-29 DIAGNOSIS — F41.9 ANXIETY: ICD-10-CM

## 2020-06-29 RX ORDER — SERTRALINE HYDROCHLORIDE 25 MG/1
25 TABLET, FILM COATED ORAL DAILY
Qty: 90 TABLET | Refills: 0 | Status: SHIPPED | OUTPATIENT
Start: 2020-06-29 | End: 2020-08-28

## 2020-06-29 NOTE — TELEPHONE ENCOUNTER
I spoke to [facility] and they stated they did not have the Rx for Sertraline sent 6/19/2020.  They transferred me to another place and I was on hold for a long time.  Can we send the Rx again, please?      Ana Perez MA

## 2020-06-29 NOTE — TELEPHONE ENCOUNTER
Reason for Call:  Medication or medication refill:    Do you use a [facility]?  Name of the pharmacy and phone number for the current request:       [facility] - [place], [place] - [address] [place] [address]      Name of the medication requested:   sertraline (ZOLOFT) 25 MG tablet  90 tablet   &  metoprolol succinate ER (TOPROL-XL) 25 MG 24 hr tablet  90 tablet        Other request: patient called and say pharmacy hasn't received these, also wondering about future refills. Patient is almost out of Metoprolol    Can we leave a detailed message on this number? YES    Phone number patient can be reached at: Home number on file [phone] (home)    Best Time: any    Call taken on 6/29/2020 at 11:20 AM by Heavenly Vidal

## 2020-06-30 NOTE — TELEPHONE ENCOUNTER
I called [facility] and patient has plenty of each of her medications until her OV with Dr. Ruiz on 8/7/2020.  We will send new Rxs' at that time.  I called patient back and explained that automated phone calls from [facility] can be confusing and she may want to call and opt out of the automated calls to help eliminate her anxiety.     Ana Perez MA

## 2020-06-30 NOTE — TELEPHONE ENCOUNTER
Pt is calling because she received a call this morning already from [facility] stating that they still have not received the Rx for Sertraline or Metoprolol.      Pt is very frustrated, distressed.    Informed her that we are doing our best on our end and have sent it multiple times.      Please send again, high priority.

## 2020-08-28 ENCOUNTER — OFFICE VISIT (OUTPATIENT)
Dept: FAMILY MEDICINE | Facility: CLINIC | Age: 80
End: 2020-08-28
Payer: COMMERCIAL

## 2020-08-28 VITALS
BODY MASS INDEX: 24.09 KG/M2 | DIASTOLIC BLOOD PRESSURE: 78 MMHG | TEMPERATURE: 97.1 F | HEIGHT: 67 IN | WEIGHT: 153.5 LBS | SYSTOLIC BLOOD PRESSURE: 132 MMHG | OXYGEN SATURATION: 97 % | HEART RATE: 48 BPM

## 2020-08-28 DIAGNOSIS — F41.9 ANXIETY: ICD-10-CM

## 2020-08-28 DIAGNOSIS — E78.5 HYPERLIPIDEMIA LDL GOAL <130: ICD-10-CM

## 2020-08-28 DIAGNOSIS — I51.81 STRESS-INDUCED CARDIOMYOPATHY: ICD-10-CM

## 2020-08-28 DIAGNOSIS — R79.89 ELEVATED LFTS: ICD-10-CM

## 2020-08-28 DIAGNOSIS — R87.610 ASCUS OF CERVIX WITH NEGATIVE HIGH RISK HPV: ICD-10-CM

## 2020-08-28 DIAGNOSIS — Z00.00 ROUTINE GENERAL MEDICAL EXAMINATION AT A HEALTH CARE FACILITY: Primary | ICD-10-CM

## 2020-08-28 DIAGNOSIS — K21.9 GASTROESOPHAGEAL REFLUX DISEASE WITHOUT ESOPHAGITIS: ICD-10-CM

## 2020-08-28 DIAGNOSIS — G62.9 PERIPHERAL POLYNEUROPATHY: ICD-10-CM

## 2020-08-28 DIAGNOSIS — I51.81 TAKOTSUBO CARDIOMYOPATHY: ICD-10-CM

## 2020-08-28 DIAGNOSIS — R41.3 MEMORY LOSS: ICD-10-CM

## 2020-08-28 DIAGNOSIS — M48.061 SPINAL STENOSIS OF LUMBAR REGION, UNSPECIFIED WHETHER NEUROGENIC CLAUDICATION PRESENT: ICD-10-CM

## 2020-08-28 PROBLEM — I21.4 NSTEMI (NON-ST ELEVATED MYOCARDIAL INFARCTION) (H): Status: RESOLVED | Noted: 2020-01-07 | Resolved: 2020-08-28

## 2020-08-28 LAB
FOLATE SERPL-MCNC: 55.3 NG/ML
VIT B12 SERPL-MCNC: 667 PG/ML (ref 193–986)

## 2020-08-28 PROCEDURE — 82746 ASSAY OF FOLIC ACID SERUM: CPT | Performed by: INTERNAL MEDICINE

## 2020-08-28 PROCEDURE — 99397 PER PM REEVAL EST PAT 65+ YR: CPT | Performed by: INTERNAL MEDICINE

## 2020-08-28 PROCEDURE — 80076 HEPATIC FUNCTION PANEL: CPT | Performed by: INTERNAL MEDICINE

## 2020-08-28 PROCEDURE — 36415 COLL VENOUS BLD VENIPUNCTURE: CPT | Performed by: INTERNAL MEDICINE

## 2020-08-28 PROCEDURE — 99213 OFFICE O/P EST LOW 20 MIN: CPT | Mod: 25 | Performed by: INTERNAL MEDICINE

## 2020-08-28 PROCEDURE — 84443 ASSAY THYROID STIM HORMONE: CPT | Performed by: INTERNAL MEDICINE

## 2020-08-28 PROCEDURE — 82607 VITAMIN B-12: CPT | Performed by: INTERNAL MEDICINE

## 2020-08-28 RX ORDER — SERTRALINE HYDROCHLORIDE 25 MG/1
25 TABLET, FILM COATED ORAL DAILY
Qty: 90 TABLET | Refills: 3 | Status: SHIPPED | OUTPATIENT
Start: 2020-08-28 | End: 2021-10-05

## 2020-08-28 RX ORDER — LOVASTATIN 40 MG
40 TABLET ORAL AT BEDTIME
Qty: 90 TABLET | Refills: 3 | Status: SHIPPED | OUTPATIENT
Start: 2020-08-28 | End: 2021-10-05

## 2020-08-28 RX ORDER — METOPROLOL SUCCINATE 25 MG/1
25 TABLET, EXTENDED RELEASE ORAL DAILY
Qty: 90 TABLET | Refills: 3 | Status: SHIPPED | OUTPATIENT
Start: 2020-08-28 | End: 2021-10-05

## 2020-08-28 ASSESSMENT — ACTIVITIES OF DAILY LIVING (ADL): CURRENT_FUNCTION: NO ASSISTANCE NEEDED

## 2020-08-28 ASSESSMENT — MIFFLIN-ST. JEOR: SCORE: 1213.9

## 2020-08-28 NOTE — LETTER
August 31, 2020      Cecily Ivory  [address] [place]  [place] [address]        Dear ,    It was a pleasure seeing you.  I wanted to get back to you with your test results.  I have enclosed a copy for your records.     Your labs look very good.  The one elevated liver test is normal so no issues there.  The tests to look for a treatable cause of memory issues are all normal.  Please see the neurologist and let's see what they say.     If you have any questions please call me.    Resulted Orders   Hepatic panel   Result Value Ref Range    Bilirubin Direct 0.1 0.0 - 0.2 mg/dL    Bilirubin Total 0.4 0.2 - 1.3 mg/dL    Albumin 3.7 3.4 - 5.0 g/dL    Protein Total 7.1 6.8 - 8.8 g/dL    Alkaline Phosphatase 61 40 - 150 U/L    ALT 31 0 - 50 U/L    AST 27 0 - 45 U/L   TSH with free T4 reflex   Result Value Ref Range    TSH 1.20 0.40 - 4.00 mU/L   Vitamin B12   Result Value Ref Range    Vitamin B12 667 193 - 986 pg/mL   Folate   Result Value Ref Range    Folate 55.3 >5.4 ng/mL

## 2020-08-29 LAB
ALBUMIN SERPL-MCNC: 3.7 G/DL (ref 3.4–5)
ALP SERPL-CCNC: 61 U/L (ref 40–150)
ALT SERPL W P-5'-P-CCNC: 31 U/L (ref 0–50)
AST SERPL W P-5'-P-CCNC: 27 U/L (ref 0–45)
BILIRUB DIRECT SERPL-MCNC: 0.1 MG/DL (ref 0–0.2)
BILIRUB SERPL-MCNC: 0.4 MG/DL (ref 0.2–1.3)
PROT SERPL-MCNC: 7.1 G/DL (ref 6.8–8.8)
TSH SERPL DL<=0.005 MIU/L-ACNC: 1.2 MU/L (ref 0.4–4)

## 2020-08-29 NOTE — RESULT ENCOUNTER NOTE
It was a pleasure seeing you.  I wanted to get back to you with your test results.  I have enclosed a copy for your records.    Your labs look very good.  The one elevated liver test is normal so no issues there.  The tests to look for a treatable cause of memory issues are all normal.  Please see the neurologist and let's see what they say.    If you have any questions please call me.

## 2020-09-11 ENCOUNTER — OFFICE VISIT (OUTPATIENT)
Dept: NEUROLOGY | Facility: CLINIC | Age: 80
End: 2020-09-11
Payer: COMMERCIAL

## 2020-09-11 VITALS
WEIGHT: 153 LBS | TEMPERATURE: 98.6 F | SYSTOLIC BLOOD PRESSURE: 119 MMHG | BODY MASS INDEX: 23.96 KG/M2 | HEART RATE: 58 BPM | DIASTOLIC BLOOD PRESSURE: 62 MMHG | OXYGEN SATURATION: 96 %

## 2020-09-11 DIAGNOSIS — R41.3 MEMORY LOSS: Primary | ICD-10-CM

## 2020-09-11 PROCEDURE — 99205 OFFICE O/P NEW HI 60 MIN: CPT | Performed by: PSYCHIATRY & NEUROLOGY

## 2020-09-11 PROCEDURE — 84425 ASSAY OF VITAMIN B-1: CPT | Mod: 90 | Performed by: PSYCHIATRY & NEUROLOGY

## 2020-09-11 PROCEDURE — 99000 SPECIMEN HANDLING OFFICE-LAB: CPT | Performed by: PSYCHIATRY & NEUROLOGY

## 2020-09-11 PROCEDURE — 36415 COLL VENOUS BLD VENIPUNCTURE: CPT | Performed by: PSYCHIATRY & NEUROLOGY

## 2020-09-11 ASSESSMENT — MONTREAL COGNITIVE ASSESSMENT (MOCA)
8. SERIAL SUBTRACTION OF 7S: 3
4. NAME EACH OF THE THREE ANIMALS SHOWN: 3
7. [VIGILENCE] TAP WHEN HEARING DESIGNATED LETTER: 1
10. [FLUENCY] NAME WORDS STARTING WITH DESIGNATED LETTER: 1
13. ORIENTATION SUBSCORE: 5
9. REPEAT EACH SENTENCE: 2
VISUOSPATIAL/EXECUTIVE SUBSCORE: 4
WHAT LEVEL OF EDUCATION WAS ATTAINED: 0
WHAT IS THE TOTAL SCORE (OUT OF 30): 24
12. MEMORY INDEX SCORE: 1
11. FOR EACH PAIR OF WORDS, WHAT CATEGORY DO THEY BELONG TO (OUT OF 2): 2
6. READ LIST OF DIGITS [FORWARD/BACKWARD]: 2
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
## 2020-09-11 NOTE — PATIENT INSTRUCTIONS
AFTER VISIT SUMMARY (AVS):    At today's visit we thoroughly discussed various diagnostic possibilities for your symptoms, necessary evaluation, and the plan, which includes:  Orders Placed This Encounter   Procedures     MR Brain w/o Contrast     Vitamin B1 whole blood     OCCUPATIONAL THERAPY REFERRAL     NEUROPSYCHOLOGY REFERRAL     No new medications.    Stay physically and mentally active with particular emphasis on daily mentally stimulating activities of your choice (such as crosswords, puzzles, sudoku, etc.), stretching exercises, walking, and healthy eating.     Additional recommendations after the work-up.    Next follow-up appointment is in the next 3 months or earlier if needed.    Please do not hesitate to call me with any questions or concerns.    Thanks.
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
## 2020-09-13 LAB — VIT B1 BLD-MCNC: 166 NMOL/L (ref 70–180)

## 2020-09-24 ENCOUNTER — DOCUMENTATION ONLY (OUTPATIENT)
Dept: NEUROLOGY | Facility: CLINIC | Age: 80
End: 2020-09-24

## 2020-09-24 ENCOUNTER — APPOINTMENT (OUTPATIENT)
Dept: CT IMAGING | Facility: CLINIC | Age: 80
End: 2020-09-24
Attending: PHYSICIAN ASSISTANT
Payer: COMMERCIAL

## 2020-09-24 ENCOUNTER — HOSPITAL ENCOUNTER (OUTPATIENT)
Dept: MRI IMAGING | Facility: CLINIC | Age: 80
Discharge: HOME | End: 2020-09-24
Attending: PSYCHIATRY & NEUROLOGY | Admitting: PSYCHIATRY & NEUROLOGY
Payer: COMMERCIAL

## 2020-09-24 ENCOUNTER — HOSPITAL ENCOUNTER (OUTPATIENT)
Facility: CLINIC | Age: 80
Setting detail: OBSERVATION
Discharge: HOME | End: 2020-09-25
Attending: EMERGENCY MEDICINE | Admitting: INTERNAL MEDICINE
Payer: COMMERCIAL

## 2020-09-24 ENCOUNTER — APPOINTMENT (OUTPATIENT)
Dept: CT IMAGING | Facility: CLINIC | Age: 80
End: 2020-09-24
Attending: EMERGENCY MEDICINE
Payer: COMMERCIAL

## 2020-09-24 DIAGNOSIS — S06.5XAA SDH (SUBDURAL HEMATOMA) (H): Primary | ICD-10-CM

## 2020-09-24 DIAGNOSIS — H11.32 SUBCONJUNCTIVAL HEMORRHAGE OF LEFT EYE: ICD-10-CM

## 2020-09-24 DIAGNOSIS — R41.3 MEMORY LOSS: ICD-10-CM

## 2020-09-24 DIAGNOSIS — S06.5XAA SUBDURAL HEMATOMA (H): ICD-10-CM

## 2020-09-24 LAB
ANION GAP SERPL CALCULATED.3IONS-SCNC: 5 MMOL/L (ref 3–14)
BASOPHILS # BLD AUTO: 0 10E9/L (ref 0–0.2)
BASOPHILS NFR BLD AUTO: 0.3 %
BUN SERPL-MCNC: 16 MG/DL (ref 7–30)
CALCIUM SERPL-MCNC: 9.4 MG/DL (ref 8.5–10.1)
CHLORIDE SERPL-SCNC: 105 MMOL/L (ref 94–109)
CO2 SERPL-SCNC: 30 MMOL/L (ref 20–32)
CREAT BLD-MCNC: 0.9 MG/DL (ref 0.52–1.04)
CREAT SERPL-MCNC: 0.7 MG/DL (ref 0.52–1.04)
DIFFERENTIAL METHOD BLD: NORMAL
EOSINOPHIL # BLD AUTO: 0.1 10E9/L (ref 0–0.7)
EOSINOPHIL NFR BLD AUTO: 1.3 %
ERYTHROCYTE [DISTWIDTH] IN BLOOD BY AUTOMATED COUNT: 13 % (ref 10–15)
GFR SERPL CREATININE-BSD FRML MDRD: 61 ML/MIN/{1.73_M2}
GFR SERPL CREATININE-BSD FRML MDRD: 83 ML/MIN/{1.73_M2}
GLUCOSE SERPL-MCNC: 83 MG/DL (ref 70–99)
HCT VFR BLD AUTO: 44 % (ref 35–47)
HGB BLD-MCNC: 14.6 G/DL (ref 11.7–15.7)
IMM GRANULOCYTES # BLD: 0 10E9/L (ref 0–0.4)
IMM GRANULOCYTES NFR BLD: 0.1 %
INR PPP: 1.06 (ref 0.86–1.14)
LYMPHOCYTES # BLD AUTO: 2.4 10E9/L (ref 0.8–5.3)
LYMPHOCYTES NFR BLD AUTO: 34.9 %
MCH RBC QN AUTO: 30.6 PG (ref 26.5–33)
MCHC RBC AUTO-ENTMCNC: 33.2 G/DL (ref 31.5–36.5)
MCV RBC AUTO: 92 FL (ref 78–100)
MONOCYTES # BLD AUTO: 0.5 10E9/L (ref 0–1.3)
MONOCYTES NFR BLD AUTO: 7.1 %
NEUTROPHILS # BLD AUTO: 3.9 10E9/L (ref 1.6–8.3)
NEUTROPHILS NFR BLD AUTO: 56.3 %
NRBC # BLD AUTO: 0 10*3/UL
NRBC BLD AUTO-RTO: 0 /100
PLATELET # BLD AUTO: 283 10E9/L (ref 150–450)
POTASSIUM SERPL-SCNC: 4 MMOL/L (ref 3.4–5.3)
RADIOLOGIST FLAGS: ABNORMAL
RBC # BLD AUTO: 4.77 10E12/L (ref 3.8–5.2)
SODIUM SERPL-SCNC: 140 MMOL/L (ref 133–144)
WBC # BLD AUTO: 6.9 10E9/L (ref 4–11)

## 2020-09-24 PROCEDURE — 99285 EMERGENCY DEPT VISIT HI MDM: CPT | Mod: 25

## 2020-09-24 PROCEDURE — 99220 ZZC INITIAL OBSERVATION CARE,LEVL III: CPT | Performed by: PHYSICIAN ASSISTANT

## 2020-09-24 PROCEDURE — C9803 HOPD COVID-19 SPEC COLLECT: HCPCS

## 2020-09-24 PROCEDURE — 82565 ASSAY OF CREATININE: CPT

## 2020-09-24 PROCEDURE — 70450 CT HEAD/BRAIN W/O DYE: CPT | Mod: 77

## 2020-09-24 PROCEDURE — 85610 PROTHROMBIN TIME: CPT | Performed by: EMERGENCY MEDICINE

## 2020-09-24 PROCEDURE — 70450 CT HEAD/BRAIN W/O DYE: CPT

## 2020-09-24 PROCEDURE — A9585 GADOBUTROL INJECTION: HCPCS | Performed by: PSYCHIATRY & NEUROLOGY

## 2020-09-24 PROCEDURE — U0003 INFECTIOUS AGENT DETECTION BY NUCLEIC ACID (DNA OR RNA); SEVERE ACUTE RESPIRATORY SYNDROME CORONAVIRUS 2 (SARS-COV-2) (CORONAVIRUS DISEASE [COVID-19]), AMPLIFIED PROBE TECHNIQUE, MAKING USE OF HIGH THROUGHPUT TECHNOLOGIES AS DESCRIBED BY CMS-2020-01-R: HCPCS | Performed by: EMERGENCY MEDICINE

## 2020-09-24 PROCEDURE — G0378 HOSPITAL OBSERVATION PER HR: HCPCS

## 2020-09-24 PROCEDURE — 25000132 ZZH RX MED GY IP 250 OP 250 PS 637: Performed by: PHYSICIAN ASSISTANT

## 2020-09-24 PROCEDURE — 80048 BASIC METABOLIC PNL TOTAL CA: CPT | Performed by: EMERGENCY MEDICINE

## 2020-09-24 PROCEDURE — 70553 MRI BRAIN STEM W/O & W/DYE: CPT

## 2020-09-24 PROCEDURE — 25500064 ZZH RX 255 OP 636: Performed by: PSYCHIATRY & NEUROLOGY

## 2020-09-24 PROCEDURE — 85025 COMPLETE CBC W/AUTO DIFF WBC: CPT | Performed by: EMERGENCY MEDICINE

## 2020-09-24 RX ORDER — NALOXONE HYDROCHLORIDE 0.4 MG/ML
.1-.4 INJECTION, SOLUTION INTRAMUSCULAR; INTRAVENOUS; SUBCUTANEOUS
Status: DISCONTINUED | OUTPATIENT
Start: 2020-09-24 | End: 2020-09-25 | Stop reason: HOSPADM

## 2020-09-24 RX ORDER — ACETAMINOPHEN 650 MG/1
650 SUPPOSITORY RECTAL EVERY 4 HOURS PRN
Status: DISCONTINUED | OUTPATIENT
Start: 2020-09-24 | End: 2020-09-25 | Stop reason: HOSPADM

## 2020-09-24 RX ORDER — LORATADINE 10 MG/1
10 TABLET ORAL DAILY
Status: DISCONTINUED | OUTPATIENT
Start: 2020-09-25 | End: 2020-09-25 | Stop reason: HOSPADM

## 2020-09-24 RX ORDER — ACETAMINOPHEN 325 MG/1
650 TABLET ORAL EVERY 4 HOURS PRN
Status: DISCONTINUED | OUTPATIENT
Start: 2020-09-24 | End: 2020-09-25 | Stop reason: HOSPADM

## 2020-09-24 RX ORDER — PROCHLORPERAZINE MALEATE 5 MG
5 TABLET ORAL EVERY 6 HOURS PRN
Status: DISCONTINUED | OUTPATIENT
Start: 2020-09-24 | End: 2020-09-25 | Stop reason: HOSPADM

## 2020-09-24 RX ORDER — ONDANSETRON 4 MG/1
4 TABLET, ORALLY DISINTEGRATING ORAL EVERY 6 HOURS PRN
Status: DISCONTINUED | OUTPATIENT
Start: 2020-09-24 | End: 2020-09-25 | Stop reason: HOSPADM

## 2020-09-24 RX ORDER — AMOXICILLIN 250 MG
1 CAPSULE ORAL 2 TIMES DAILY
Status: DISCONTINUED | OUTPATIENT
Start: 2020-09-24 | End: 2020-09-25 | Stop reason: HOSPADM

## 2020-09-24 RX ORDER — POLYETHYLENE GLYCOL 3350 17 G/17G
17 POWDER, FOR SOLUTION ORAL DAILY PRN
Status: DISCONTINUED | OUTPATIENT
Start: 2020-09-24 | End: 2020-09-25 | Stop reason: HOSPADM

## 2020-09-24 RX ORDER — ONDANSETRON 2 MG/ML
4 INJECTION INTRAMUSCULAR; INTRAVENOUS EVERY 6 HOURS PRN
Status: DISCONTINUED | OUTPATIENT
Start: 2020-09-24 | End: 2020-09-25 | Stop reason: HOSPADM

## 2020-09-24 RX ORDER — BISACODYL 5 MG
5 TABLET, DELAYED RELEASE (ENTERIC COATED) ORAL DAILY PRN
Status: DISCONTINUED | OUTPATIENT
Start: 2020-09-24 | End: 2020-09-25 | Stop reason: HOSPADM

## 2020-09-24 RX ORDER — BISACODYL 10 MG
10 SUPPOSITORY, RECTAL RECTAL DAILY PRN
Status: DISCONTINUED | OUTPATIENT
Start: 2020-09-24 | End: 2020-09-25 | Stop reason: HOSPADM

## 2020-09-24 RX ORDER — GADOBUTROL 604.72 MG/ML
7 INJECTION INTRAVENOUS ONCE
Status: COMPLETED | OUTPATIENT
Start: 2020-09-24 | End: 2020-09-24

## 2020-09-24 RX ORDER — BISACODYL 5 MG
15 TABLET, DELAYED RELEASE (ENTERIC COATED) ORAL DAILY PRN
Status: DISCONTINUED | OUTPATIENT
Start: 2020-09-24 | End: 2020-09-25 | Stop reason: HOSPADM

## 2020-09-24 RX ORDER — LIDOCAINE 40 MG/G
CREAM TOPICAL
Status: DISCONTINUED | OUTPATIENT
Start: 2020-09-24 | End: 2020-09-25 | Stop reason: HOSPADM

## 2020-09-24 RX ORDER — METOPROLOL SUCCINATE 25 MG/1
25 TABLET, EXTENDED RELEASE ORAL DAILY
Status: DISCONTINUED | OUTPATIENT
Start: 2020-09-25 | End: 2020-09-25 | Stop reason: HOSPADM

## 2020-09-24 RX ORDER — PROCHLORPERAZINE 25 MG
12.5 SUPPOSITORY, RECTAL RECTAL EVERY 12 HOURS PRN
Status: DISCONTINUED | OUTPATIENT
Start: 2020-09-24 | End: 2020-09-25 | Stop reason: HOSPADM

## 2020-09-24 RX ORDER — HYDRALAZINE HYDROCHLORIDE 20 MG/ML
5 INJECTION INTRAMUSCULAR; INTRAVENOUS EVERY 4 HOURS PRN
Status: DISCONTINUED | OUTPATIENT
Start: 2020-09-24 | End: 2020-09-25 | Stop reason: HOSPADM

## 2020-09-24 RX ORDER — BISACODYL 5 MG
10 TABLET, DELAYED RELEASE (ENTERIC COATED) ORAL DAILY PRN
Status: DISCONTINUED | OUTPATIENT
Start: 2020-09-24 | End: 2020-09-25 | Stop reason: HOSPADM

## 2020-09-24 RX ORDER — AMOXICILLIN 250 MG
2 CAPSULE ORAL 2 TIMES DAILY
Status: DISCONTINUED | OUTPATIENT
Start: 2020-09-24 | End: 2020-09-25 | Stop reason: HOSPADM

## 2020-09-24 RX ORDER — GLIPIZIDE 10 MG/1
1 TABLET ORAL EVERY EVENING
Status: DISCONTINUED | OUTPATIENT
Start: 2020-09-24 | End: 2020-09-25 | Stop reason: HOSPADM

## 2020-09-24 RX ADMIN — DEXTRAN 70, GLYCERIN, HYPROMELLOSE 1 DROP: 1; 2; 3 SOLUTION/ DROPS OPHTHALMIC at 22:15

## 2020-09-24 RX ADMIN — GADOBUTROL 7 ML: 604.72 INJECTION INTRAVENOUS at 12:12

## 2020-09-24 RX ADMIN — DOCUSATE SODIUM 50 MG AND SENNOSIDES 8.6 MG 1 TABLET: 8.6; 5 TABLET, FILM COATED ORAL at 20:28

## 2020-09-24 SDOH — HEALTH STABILITY: MENTAL HEALTH: HOW MANY STANDARD DRINKS CONTAINING ALCOHOL DO YOU HAVE ON A TYPICAL DAY?: 1 OR 2

## 2020-09-24 SDOH — HEALTH STABILITY: MENTAL HEALTH: HOW OFTEN DO YOU HAVE A DRINK CONTAINING ALCOHOL?: 2-4 TIMES A MONTH

## 2020-09-24 SDOH — HEALTH STABILITY: MENTAL HEALTH: HOW OFTEN DO YOU HAVE 6 OR MORE DRINKS ON ONE OCCASION?: NEVER

## 2020-09-24 ASSESSMENT — ENCOUNTER SYMPTOMS: HEADACHES: 0

## 2020-09-24 NOTE — CONSULTS
NEUROSURGERY CONSULT    Neurosurgery was asked by Dr. Hamilton to consult for bilateral subdural hematomas.    PLAN:  Ms. Ivory exhibits left greater than right bilateral convexity subdural hematomas measuring up to 1.1 cm on the left and 0.3 cm on the right in  thickness on her most recent imaging. From a Neurosurgical standpoint, we feel that it would be in her best interest to be admitted to [facility] by the Medical team. The head of the bed should be at 30 degrees at all times and the systolic blood pressure should be maintained at 150 mmHg or less at all times. A head CT has been ordered for now by the ED staff as we only have a brain MRI, we will also repeat a head CT in six hours (this evening) to assess the stability of the bleed.     Please refrain from using any Aspirin or anti-inflammatory products.     In the meantime, she should not lift anything greater than 5-10 lbs., avoid putting her head below her heart, and avoid straining and sneezing with her mouth closed.     We did review the images together. She appeared to have a good understanding of the situation and asked appropriate questions which we answered. We did discuss signs of a worsening problem that a repeat evaluation should be considered.    It has been a pleasure meeting Cecliy Ivory. Thank you for having us be involved in her care.    ______________________________________________________________________    HPI:    Cecily Ivory is a pleasant 80 year old female who presented to the [facility] Emergency Department for consultation after her daughter had noted a subconjunctival hemorrhage in her left eye this morning. She was being evaluated by her PCP a couple weeks ago and her daughter brought up a concern regarding Ms. Ivory starting to repeat herself lately and having memory changes. Subsequently she was referred to a Neurologist and a brain MRI was obtained which exhibits left greater than right bilateral  convexity subdural hematomas. She was directed here to [facility] for further management. The age of the subdural fluid collections is not known as there is no evidence of a recent traumatic mechanism, although she did suffer a fall in February and did hit her face on the bathroom floor. She does take 81 mg of aspirin nightly. There are no bowel or bladder changes. No other concerns are voiced.    Past Medical History:   Diagnosis Date     Abdominal pain 2009, 2013    ct liver and renal cyst and 2mm lung nodule, repeat ct done 2013 and no significantly abnl.     Anxiety 2014    added med 3/14     Arthritis      ASCUS of cervix with negative high risk HPV 03/2018     Carotid bruit 2011    us nl     Gastritis 2011    egd done by [facility]     GERD (gastroesophageal reflux disease) 2011     History of colonoscopy 2004, 2014    nl     Hypercholesteremia 2000    stopped lovastatin 2015, added lipitor 3/16     Insomnia     using otc      Lumbar spinal stenosis 2016    Dr. Wilde, had epidural     Lung nodule 2009, 2013    seen on ct for abd pain, fu done 3/13 and 2 nodules stable and benign, one new one needs fu 1 year.  fu done 3/14 and fu 1 year and then done; fu done 3/15 and unchanged, no fu needed     Myocardial infarction (H) 01/2020    elev trop but clean coronaries on angio, felt to be stress induced cmyop     Odynophagia 2004    egd nl     Other chronic pain      Peripheral neuropathies     Dr. Kim     Screening 2006, 2017    nl dexa     Takotsubo cardiomyopathy 01/2020    hosp fsd, ef 40%, mod ai, clean coronaries; fu echo nl 5/20     Vaginal dysplasia     chronic VAIN I, many colpos of vagina.  Now we only do an annual pap of vagina, no colpo since stable long term     Weight loss 07/2018    ct chest, abd and pelvis neg       Past Surgical History:   Procedure Laterality Date     ANKLE SURGERY  2000     APPENDECTOMY  13     ARTHROPLASTY KNEE Left 5/14/2018    Procedure: ARTHROPLASTY KNEE;  Left total knee  arthroplasty;  Surgeon: William Pollard MD;  Location: [facility] OR     BREAST BIOPSY, CORE RT/LT       C VAGINAL HYSTERECTOMY       CATARACT IOL, RT/LT  2019     CV HEART CATHETERIZATION WITH POSSIBLE INTERVENTION N/A 2020    Procedure: Heart Catheterization with Possible Intervention;  Surgeon: Bibiana Ruggiero MD;  Location:  HEART CARDIAC CATH LAB     GYN SURGERY      BSO     HC UGI ENDOSCOPY, SIMPLE EXAM  03/15/11    [facility]     HYSTERECTOMY, PAP NO LONGER INDICATED       LAPAROSCOPIC CHOLECYSTECTOMY       NOSE SURGERY       right knee arthroscopy       thumb surgery Left 2015       Allergies   Allergen Reactions     Latex Cough     Seasonal Allergies      YEAR ROUND ALLERGIES     Sulfa Drugs Unknown       Social History     Tobacco Use     Smoking status: Former Smoker     Packs/day: 1.50     Years: 20.00     Pack years: 30.00     Types: Cigarettes     Last attempt to quit: 1992     Years since quittin.6     Smokeless tobacco: Never Used   Substance Use Topics     Alcohol use: Yes     Alcohol/week: 0.0 standard drinks     Comment: 2 glasses 4 times per week       Family History   Problem Relation Age of Onset     Hyperlipidemia Mother      No Known Problems Father      No Known Problems Brother      No Known Problems Maternal Grandmother      No Known Problems Maternal Grandfather      No Known Problems Paternal Grandmother      No Known Problems Other      Home Medications  Zyrtec  Claritin  Mevacor  Magnesium  Toprol  Zoloft  Aspirin 81 MG    ROS: 10 point ROS neg other than the symptoms noted above in the HPI.    Vitals:    BP (!) 148/45   Pulse 51   Temp 97.2  F (36.2  C) (Temporal)   Resp 18   SpO2 98%   There is no height or weight on file to calculate BMI.  No intake/output data recorded.    Exam:    Pt examined in ED 19. Pt appears comfortable and in no apparent distress, moving all extremities.     Head: Normocephalic, without obvious  abnormality, atraumatic, no facial asymmetry.   Eyes: Subconjunctival hemorrhage in her left eye. PERRL, EOM's intact.   Throat: lips, mucosa, and tongue normal; teeth and gums normal.   Neck: supple, symmetrical, trachea midline, no adenopathy and thyroid: not enlarged, symmetric, no tenderness/mass/nodules.   Lungs: clear to auscultation bilaterally.   Heart: regular rate and rhythm.   Abdomen: soft, non-tender; bowel sounds normal; no masses, no organomegaly.   Pulses: 2+ and symmetric.   Skin: Skin color, texture, turgor normal. No rashes or lesions.     Alert and oriented to date and time. Able to recall the current president of [place].   CN II: Able to read nametag, VF full with gross confrontation.   CN III,IV,VI: ANJELICA, Extraocular movements full, absent for nystagmus, absent for ptosis.   CN V: Full sensation in V1,V2,V3, jaw clench symmetrical.   CN VII: Face symmetrical and with equal strength, able to puff cheeks out.   CN VIII: Able to hear conversation.   CN IX: Able to push tongue against bilateral cheeks.   CN X: Palate elevates symmetrically, uvula midline.   CN XI: Elevate shoulders symmetrical, full strength when turning head from side to side.   CNXII: Tongue protrudes midline, absent for fasciculation.   Able to name 3/3 objects.   Finger to nose slow and accurate.   Rapid hand movements intact.   Absent for upper and lower extremity drift.     GCS:   Eye: eyes open spontaneously (4)   Motor: obeys commands (6)   Verbal: oriented (5)   Composite score: 15     Bilateral upper extremities 5/5. Bilateral bicep and triceps reflexes 2/4. Sensation intact throughout. Normal ROM.   Bilateral lower extremities 5/5. Normal sensation t/o bilaterally. Bilateral patellar 2/4 and achilles reflex 1/4.   Calves soft and non-tender.     Imaging: MRI BRAIN WITHOUT AND WITH CONTRAST September 24, 2020 12:42 PM  Impression per radiology read - I have personally reviewed the images with the patient.    1.  Left greater than right bilateral convexity subdural hematomas  measuring up to 1.1 cm on the left and 0.3 cm on the right in  thickness.  2. Mild-to-moderate cerebral atrophy.    Available labs at time of consult:     No results for input(s): WBC, HGB, HCT, MCV, PLT, IRON, IRONSAT, RETICABSCT, RETP, FEB, DRAKE, B12, FOLIC, EPOE, MORPH in the last 168 hours.  No results for input(s): WBC, HGB, HCT, MCV, PLT in the last 168 hours.  No results for input(s): HGB in the last 168 hours.  No results for input(s): INR in the last 168 hours.  No results for input(s): PLT in the last 168 hours.  No results for input(s): WBC in the last 168 hours.      Respectfully,    CLAUDETTE Young, KERI  [facility]  [facility]     Tel: [phone]      All imaging, physical findings, and the above plan have been reviewed with Dr. Villa.

## 2020-09-24 NOTE — PROGRESS NOTES
RECEIVING UNIT ED HANDOFF REVIEW    ED Nurse Handoff Report was reviewed by: Kenadl Smith RN on September 24, 2020 at 6:22 PM

## 2020-09-24 NOTE — ED PROVIDER NOTES
Assumed care of the patient from Dr. Hamilton pending CT scanning, the neurosurgical team has seen the patient in the ED and plan for evaluation and repeat scanning likely nonoperative intervention.  I spoke with Dr. Arnold from the hospitalist service who is in agreement with admission for further evaluation and treatment.  I informed the patient of her findings she was in agreement this plan and subsequently brought into the hospital for further evaluation and treatment.    Head CT w/o contrast   Preliminary Result   IMPRESSION:    1. Bilateral subdural collection representing subacute or chronic   subdural hematomas or subdural hygromas measuring up to 1 cm in   thickness on the left and 0.3 cm in thickness on the right again noted   without change.   2. Diffuse cerebral volume loss and cerebral white matter changes   consistent with chronic small vessel ischemic disease.            Radiation dose for this scan was reduced using automated exposure   control, adjustment of the mA and/or kV according to patient size, or   iterative reconstruction technique         CT Head w/o Contrast    (Results Pending)        Trigger, Everton Estrella MD  09/24/20 [id number]

## 2020-09-24 NOTE — ED NOTES
Patient updated on delay to admission floor. Patient ambulated to the bathroom with stand by assist.

## 2020-09-24 NOTE — PHARMACY-ADMISSION MEDICATION HISTORY
Pharmacy Medication History  Admission medication history interview status for the 9/24/2020  admission is complete. See EPIC admission navigator for prior to admission medications     Medication history sources: Patient and Surescripts  Medication history source reliability: Good  Adherence assessment: Good    Significant changes made to the medication list:  Removed cetirizine (takes loratadine)      Additional medication history information:   None    Medication reconciliation completed by provider prior to medication history? No    Time spent in this activity: 15 min      Prior to Admission medications    Medication Sig Last Dose Taking? Auth Provider   Ascorbic Acid (VITAMIN C PO) Take 500 mg by mouth daily. 9/24/2020 at am Yes Reported, Patient   Biotin 5000 MCG CAPS Take 1 tablet by mouth At Bedtime  9/23/2020 at hs Yes Reported, Patient   calcium carb 1250 mg, 500 mg [place],/vitamin D 200 units (OSCAL WITH D) 500-200 MG-UNIT per tablet Take 1 tablet by mouth daily  9/24/2020 at am Yes Ellis Ruiz MD   Carboxymethylcellulose Sodium (THERATEARS) 0.25 % SOLN Place 1 drop into both eyes every evening 9/23/2020 at hs Yes Unknown, Entered By History   Cholecalciferol (VITAMIN D3 PO) Take 1,000 Units by mouth daily  9/24/2020 at am Yes Reported, Patient   CYANOCOBALAMIN PO Take 1,000 mcg by mouth daily 9/24/2020 at am Yes Reported, Patient   loratadine (CLARITIN) 10 MG tablet Take 10 mg by mouth daily  9/24/2020 at am Yes Ellis Ruiz MD   lovastatin (MEVACOR) 40 MG tablet Take 1 tablet (40 mg) by mouth At Bedtime 9/23/2020 at hs Yes Ellis Ruiz MD   magnesium 100 MG CAPS Take 1 tablet by mouth daily  9/24/2020 at am Yes Reported, Patient   metoprolol succinate ER (TOPROL-XL) 25 MG 24 hr tablet Take 1 tablet (25 mg) by mouth daily 9/24/2020 at am Yes Ellis Ruiz MD   Multiple Vitamins-Minerals (CENTRUM SILVER) per tablet Take 1 tablet by mouth daily 9/24/2020 at am Yes Reported,  Patient   sertraline (ZOLOFT) 25 MG tablet Take 1 tablet (25 mg) by mouth daily 9/24/2020 at am Yes Ellis Ruiz MD

## 2020-09-24 NOTE — PROGRESS NOTES
Was informed of urgent brain MRI findings by Dr. Lagunas (neuroradiologist) regarding bilateral subdural hematoma, possibly acute to subacute.  Also discussed the case with Dr. Villa (neurosurgery).  The plan is that the patient should be seen in the emergency department by neurosurgery team and would require a follow-up head CT in 6 hours.  I also was able to discuss the findings and the plan with patient's daughter over the phone.  The patient will come to emergency department straight from imaging.  I discussed these findings with emergency room physician (Dr. Jenkins).  Dionte Burnham MD.
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
## 2020-09-24 NOTE — ED NOTES
Patient sent from MRI for concerns of bilateral SDH. Paient has subconjunctival hemorrhage in left eye. Patient states daughter noticed it this morning. Patient complains of mild headache. Denies N/V.

## 2020-09-24 NOTE — ED NOTES
[facility]  ED Nurse Handoff Report    ED Chief complaint: MRI results      ED Diagnosis:   Final diagnoses:   Subdural hematoma (H)   Subconjunctival hemorrhage of left eye       Code Status: Not addressed by ED MD.    Allergies:   Allergies   Allergen Reactions     Latex Cough     Seasonal Allergies      YEAR ROUND ALLERGIES     Sulfa Drugs Unknown       Patient Story: Patient sent from MRI for further evaluation of SDH.  Focused Assessment:  Patient is alert and oriented times four. Patient does repeat questions and comments and is forgetful at times. Patient answers questions appropriately and follows commands. Patient was ambulatory upon arrival to ED. Patient has subconjunctival hemorrhage in left eye. Complains of mild headache. MRI showed bilateral subdural hemorrhage. Patient sent for CT.    Treatments and/or interventions provided: CT scan  Patient's response to treatments and/or interventions: Patient remains stable    To be done/followed up on inpatient unit:  Continue to monitor neuros.    Does this patient have any cognitive concerns?: Forgetful    Activity level - Baseline/Home:  Independent  Activity Level - Current:   Stand with Assist    Patient's Preferred language: English   Needed?: No    Isolation: None  Infection: Not Applicable  Bariatric?: No    Vital Signs:   Vitals:    09/24/20 1330   BP: (!) 148/45   Pulse: 51   Resp: 18   Temp: 97.2  F (36.2  C)   TempSrc: Temporal   SpO2: 98%       Cardiac Rhythm:     Was the PSS-3 completed:   Yes  What interventions are required if any?               Family Comments: Daughter at bedside.  OBS brochure/video discussed/provided to patient/family: No              Name of person given brochure if not patient: N/A              Relationship to patient: N/A    For the majority of the shift this patient's behavior was Green.   Behavioral interventions performed were N/A.    ED NURSE PHONE NUMBER: [phone]
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
## 2020-09-25 VITALS
RESPIRATION RATE: 16 BRPM | DIASTOLIC BLOOD PRESSURE: 58 MMHG | TEMPERATURE: 98.1 F | OXYGEN SATURATION: 92 % | HEART RATE: 60 BPM | SYSTOLIC BLOOD PRESSURE: 117 MMHG

## 2020-09-25 LAB
HGB BLD-MCNC: 13.6 G/DL (ref 11.7–15.7)
LABORATORY COMMENT REPORT: NORMAL
SARS-COV-2 RNA SPEC QL NAA+PROBE: NEGATIVE
SARS-COV-2 RNA SPEC QL NAA+PROBE: NORMAL
SPECIMEN SOURCE: NORMAL
SPECIMEN SOURCE: NORMAL

## 2020-09-25 PROCEDURE — G0378 HOSPITAL OBSERVATION PER HR: HCPCS

## 2020-09-25 PROCEDURE — 25000132 ZZH RX MED GY IP 250 OP 250 PS 637: Performed by: PHYSICIAN ASSISTANT

## 2020-09-25 PROCEDURE — 36415 COLL VENOUS BLD VENIPUNCTURE: CPT | Performed by: PHYSICIAN ASSISTANT

## 2020-09-25 PROCEDURE — 99217 ZZC OBSERVATION CARE DISCHARGE: CPT | Performed by: PHYSICIAN ASSISTANT

## 2020-09-25 PROCEDURE — 85018 HEMOGLOBIN: CPT | Performed by: PHYSICIAN ASSISTANT

## 2020-09-25 RX ADMIN — METOPROLOL SUCCINATE 25 MG: 25 TABLET, EXTENDED RELEASE ORAL at 08:35

## 2020-09-25 RX ADMIN — DOCUSATE SODIUM 50 MG AND SENNOSIDES 8.6 MG 2 TABLET: 8.6; 5 TABLET, FILM COATED ORAL at 08:35

## 2020-09-25 RX ADMIN — LORATADINE 10 MG: 10 TABLET ORAL at 08:35

## 2020-09-25 NOTE — PROGRESS NOTES
brief im progress    Doing well, no ha/n/v, muscle weakness, paresthesias now or recently.  CT H stable.    Anticipate discharge home this afternoon pending neurosurg rec  - appreciate clarifying asa (hx nstemi 2/2 stress cmo) and follow-up recs  - family would like to be on phone when nsg see's patient    JoAnna Barthell, PA-C  9/25/2020   9:07 AM

## 2020-09-25 NOTE — PLAN OF CARE
Pt here with subacute versus chronic bilateral subdural hematomas and Left subconjunctival hemorrhage. A+Ox4, forgetful. Pt stated they are being tested for dementia. Neuros intact, except for L eye scleral hemorrhage and intermittent LLE 4-5/5 strength d/t L knee replacement and left hip tenderness per pt. They also have baseline neuropathy, but not reported on this shift. VSS on RA. SBP parameters <150. HOB at 30 degrees. Had repeat head CT overnight- see flowsheets. Up with SBA and GB. Regular diet, thin liquids- takes pills whole with water. Scoring green on aggression stop light tool. Plan for neurosurgery consult today. Discharge pending.

## 2020-09-25 NOTE — PLAN OF CARE
VSS. Pt A&O. Belongings returned to patient. Discharge instructions reviewed with patient and daughter, both verbalized understanding. Patient discharged to home via daughter as transport.

## 2020-09-25 NOTE — PROGRESS NOTES
Care Coordination:    The pt stated she would like her Neurosurgery f/u appointment made for her on a Friday so her dtg can take her to the appointment.    The following appointment for Head CT and Neurosurgery appointment were made for the pt and added to her AVS:     [facility] CT   [address] [facility] [id number] [phone]  CT is at 12:45 with a 12:30PM check in at [facility] desk.             Return Visit with Angie Brewer NP   Friday Oct 23, 2020 2:00 PM  [facility]   [address] [id number] [phone]            I spoke with the pt to let her know where and when the appointments are and she will make her own PCP appointment.            Lashell Hanna RN, BSN Care Coordinator  [facility]  Mobile: [phone]
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
## 2020-09-28 ENCOUNTER — TELEPHONE (OUTPATIENT)
Dept: FAMILY MEDICINE | Facility: CLINIC | Age: 80
End: 2020-09-28

## 2020-09-28 NOTE — TELEPHONE ENCOUNTER
Chief Complaint: Subdural Hematoma (H), Sdh (Subdural Hematoma) (H),  FRI 25-SEP-2020  0 / 0    [phone] ([place])

## 2020-10-01 ENCOUNTER — OFFICE VISIT (OUTPATIENT)
Dept: FAMILY MEDICINE | Facility: CLINIC | Age: 80
End: 2020-10-01
Payer: COMMERCIAL

## 2020-10-01 VITALS
TEMPERATURE: 98.4 F | HEIGHT: 67 IN | HEART RATE: 54 BPM | SYSTOLIC BLOOD PRESSURE: 114 MMHG | OXYGEN SATURATION: 98 % | DIASTOLIC BLOOD PRESSURE: 64 MMHG | BODY MASS INDEX: 23.96 KG/M2

## 2020-10-01 DIAGNOSIS — S06.5XAA BILATERAL SUBDURAL HEMATOMAS (H): Primary | ICD-10-CM

## 2020-10-01 DIAGNOSIS — Z23 NEED FOR PROPHYLACTIC VACCINATION AND INOCULATION AGAINST INFLUENZA: ICD-10-CM

## 2020-10-01 DIAGNOSIS — G31.84 MCI (MILD COGNITIVE IMPAIRMENT): ICD-10-CM

## 2020-10-01 PROCEDURE — 99495 TRANSJ CARE MGMT MOD F2F 14D: CPT | Mod: 25 | Performed by: INTERNAL MEDICINE

## 2020-10-01 PROCEDURE — 90662 IIV NO PRSV INCREASED AG IM: CPT | Performed by: INTERNAL MEDICINE

## 2020-10-01 PROCEDURE — G0008 ADMIN INFLUENZA VIRUS VAC: HCPCS | Performed by: INTERNAL MEDICINE
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
## 2020-10-19 NOTE — PROGRESS NOTES
SUBJECTIVE:   Cecily Ivory is a 79 year old female who presents for Preventive Visit.    Are you in the first 12 months of your Medicare coverage?  No    The patient presents with her daughter for a physical.  Her daughter is worried about her memory.  The patient does admit to short-term memory issues for over a year.  She will repeat things many times in the same conversation.    She has a history of stress-induced cardiomyopathy.  A coronary angiogram was normal as noted.  She has had regular cardiology follow-up and the last echo was normal.    She had labs done in March.  Her LFTs were slightly elevated at that time.  She has a prior history of spinal stenosis and acid reflux which is controlled.  She has high cholesterol and a history of peripheral neuropathy.  She feels as if her anxiety is doing relatively well.  She is not depressed.  Zoloft was started in the hospital.               Past Medical History:      Past Medical History:   Diagnosis Date     Abdominal pain 2009, 2013    ct liver and renal cyst and 2mm lung nodule, repeat ct done 2013 and no significantly abnl.     Anxiety 2014    added med 3/14     Arthritis      ASCUS of cervix with negative high risk HPV 03/2018     Carotid bruit 2011    us nl     Gastritis 2011    egd done by [facility]     GERD (gastroesophageal reflux disease) 2011     History of colonoscopy 2004, 2014    nl     Hypercholesteremia 2000    stopped lovastatin 2015, added lipitor 3/16     Insomnia     using otc      Lumbar spinal stenosis 2016    Dr. Wilde, had epidural     Lung nodule 2009, 2013    seen on ct for abd pain, fu done 3/13 and 2 nodules stable and benign, one new one needs fu 1 year.  fu done 3/14 and fu 1 year and then done; fu done 3/15 and unchanged, no fu needed     Myocardial infarction (H) 01/2020    elev trop but clean coronaries on angio, felt to be stress induced cmyop     Odynophagia 2004    egd nl     Other chronic pain      Peripheral neuropathies       [place]     Screening ,     nl dexa     Takotsubo cardiomyopathy 2020    hosp fsd, ef 40%, mod ai, clean coronaries; fu echo nl      Vaginal dysplasia     chronic VAIN I, many colpos of vagina.  Now we only do an annual pap of vagina, no colpo since stable long term     Weight loss 2018    ct chest, abd and pelvis neg             Past Surgical History:      Past Surgical History:   Procedure Laterality Date     ANKLE SURGERY       APPENDECTOMY  13     ARTHROPLASTY KNEE Left 2018    Procedure: ARTHROPLASTY KNEE;  Left total knee arthroplasty;  Surgeon: William Pollard MD;  Location: [facility] OR     BREAST BIOPSY, CORE RT/LT       C VAGINAL HYSTERECTOMY       CATARACT IOL, RT/LT  2019     CV HEART CATHETERIZATION WITH POSSIBLE INTERVENTION N/A 2020    Procedure: Heart Catheterization with Possible Intervention;  Surgeon: Bibiana Ruggiero MD;  Location:  HEART CARDIAC CATH LAB     GYN SURGERY      BSO     [facility] UGI ENDOSCOPY, SIMPLE EXAM  03/15/11    [facility]     HYSTERECTOMY, PAP NO LONGER INDICATED       LAPAROSCOPIC CHOLECYSTECTOMY       NOSE SURGERY       right knee arthroscopy       thumb surgery Left 2015             Social History:     Social History     Socioeconomic History     Marital status:      Spouse name: Not on file     Number of children: 1     Years of education: Not on file     Highest education level: Not on file   Occupational History     Occupation: , retired     Employer: [employer]     Employer: RETIRED   Social Needs     Financial resource strain: Not on file     Food insecurity     Worry: Not on file     Inability: Not on file     Transportation needs     Medical: Not on file     Non-medical: Not on file   Tobacco Use     Smoking status: Former Smoker     Packs/day: 1.50     Years: 20.00     Pack years: 30.00     Types: Cigarettes     Last attempt to quit: 1992     Years since quittin.5      Smokeless tobacco: Never Used   Substance and Sexual Activity     Alcohol use: Yes     Alcohol/week: 0.0 standard drinks     Comment: 4 drinks weekly     Drug use: No     Sexual activity: Not Currently     Partners: Male     Comment: vag hyst, BSO   Lifestyle     Physical activity     Days per week: Not on file     Minutes per session: Not on file     Stress: Not on file   Relationships     Social connections     Talks on phone: Not on file     Gets together: Not on file     Attends Congregation service: Not on file     Active member of club or organization: Not on file     Attends meetings of clubs or organizations: Not on file     Relationship status: Not on file     Intimate partner violence     Fear of current or ex partner: Not on file     Emotionally abused: Not on file     Physically abused: Not on file     Forced sexual activity: Not on file   Other Topics Concern     Parent/sibling w/ CABG, MI or angioplasty before 65F 55M? Not Asked   Social History Narrative     Not on file             Family History:   reviewed         Allergies:     Allergies   Allergen Reactions     Latex Cough     Seasonal Allergies      YEAR ROUND ALLERGIES     Sulfa Drugs Unknown             Medications:     Current Outpatient Medications   Medication Sig Dispense Refill     Ascorbic Acid (VITAMIN C PO) Take 500 mg by mouth daily.       Biotin 5000 MCG CAPS Take 1 tablet by mouth three times a week        calcium carb 1250 mg, 500 mg [place],/vitamin D 200 units (OSCAL WITH D) 500-200 MG-UNIT per tablet Take 1 tablet by mouth 2 times daily (with meals)  100 tablet 3     Carboxymethylcellulose Sodium (THERATEARS) 0.25 % SOLN Place 1 drop into both eyes every evening       cetirizine (ZYRTEC) 10 MG tablet Take 10 mg by mouth daily       Cholecalciferol (VITAMIN D3 PO) Take 1,000 Units by mouth daily        CYANOCOBALAMIN PO Take 1,000 mcg by mouth daily       loratadine (CLARITIN) 10 MG tablet Take 10 mg by mouth daily as needed for  "allergies        lovastatin (MEVACOR) 40 MG tablet Take 1 tablet (40 mg) by mouth At Bedtime 90 tablet 3     magnesium 100 MG CAPS Take 1 tablet by mouth daily        metoprolol succinate ER (TOPROL-XL) 25 MG 24 hr tablet Take 1 tablet (25 mg) by mouth daily 90 tablet 3     Multiple Vitamins-Minerals (CENTRUM SILVER) per tablet Take 1 tablet by mouth daily       sertraline (ZOLOFT) 25 MG tablet Take 1 tablet (25 mg) by mouth daily 90 tablet 3               Review of Systems:   The 10 point Review of Systems is negative other than noted in the HPI           Physical Exam:   Blood pressure 132/78, pulse (!) 48, temperature 97.1  F (36.2  C), temperature source Temporal, height 1.702 m (5' 7\"), weight 69.6 kg (153 lb 8 oz), SpO2 97 %, not currently breastfeeding.    Exam:  Constitutional: healthy appearing, alert and in no distress  Heent: Normocephalic. Head without obvious masses or lesions. PERRLDC, EOMI. Mouth exam within normal limits: tongue, mucous membranes, posterior pharynx all normal, no lesions or abnormalities seen.  Tm's and canals within normal limits bilaterally. Neck supple, no nuchal rigidity or masses. No supraclavicular, or cervical adenopathy. Thyroid symmetric, no masses.  Cardiovascular: Regular rate and rhythm, no murmer, rub or gallops.  JVP not elevated, no edema.  Carotids within normal limits bilaterally, no bruits.  Respiratory: Normal respiratory effort.  Lungs clear, normal flow, no wheezing or crackles.  Gastrointestinal: Normal active bowel sounds.   Soft, not tender, no masses, guarding or rebound.  No hepatosplenomegaly.   Musculoskeletal: extremities normal, no gross deformities noted.  Skin: no suspicious lesions or rashes   Neurologic: Mental status within normal limits.  Speech fluent.  No gross motor abnormalities and gait intact.  Psychiatric: mentation appears normal and affect normal.         Data:   Labs reviewed with patient, others to be done today        Assessment:   1. " "Normal complete physical exam  2. Memory loss, suspect real, check labs, to neuro  3. Cmyop, resolved, clean coronaries  4. Anxiety, stable  5. Elevated lft, follow up labs  6. Ascus, follow up gyn  7. Gerd, no issues  8. Elevated cholesterol on statin  9. hcm         Plan:   Up to date immunizations, mammogram  Labs  Neuro eval  Exercise and diet  Call if changes      Ellis Ruiz M.D.              Healthy Habits:    In general, how would you rate your overall health?  Very good    Frequency of exercise:  6-7 days/week    Duration of exercise:  30-45 minutes    Do you usually eat at least 4 servings of fruit and vegetables a day, include whole grains    & fiber and avoid regularly eating high fat or \"junk\" foods?  Yes    Taking medications regularly:  Yes    Barriers to taking medications:  None    Medication side effects:  None    Ability to successfully perform activities of daily living:  No assistance needed    Home Safety:  No safety concerns identified    Hearing Impairment:  Difficulty understanding soft or whispered speech    In the past 6 months, have you been bothered by leaking of urine?  No    In general, how would you rate your overall mental or emotional health?  Good      PHQ-2 Total Score:    Additional concerns today:  Yes    Do you feel safe in your environment? Yes    Have you ever done Advance Care Planning? (For example, a Health Directive, POLST, or a discussion with a medical provider or your loved ones about your wishes): Yes, advance care planning is on file.      Fall risk       Cognitive Screening   1) Repeat 3 items (Leader, Season, Table)    2) Clock draw: NORMAL  3) 3 item recall: Recalls 2 objects   Results: NORMAL clock, 1-2 items recalled: COGNITIVE IMPAIRMENT LESS LIKELY    Mini-CogTM Copyright TRACI Winters. Licensed by the author for use in [facility]; reprinted with permission (raf@.[facility]). All rights reserved.      Do you have sleep apnea, excessive snoring or " Minocycline Pregnancy And Lactation Text: This medication is Pregnancy Category D and not consider safe during pregnancy. It is also excreted in breast milk. "daytime drowsiness?: yes    Reviewed and updated as needed this visit by clinical staff         Reviewed and updated as needed this visit by Provider        Social History     Tobacco Use     Smoking status: Former Smoker     Packs/day: 1.50     Years: 20.00     Pack years: 30.00     Types: Cigarettes     Last attempt to quit: 1992     Years since quittin.5     Smokeless tobacco: Never Used   Substance Use Topics     Alcohol use: Yes     Alcohol/week: 0.0 standard drinks     Comment: 4 drinks weekly         Alcohol Use 2019   Prescreen: >3 drinks/day or >7 drinks/week? No               Current providers sharing in care for this patient include: cards, gyn  Patient Care Team:  Ellis Ruiz MD as PCP - General (Internal Medicine)  Ellis Ruiz MD as Assigned PCP    The following health maintenance items are reviewed in Epic and correct as of today:  Health Maintenance   Topic Date Due     MEDICARE ANNUAL WELLNESS VISIT  2020     INFLUENZA VACCINE (1) 2020     FALL RISK ASSESSMENT  2021     MAMMO SCREENING  06/15/2021     ADVANCE CARE PLANNING  2022     DTAP/TDAP/TD IMMUNIZATION (4 - Td) 2023     LIPID  2025     DEXA  Completed     PHQ-2  Completed     PNEUMOCOCCAL IMMUNIZATION 65+ LOW/MEDIUM RISK  Completed     ZOSTER IMMUNIZATION  Completed     IPV IMMUNIZATION  Aged Out     MENINGITIS IMMUNIZATION  Aged Out     HEPATITIS B IMMUNIZATION  Aged Out           Review of Systems      OBJECTIVE:   There were no vitals taken for this visit. Estimated body mass index is 22.9 kg/m  as calculated from the following:    Height as of 2/10/20: 1.702 m (5' 7\").    Weight as of 20: 66.3 kg (146 lb 3.2 oz).  Physical Exam          ASSESSMENT / PLAN:       COUNSELING:  Reviewed preventive health counseling, as reflected in patient instructions       Regular exercise       Healthy diet/nutrition    Estimated body mass index is 22.9 kg/m  as calculated from the " Azithromycin Pregnancy And Lactation Text: This medication is considered safe during pregnancy and is also secreted in breast milk. "following:    Height as of 2/10/20: 1.702 m (5' 7\").    Weight as of 5/19/20: 66.3 kg (146 lb 3.2 oz).        She reports that she quit smoking about 28 years ago. Her smoking use included cigarettes. She has a 30.00 pack-year smoking history. She has never used smokeless tobacco.      Appropriate preventive services were discussed with this patient, including applicable screening as appropriate for cardiovascular disease, diabetes, osteopenia/osteoporosis, and glaucoma.  As appropriate for age/gender, discussed screening for colorectal cancer, prostate cancer, breast cancer, and cervical cancer. Checklist reviewing preventive services available has been given to the patient.    Reviewed patients plan of care and provided an AVS. The Basic Care Plan (routine screening as documented in Health Maintenance) for Cecily meets the Care Plan requirement. This Care Plan has been established and reviewed with the Patient and daughter.    Counseling Resources:  ATP IV Guidelines  Pooled Cohorts Equation Calculator  Breast Cancer Risk Calculator  Breast Cancer: Medication to Reduce Risk  FRAX Risk Assessment  ICSI Preventive Guidelines  Dietary Guidelines for Americans, 2010  USDA's MyPlate  ASA Prophylaxis  Lung CA Screening    Ellis Ruiz MD  [facility]    Identified Health Risks:  " Detail Level: Detailed Use Enhanced Medication Counseling?: No Benzoyl Peroxide Pregnancy And Lactation Text: This medication is Pregnancy Category C. It is unknown if benzoyl peroxide is excreted in breast milk. Topical Clindamycin Pregnancy And Lactation Text: This medication is Pregnancy Category B and is considered safe during pregnancy. It is unknown if it is excreted in breast milk. Dapsone Pregnancy And Lactation Text: This medication is Pregnancy Category C and is not considered safe during pregnancy or breast feeding. Tazorac Counseling:  Patient advised that medication is irritating and drying.  Patient may need to apply sparingly and wash off after an hour before eventually leaving it on overnight.  The patient verbalized understanding of the proper use and possible adverse effects of tazorac.  All of the patient's questions and concerns were addressed. Doxycycline Counseling:  Patient counseled regarding possible photosensitivity and increased risk for sunburn.  Patient instructed to avoid sunlight, if possible.  When exposed to sunlight, patients should wear protective clothing, sunglasses, and sunscreen.  The patient was instructed to call the office immediately if the following severe adverse effects occur:  hearing changes, easy bruising/bleeding, severe headache, or vision changes.  The patient verbalized understanding of the proper use and possible adverse effects of doxycycline.  All of the patient's questions and concerns were addressed. Dapsone Counseling: I discussed with the patient the risks of dapsone including but not limited to hemolytic anemia, agranulocytosis, rashes, methemoglobinemia, kidney failure, peripheral neuropathy, headaches, GI upset, and liver toxicity.  Patients who start dapsone require monitoring including baseline LFTs and weekly CBCs for the first month, then every month thereafter.  The patient verbalized understanding of the proper use and possible adverse effects of dapsone.  All of the patient's questions and concerns were addressed. Benzoyl Peroxide Counseling: Patient counseled that medicine may cause skin irritation and bleach clothing.  In the event of skin irritation, the patient was advised to reduce the amount of the drug applied or use it less frequently.   The patient verbalized understanding of the proper use and possible adverse effects of benzoyl peroxide.  All of the patient's questions and concerns were addressed. Bactrim Pregnancy And Lactation Text: This medication is Pregnancy Category D and is known to cause fetal risk.  It is also excreted in breast milk. Tazorac Pregnancy And Lactation Text: This medication is not safe during pregnancy. It is unknown if this medication is excreted in breast milk. High Dose Vitamin A Pregnancy And Lactation Text: High dose vitamin A therapy is contraindicated during pregnancy and breast feeding. Topical Sulfur Applications Counseling: Topical Sulfur Counseling: Patient counseled that this medication may cause skin irritation or allergic reactions.  In the event of skin irritation, the patient was advised to reduce the amount of the drug applied or use it less frequently.   The patient verbalized understanding of the proper use and possible adverse effects of topical sulfur application.  All of the patient's questions and concerns were addressed. Doxycycline Pregnancy And Lactation Text: This medication is Pregnancy Category D and not consider safe during pregnancy. It is also excreted in breast milk but is considered safe for shorter treatment courses. Topical Retinoid Pregnancy And Lactation Text: This medication is Pregnancy Category C. It is unknown if this medication is excreted in breast milk. Bactrim Counseling:  I discussed with the patient the risks of sulfa antibiotics including but not limited to GI upset, allergic reaction, drug rash, diarrhea, dizziness, photosensitivity, and yeast infections.  Rarely, more serious reactions can occur including but not limited to aplastic anemia, agranulocytosis, methemoglobinemia, blood dyscrasias, liver or kidney failure, lung infiltrates or desquamative/blistering drug rashes. Isotretinoin Pregnancy And Lactation Text: This medication is Pregnancy Category X and is considered extremely dangerous during pregnancy. It is unknown if it is excreted in breast milk. Topical Retinoid counseling:  Patient advised to apply a pea-sized amount only at bedtime and wait 30 minutes after washing their face before applying.  If too drying, patient may add a non-comedogenic moisturizer. The patient verbalized understanding of the proper use and possible adverse effects of retinoids.  All of the patient's questions and concerns were addressed. Erythromycin Pregnancy And Lactation Text: This medication is Pregnancy Category B and is considered safe during pregnancy. It is also excreted in breast milk. Spironolactone Pregnancy And Lactation Text: This medication can cause feminization of the male fetus and should be avoided during pregnancy. The active metabolite is also found in breast milk. Minocycline Counseling: Patient advised regarding possible photosensitivity and discoloration of the teeth, skin, lips, tongue and gums.  Patient instructed to avoid sunlight, if possible.  When exposed to sunlight, patients should wear protective clothing, sunglasses, and sunscreen.  The patient was instructed to call the office immediately if the following severe adverse effects occur:  hearing changes, easy bruising/bleeding, severe headache, or vision changes.  The patient verbalized understanding of the proper use and possible adverse effects of minocycline.  All of the patient's questions and concerns were addressed. High Dose Vitamin A Counseling: Side effects reviewed, pt to contact office should one occur. Erythromycin Counseling:  I discussed with the patient the risks of erythromycin including but not limited to GI upset, allergic reaction, drug rash, diarrhea, increase in liver enzymes, and yeast infections. Azithromycin Counseling:  I discussed with the patient the risks of azithromycin including but not limited to GI upset, allergic reaction, drug rash, diarrhea, and yeast infections. Topical Sulfur Applications Pregnancy And Lactation Text: This medication is Pregnancy Category C and has an unknown safety profile during pregnancy. It is unknown if this topical medication is excreted in breast milk. Birth Control Pills Pregnancy And Lactation Text: This medication should be avoided if pregnant and for the first 30 days post-partum. Topical Clindamycin Counseling: Patient counseled that this medication may cause skin irritation or allergic reactions.  In the event of skin irritation, the patient was advised to reduce the amount of the drug applied or use it less frequently.   The patient verbalized understanding of the proper use and possible adverse effects of clindamycin.  All of the patient's questions and concerns were addressed. Spironolactone Counseling: Patient advised regarding risks of diarrhea, abdominal pain, hyperkalemia, birth defects (for female patients), liver toxicity and renal toxicity. The patient may need blood work to monitor liver and kidney function and potassium levels while on therapy. The patient verbalized understanding of the proper use and possible adverse effects of spironolactone.  All of the patient's questions and concerns were addressed. Birth Control Pills Counseling: Birth Control Pill Counseling: I discussed with the patient the potential side effects of OCPs including but not limited to increased risk of stroke, heart attack, thrombophlebitis, deep venous thrombosis, hepatic adenomas, breast changes, GI upset, headaches, and depression.  The patient verbalized understanding of the proper use and possible adverse effects of OCPs. All of the patient's questions and concerns were addressed. Isotretinoin Counseling: Patient should get monthly blood tests, not donate blood, not drive at night if vision affected, not share medication, and not undergo elective surgery for 6 months after tx completed. Side effects reviewed, pt to contact office should one occur. Tetracycline Counseling: Patient counseled regarding possible photosensitivity and increased risk for sunburn.  Patient instructed to avoid sunlight, if possible.  When exposed to sunlight, patients should wear protective clothing, sunglasses, and sunscreen.  The patient was instructed to call the office immediately if the following severe adverse effects occur:  hearing changes, easy bruising/bleeding, severe headache, or vision changes.  The patient verbalized understanding of the proper use and possible adverse effects of tetracycline.  All of the patient's questions and concerns were addressed. Patient understands to avoid pregnancy while on therapy due to potential birth defects.

## 2020-10-23 ENCOUNTER — OFFICE VISIT (OUTPATIENT)
Dept: NEUROSURGERY | Facility: CLINIC | Age: 80
End: 2020-10-23
Attending: NURSE PRACTITIONER
Payer: COMMERCIAL

## 2020-10-23 ENCOUNTER — HOSPITAL ENCOUNTER (OUTPATIENT)
Dept: CT IMAGING | Facility: CLINIC | Age: 80
End: 2020-10-23
Attending: PHYSICIAN ASSISTANT
Payer: COMMERCIAL

## 2020-10-23 VITALS
TEMPERATURE: 99 F | HEART RATE: 57 BPM | BODY MASS INDEX: 23.7 KG/M2 | DIASTOLIC BLOOD PRESSURE: 60 MMHG | WEIGHT: 151 LBS | SYSTOLIC BLOOD PRESSURE: 122 MMHG | OXYGEN SATURATION: 97 % | HEIGHT: 67 IN

## 2020-10-23 DIAGNOSIS — S06.5XAA BILATERAL SUBDURAL HEMATOMAS (H): Primary | ICD-10-CM

## 2020-10-23 DIAGNOSIS — S06.5XAA SDH (SUBDURAL HEMATOMA) (H): ICD-10-CM

## 2020-10-23 PROCEDURE — 70450 CT HEAD/BRAIN W/O DYE: CPT

## 2020-10-23 PROCEDURE — 99213 OFFICE O/P EST LOW 20 MIN: CPT | Performed by: NURSE PRACTITIONER

## 2020-10-23 PROCEDURE — G0463 HOSPITAL OUTPT CLINIC VISIT: HCPCS | Mod: 25

## 2020-10-23 ASSESSMENT — MIFFLIN-ST. JEOR: SCORE: 1197.56

## 2020-10-23 ASSESSMENT — PAIN SCALES - GENERAL: PAINLEVEL: NO PAIN (0)

## 2020-10-23 NOTE — LETTER
"    10/23/2020         RE: Cecily Ivory  [address]  [place] [address]        Dear Colleague,    Thank you for referring your patient, Cecily Ivory, to the [facility]UR[facility] [place]. Please see a copy of my visit note below.    [facility] Neurosurgery Followup:    HPI: Cecily Ivory is a 80 year old female who presents today with her daughter for 1 month follow-up for bilateral subdural hematomas. She reports feeling well overall. Denies any headaches, pain, dizziness, nausea/vomiting, vision changes, weakness, or neurological changes. No recent falls or injuries. She is not taking any blood thinning medications.     Medical, surgical, family, and social history reviewed.     Exam:  Constitutional:  Alert, well nourished, NAD.  HEENT: Normocephalic, atraumatic.   Pulm:  Without shortness of breath or audible adventitious respiratory sounds.  CV:  No pitting edema of BLE.  Brisk capillary refill, CMS intact.    Vital Signs:  /60   Pulse 57   Temp 99  F (37.2  C)   Ht 5' 7\" (1.702 m)   Wt 151 lb (68.5 kg)   SpO2 97%   BMI 23.65 kg/m      Neurological:  Awake  Alert  Oriented x 3  Speech clear  Cranial nerves II - XII intact  PERRL  EOMI  Face symmetric  Tongue midline  Motor exam: 5/5 strength in all four extremities.   Sensation intact  Finger to Nose smooth  Pronator drift negative    Gait: Able to stand from a seated position. Normal non-antalgic, non-myelopathic gait.  Able to heel/toe walk without loss of balance      Imaging: CT OF THE HEAD WITHOUT CONTRAST 10/23/2020 12:35 PM   IMPRESSION:   1. Minimal interval decrease in thickness and extent of the subdural collection along the left cerebral convexity. No definite evidence for new or increasing intracranial hemorrhage.  2. Slight interval decrease in rightward midline shift of the septum pellucidum from 4-5 mm previously to approximately 3 mm currently.    A/P: Cecily Ivory is a 80 " year old female who presents today for 1 month follow-up for bilateral subdural hematomas. She reports feeling well overall. Denies any headaches, pain, dizziness, nausea/vomiting, weakness, vision changes, or neurological changes. She is neurologically intact on exam. No new concerns today. CT head reviewed in clinic with daughter and patient. Discussed their questions regarding plan of care at this time.    - Continue to hold blood thinning medications (Aspirin, Ibuprofen, Advil).   - Follow up in 6 weeks with repeat head CT. You may schedule at the .   - Call clinic or Go to ER with any seizure activity, mental status change (increasing confusion), difficulty with speech or increasing or acute weakness.    Patient and daughter voiced understanding and agreement with plan.      Angie Brewer CNP  [facility] Neurosurgery  [address] [address]  Tel [phone]  Pager [phone]          Again, thank you for allowing me to participate in the care of your patient.        Sincerely,        Angie Brewer, EDDIE

## 2020-10-23 NOTE — PATIENT INSTRUCTIONS
- Continue to hold blood thinning medications (Aspirin, Ibuprofen, Advil).   - Follow up in 6 weeks with repeat head CT. You may schedule at the .   - Go to ER with any seizure activity, mental status change (increasing confusion), difficulty with speech or increasing or acute weakness.    Angie Brewer CNP  [facility] Neurosurgery  [address] [address]  Tel [phone]  Pager [phone]

## 2020-10-23 NOTE — PROGRESS NOTES
"[facility] Neurosurgery Followup:    HPI: Cecily Ivory is a 80 year old female who presents today with her daughter for 1 month follow-up for bilateral subdural hematomas. She reports feeling well overall. Denies any headaches, pain, dizziness, nausea/vomiting, vision changes, weakness, or neurological changes. No recent falls or injuries. She is not taking any blood thinning medications.     Medical, surgical, family, and social history reviewed.     Exam:  Constitutional:  Alert, well nourished, NAD.  HEENT: Normocephalic, atraumatic.   Pulm:  Without shortness of breath or audible adventitious respiratory sounds.  CV:  No pitting edema of BLE.  Brisk capillary refill, CMS intact.    Vital Signs:  /60   Pulse 57   Temp 99  F (37.2  C)   Ht 5' 7\" (1.702 m)   Wt 151 lb (68.5 kg)   SpO2 97%   BMI 23.65 kg/m      Neurological:  Awake  Alert  Oriented x 3  Speech clear  Cranial nerves II - XII intact  PERRL  EOMI  Face symmetric  Tongue midline  Motor exam: 5/5 strength in all four extremities.   Sensation intact  Finger to Nose smooth  Pronator drift negative    Gait: Able to stand from a seated position. Normal non-antalgic, non-myelopathic gait.  Able to heel/toe walk without loss of balance      Imaging: CT OF THE HEAD WITHOUT CONTRAST 10/23/2020 12:35 PM   IMPRESSION:   1. Minimal interval decrease in thickness and extent of the subdural collection along the left cerebral convexity. No definite evidence for new or increasing intracranial hemorrhage.  2. Slight interval decrease in rightward midline shift of the septum pellucidum from 4-5 mm previously to approximately 3 mm currently.    A/P: Cecily Ivory is a 80 year old female who presents today for 1 month follow-up for bilateral subdural hematomas. She reports feeling well overall. Denies any headaches, pain, dizziness, nausea/vomiting, weakness, vision changes, or neurological changes. She is neurologically intact on exam. No new " concerns today. CT head reviewed in clinic with daughter and patient. Discussed their questions regarding plan of care at this time.    - Continue to hold blood thinning medications (Aspirin, Ibuprofen, Advil).   - Follow up in 6 weeks with repeat head CT. You may schedule at the .   - Call clinic or Go to ER with any seizure activity, mental status change (increasing confusion), difficulty with speech or increasing or acute weakness.    Patient and daughter voiced understanding and agreement with plan.      Angie Brewer CNP  [facility] Neurosurgery  [address] [address]  Tel [phone]  Pager [phone]

## 2020-10-23 NOTE — NURSING NOTE
"Cecily Ivory is a 80 year old female who presents for:  Chief Complaint   Patient presents with     Follow Up     4 wk f/u SDH        Initial Vitals:  /60   Pulse 57   Temp 99  F (37.2  C)   Ht 5' 7\" (1.702 m)   Wt 151 lb (68.5 kg)   SpO2 97%   BMI 23.65 kg/m   Estimated body mass index is 23.65 kg/m  as calculated from the following:    Height as of this encounter: 5' 7\" (1.702 m).    Weight as of this encounter: 151 lb (68.5 kg).. Body surface area is 1.8 meters squared. BP completed using cuff size: regular  No Pain (0)    Nursing Comments: Patient presents for a 4 wk f/u SDH     Sudhakar Larkin MA  "

## 2020-10-29 NOTE — PROGRESS NOTES
[address] [address]  [phone]  Dept: [phone]    PRE-OP EVALUATION:  Today's date: 2019    Cecily Ivory (: 1940) presents for pre-operative evaluation assessment as requested by Dr. Murcia.  She requires evaluation and anesthesia risk assessment prior to undergoing surgery/procedure for treatment of cataract .    Proposed Surgery/ Procedure: Patient will call back  Date of Surgery/ Procedure: 19 and 19  Time of Surgery/ Procedure: ?  Hospital/Surgical Facility: ?    Primary Physician: Ellis Ruiz  Type of Anesthesia Anticipated: to be determined    Patient has a Health Care Directive or Living Will:  YES     SUBJECTIVE:   Cecily Ivory is a 78 year old female who presents for Preventive Visit.    The patient is here for her annual wellness but also needs a pre op for planned surgery.  She is doing very well and feels fine.  She has some bowel irreg noted prior, took fiber and better, now off it, no abdomen pain or n/v,  No b/b stools, no fevers, chills or night sweats or weight loss. She otherwise feels fine, active, up to date with gyn               Past Medical History:      Past Medical History:   Diagnosis Date     Abdominal pain ,     ct liver and renal cyst and 2mm lung nodule, repeat ct done  and no significantly abnl.     Anxiety     added med 3/14     Arthritis      ASCUS of cervix with negative high risk HPV 2018     Carotid bruit     us nl     Gastritis     egd done by [place] [facility]     GERD (gastroesophageal reflux disease)      History of colonoscopy ,     nl     Hypercholesteremia 2000    stopped lovastatin , added lipitor 3/16     Insomnia     using otc      Lumbar spinal stenosis     Dr. Wilde, had epidural     Lung nodule ,     seen on ct for abd pain, fu done 3/13 and 2 nodules stable and benign, one new one needs fu 1 year.  fu done 3/14 and fu 1 year and then  done; fu done 3/15 and unchanged, no fu needed     Odynophagia     egd nl     Other chronic pain      Peripheral neuropathies     Dr. Kim     Screening ,     nl dexa     Vaginal dysplasia     chronic VAIN I, many colpos of vagina.  Now we only do an annual pap of vagina, no colpo since stable long term     Weight loss 2018    ct chest, abd and pelvis neg             Past Surgical History:      Past Surgical History:   Procedure Laterality Date     ANKLE SURGERY       APPENDECTOMY  13     ARTHROPLASTY KNEE Left 2018    Procedure: ARTHROPLASTY KNEE;  Left total knee arthroplasty;  Surgeon: William Pollard MD;  Location: [facility] OR     BREAST BIOPSY, CORE RT/LT       C VAGINAL HYSTERECTOMY       GYN SURGERY      BSO     [facility] UGI ENDOSCOPY, SIMPLE EXAM  03/15/11    [facility]     HYSTERECTOMY, PAP NO LONGER INDICATED       LAPAROSCOPIC CHOLECYSTECTOMY       NOSE SURGERY       right knee arthroscopy  2007     thumb surgery Left 2015             Social History:     Social History     Socioeconomic History     Marital status:      Spouse name: Not on file     Number of children: 1     Years of education: Not on file     Highest education level: Not on file   Occupational History     Occupation: , retired     Employer: [employer]     Employer: RETIRED   Social Needs     Financial resource strain: Not on file     Food insecurity:     Worry: Not on file     Inability: Not on file     Transportation needs:     Medical: Not on file     Non-medical: Not on file   Tobacco Use     Smoking status: Former Smoker     Packs/day: 1.50     Years: 20.00     Pack years: 30.00     Types: Cigarettes     Last attempt to quit: 1992     Years since quittin.1     Smokeless tobacco: Never Used   Substance and Sexual Activity     Alcohol use: Yes     Alcohol/week: 0.0 oz     Comment: 5-6 drinks weekly     Drug use: No     Sexual activity: Not Currently      Partners: Male     Comment: vag hyst, BSO   Lifestyle     Physical activity:     Days per week: Not on file     Minutes per session: Not on file     Stress: Not on file   Relationships     Social connections:     Talks on phone: Not on file     Gets together: Not on file     Attends Advent service: Not on file     Active member of club or organization: Not on file     Attends meetings of clubs or organizations: Not on file     Relationship status: Not on file     Intimate partner violence:     Fear of current or ex partner: Not on file     Emotionally abused: Not on file     Physically abused: Not on file     Forced sexual activity: Not on file   Other Topics Concern     Parent/sibling w/ CABG, MI or angioplasty before 65F 55M? Not Asked   Social History Narrative     Not on file             Family History:   reviewed         Allergies:     Allergies   Allergen Reactions     Latex Cough     Seasonal Allergies      YEAR ROUND ALLERGIES     Sulfa Drugs Unknown             Medications:     Current Outpatient Medications   Medication Sig Dispense Refill     Ascorbic Acid (VITAMIN C PO) Take 500 mg by mouth daily.       Biotin 5000 MCG CAPS Take 1 tablet by mouth daily        calcium carb 1250 mg, 500 mg [place],/vitamin D 200 units (OSCAL WITH D) 500-200 MG-UNIT per tablet Take 1 tablet by mouth 2 times daily (with meals). Takes 750mg daily. 100 tablet 3     Cholecalciferol (VITAMIN D3 PO) Take 1,000 Units by mouth daily        CYANOCOBALAMIN PO Take 1,000 mcg by mouth daily       loratadine (CLARITIN) 10 MG tablet Take 1 tablet (10 mg) by mouth daily as needed for allergies Occasional use       lovastatin (MEVACOR) 40 MG tablet TAKE ONE TABLET BY MOUTH EVERY NIGHT AT BEDTIME 90 tablet 0     magnesium 100 MG CAPS Take 1 tablet by mouth daily        Multiple Vitamins-Minerals (CENTRUM SILVER) per tablet Take 1 tablet by mouth daily                 Review of Systems:   The 10 point Review of Systems is negative other than  "noted in the HPI           Physical Exam:   Blood pressure 131/65, pulse 67, temperature 97  F (36.1  C), temperature source Oral, height 1.702 m (5' 7\"), weight 67.1 kg (148 lb), SpO2 97 %, not currently breastfeeding.    Exam:  Constitutional: healthy appearing, alert and in no distress  Heent: Normocephalic. Head without obvious masses or lesions. PERRLDC, EOMI. Mouth exam within normal limits: tongue, mucous membranes, posterior pharynx all normal, no lesions or abnormalities seen.  Tm's and canals within normal limits bilaterally. Neck supple, no nuchal rigidity or masses. No supraclavicular, or cervical adenopathy. Thyroid symmetric, no masses.  Cardiovascular: Regular rate and rhythm, no murmer, rub or gallops.  JVP not elevated, no edema.  Carotids within normal limits bilaterally, no bruits.  Respiratory: Normal respiratory effort.  Lungs clear, normal flow, no wheezing or crackles.  Gastrointestinal: Normal active bowel sounds.   Soft, not tender, no masses, guarding or rebound.  No hepatosplenomegaly.   Musculoskeletal: extremities normal, no gross deformities noted.  Skin: no suspicious lesions or rashes   Neurologic: Mental status within normal limits.  Speech fluent.  No gross motor abnormalities and gait intact.  Psychiatric: mentation appears normal and affect normal.         Data:   Labs reviewed with patient         Assessment:   1. Normal complete physical exam  2. Normal pre op exam for cataract, this patient is low risk for the procedure  3. Elevated cholesterol on statin  4. Gi issues, suspect ibs, doubt tumor, colitis, to try fiber and if not resolved soon let me know  5. Ascus, follow up gyn  6. hcm         Plan:   The patient is ok for the surgery  shingrix at pharm  Fiber and if not resolving call  Exercise, diet        Ellis Ruiz M.D.                  Are you in the first 12 months of your Medicare coverage?  No    Healthy Habits:     In general, how would you rate your overall health?  " Good    Frequency of exercise:  6-7 days/week    Duration of exercise:  30-45 minutes    Taking medications regularly:  Yes    Barriers to taking medications:  Not applicable    Medication side effects:  Not applicable    Ability to successfully perform activities of daily living:  No assistance needed    Home Safety:  No safety concerns identified    Hearing Impairment:  No hearing concerns    In the past 6 months, have you been bothered by leaking of urine?  No    In general, how would you rate your overall mental or emotional health?  Good      PHQ-2 Total Score: 0    Additional concerns today:  No    Do you feel safe in your environment? Yes    Do you have a Health Care Directive? Yes: Patient states has Advance Directive and will bring in a copy to clinic.      Fall risk  Fallen 2 or more times in the past year?: No  Any fall with injury in the past year?: No    Cognitive Screening   1) Repeat 3 items (Leader, Season, Table)    2) Clock draw:   3) 3 item recall:   Results: 3 items recalled: COGNITIVE IMPAIRMENT LESS LIKELY    Mini-CogTM Copyright TRACI Winters. Licensed by the author for use in [facility]; reprinted with permission (raf@[facility]). All rights reserved.      Do you have sleep apnea, excessive snoring or daytime drowsiness?: no    Reviewed and updated as needed this visit by clinical staff  Tobacco  Allergies  Meds  Problems  Med Hx  Surg Hx  Fam Hx  Soc Hx          Reviewed and updated as needed this visit by Provider        Social History     Tobacco Use     Smoking status: Former Smoker     Packs/day: 1.50     Years: 20.00     Pack years: 30.00     Types: Cigarettes     Last attempt to quit: 1992     Years since quittin.1     Smokeless tobacco: Never Used   Substance Use Topics     Alcohol use: Yes     Alcohol/week: 0.0 oz     Comment: 5-6 drinks weekly     If you drink alcohol do you typically have >3 drinks per day or >7 drinks per week? No    Alcohol Use 3/27/2017  "  Prescreen: >3 drinks/day or >7 drinks/week? The patient does not drink >3 drinks per day nor >7 drinks per week.               Current providers sharing in care for this patient include:   Patient Care Team:  Ellis Ruiz MD as PCP - General (Internal Medicine)  Ellis Ruiz MD as Assigned PCP    The following health maintenance items are reviewed in Epic and correct as of today:  Health Maintenance   Topic Date Due     ZOSTER IMMUNIZATION (2 of 3) 01/28/2008     MEDICARE ANNUAL WELLNESS VISIT  03/13/2019     FALL RISK ASSESSMENT  11/09/2019     PHQ-2 Q1 YR  11/09/2019     MAMMO Q1 YR  03/26/2020     ADVANCE DIRECTIVE PLANNING Q5 YRS  03/27/2022     DTAP/TDAP/TD IMMUNIZATION (4 - Td) 03/12/2023     LIPID SCREEN Q5 YR FEMALE (SYSTEM ASSIGNED)  04/04/2024     DEXA SCAN SCREENING (SYSTEM ASSIGNED)  Completed     INFLUENZA VACCINE  Completed     PNEUMOCOCCAL IMMUNIZATION 65+ LOW/MEDIUM RISK  Completed     IPV IMMUNIZATION  Aged Out     MENINGITIS IMMUNIZATION  Aged Out       End of Life Planning:  Patient currently has an advanced directive: yes    COUNSELING:  Reviewed preventive health counseling, as reflected in patient instructions       Regular exercise       Healthy diet/nutrition    BP Readings from Last 1 Encounters:   03/30/19 120/61     Estimated body mass index is 23.18 kg/m  as calculated from the following:    Height as of 3/30/19: 1.702 m (5' 7\").    Weight as of 3/30/19: 67.1 kg (148 lb).           reports that she quit smoking about 27 years ago. Her smoking use included cigarettes. She has a 30.00 pack-year smoking history. She has never used smokeless tobacco.      Appropriate preventive services were discussed with this patient, including applicable screening as appropriate for cardiovascular disease, diabetes, osteopenia/osteoporosis, and glaucoma.  As appropriate for age/gender, discussed screening for colorectal cancer, prostate cancer, breast cancer, and cervical cancer. " Checklist reviewing preventive services available has been given to the patient.    Reviewed patients plan of care and provided an AVS. The Basic Care Plan (routine screening as documented in Health Maintenance) for Cecily meets the Care Plan requirement. This Care Plan has been established and reviewed with the Patient.    Counseling Resources:  ATP IV Guidelines  Pooled Cohorts Equation Calculator  Breast Cancer Risk Calculator  FRAX Risk Assessment  ICSI Preventive Guidelines  Dietary Guidelines for Americans, 2010  USDA's MyPlate  ASA Prophylaxis  Lung CA Screening    Ellis Ruiz MD  [facility]    Identified Health Risks:    Recent Labs   Lab Test 04/04/19  0847 07/12/18  1414  05/17/18  0608 05/16/18  0605   HGB 14.2 12.8   < >  --  8.8*    275   < >  --   --    INR  --   --   --  1.49* 1.34*    142   < >  --   --    POTASSIUM 4.3 4.2   < >  --   --    CR 0.72 0.75   < >  --   --    A1C  --  5.3  --   --   --     < > = values in this interval not displayed.         Patient/Caregiver provided printed discharge information.

## 2020-11-09 ENCOUNTER — TRANSFERRED RECORDS (OUTPATIENT)
Dept: HEALTH INFORMATION MANAGEMENT | Facility: CLINIC | Age: 80
End: 2020-11-09

## 2020-11-27 ENCOUNTER — HOSPITAL ENCOUNTER (OUTPATIENT)
Dept: OCCUPATIONAL THERAPY | Facility: CLINIC | Age: 80
Setting detail: THERAPIES SERIES
End: 2020-11-27
Attending: PSYCHIATRY & NEUROLOGY
Payer: COMMERCIAL

## 2020-11-27 PROCEDURE — 97165 OT EVAL LOW COMPLEX 30 MIN: CPT | Mod: GO | Performed by: OCCUPATIONAL THERAPIST

## 2020-11-27 PROCEDURE — 97535 SELF CARE MNGMENT TRAINING: CPT | Mod: GO | Performed by: OCCUPATIONAL THERAPIST

## 2020-11-27 PROCEDURE — 96125 COGNITIVE TEST BY HC PRO: CPT | Mod: GO | Performed by: OCCUPATIONAL THERAPIST

## 2020-11-27 NOTE — PROGRESS NOTES
Cognitive Performance Test    SUMMARY OF TEST:    The Cognitive Performance Test (CPT) is a standardized performance-based assessment to measure working memory/executive function processing capacities that 30underlie functional performance. Subtasks include common basic and instrumental activities of daily living (ADL/IADL) which are rated based on the manner in which patients respond to task demands of varying complexity. The total CPT score describes a level of functioning that indicates how information is processed, implications for functional activities, potential safety risks and a recommended level of supervision or assist based on cognitive function. The highest total score on this test is in the range of 5.6 to 5.8.    DATE OF TESTIN2020    RESULTS OF TESTING:                                                                                         CPT Subtest Results    MEDBOX:  SHOP/GLOVES:  PHONE:    WASH:   TRAVEL:  TOAST:    DRESS:    TOTAL CPT SCORE:  30/34     Average CPT Score  5.0/5.6    INTERPRETATION OF TEST RESULTS:    Based on the Cognitive Performance Test, this patient scored at CPT Level 5.0.  See CPT Levels reference below.    Factors affecting performance:  Emotional Status  Other:  anxiety with testing    Recommendations:    Assist for ADL/IADL:  Finances and Driving  Supervision for ADL/IADL:  Meal preparation and Medication management  Supervision in living setting:  Weekly checks                                                       TIME ADMINISTERING TEST: 46    TIME FOR INTERPRETATION AND PREPARATION OF REPORT: 10    TOTAL TIME: 56      CPT Levels Reference:    Patient's Average CPT Score:  5.0                                                                                                                                                  Individual scores range along a continuum as outlined below.  In addition to cognitive status, other factors may affect  "safety in a home environment.  Please refer to specific recommendations for this patient.    ___5.6-5.8  Normal functioning (absence of cognitive-functional disability).  Independent in managing personal affairs, monitors and directs own behavior.  Uses complex information to carry out daily activities with safety and accuracy.    Proficient with instrumental activities of daily living (IADL) and learning new activity.  Problems are anticipated, errors are avoided, and consequences of actions are considered.      X__5.0   Mild cognitive-functional disability; deficits in working memory and executive thought processes. Difficulty using complex information. Problems may be observed with recent memory, judgment, reasoning and planning ahead. May be impulsive or have difficulty anticipating consequences.  Safety:  May require assistance to plan ahead; or to manage complex medication schedules, appointments or finances.  Hazardous activities may need to be monitored or limited.  ADL:  Mild functional decline.  Able to complete basic self-care and routine household tasks.  May have difficulty with complex daily tasks such as reading, writing, meal preparation, shopping or driving.   Learns through hands on teaching. Self-centered behavior or difficulty considering the needs of others may be seen related to trouble seeing the  whole picture\". Can appear disorganized or uninhibited.    ___4.5  Mild to moderate cognitive-functional disability. Significant deficits in working memory and executive thought processes. Judgment, reasoning and planning show obvious impairment.  Distractible with inability to shift attention/actions given competing stimuli.  Difficulty with problem solving and managing details. Complex daily tasks performed with inconsistency, difficulty, or error.     Safety:  Medications should be monitored, stove use may require supervision, and driving ability may be affected.  Impaired safety awareness with " inability to anticipate potential problems.  May not recognize or respond to emergent situations. Requires frequent check-in support.   ADL:  Mild difficulty with simple everyday self-care tasks. Benefits from structured, routine activity.  Will likely need reminders to complete tasks outside of the routine. Requires assistance with planning and IADL tasks like shopping and finances. Learns concrete tasks through repetition, but performance may not generalize. Tends to be impulsive with poor insight. Self centered behavior or inability to consider the needs of others is common.    ___4.0  Moderate cognitive-functional disability; abstract to concrete thought processes. Working memory and executive function impairments are obvious. Difficulty with planning and problem solving.  Behavior is goal-directed, but unable to follow multi-step directions, is easily distracted, and may not recognize mistakes.  Inability to anticipate hazards or understand precautions.  Safety:  Recommend 24-hour supervision for safety. Supervision needed for medication management and for hazardous activities. May not be able to follow a restricted diet. Can get lost in unfamiliar surroundings. Generally, persons functioning at level 4 should not be driving.   ADL:  Some decline in quality or frequency of ADL.  [place] enhanced by use of a routine, simple concrete directions, and caregiver set-up of needed items. Complex tasks such as money or home management typically requires assistance.  Relies heavily on vision to guide behavior; will ignore objects/hazards not in plain sight and can be distracted by irrelevant objects. Often has poor insight.  Able to carry out social conversation and may verbally  cover  for deficits leading caregivers to believe they are capable of functioning independently.       ___3.5  Moderate cognitive-functional disability; increased cues needed for task completion. Aware of concrete task steps but needs  prompting or cues to initiate and complete simple tasks. Attention span is limited, simple directions may need to be repeated, and re-focus to a topic or task may be required.  Safety:  24-hour supervision required for safety and for assistance with daily tasks. Assistance required with medications, and access to medication should be limited. Meals, nutrition and dietary restrictions need to be monitored.  All hazardous activities should be restricted or supervised. Should not drive. Prone to wandering and can become lost.  ADL:  Moderate functional decline. Familiar tasks usually requires set-up of supplies and directions to complete steps. May need objects handed to them for task initiation. Function best with a set schedule in familiar surroundings with familiar people. All complex tasks must be done by others. Vocabulary is diminished and speech often unfocused.
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
## 2020-12-01 ENCOUNTER — TRANSFERRED RECORDS (OUTPATIENT)
Dept: HEALTH INFORMATION MANAGEMENT | Facility: CLINIC | Age: 80
End: 2020-12-01

## 2020-12-02 ENCOUNTER — VIRTUAL VISIT (OUTPATIENT)
Dept: NEUROSURGERY | Facility: CLINIC | Age: 80
End: 2020-12-02
Attending: NURSE PRACTITIONER
Payer: COMMERCIAL

## 2020-12-02 ENCOUNTER — HOSPITAL ENCOUNTER (OUTPATIENT)
Dept: CT IMAGING | Facility: CLINIC | Age: 80
End: 2020-12-02
Attending: NURSE PRACTITIONER
Payer: COMMERCIAL

## 2020-12-02 VITALS — WEIGHT: 150 LBS | HEIGHT: 67 IN | BODY MASS INDEX: 23.54 KG/M2

## 2020-12-02 DIAGNOSIS — S06.5XAA BILATERAL SUBDURAL HEMATOMAS (H): ICD-10-CM

## 2020-12-02 DIAGNOSIS — S06.5XAA BILATERAL SUBDURAL HEMATOMAS (H): Primary | ICD-10-CM

## 2020-12-02 PROCEDURE — 70450 CT HEAD/BRAIN W/O DYE: CPT

## 2020-12-02 PROCEDURE — 99213 OFFICE O/P EST LOW 20 MIN: CPT | Mod: TEL | Performed by: PHYSICIAN ASSISTANT

## 2020-12-02 PROCEDURE — 999N001193 HC VIDEO/TELEPHONE VISIT; NO CHARGE

## 2020-12-02 ASSESSMENT — PAIN SCALES - GENERAL: PAINLEVEL: NO PAIN (0)

## 2020-12-02 ASSESSMENT — MIFFLIN-ST. JEOR: SCORE: 1193.03

## 2020-12-02 NOTE — PROGRESS NOTES
"Daughter [phone]    Cecily Ivory is a 80 year old female who is being evaluated via a billable telephone visit.      Due to COVID-19 this visit has been performed over the phone verses face to face.     The patient has been notified of following:     \"This telephone visit will be conducted via a call between you and your physician/provider. We have found that certain health care needs can be provided without the need for a physical exam.  This service lets us provide the care you need with a short phone conversation.  If a prescription is necessary we can send it directly to your pharmacy.  If lab work is needed we can place an order for that and you can then stop by our lab to have the test done at a later time.    If during the course of the call the physician/provider feels a telephone visit is not appropriate, you will not be charged for this service.\"     Cecily Ivory complains of    Chief Complaint   Patient presents with     Follow Up     f/u after CT scan head 12/2/20     phone visit     Please Call: [phone] and Daughter Silvia [phone]       I have reviewed and updated the patient's Past Medical History, Social History, Family History and Medication List.    ALLERGIES  Latex, Seasonal allergies, and Sulfa drugs    Additional provider notes:    Cecily Ivory is a 80 year old female who is being evaluated for subdural hematomas.  She initially presented in at the end of September emergency department after having some changes in memory and having a brain MRI which demonstrated subdural hematomas.  They were bilateral at that time.  We have been monitoring her since on her last head CT scan 6 weeks ago she still had small bilateral subdural hematomas.  Today her CT scan reveals resolution of the right-sided hematoma and interval decrease in the left from 0.8 cm to 0.4 cm with no new hemorrhage and no significant mass-effect, there is trace left to right midline shift but this is also " decreased since the prior study.    The patient states she is feeling well without any concerns.    She was joined on today's call with by her daughter Mariaelena.    Assessment    Subdural hematoma    Plan:    No further follow-up is necessary.  The patient may resume her nightly aspirin at the direction of her primary care provider.  She has any further concerns we can certainly follow-up as needed.    Phone call duration: 5 minutes  Start Time 930  End Time 935    Abisai Collado PA-C    Patient provided verbal consent for the phone visit today.

## 2020-12-02 NOTE — NURSING NOTE
"Cecily Ivory is a 80 year old female who is being evaluated via a billable telephone visit.      The patient has been notified of following:     \"This telephone visit will be conducted via a call between you and your physician/provider. We have found that certain health care needs can be provided without the need for a physical exam.  This service lets us provide the care you need with a short phone conversation.  If a prescription is necessary we can send it directly to your pharmacy.  If lab work is needed we can place an order for that and you can then stop by our lab to have the test done at a later time.    Telephone visits are billed at different rates depending on your insurance coverage. During this emergency period, for some insurers they may be billed the same as an in-person visit.  Please reach out to your insurance provider with any questions.    If during the course of the call the physician/provider feels a telephone visit is not appropriate, you will not be charged for this service.\"    Patient has given verbal consent for Telephone visit?  Yes    What phone number would you like to be contacted at? [phone] and daughter Silvia at [phone]    How would you like to obtain your AVS? Mail a copy Sudhakar Larkin MA on 12/2/2020 at 9:20 AM      "

## 2020-12-02 NOTE — LETTER
"    12/2/2020         RE: Cecily Ivory  [address] [place]  [place] [address]        Dear Colleague,    Thank you for referring your patient, Cecily Ivory, to the [facility] [place]. Please see a copy of my visit note below.    Daughter [phone]    Cecily Ivory is a 80 year old female who is being evaluated via a billable telephone visit.      Due to COVID-19 this visit has been performed over the phone verses face to face.     The patient has been notified of following:     \"This telephone visit will be conducted via a call between you and your physician/provider. We have found that certain health care needs can be provided without the need for a physical exam.  This service lets us provide the care you need with a short phone conversation.  If a prescription is necessary we can send it directly to your pharmacy.  If lab work is needed we can place an order for that and you can then stop by our lab to have the test done at a later time.    If during the course of the call the physician/provider feels a telephone visit is not appropriate, you will not be charged for this service.\"     Cecily Ivory complains of    Chief Complaint   Patient presents with     Follow Up     f/u after CT scan head 12/2/20     phone visit     Please Call: [phone] and Daughter Silvia [phone]       I have reviewed and updated the patient's Past Medical History, Social History, Family History and Medication List.    ALLERGIES  Latex, Seasonal allergies, and Sulfa drugs    Additional provider notes:    Cecily Ivory is a 80 year old female who is being evaluated for subdural hematomas.  She initially presented in at the end of September emergency department after having some changes in memory and having a brain MRI which demonstrated subdural hematomas.  They were bilateral at that time.  We have been monitoring her since on her last head CT scan 6 weeks ago she still " had small bilateral subdural hematomas.  Today her CT scan reveals resolution of the right-sided hematoma and interval decrease in the left from 0.8 cm to 0.4 cm with no new hemorrhage and no significant mass-effect, there is trace left to right midline shift but this is also decreased since the prior study.    The patient states she is feeling well without any concerns.    She was joined on today's call with by her daughter Mariaelena.    Assessment    Subdural hematoma    Plan:    No further follow-up is necessary.  The patient may resume her nightly aspirin at the direction of her primary care provider.  She has any further concerns we can certainly follow-up as needed.    Phone call duration: 5 minutes  Start Time 930  End Time 935    Abisai Collado PA-C    Patient provided verbal consent for the phone visit today.       Again, thank you for allowing me to participate in the care of your patient.        Sincerely,        Abisai Collado PA-C

## 2020-12-04 ENCOUNTER — TELEPHONE (OUTPATIENT)
Dept: NEUROSURGERY | Facility: CLINIC | Age: 80
End: 2020-12-04

## 2020-12-04 NOTE — TELEPHONE ENCOUNTER
"Per Abisai Collado PA-C: \"Please relay plan regarding SDH to patient. No further follow-up is necessary.  The patient may resume her nightly aspirin at the direction of her primary care provider.  If She has any further concerns we can certainly follow-up as needed.\"    Called patient to review above plan per Abisai. No answer. LM. Awaiting call back.             "

## 2020-12-07 ENCOUNTER — OFFICE VISIT (OUTPATIENT)
Dept: NEUROLOGY | Facility: CLINIC | Age: 80
End: 2020-12-07
Payer: COMMERCIAL

## 2020-12-07 DIAGNOSIS — G31.84 MILD COGNITIVE IMPAIRMENT, SO STATED: Primary | ICD-10-CM

## 2020-12-07 DIAGNOSIS — S06.5XAA BILATERAL SUBDURAL HEMATOMAS (H): ICD-10-CM
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
## 2020-12-07 NOTE — PROGRESS NOTES
Patient was seen for neuropsychological evaluation at the request of Dr. Dionte Burnham, for the purposes of diagnostic clarification and treatment planning.  1 hrs 40 min of test administration and scoring were provided by this writer, Aline Christianson.  Please see Dr. Lars Conte's report for a full interpretation of the findings.
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
## 2020-12-15 NOTE — PROGRESS NOTES
Name: Cecily Ivory MRN: [id number]  : 1940  SHELTON: 2020  Staff: LUIS Tech: TRAVIS Age: 80  Sex: Female Hand: Right Educ: 13-15  Vision: 20/20 ?with correction / ?without correction    ORIENTATION     Time  -0     Place       Personal info          Presidents     WAIS-IV   Raw SSa     Similarities  15 7     Block Design  24 9     Digit Span  26 11       Coding  28 7    COWAT (CFL)     Raw: 26  SS: 8 %ile: 19-28    ANIMAL NAMING TEST     Raw: 11  SS: 5 T: 31    CLOCK DRAWING: Impaired    FINGER TAPPING   Avg  SS T     RH  16.0 0 25     LH 23.7 2 31    TRAILS Raw  Err SS %ile     A 59  0 8 19-28     B 256  4 3 1    PORTZoeMobS MAZE TEST     Test Age: 8.0, but incomplete b/c pt stopped    DANI-O    Raw    T %ile     Time to Copy  187      >16     Copy    15.5     ?1     Short Delay Recall 0.5 21 <1     Long Delay Recall 0.0 <20 <1     Recognition Total 16 26 1    WMS-IV  Raw SS / %ile     LM I  33 11     LM II  9 7     LM Recog. 16 26th-50th    HVLT-R     Trial 1 2 3      4 6 6  Raw T     Total Learning (1-3) 16 36     Delayed Recall  4 34     Percent Retention 67 41     Recognition Hits/FP 8/0 37    BVMT-R     Trial 1 2 3      3 4 5  Raw T     Total Learning (1-3) 12 36     Delayed Recall  0 <20     Percent Retention 0 <1st     Recognition Hits/FP 3/1 3rd-5th    GDS (30-item)     Raw:  9 Interpretation: Minimal/Mild

## 2020-12-16 NOTE — PROGRESS NOTES
ESTABLISHED PATIENT NEUROLOGY NOTE    DATE OF VISIT: 12/18/2020  CLINIC LOCATION: [facility]  MRN: [id number]  PATIENT NAME: Cecily Ivory  YOB: 1940    PCP: Ellis Ruiz MD    REASON FOR VISIT:   Chief Complaint   Patient presents with     RECHECK     3 mo follow up     SUBJECTIVE:                                                      HISTORY OF PRESENT ILLNESS: Patient is here to follow up regarding memory loss. Please refer to my initial note from 9/11/2020 for further information.  The patient is accompanied by her daughter today.    Since the last visit, the patient reports no significant changes in her cognitive symptoms.  She experienced several headaches with one being strong on the right side.  She thinks that it might be attributed to stress related to her apartment issues.  Now, she feels better after they were resolved.  No interval development of new focal neurological symptoms.  Her daughter agrees with the patient's report regarding her memory issues.    Vitamin B1 level was normal.    Brain MRI from 9/24/2020 demonstrated left greater than right bilateral convexity subdural hematomas measuring up to 1.1 cm in the left and 0.3 cm in the right in addition to mild to moderate cerebral atrophy.  The patient was evaluated by neurosurgery.  Serial head CT's were done, with the latest on 12/2/2020 demonstrated interval decrease in the residual left-sided subdural hematoma measuring 0.4 cm without evidence of any new hemorrhage or significant mass-effect.  After that the patient was advised that no neurosurgery follow-up is necessary and she should resume her aspirin under the guidance of her primary care provider.    Cognitive performance test from 11/27/2020 was 5.0/5.6, consistent with mild cognitive functional disability that would require weekly checks for safety.    Neuropsychologic evaluation from 12/7/2020 was abnormal despite limitations of waning test  "engagement and cultural linguistic factors.  She demonstrated marked deficits in executive functioning, learning, and memory with abnormally low visuoconstructional abilities, fine motor speed, and dexterity along with slightly low cognitive speed.  These changes suggested weaknesses in the frontal and temporal systems raising concerns regarding possible degenerative syndromes, including Alzheimer's disease and Lewy body dementia.  It felt that her current state indicates mild cognitive impairment.  Follow-up in 12 months was recommended.    On review of systems, patient endorses no additional active complaints. Medications, allergies, family and social history were also reviewed. There are no changes reported by patient.  REVIEW OF SYSTEMS:                                                    10-system review was completed. Pertinent positives are included in HPI. The remainder of ROS is negative.  EXAM:                                                    Physical Exam:   Vitals: /66   Pulse 51   Ht 5' 7\" (1.702 m)   Wt 152 lb (68.9 kg)   SpO2 96%   BMI 23.81 kg/m      General: pt is in NAD, cooperative.  Skin: normal turgor, moist mucous membranes, no lesions/rashes noticed.  HEENT: ATNC, white sclera, normal conjunctiva.  Respiratory: Symmetric lung excursion, no accessory respiratory muscle use.  Abdomen: Non distended.  Neurological: awake, cooperative, follows commands, no exam changes compared to the last visit.  ASSESSMENT AND PLAN:                                                    Assessment: 80-year-old female patient with mild cognitive impairment presents for follow-up after the completion of recommended work-up.      We had an extensive discussion with the patient and her daughter regarding evaluation results.  Her initial brain MRI demonstrated bilateral subdural hematomas that significantly improved on serial head CT's.  She was followed by neurosurgery conservatively.  We reviewed images " together today.  Thiamine level was normal.  CPT demonstrated mild cognitive functional disability that requires weekly checks for safety.  Neuropsychologic evaluation was consistent with mild cognitive impairment with etiologic considerations of Alzheimer's disease versus Lewy body dementia.    I reviewed all of these findings with the patient and her daughter.  We discussed that mild cognitive impairment might progress into dementia over time and continued neurologic follow-up is necessary.  We also reviewed that currently there no available medications for mild cognitive impairment, though centrally acting anticholinesterase inhibitors are used.  We decided not to start medication at the current time, but might consider starting it in the future if we see further cognitive decline.  I would like to repeat her MoCA in approximately 6 months from now.    Subdural hematoma appears to be improving on the left side.  Right-sided subdural hematoma completely resolved.    Diagnoses:    ICD-10-CM    1. Mild cognitive impairment  G31.84    2. Subdural hematoma (H)  S06.5X9A      Plan: At today's visit we thoroughly discussed current symptoms, evaluation results (mild cognitive impairment) and the proposed plan.  We will repeat her cognitive testing (MoCA) at the next follow-up visit.  We will consider repeating neuropsychologic evaluation if we see significant decline in her scores over time.    Next follow-up appointment is in the next 6 months or earlier if needed.    Total Time: 41 minutes with > 50% spent counseling the patient and her daughter on stated above assessment and recommendations.    Dionte Burnham MD  / Neurology

## 2020-12-16 NOTE — PATIENT INSTRUCTIONS
AFTER VISIT SUMMARY (AVS):    At today's visit we thoroughly discussed current symptoms, evaluation results (mild cognitive impairment) and the proposed plan.  We will repeat your cognitive testing at the next follow-up visit.    Next follow-up appointment is in the next 6 months or earlier if needed.    Please do not hesitate to call me with any questions or concerns.    Thanks.

## 2020-12-18 ENCOUNTER — OFFICE VISIT (OUTPATIENT)
Dept: NEUROSURGERY | Facility: CLINIC | Age: 80
End: 2020-12-18
Attending: PSYCHIATRY & NEUROLOGY
Payer: COMMERCIAL

## 2020-12-18 VITALS
HEART RATE: 51 BPM | HEIGHT: 67 IN | WEIGHT: 152 LBS | DIASTOLIC BLOOD PRESSURE: 66 MMHG | OXYGEN SATURATION: 96 % | BODY MASS INDEX: 23.86 KG/M2 | SYSTOLIC BLOOD PRESSURE: 136 MMHG

## 2020-12-18 DIAGNOSIS — S06.5XAA SUBDURAL HEMATOMA (H): ICD-10-CM

## 2020-12-18 DIAGNOSIS — G31.84 MILD COGNITIVE IMPAIRMENT: Primary | ICD-10-CM

## 2020-12-18 PROCEDURE — 99215 OFFICE O/P EST HI 40 MIN: CPT | Performed by: PSYCHIATRY & NEUROLOGY

## 2020-12-18 ASSESSMENT — MIFFLIN-ST. JEOR: SCORE: 1202.1

## 2020-12-18 NOTE — LETTER
12/18/2020         RE: Cecily Ivory  [address]  [place] [address]        Dear Colleague,    Thank you for referring your patient, Cecily Ivory, to the [facility] NEUROSURGERY CLINIC [place]. Please see a copy of my visit note below.    ESTABLISHED PATIENT NEUROLOGY NOTE    DATE OF VISIT: 12/18/2020  CLINIC LOCATION: [facility]  MRN: [id number]  PATIENT NAME: Cecily Ivory  YOB: 1940    PCP: Ellis Ruiz MD    REASON FOR VISIT:   Chief Complaint   Patient presents with     RECHECK     3 mo follow up     SUBJECTIVE:                                                      HISTORY OF PRESENT ILLNESS: Patient is here to follow up regarding memory loss. Please refer to my initial note from 9/11/2020 for further information.  The patient is accompanied by her daughter today.    Since the last visit, the patient reports no significant changes in her cognitive symptoms.  She experienced several headaches with one being strong on the right side.  She thinks that it might be attributed to stress related to her apartment issues.  Now, she feels better after they were resolved.  No interval development of new focal neurological symptoms.  Her daughter agrees with the patient's report regarding her memory issues.    Vitamin B1 level was normal.    Brain MRI from 9/24/2020 demonstrated left greater than right bilateral convexity subdural hematomas measuring up to 1.1 cm in the left and 0.3 cm in the right in addition to mild to moderate cerebral atrophy.  The patient was evaluated by neurosurgery.  Serial head CT's were done, with the latest on 12/2/2020 demonstrated interval decrease in the residual left-sided subdural hematoma measuring 0.4 cm without evidence of any new hemorrhage or significant mass-effect.  After that the patient was advised that no neurosurgery follow-up is necessary and she should resume her aspirin under the guidance of  "her primary care provider.    Cognitive performance test from 11/27/2020 was 5.0/5.6, consistent with mild cognitive functional disability that would require weekly checks for safety.    Neuropsychologic evaluation from 12/7/2020 was abnormal despite limitations of waning test engagement and cultural linguistic factors.  She demonstrated marked deficits in executive functioning, learning, and memory with abnormally low visuoconstructional abilities, fine motor speed, and dexterity along with slightly low cognitive speed.  These changes suggested weaknesses in the frontal and temporal systems raising concerns regarding possible degenerative syndromes, including Alzheimer's disease and Lewy body dementia.  It felt that her current state indicates mild cognitive impairment.  Follow-up in 12 months was recommended.    On review of systems, patient endorses no additional active complaints. Medications, allergies, family and social history were also reviewed. There are no changes reported by patient.  REVIEW OF SYSTEMS:                                                    10-system review was completed. Pertinent positives are included in HPI. The remainder of ROS is negative.  EXAM:                                                    Physical Exam:   Vitals: /66   Pulse 51   Ht 5' 7\" (1.702 m)   Wt 152 lb (68.9 kg)   SpO2 96%   BMI 23.81 kg/m      General: pt is in NAD, cooperative.  Skin: normal turgor, moist mucous membranes, no lesions/rashes noticed.  HEENT: ATNC, white sclera, normal conjunctiva.  Respiratory: Symmetric lung excursion, no accessory respiratory muscle use.  Abdomen: Non distended.  Neurological: awake, cooperative, follows commands, no exam changes compared to the last visit.  ASSESSMENT AND PLAN:                                                    Assessment: 80-year-old female patient with mild cognitive impairment presents for follow-up after the completion of recommended work-up.      We " had an extensive discussion with the patient and her daughter regarding evaluation results.  Her initial brain MRI demonstrated bilateral subdural hematomas that significantly improved on serial head CT's.  She was followed by neurosurgery conservatively.  We reviewed images together today.  Thiamine level was normal.  CPT demonstrated mild cognitive functional disability that requires weekly checks for safety.  Neuropsychologic evaluation was consistent with mild cognitive impairment with etiologic considerations of Alzheimer's disease versus Lewy body dementia.    I reviewed all of these findings with the patient and her daughter.  We discussed that mild cognitive impairment might progress into dementia over time and continued neurologic follow-up is necessary.  We also reviewed that currently there no available medications for mild cognitive impairment, though centrally acting anticholinesterase inhibitors are used.  We decided not to start medication at the current time, but might consider starting it in the future if we see further cognitive decline.  I would like to repeat her MoCA in approximately 6 months from now.    Subdural hematoma appears to be improving on the left side.  Right-sided subdural hematoma completely resolved.    Diagnoses:    ICD-10-CM    1. Mild cognitive impairment  G31.84    2. Subdural hematoma (H)  S06.5X9A      Plan: At today's visit we thoroughly discussed current symptoms, evaluation results (mild cognitive impairment) and the proposed plan.  We will repeat her cognitive testing (MoCA) at the next follow-up visit.  We will consider repeating neuropsychologic evaluation if we see significant decline in her scores over time.    Next follow-up appointment is in the next 6 months or earlier if needed.    Total Time: 41 minutes with > 50% spent counseling the patient and her daughter on stated above assessment and recommendations.    Dionte Burnham MD  /  Neurology        Again, thank you for allowing me to participate in the care of your patient.        Sincerely,        Dionte Burnham MD

## 2021-02-22 ENCOUNTER — TRANSFERRED RECORDS (OUTPATIENT)
Dept: HEALTH INFORMATION MANAGEMENT | Facility: CLINIC | Age: 81
End: 2021-02-22

## 2021-03-01 ENCOUNTER — TRANSFERRED RECORDS (OUTPATIENT)
Dept: HEALTH INFORMATION MANAGEMENT | Facility: CLINIC | Age: 81
End: 2021-03-01

## 2021-03-16 ENCOUNTER — TELEPHONE (OUTPATIENT)
Dept: NEUROLOGY | Facility: CLINIC | Age: 81
End: 2021-03-16

## 2021-03-16 NOTE — TELEPHONE ENCOUNTER
Schedule 3 month follow up- patient would like June 21st at 11a.  Jonah in notes MOCA #2 once scheduled as well.

## 2021-05-07 ENCOUNTER — TELEPHONE (OUTPATIENT)
Dept: FAMILY MEDICINE | Facility: CLINIC | Age: 81
End: 2021-05-07

## 2021-05-07 NOTE — TELEPHONE ENCOUNTER
"Patient's daughter called requesting triage call her mother who has been having trouble getting connected.    Confirmed numbers in demographics are correct. Please call daughter is unable to reach mother.    Pt c/o \"feeling her heart.\"  Daughter states it is not acute and non-urgent.    Thanks!    Poppy Woodlal, MSN, RN  Triage RN  [facility]      "

## 2021-05-08 ENCOUNTER — NURSE TRIAGE (OUTPATIENT)
Dept: NURSING | Facility: CLINIC | Age: 81
End: 2021-05-08

## 2021-05-08 NOTE — TELEPHONE ENCOUNTER
Patient states every so often she has a pain in her heart; no symptoms at time of call.  Connected to scheduling for office visit and instructed patient to call back with any symptoms.  Patient states she is aware when to call 911 as she had a heart attack January, 2020.

## 2021-05-10 ENCOUNTER — OFFICE VISIT (OUTPATIENT)
Dept: FAMILY MEDICINE | Facility: CLINIC | Age: 81
End: 2021-05-10
Payer: COMMERCIAL

## 2021-05-10 VITALS
OXYGEN SATURATION: 94 % | WEIGHT: 159 LBS | SYSTOLIC BLOOD PRESSURE: 132 MMHG | BODY MASS INDEX: 24.96 KG/M2 | HEIGHT: 67 IN | TEMPERATURE: 97.7 F | HEART RATE: 62 BPM | DIASTOLIC BLOOD PRESSURE: 73 MMHG

## 2021-05-10 DIAGNOSIS — R07.89 ATYPICAL CHEST PAIN: Primary | ICD-10-CM

## 2021-05-10 PROCEDURE — 99213 OFFICE O/P EST LOW 20 MIN: CPT | Performed by: NURSE PRACTITIONER

## 2021-05-10 ASSESSMENT — MIFFLIN-ST. JEOR: SCORE: 1233.85

## 2021-05-10 NOTE — PROGRESS NOTES
"    Assessment & Plan   Problem List Items Addressed This Visit     None      Visit Diagnoses     Atypical chest pain    -  Primary         Pt has had multiple very brief sharp pains of the left chest over the last several days. Historical features are not consistent with ACS. Symptoms are most c/w spasm: esophageal, muscular or other. I recommend we continue to watch and wait and follow-up if symptoms worsen or do not  Improve       CARLITOS Cardona CNP  [facility] Tony is a 80 year old who presents for the following health issues     HPI     Chest Pain  Onset/Duration: 4 days  Description:   Location: left side  Character: sharp  Radiation: none  Duration: intermittent   Intensity: mild  Progression of Symptoms: same  Accompanying Signs & Symptoms:  Shortness of breath: no  Sweating: no  Nausea/vomiting: no  Lightheadedness: no  Palpitations: no  Fever/Chills: no  Cough: YES           Heartburn: YES  History:   Family history of heart disease: YES  Tobacco use: no  Previous similar symptoms: YES  Precipitating factors:   Worse with exertion: no  Worse with deep breaths: YES           Related to eating: no           Better with burping: no  Alleviating factors: none  Therapies tried and outcome: none    Had a \"llight heart attack\" 1 year ago. Has been fine since   Walks everyday and does well   Currently walks 6000 steps at once   Now every once in awhile gets a sharp pain of L chest  Gets the pain a few times a day   Can get it just when she is sitting   Pain is present for just a couple seconds and then it will return randomly   Gets more tired than she used to   No recent illness   Has a chronic cough d/t allergies that is unchanged   No SOB, palpitations, abd pain, rash   Gets reflux when she eats sometimes     Her brother passed just recently from an MI in his sleep       Review of Systems   Detailed as above         Objective    /73 (BP Location: Left arm, " "Patient Position: Chair, Cuff Size: Adult Regular)   Pulse 62   Temp 97.7  F (36.5  C) (Temporal)   Ht 1.702 m (5' 7\")   Wt 72.1 kg (159 lb)   SpO2 94%   BMI 24.90 kg/m    Body mass index is 24.9 kg/m .  Physical Exam  Constitutional:       Appearance: Normal appearance.   HENT:      Head: Normocephalic.   Eyes:      Conjunctiva/sclera: Conjunctivae normal.   Cardiovascular:      Rate and Rhythm: Normal rate and regular rhythm.      Heart sounds: Normal heart sounds. No murmur.   Pulmonary:      Effort: Pulmonary effort is normal. No respiratory distress.      Breath sounds: Normal breath sounds.   Chest:      Chest wall: No tenderness.   Skin:     General: Skin is warm and dry.   Neurological:      Mental Status: She is alert.   Psychiatric:         Mood and Affect: Mood normal.            "

## 2021-05-10 NOTE — PATIENT INSTRUCTIONS
Your symptoms today are not consistent with heart problems. I suspect this is a spasm of something, whether it be a muscle, your esophagus or vessels. This is not dangerous.     If your pain changes at all, let us know right away.     Please schedule an appt with Dr Ruiz at his next available for a follow up

## 2021-05-11 NOTE — TELEPHONE ENCOUNTER
Left message to return call to Triage RN at [facility].  Please transfer to [place] Triage Green Line at [phone]   Angela Pickens RN  [facility]

## 2021-05-12 NOTE — TELEPHONE ENCOUNTER
DESIRAE Edge unless changes to plan,    Pt concerned that she is still having intermittent chest pains.  No changes at all since Monday, but pt is concerned as reports Heart Attack last year.  Would like sooner follow up with Dr. Ruiz than current scheduled 6/7, and declined virtual.     Made plan with pt:     1. If any worsening/changed symptoms re: chest or other urgent sx, to ER   2. Echo on the 17th already schedule  3. Cards/Mack on the 24th.  4. Call in after Cards visit if still needing to see Dr. Ruiz sooner, and will ask him to work in.   5. In meantime, can call triage if any questions.    Pt agreed to this plan.  Angela Pickens, RN  [facility] RN Triage Team

## 2021-05-17 ENCOUNTER — HOSPITAL ENCOUNTER (OUTPATIENT)
Dept: CARDIOLOGY | Facility: CLINIC | Age: 81
Discharge: HOME | End: 2021-05-17
Attending: INTERNAL MEDICINE | Admitting: INTERNAL MEDICINE
Payer: COMMERCIAL

## 2021-05-17 DIAGNOSIS — I51.81 STRESS-INDUCED CARDIOMYOPATHY: ICD-10-CM

## 2021-05-17 PROCEDURE — 93306 TTE W/DOPPLER COMPLETE: CPT | Mod: 26 | Performed by: INTERNAL MEDICINE

## 2021-05-17 PROCEDURE — 93306 TTE W/DOPPLER COMPLETE: CPT

## 2021-05-24 ENCOUNTER — VIRTUAL VISIT (OUTPATIENT)
Dept: CARDIOLOGY | Facility: CLINIC | Age: 81
End: 2021-05-24
Payer: COMMERCIAL

## 2021-05-24 DIAGNOSIS — I35.1 NONRHEUMATIC AORTIC VALVE INSUFFICIENCY: ICD-10-CM

## 2021-05-24 DIAGNOSIS — E78.5 HYPERLIPIDEMIA LDL GOAL <130: ICD-10-CM

## 2021-05-24 DIAGNOSIS — I51.81 TAKOTSUBO CARDIOMYOPATHY: ICD-10-CM

## 2021-05-24 DIAGNOSIS — I51.81 STRESS-INDUCED CARDIOMYOPATHY: Primary | ICD-10-CM

## 2021-05-24 PROCEDURE — 99213 OFFICE O/P EST LOW 20 MIN: CPT | Mod: 95 | Performed by: INTERNAL MEDICINE

## 2021-05-24 NOTE — PROGRESS NOTES
"Cecily is a 80 year old who is being evaluated via a billable video visit.      How would you like to obtain your AVS? Mail a copy  If the video visit is dropped, the invitation should be resent by: Text to cell phone: [phone]  Will anyone else be joining your video visit? Yes, daughter Silvia Andrea - Patient Reported  Weight (Patient Reported): 72.3 kg (159 lb 4.8 oz)  Height (Patient Reported): 508 cm (16' 8\")  BMI (Based on Pt Reported Ht/Wt): 2.8    Review Of Systems  Skin: NEGATIVE  Eyes:Ears/Nose/Throat: NEGATIVE  Respiratory: NEGATIVE  Cardiovascular:NEGATIVE  Gastrointestinal: NEGATIVE  Genitourinary:NEGATIVE   Musculoskeletal: NEGATIVE  Neurologic: NEGATIVE  Psychiatric: NEGATIVE  Hematologic/Lymphatic/Immunologic: NEGATIVE  Endocrine:  NEGATIVE    Luz Maria Ray LPN    Video-Visit Details    Video Start Time: 1:59 PM      Type of service:  Video Visit    Video End Time:2:09 PM    Originating Location (pt. Location): Home    Distant Location (provider location):  [facility] HEART [facility]     Platform used for Video Visit: Harlan RUIZ Note  I had the pleasure of seeing Aranza Ivory in cardiology clinic follow-up as a virtual video visit.     She is a pleasant 80-year-old female.  In January 2020 she was admitted with chest pain elevated troponin up to 4.5.  Coronary angiography with normal coronary arteries.  Echo and LV gram revealed wall motion abnormalities consistent with stress-induced cardiomyopathy that has since improved.    She is doing reasonably well about 2 months ago she had a day when she had multiple episodes of what she describes as chest pain that lasted a second.  It would come and go.  It was nonexertional.  It sounds atypical and unlikely to be angina.  It has resolved.    She tries to walk every day and get 10,000 steps a day.  Denies any shortness of breath orthopnea or PND.  I discussed the results of her repeat echocardiogram with her and her daughter.  " Her daughter was also able to join via [place] phone.  The echocardiogram revealed normal ejection fraction.  The reader felt that the aortic regurgitation was moderate now but on my review, it appears mild when you look at the vena contractor, pressure half-time as well as the color Doppler.     Physical exam was done via video visit  She is alert and oriented x3.  She is appears in no respiratory distress on respiratory exam.  No accessory muscles used.  JVP is not seen in sitting position.  She is able to move her arms equally well.  Face stable evaluation revealed no lacerations or xanthelasma.     Impression     1.  History of Takotsubo cardiomyopathy  : Angiography during that time in January 2020 revealed normal coronary arteries.  Since then EF has improved.  Repeat echocardiogram confirms normal EF and you regurgitation, probably mild to moderate.  Continue metoprolol.     Hyperlipidemia  Continue  lovastatin and managed by primary care physician.     Aortic regurgitation, probably mild, or mild to moderate  We will repeat echocardiogram in 2 years.    Overall, she is doing well.  She has no cardiac symptoms, of asked him to call us.  Otherwise I will see her in a year.  We will consider doing an echo now in 2 years.     Sincerely,     Cameron Giron MD

## 2021-05-24 NOTE — LETTER
"5/24/2021    Ellis Ruiz MD  [address]  [place] [facility] [place] [address]    RE: Cecily Ivory       Dear Colleague,    I had the pleasure of seeing Cecily Ivory in the [facility].    Cecily is a 80 year old who is being evaluated via a billable video visit.      How would you like to obtain your AVS? Mail a copy  If the video visit is dropped, the invitation should be resent by: Text to cell phone: [phone]  Will anyone else be joining your video visit? Yes, daughter Silvia Andrea - Patient Reported  Weight (Patient Reported): 72.3 kg (159 lb 4.8 oz)  Height (Patient Reported): 508 cm (16' 8\")  BMI (Based on Pt Reported Ht/Wt): 2.8    Review Of Systems  Skin: NEGATIVE  Eyes:Ears/Nose/Throat: NEGATIVE  Respiratory: NEGATIVE  Cardiovascular:NEGATIVE  Gastrointestinal: NEGATIVE  Genitourinary:NEGATIVE   Musculoskeletal: NEGATIVE  Neurologic: NEGATIVE  Psychiatric: NEGATIVE  Hematologic/Lymphatic/Immunologic: NEGATIVE  Endocrine:  NEGATIVE    Luz Maria Ray LPN    Video-Visit Details    Video Start Time: 1:59 PM      Type of service:  Video Visit    Video End Time:2:09 PM    Originating Location (pt. Location): Home    Distant Location (provider location):  [facility] HEART CLINIC [place]     Platform used for Video Visit: Harlan RUIZ Note  I had the pleasure of seeing Aranza Ivory in cardiology clinic follow-up as a virtual video visit.     She is a pleasant 80-year-old female.  In January 2020 she was admitted with chest pain elevated troponin up to 4.5.  Coronary angiography with normal coronary arteries.  Echo and LV gram revealed wall motion abnormalities consistent with stress-induced cardiomyopathy that has since improved.    She is doing reasonably well about 2 months ago she had a day when she had multiple episodes of what she describes as chest pain that lasted a second.  It would come and go.  It was nonexertional.  It sounds " atypical and unlikely to be angina.  It has resolved.    She tries to walk every day and get 10,000 steps a day.  Denies any shortness of breath orthopnea or PND.  I discussed the results of her repeat echocardiogram with her and her daughter.  Her daughter was also able to join via [place] phone.  The echocardiogram revealed normal ejection fraction.  The reader felt that the aortic regurgitation was moderate now but on my review, it appears mild when you look at the vena contractor, pressure half-time as well as the color Doppler.     Physical exam was done via video visit  She is alert and oriented x3.  She is appears in no respiratory distress on respiratory exam.  No accessory muscles used.  JVP is not seen in sitting position.  She is able to move her arms equally well.  Face stable evaluation revealed no lacerations or xanthelasma.     Impression     1.  History of Takotsubo cardiomyopathy  : Angiography during that time in January 2020 revealed normal coronary arteries.  Since then EF has improved.  Repeat echocardiogram confirms normal EF and you regurgitation, probably mild to moderate.  Continue metoprolol.     Hyperlipidemia  Continue  lovastatin and managed by primary care physician.     Aortic regurgitation, probably mild, or mild to moderate  We will repeat echocardiogram in 2 years.    Overall, she is doing well.  She has no cardiac symptoms, of asked him to call us.  Otherwise I will see her in a year.  We will consider doing an echo now in 2 years.     Sincerely,     Cameron Giron MD    cc:   No referring provider defined for this encounter.

## 2021-06-03 ENCOUNTER — OFFICE VISIT (OUTPATIENT)
Dept: NEUROLOGY | Facility: CLINIC | Age: 81
End: 2021-06-03
Attending: PSYCHIATRY & NEUROLOGY
Payer: COMMERCIAL

## 2021-06-03 VITALS
DIASTOLIC BLOOD PRESSURE: 80 MMHG | HEIGHT: 67 IN | TEMPERATURE: 98.3 F | OXYGEN SATURATION: 95 % | WEIGHT: 162 LBS | SYSTOLIC BLOOD PRESSURE: 146 MMHG | HEART RATE: 54 BPM | BODY MASS INDEX: 25.43 KG/M2

## 2021-06-03 DIAGNOSIS — G31.84 MILD COGNITIVE IMPAIRMENT, SO STATED: Primary | ICD-10-CM

## 2021-06-03 PROCEDURE — G0463 HOSPITAL OUTPT CLINIC VISIT: HCPCS

## 2021-06-03 PROCEDURE — 99214 OFFICE O/P EST MOD 30 MIN: CPT | Performed by: PSYCHIATRY & NEUROLOGY

## 2021-06-03 ASSESSMENT — MONTREAL COGNITIVE ASSESSMENT (MOCA)
WHAT IS THE TOTAL SCORE (OUT OF 30): 23
8. SERIAL SUBTRACTION OF 7S: 3
10. [FLUENCY] NAME WORDS STARTING WITH DESIGNATED LETTER: 1
6. READ LIST OF DIGITS [FORWARD/BACKWARD]: 2
11. FOR EACH PAIR OF WORDS, WHAT CATEGORY DO THEY BELONG TO (OUT OF 2): 2
12. MEMORY INDEX SCORE: 0
4. NAME EACH OF THE THREE ANIMALS SHOWN: 3
9. REPEAT EACH SENTENCE: 2
VISUOSPATIAL/EXECUTIVE SUBSCORE: 3
7. [VIGILENCE] TAP WHEN HEARING DESIGNATED LETTER: 1
13. ORIENTATION SUBSCORE: 6
WHAT LEVEL OF EDUCATION WAS ATTAINED: 0

## 2021-06-03 ASSESSMENT — MIFFLIN-ST. JEOR: SCORE: 1247.46

## 2021-06-03 NOTE — PROGRESS NOTES
"ESTABLISHED PATIENT NEUROLOGY NOTE    DATE OF VISIT: 6/3/2021  CLINIC LOCATION: [facility]  MRN: [id number]  PATIENT NAME: Cecily Ivory  YOB: 1940    PCP: Ellis Ruiz MD    REASON FOR VISIT:   Chief Complaint   Patient presents with     Neurologic Problem     mild cognitive issue     SUBJECTIVE:                                                      HISTORY OF PRESENT ILLNESS: Patient is here to follow up regarding mild cognitive impairment.  Last seen on 12/18/2020.  Please refer to my initial/other prior notes for further information.  The patient is accompanied by her daughter, who participates in the interview today.    Since the last visit, the patient reports that she is more forgetful than before, but is able to recall necessary information when she is relaxed.  She denies interval development of new focal neurological symptoms.  Her general health is good.    Patient's daughter agrees that the patient is slightly more forgetful, but she did not notice any significant decline.  She reports that the patient had \"1 lapse in judgment\" when she let stranger to drive her home from [employer] because \"he was nice looking\".  It went okay, but made daughter concerned.  Now, daughter tracks her location on the phone to prevent further episodes.  It did not recur.  She does not have any additional concerns.    On review of systems, patient endorses no additional active complaints. Medications, allergies, family and social history were also reviewed. There are no changes reported by patient.  REVIEW OF SYSTEMS:                                                    10-system review was completed. Pertinent positives are included in HPI. The remainder of ROS is negative.  EXAM:                                                    Physical Exam:   Vitals: BP (!) 153/80 (BP Location: Right arm, Patient Position: Sitting, Cuff Size: Adult Regular)   Pulse 54   Temp 98.3  F (36.8  C) " "(Oral)   Ht 1.702 m (5' 7\")   Wt 73.5 kg (162 lb)   SpO2 95%   BMI 25.37 kg/m     Repeat vitals: BP (!) 146/80 (BP Location: Right arm, Patient Position: Sitting, Cuff Size: Adult Regular)   Pulse 54   Temp 98.3  F (36.8  C) (Oral)   Ht 1.702 m (5' 7\")   Wt 73.5 kg (162 lb)   SpO2 95%   BMI 25.37 kg/m  .  Ad Cognitive Assessment:    [place] Cognitive Assessment (MOCA)  Visuospatial/Executive : 3  Naming: 3  Attention - Digits: 2  Attention - Letters: 1  Attention - Subtraction: 3  Language - Repeat: 2  Language - Fluency : 1  Abstraction: 2  Delayed Recall: 0  Orientation: 6  Education: 0  MOCA Score: 23  Administered by: : CHIARA     [place] Cognitive Assessment Score:  MOCA Score: 23/30.   General: pt is in NAD, cooperative.  Skin: normal turgor, moist mucous membranes, no lesions/rashes noticed.  HEENT: ATNC, white sclera, normal conjunctiva.  Respiratory: Symmetric lung excursion, no accessory respiratory muscle use.  Abdomen: Non distended.  Neurological: awake, cooperative, follows commands, MoCA as per above, face is symmetric, able to equally move all extremities, no sensory deficits, no dysmetria bilaterally, casual gait is normal.  ASSESSMENT AND PLAN:                                                    Assessment: 80-year-old female patient with history of bilateral subdural hematomas (significantly improved with conservative management) presents for follow-up of mild cognitive impairment.  She reports mildly worsening forgetfulness.  Her daughter feels that there is no significant memory decline.    We had a detailed discussion with the patient and her daughter.  MoCA today is 23/30, compared to 24/30 in September 2020.  Previously we discussed that neuropsychologic evaluation could be repeated when we see a significant decline, but today her score appears to be stable.  We will repeat her cognitive test again in 6 months.    Cecily to follow up with Primary Care provider regarding elevated " blood pressure.     Diagnoses:    ICD-10-CM    1. Mild cognitive impairment, so stated  G31.84      Plan: At today's visit we thoroughly discussed current symptoms, cognitive test results (stable), and the plan.    We decided to repeat her cognitive testing at the next visit.    Next follow-up appointment is in the next 6 months or earlier if needed.    Total Time: 30 minutes spent on the date of the encounter doing chart review, history and exam, documentation and further activities per the note.    Dionte Burnham MD  [facility] Neurology  (Chart documentation was completed in part with Dragon voice-recognition software. Even though reviewed, some grammatical, spelling, and word errors may remain.)

## 2021-06-03 NOTE — PATIENT INSTRUCTIONS
AFTER VISIT SUMMARY (AVS):    At today's visit we thoroughly discussed current symptoms, cognitive test results (stable), and the plan.    We decided to repeat your cognitive testing at the next visit.    Next follow-up appointment is in the next 6 months or earlier if needed.    Please do not hesitate to call me with any questions or concerns.    Thanks.

## 2021-06-03 NOTE — NURSING NOTE
"Cecily Ivory is a 80 year old female who presents for:  Chief Complaint   Patient presents with     Neurologic Problem     mild cognitive issue        Initial Vitals:  BP (!) 153/80 (BP Location: Right arm, Patient Position: Sitting, Cuff Size: Adult Regular)   Pulse 54   Temp 98.3  F (36.8  C) (Oral)   Ht 1.702 m (5' 7\")   Wt 73.5 kg (162 lb)   SpO2 95%   BMI 25.37 kg/m   Estimated body mass index is 25.37 kg/m  as calculated from the following:    Height as of this encounter: 1.702 m (5' 7\").    Weight as of this encounter: 73.5 kg (162 lb).. Body surface area is 1.86 meters squared. BP completed using cuff size: regular  Data Unavailable    Nursing Comments: see chief complaint    James Gardner, [facility]      "

## 2021-06-03 NOTE — LETTER
"    6/3/2021         RE: Cecily Ivory  [address] [place] [address]        Dear Colleague,    Thank you for referring your patient, Cecily Ivory, to the [facility] [place]. Please see a copy of my visit note below.    ESTABLISHED PATIENT NEUROLOGY NOTE    DATE OF VISIT: 6/3/2021  CLINIC LOCATION: [facility]  MRN: [id number]  PATIENT NAME: Cecily Ivory  YOB: 1940    PCP: Ellis Ruiz MD    REASON FOR VISIT:   Chief Complaint   Patient presents with     Neurologic Problem     mild cognitive issue     SUBJECTIVE:                                                      HISTORY OF PRESENT ILLNESS: Patient is here to follow up regarding mild cognitive impairment.  Last seen on 12/18/2020.  Please refer to my initial/other prior notes for further information.  The patient is accompanied by her daughter, who participates in the interview today.    Since the last visit, the patient reports that she is more forgetful than before, but is able to recall necessary information when she is relaxed.  She denies interval development of new focal neurological symptoms.  Her general health is good.    Patient's daughter agrees that the patient is slightly more forgetful, but she did not notice any significant decline.  She reports that the patient had \"1 lapse in judgment\" when she let stranger to drive her home from [employer] because \"he was nice looking\".  It went okay, but made daughter concerned.  Now, daughter tracks her location on the phone to prevent further episodes.  It did not recur.  She does not have any additional concerns.    On review of systems, patient endorses no additional active complaints. Medications, allergies, family and social history were also reviewed. There are no changes reported by patient.  REVIEW OF SYSTEMS:                                                    10-system review was completed. Pertinent " "positives are included in HPI. The remainder of ROS is negative.  EXAM:                                                    Physical Exam:   Vitals: BP (!) 153/80 (BP Location: Right arm, Patient Position: Sitting, Cuff Size: Adult Regular)   Pulse 54   Temp 98.3  F (36.8  C) (Oral)   Ht 1.702 m (5' 7\")   Wt 73.5 kg (162 lb)   SpO2 95%   BMI 25.37 kg/m     Repeat vitals: BP (!) 146/80 (BP Location: Right arm, Patient Position: Sitting, Cuff Size: Adult Regular)   Pulse 54   Temp 98.3  F (36.8  C) (Oral)   Ht 1.702 m (5' 7\")   Wt 73.5 kg (162 lb)   SpO2 95%   BMI 25.37 kg/m  .  [place] Cognitive Assessment:    [place] Cognitive Assessment (MOCA)  Visuospatial/Executive : 3  Naming: 3  Attention - Digits: 2  Attention - Letters: 1  Attention - Subtraction: 3  Language - Repeat: 2  Language - Fluency : 1  Abstraction: 2  Delayed Recall: 0  Orientation: 6  Education: 0  MOCA Score: 23  Administered by: : CHIARA     Ad Cognitive Assessment Score:  MOCA Score: 23/30.   General: pt is in NAD, cooperative.  Skin: normal turgor, moist mucous membranes, no lesions/rashes noticed.  HEENT: ATNC, white sclera, normal conjunctiva.  Respiratory: Symmetric lung excursion, no accessory respiratory muscle use.  Abdomen: Non distended.  Neurological: awake, cooperative, follows commands, MoCA as per above, face is symmetric, able to equally move all extremities, no sensory deficits, no dysmetria bilaterally, casual gait is normal.  ASSESSMENT AND PLAN:                                                    Assessment: 80-year-old female patient with history of bilateral subdural hematomas (significantly improved with conservative management) presents for follow-up of mild cognitive impairment.  She reports mildly worsening forgetfulness.  Her daughter feels that there is no significant memory decline.    We had a detailed discussion with the patient and her daughter.  MoCA today is 23/30, compared to 24/30 in September " 2020.  Previously we discussed that neuropsychologic evaluation could be repeated when we see a significant decline, but today her score appears to be stable.  We will repeat her cognitive test again in 6 months.    Cecily to follow up with Primary Care provider regarding elevated blood pressure.     Diagnoses:    ICD-10-CM    1. Mild cognitive impairment, so stated  G31.84      Plan: At today's visit we thoroughly discussed current symptoms, cognitive test results (stable), and the plan.    We decided to repeat her cognitive testing at the next visit.    Next follow-up appointment is in the next 6 months or earlier if needed.    Total Time: 30 minutes spent on the date of the encounter doing chart review, history and exam, documentation and further activities per the note.    Dionte Burnham MD  [facility]  (Chart documentation was completed in part with Dragon voice-recognition software. Even though reviewed, some grammatical, spelling, and word errors may remain.)      Again, thank you for allowing me to participate in the care of your patient.        Sincerely,        Dionte Burnham MD

## 2021-06-07 ENCOUNTER — OFFICE VISIT (OUTPATIENT)
Dept: FAMILY MEDICINE | Facility: CLINIC | Age: 81
End: 2021-06-07
Payer: COMMERCIAL

## 2021-06-07 VITALS
DIASTOLIC BLOOD PRESSURE: 68 MMHG | WEIGHT: 162 LBS | TEMPERATURE: 98 F | SYSTOLIC BLOOD PRESSURE: 129 MMHG | OXYGEN SATURATION: 97 % | BODY MASS INDEX: 25.43 KG/M2 | HEIGHT: 67 IN | HEART RATE: 70 BPM

## 2021-06-07 DIAGNOSIS — L65.9 HAIR LOSS: Primary | ICD-10-CM

## 2021-06-07 DIAGNOSIS — R07.89 ATYPICAL CHEST PAIN: ICD-10-CM

## 2021-06-07 DIAGNOSIS — S06.5XAA SUBDURAL HEMATOMA (H): ICD-10-CM

## 2021-06-07 PROCEDURE — 84443 ASSAY THYROID STIM HORMONE: CPT | Performed by: INTERNAL MEDICINE

## 2021-06-07 PROCEDURE — 99213 OFFICE O/P EST LOW 20 MIN: CPT | Performed by: INTERNAL MEDICINE

## 2021-06-07 PROCEDURE — 83550 IRON BINDING TEST: CPT | Performed by: INTERNAL MEDICINE

## 2021-06-07 PROCEDURE — 82728 ASSAY OF FERRITIN: CPT | Performed by: INTERNAL MEDICINE

## 2021-06-07 PROCEDURE — 83540 ASSAY OF IRON: CPT | Performed by: INTERNAL MEDICINE

## 2021-06-07 PROCEDURE — 36415 COLL VENOUS BLD VENIPUNCTURE: CPT | Performed by: INTERNAL MEDICINE

## 2021-06-07 ASSESSMENT — MIFFLIN-ST. JEOR: SCORE: 1247.46

## 2021-06-07 NOTE — PATIENT INSTRUCTIONS
I will have radiology call you to do the ct scan.  If they do not call in the next 2 days let me know.    Ellis Ruiz M.D.

## 2021-06-07 NOTE — LETTER
June 9, 2021      Cecily Ivory  [address]  [place] [address]        Dear ,    We are writing to inform you of your test results.    It was a pleasure seeing you.  I wanted to get back to you with your test results.  I have enclosed a copy for your records.     Your labs are normal, the thyroid and iron.  For the hair loss if you would like to pursue it further I would see a dermatologist.     Resulted Orders   TSH with free T4 reflex   Result Value Ref Range    TSH 1.64 0.40 - 4.00 mU/L   Ferritin   Result Value Ref Range    Ferritin 46 8 - 252 ng/mL   Iron and iron binding capacity   Result Value Ref Range    Iron 52 35 - 180 ug/dL    Iron Binding Cap 314 240 - 430 ug/dL    Iron Saturation Index 17 15 - 46 %       If you have any questions or concerns, please call the clinic at the number listed above.       Sincerely,      Ellis Ruiz MD

## 2021-06-07 NOTE — PROGRESS NOTES
Patient presents to follow-up on a few issues.    She was having chest pain as noted and seen here for that.  Lasted a few days but subsequently has resolved.  She does have a history of stress-induced cardiomyopathy but a fairly recent echo was normal as noted and reviewed.  She does have a little bit of mitral regurgitation.    The patient notes headaches for a few months.  She does not usually get those.  They come and go.  She does have a history of a subdural hematoma as noted.    She also notes some hair loss.  It really does not run in the family.    She is otherwise doing well and feeling quite well.    Past Medical History:   Diagnosis Date     Abdominal pain 2009, 2013    ct liver and renal cyst and 2mm lung nodule, repeat ct done 2013 and no significantly abnl.     Anxiety 2014    added med 3/14     Arthritis      ASCUS of cervix with negative high risk HPV 03/2018     Bilateral subdural hematomas (H) 09/2020    found incidentally on mri done for memory loss     Carotid bruit 2011    us nl     Gastritis 2011    egd done by [facility]     GERD (gastroesophageal reflux disease) 2011     History of colonoscopy 2004, 2014    nl     Hypercholesteremia 2000    stopped lovastatin 2015, added lipitor 3/16     Insomnia     using otc      Lumbar spinal stenosis 2016    Dr. Wilde, had epidural     Lung nodule 2009, 2013    seen on ct for abd pain, fu done 3/13 and 2 nodules stable and benign, one new one needs fu 1 year.  fu done 3/14 and fu 1 year and then done; fu done 3/15 and unchanged, no fu needed     MCI (mild cognitive impairment) 2020    seen by neuro     Myocardial infarction (H) 01/2020    elev trop but clean coronaries on angio, felt to be stress induced cmyop     Odynophagia 2004    egd nl     Other chronic pain      Peripheral neuropathies     Dr. Kim     Screening 2006, 2017    nl dexa     Seasonal allergies      Takotsubo cardiomyopathy 01/2020    hosp fsd, ef 40%, mod ai, clean coronaries; fu echo nl       Vaginal dysplasia     chronic VAIN I, many colpos of vagina.  Now we only do an annual pap of vagina, no colpo since stable long term     Weight loss 2018    ct chest, abd and pelvis neg     Past Surgical History:   Procedure Laterality Date     ANKLE SURGERY       APPENDECTOMY  13     ARTHROPLASTY KNEE Left 2018    Procedure: ARTHROPLASTY KNEE;  Left total knee arthroplasty;  Surgeon: William Pollard MD;  Location: [facility] OR     BREAST BIOPSY, CORE RT/LT       C VAGINAL HYSTERECTOMY       CATARACT IOL, RT/LT  2019     CV HEART CATHETERIZATION WITH POSSIBLE INTERVENTION N/A 2020    Procedure: Heart Catheterization with Possible Intervention;  Surgeon: Bibiana Ruggiero MD;  Location:  HEART CARDIAC CATH LAB     GYN SURGERY      BSO     HYSTERECTOMY, PAP NO LONGER INDICATED       LAPAROSCOPIC CHOLECYSTECTOMY       NOSE SURGERY       right knee arthroscopy       thumb surgery Left 2015     ZZHC UGI ENDOSCOPY, SIMPLE EXAM  03/15/11    [facility]     Social History     Socioeconomic History     Marital status:      Spouse name: Not on file     Number of children: 1     Years of education: Not on file     Highest education level: Not on file   Occupational History     Occupation: , retired     Employer: [employer]     Employer: RETIRED   Social Needs     Financial resource strain: Not on file     Food insecurity     Worry: Not on file     Inability: Not on file     Transportation needs     Medical: Not on file     Non-medical: Not on file   Tobacco Use     Smoking status: Former Smoker     Packs/day: 1.50     Years: 20.00     Pack years: 30.00     Types: Cigarettes     Quit date: 1992     Years since quittin.3     Smokeless tobacco: Never Used   Substance and Sexual Activity     Alcohol use: Yes     Alcohol/week: 2.0 - 4.0 standard drinks     Types: 2 - 4 Glasses of wine per week     Frequency: 2-4 times a month      Drinks per session: 1 or 2     Binge frequency: Never     Comment: 2 glasses 4 times per week     Drug use: No     Sexual activity: Not Currently     Partners: Male     Comment: vag hyst, BSO   Lifestyle     Physical activity     Days per week: Not on file     Minutes per session: Not on file     Stress: Not on file   Relationships     Social connections     Talks on phone: Not on file     Gets together: Not on file     Attends [employer] service: Not on file     Active member of club or organization: Not on file     Attends meetings of clubs or organizations: Not on file     Relationship status: Not on file     Intimate partner violence     Fear of current or ex partner: Not on file     Emotionally abused: Not on file     Physically abused: Not on file     Forced sexual activity: Not on file   Other Topics Concern     Parent/sibling w/ CABG, MI or angioplasty before 65F 55M? Not Asked   Social History Narrative     Not on file     Current Outpatient Medications   Medication Sig Dispense Refill     Ascorbic Acid (VITAMIN C PO) Take 500 mg by mouth daily.       Biotin 5000 MCG CAPS Take 1 tablet by mouth At Bedtime        calcium carb 1250 mg, 500 mg [place],/vitamin D 200 units (OSCAL WITH D) 500-200 MG-UNIT per tablet Take 1 tablet by mouth daily  100 tablet 3     Carboxymethylcellulose Sodium (THERATEARS) 0.25 % SOLN Place 1 drop into both eyes every evening       Cholecalciferol (VITAMIN D3 PO) Take 1,000 Units by mouth daily        CYANOCOBALAMIN PO Take 1,000 mcg by mouth daily       loratadine (CLARITIN) 10 MG tablet Take 10 mg by mouth daily        lovastatin (MEVACOR) 40 MG tablet Take 1 tablet (40 mg) by mouth At Bedtime 90 tablet 3     magnesium 100 MG CAPS Take 1 tablet by mouth daily        metoprolol succinate ER (TOPROL-XL) 25 MG 24 hr tablet Take 1 tablet (25 mg) by mouth daily 90 tablet 3     Multiple Vitamins-Minerals (CENTRUM SILVER) per tablet Take 1 tablet by mouth daily       sertraline (ZOLOFT) 25  "MG tablet Take 1 tablet (25 mg) by mouth daily 90 tablet 3     Allergies   Allergen Reactions     Latex Cough     Seasonal Allergies      YEAR ROUND ALLERGIES     Sulfa Drugs Unknown     FAMILY HISTORY NOTED AND REVIEWED    REVIEW OF SYSTEMS: above    PHYSICAL EXAM    /68 (BP Location: Right arm, Patient Position: Chair, Cuff Size: Adult Large)   Pulse 70   Temp 98  F (36.7  C) (Oral)   Ht 1.702 m (5' 7\")   Wt 73.5 kg (162 lb)   SpO2 97%   Breastfeeding No   BMI 25.37 kg/m      Patient appears non toxic  Lungs - clear, normal flow  Cardiovascular - regular rate and rhythm, no murmer, rub or gallop, no jvp or edema, carotids within normal limits, no bruits.  Abdomen - normal active bowel sounds, soft, non tender, no masses, guarding or rebound, no hepatosplenomegaly    Labs sent    ASSESSMENT:  1. Hair loss, check labs  2. Grace, prior sdh, will check ct, doubt vasculitis, aneurysm, etc  3. Chest pain, resolved    PLAN:  Labs  Ct  Follow up complete physical exam this fall    Ellis Ruiz M.D.        Ellis Ruiz M.D.          "

## 2021-06-08 LAB
FERRITIN SERPL-MCNC: 46 NG/ML (ref 8–252)
IRON SATN MFR SERPL: 17 % (ref 15–46)
IRON SERPL-MCNC: 52 UG/DL (ref 35–180)
TIBC SERPL-MCNC: 314 UG/DL (ref 240–430)
TSH SERPL DL<=0.005 MIU/L-ACNC: 1.64 MU/L (ref 0.4–4)

## 2021-06-09 NOTE — RESULT ENCOUNTER NOTE
It was a pleasure seeing you.  I wanted to get back to you with your test results.  I have enclosed a copy for your records.    Your labs are normal, the thyroid and iron.  For the hair loss if you would like to pursue it further I would see a dermatologist.

## 2021-06-17 ENCOUNTER — HOSPITAL ENCOUNTER (OUTPATIENT)
Dept: CT IMAGING | Facility: CLINIC | Age: 81
End: 2021-06-17
Attending: INTERNAL MEDICINE
Payer: COMMERCIAL

## 2021-06-17 ENCOUNTER — TELEPHONE (OUTPATIENT)
Dept: FAMILY MEDICINE | Facility: CLINIC | Age: 81
End: 2021-06-17

## 2021-06-17 ENCOUNTER — HOSPITAL ENCOUNTER (OUTPATIENT)
Dept: MAMMOGRAPHY | Facility: CLINIC | Age: 81
End: 2021-06-17
Attending: INTERNAL MEDICINE
Payer: COMMERCIAL

## 2021-06-17 DIAGNOSIS — S06.5XAA SUBDURAL HEMATOMA (H): ICD-10-CM

## 2021-06-17 DIAGNOSIS — Z12.31 VISIT FOR SCREENING MAMMOGRAM: ICD-10-CM

## 2021-06-17 PROCEDURE — 77067 SCR MAMMO BI INCL CAD: CPT

## 2021-06-17 PROCEDURE — 70450 CT HEAD/BRAIN W/O DYE: CPT

## 2021-06-17 NOTE — TELEPHONE ENCOUNTER
Informed pt and will follow up with derm.  Angela Pickens, RN  [facility] RN Triage Team

## 2021-06-17 NOTE — TELEPHONE ENCOUNTER
Please call and let the patient know her head CT is fine.  There is no signs of any bleeding.  If her headaches do not resolve in the next few weeks let me know.    Ellis Ruiz M.D.

## 2021-07-09 ENCOUNTER — OFFICE VISIT (OUTPATIENT)
Dept: FAMILY MEDICINE | Facility: CLINIC | Age: 81
End: 2021-07-09
Payer: COMMERCIAL

## 2021-07-09 VITALS
SYSTOLIC BLOOD PRESSURE: 130 MMHG | OXYGEN SATURATION: 96 % | DIASTOLIC BLOOD PRESSURE: 66 MMHG | BODY MASS INDEX: 25.27 KG/M2 | HEIGHT: 67 IN | WEIGHT: 161 LBS | TEMPERATURE: 97.7 F | HEART RATE: 60 BPM

## 2021-07-09 DIAGNOSIS — R31.0 GROSS HEMATURIA: Primary | ICD-10-CM

## 2021-07-09 LAB
ALBUMIN UR-MCNC: NEGATIVE MG/DL
APPEARANCE UR: CLEAR
BILIRUB UR QL STRIP: NEGATIVE
COLOR UR AUTO: YELLOW
GLUCOSE UR STRIP-MCNC: NEGATIVE MG/DL
HGB UR QL STRIP: NEGATIVE
KETONES UR STRIP-MCNC: NEGATIVE MG/DL
LEUKOCYTE ESTERASE UR QL STRIP: ABNORMAL
NITRATE UR QL: NEGATIVE
PH UR STRIP: 7 PH (ref 5–7)
RBC #/AREA URNS AUTO: NORMAL /HPF
SOURCE: ABNORMAL
SP GR UR STRIP: 1.01 (ref 1–1.03)
UROBILINOGEN UR STRIP-ACNC: 0.2 EU/DL (ref 0.2–1)
WBC #/AREA URNS AUTO: NORMAL /HPF

## 2021-07-09 PROCEDURE — 99213 OFFICE O/P EST LOW 20 MIN: CPT | Performed by: INTERNAL MEDICINE

## 2021-07-09 PROCEDURE — 87086 URINE CULTURE/COLONY COUNT: CPT | Performed by: INTERNAL MEDICINE

## 2021-07-09 PROCEDURE — 81001 URINALYSIS AUTO W/SCOPE: CPT | Performed by: INTERNAL MEDICINE

## 2021-07-09 RX ORDER — KETOCONAZOLE 20 MG/ML
SHAMPOO TOPICAL
COMMUNITY
Start: 2021-06-23 | End: 2021-10-05

## 2021-07-09 ASSESSMENT — MIFFLIN-ST. JEOR: SCORE: 1242.92

## 2021-07-09 NOTE — PROGRESS NOTES
This is a 80-year-old female with a prior history of a complete hysterectomy who presents for what appears to be gross hematuria.  The patient relates this to ketoconazole shampoo she started using.  She used it twice, every other day.  Both times she noticed some gross hematuria.  She does not think it was vaginal.  She is not having abdominal pain or fevers and she does not feel ill.  No vaginal discharge.  No change in her urinary frequency and no burning.  This happened twice, the last time a week ago and since she has been fine.    Past Medical History:   Diagnosis Date     Abdominal pain 2009, 2013    ct liver and renal cyst and 2mm lung nodule, repeat ct done 2013 and no significantly abnl.     Anxiety 2014    added med 3/14     Arthritis      ASCUS of cervix with negative high risk HPV 03/2018     Bilateral subdural hematomas (H) 09/2020    found incidentally on mri done for memory loss     Carotid bruit 2011    us nl     Gastritis 2011    egd done by [facility]     GERD (gastroesophageal reflux disease) 2011     History of colonoscopy 2004, 2014    nl     Hypercholesteremia 2000    stopped lovastatin 2015, added lipitor 3/16     Insomnia     using otc      Lumbar spinal stenosis 2016    Dr. Wilde, had epidural     Lung nodule 2009, 2013    seen on ct for abd pain, fu done 3/13 and 2 nodules stable and benign, one new one needs fu 1 year.  fu done 3/14 and fu 1 year and then done; fu done 3/15 and unchanged, no fu needed     MCI (mild cognitive impairment) 2020    seen by neuro     Myocardial infarction (H) 01/2020    elev trop but clean coronaries on angio, felt to be stress induced cmyop     Odynophagia 2004    egd nl     Other chronic pain      Peripheral neuropathies     Dr. Kim     Screening 2006, 2017    nl dexa     Seasonal allergies      Takotsubo cardiomyopathy 01/2020    hosp fsd, ef 40%, mod ai, clean coronaries; fu echo nl 5/20     Vaginal dysplasia     chronic VAIN I, many colpos of vagina.  Now we  only do an annual pap of vagina, no colpo since stable long term     Weight loss 2018    ct chest, abd and pelvis neg     Past Surgical History:   Procedure Laterality Date     ANKLE SURGERY       APPENDECTOMY  13     ARTHROPLASTY KNEE Left 2018    Procedure: ARTHROPLASTY KNEE;  Left total knee arthroplasty;  Surgeon: William Pollard MD;  Location: [facility]     BREAST BIOPSY, CORE RT/LT       C VAGINAL HYSTERECTOMY       CATARACT IOL, RT/LT  2019     CV HEART CATHETERIZATION WITH POSSIBLE INTERVENTION N/A 2020    Procedure: Heart Catheterization with Possible Intervention;  Surgeon: Bibiana Ruggiero MD;  Location:  HEART CARDIAC CATH LAB     GYN SURGERY      BSO     HYSTERECTOMY, PAP NO LONGER INDICATED       LAPAROSCOPIC CHOLECYSTECTOMY       NOSE SURGERY       right knee arthroscopy       thumb surgery Left 2015     ZZHC UGI ENDOSCOPY, SIMPLE EXAM  03/15/11    [facility]     Social History     Socioeconomic History     Marital status:      Spouse name: Not on file     Number of children: 1     Years of education: Not on file     Highest education level: Not on file   Occupational History     Occupation: , retired     Employer: [employer]     Employer: RETIRED   Social Needs     Financial resource strain: Not on file     Food insecurity     Worry: Not on file     Inability: Not on file     Transportation needs     Medical: Not on file     Non-medical: Not on file   Tobacco Use     Smoking status: Former Smoker     Packs/day: 1.50     Years: 20.00     Pack years: 30.00     Types: Cigarettes     Quit date: 1992     Years since quittin.4     Smokeless tobacco: Never Used   Substance and Sexual Activity     Alcohol use: Yes     Alcohol/week: 2.0 - 4.0 standard drinks     Types: 2 - 4 Glasses of wine per week     Frequency: 2-4 times a month     Drinks per session: 1 or 2     Binge frequency: Never     Comment: 2 glasses  4 times per week     Drug use: No     Sexual activity: Not Currently     Partners: Male     Comment: vag hyst, BSO   Lifestyle     Physical activity     Days per week: Not on file     Minutes per session: Not on file     Stress: Not on file   Relationships     Social connections     Talks on phone: Not on file     Gets together: Not on file     Attends [employer] service: Not on file     Active member of club or organization: Not on file     Attends meetings of clubs or organizations: Not on file     Relationship status: Not on file     Intimate partner violence     Fear of current or ex partner: Not on file     Emotionally abused: Not on file     Physically abused: Not on file     Forced sexual activity: Not on file   Other Topics Concern     Parent/sibling w/ CABG, MI or angioplasty before 65F 55M? Not Asked   Social History Narrative     Not on file     Current Outpatient Medications   Medication Sig Dispense Refill     Ascorbic Acid (VITAMIN C PO) Take 500 mg by mouth daily.       Biotin 5000 MCG CAPS Take 1 tablet by mouth At Bedtime        calcium carb 1250 mg, 500 mg [place],/vitamin D 200 units (OSCAL WITH D) 500-200 MG-UNIT per tablet Take 1 tablet by mouth daily  100 tablet 3     Carboxymethylcellulose Sodium (THERATEARS) 0.25 % SOLN Place 1 drop into both eyes every evening       Cholecalciferol (VITAMIN D3 PO) Take 1,000 Units by mouth daily        CYANOCOBALAMIN PO Take 1,000 mcg by mouth daily       ketoconazole (NIZORAL) 2 % external shampoo SHAMPOO SCALP EVERY OTHER DAY       loratadine (CLARITIN) 10 MG tablet Take 10 mg by mouth daily        lovastatin (MEVACOR) 40 MG tablet Take 1 tablet (40 mg) by mouth At Bedtime 90 tablet 3     magnesium 100 MG CAPS Take 1 tablet by mouth daily        metoprolol succinate ER (TOPROL-XL) 25 MG 24 hr tablet Take 1 tablet (25 mg) by mouth daily 90 tablet 3     Multiple Vitamins-Minerals (CENTRUM SILVER) per tablet Take 1 tablet by mouth daily       sertraline (ZOLOFT)  "25 MG tablet Take 1 tablet (25 mg) by mouth daily 90 tablet 3     Allergies   Allergen Reactions     Latex Cough     Seasonal Allergies      YEAR ROUND ALLERGIES     Sulfa Drugs Unknown     FAMILY HISTORY NOTED AND REVIEWED    REVIEW OF SYSTEMS: above    PHYSICAL EXAM    /66 (BP Location: Left arm, Patient Position: Sitting, Cuff Size: Adult Regular)   Pulse 60   Temp 97.7  F (36.5  C) (Temporal)   Ht 1.702 m (5' 7\")   Wt 73 kg (161 lb)   SpO2 96%   BMI 25.22 kg/m      Patient appears non toxic  Pelvic: ext gen within normal limits  Speculum scant white discharge, no lesions  Bimanual within normal limits  My MA was present during the exam.    Urine sent    ASSESSMENT:  Probable hematuria, doubt vaginal as neg exam    PLAN:  ua and uc, if neg needs ct and urol meg Ruiz M.D.        "

## 2021-07-10 LAB
BACTERIA SPEC CULT: NORMAL
Lab: NORMAL
SPECIMEN SOURCE: NORMAL

## 2021-07-12 ENCOUNTER — TELEPHONE (OUTPATIENT)
Dept: UROLOGY | Facility: CLINIC | Age: 81
End: 2021-07-12

## 2021-07-12 ENCOUNTER — TELEPHONE (OUTPATIENT)
Dept: FAMILY MEDICINE | Facility: CLINIC | Age: 81
End: 2021-07-12

## 2021-07-12 DIAGNOSIS — R31.0 GROSS HEMATURIA: Primary | ICD-10-CM

## 2021-07-12 NOTE — TELEPHONE ENCOUNTER
I discussed with patient that urine is negative.  No more symptoms.  I explained that if we do not look we could certainly miss something serious although it is unlikely.  The patient would like to look further and I will refer her to urology.  She will call there to schedule.    Ellis Ruiz M.D.

## 2021-07-12 NOTE — TELEPHONE ENCOUNTER
[facility] Call Center    Phone Message    May a detailed message be left on voicemail: yes     Reason for Call: Appointment Intake    Referring Provider Name: Ellis Ruiz MD in  [facility]  Diagnosis and/or Symptoms: Gross Hematuria    Action Taken: Message routed to:  [facility] ([facility] ua uro    Travel Screening: Not Applicable

## 2021-07-12 NOTE — TELEPHONE ENCOUNTER
Pt reports she has a missed call from clinic. Asked if PCP had called her. No records of called found. Doctor Joseph, please advice if any information we should review with pt.     She is in a meeting for an hour but will be available after 3:00 PM today.

## 2021-07-12 NOTE — TELEPHONE ENCOUNTER
"Patient states they received call from PCP, patient calling back:   \"Did some test, was going to get back to me on that\"    Best time to call: anytime    Callback: [phone]- okay to leave detailed VM.     Lincoln Lee RN  [facility]    "

## 2021-07-20 DIAGNOSIS — R31.0 GROSS HEMATURIA: Primary | ICD-10-CM

## 2021-07-23 ENCOUNTER — OFFICE VISIT (OUTPATIENT)
Dept: UROLOGY | Facility: CLINIC | Age: 81
End: 2021-07-23
Payer: COMMERCIAL

## 2021-07-23 VITALS
DIASTOLIC BLOOD PRESSURE: 60 MMHG | HEIGHT: 67 IN | WEIGHT: 159 LBS | SYSTOLIC BLOOD PRESSURE: 130 MMHG | OXYGEN SATURATION: 98 % | BODY MASS INDEX: 24.96 KG/M2 | HEART RATE: 64 BPM

## 2021-07-23 DIAGNOSIS — R31.0 GROSS HEMATURIA: ICD-10-CM

## 2021-07-23 LAB
ALBUMIN UR-MCNC: NEGATIVE MG/DL
APPEARANCE UR: CLEAR
BILIRUB UR QL STRIP: NEGATIVE
COLOR UR AUTO: YELLOW
GLUCOSE UR STRIP-MCNC: NEGATIVE MG/DL
HGB UR QL STRIP: NEGATIVE
KETONES UR STRIP-MCNC: NEGATIVE MG/DL
LEUKOCYTE ESTERASE UR QL STRIP: NEGATIVE
NITRATE UR QL: NEGATIVE
PH UR STRIP: 6 [PH] (ref 5–7)
SP GR UR STRIP: 1.02 (ref 1–1.03)
UROBILINOGEN UR STRIP-ACNC: 0.2 E.U./DL

## 2021-07-23 PROCEDURE — 99204 OFFICE O/P NEW MOD 45 MIN: CPT | Performed by: PHYSICIAN ASSISTANT

## 2021-07-23 PROCEDURE — 81003 URINALYSIS AUTO W/O SCOPE: CPT | Mod: QW | Performed by: PHYSICIAN ASSISTANT

## 2021-07-23 ASSESSMENT — PAIN SCALES - GENERAL: PAINLEVEL: NO PAIN (0)

## 2021-07-23 ASSESSMENT — MIFFLIN-ST. JEOR: SCORE: 1233.85

## 2021-07-23 NOTE — LETTER
7/23/2021       RE: Cecily Ivory  [address]  [place] [address]     Dear Colleague,    Thank you for referring your patient, Cecily Ivory, to the [facility] at [facility]. Please see a copy of my visit note below.    CC: Hematuria.    HPI: It is a pleasure to see Ms. Cecily Ivory, a pleasant 80 year old female, asked to be seen in consultation by Dr. Ruiz for evaluation of gross hematuria (x2).       Ms. Ivory voids without difficulty.  She currently denies any dysuria, pyuria, hesitancy, intermittency, feelings of incomplete emptying, or any recent history of urinary tract infections or stones.    Hematuria Risk Factors:  Age >40: Yes     Smoking history: yes, former  Occupational exposure to chemicals or dyes (ie, benzenes, aromatic amines): no  History of urologic disorder or disease: no  History of irritative voiding symptoms: no  History of urinary tract infection: no  Analgesic abuse: no  History of pelvic irradiation: no    Past Medical History:   Diagnosis Date     Abdominal pain 2009, 2013    ct liver and renal cyst and 2mm lung nodule, repeat ct done 2013 and no significantly abnl.     Anxiety 2014    added med 3/14     Arthritis      ASCUS of cervix with negative high risk HPV 03/2018     Bilateral subdural hematomas (H) 09/2020    found incidentally on mri done for memory loss     Carotid bruit 2011    us nl     Gastritis 2011    egd done by [facility]     GERD (gastroesophageal reflux disease) 2011     History of colonoscopy 2004, 2014    nl     Hypercholesteremia 2000    stopped lovastatin 2015, added lipitor 3/16     Insomnia     using otc      Lumbar spinal stenosis 2016    Dr. Wilde, had epidural     Lung nodule 2009, 2013    seen on ct for abd pain, fu done 3/13 and 2 nodules stable and benign, one new one needs fu 1 year.  fu done 3/14 and fu 1 year and then done; fu done 3/15 and  unchanged, no fu needed     MCI (mild cognitive impairment) 2020    seen by neuro     Myocardial infarction (H) 01/2020    elev trop but clean coronaries on angio, felt to be stress induced cmyop     Odynophagia 2004    egd nl     Other chronic pain      Peripheral neuropathies     Dr. Kim     Screening 2006, 2017    nl dexa     Seasonal allergies      Takotsubo cardiomyopathy 01/2020    hosp fsd, ef 40%, mod ai, clean coronaries; fu echo nl 5/20     Vaginal dysplasia     chronic VAIN I, many colpos of vagina.  Now we only do an annual pap of vagina, no colpo since stable long term     Weight loss 07/2018    ct chest, abd and pelvis neg     Past Surgical History:   Procedure Laterality Date     ANKLE SURGERY  2000     APPENDECTOMY  13     ARTHROPLASTY KNEE Left 5/14/2018    Procedure: ARTHROPLASTY KNEE;  Left total knee arthroplasty;  Surgeon: William Pollard MD;  Location: [facility]     BREAST BIOPSY, CORE RT/LT       C VAGINAL HYSTERECTOMY  1995     CATARACT IOL, RT/LT  04/2019     CV HEART CATHETERIZATION WITH POSSIBLE INTERVENTION N/A 1/7/2020    Procedure: Heart Catheterization with Possible Intervention;  Surgeon: Bibiana Ruggiero MD;  Location:  HEART CARDIAC CATH LAB     GYN SURGERY      BSO     HYSTERECTOMY, PAP NO LONGER INDICATED       LAPAROSCOPIC CHOLECYSTECTOMY  2008     NOSE SURGERY  1990     right knee arthroscopy  2007     thumb surgery Left 2/2015     [facility] UGI ENDOSCOPY, SIMPLE EXAM  03/15/11    [facility]     Current Outpatient Medications   Medication Sig Dispense Refill     Ascorbic Acid (VITAMIN C PO) Take 500 mg by mouth daily.       Biotin 5000 MCG CAPS Take 1 tablet by mouth At Bedtime        calcium carb 1250 mg, 500 mg [place],/vitamin D 200 units (OSCAL WITH D) 500-200 MG-UNIT per tablet Take 1 tablet by mouth daily  100 tablet 3     Carboxymethylcellulose Sodium (THERATEARS) 0.25 % SOLN Place 1 drop into both eyes every evening       Cholecalciferol (VITAMIN D3 PO)  Take 1,000 Units by mouth daily        CYANOCOBALAMIN PO Take 1,000 mcg by mouth daily       loratadine (CLARITIN) 10 MG tablet Take 10 mg by mouth daily        lovastatin (MEVACOR) 40 MG tablet Take 1 tablet (40 mg) by mouth At Bedtime 90 tablet 3     magnesium 100 MG CAPS Take 1 tablet by mouth daily        metoprolol succinate ER (TOPROL-XL) 25 MG 24 hr tablet Take 1 tablet (25 mg) by mouth daily 90 tablet 3     Multiple Vitamins-Minerals (CENTRUM SILVER) per tablet Take 1 tablet by mouth daily       sertraline (ZOLOFT) 25 MG tablet Take 1 tablet (25 mg) by mouth daily 90 tablet 3     ketoconazole (NIZORAL) 2 % external shampoo SHAMPOO SCALP EVERY OTHER DAY (Patient not taking: Reported on 2021)       Allergies   Allergen Reactions     Latex Cough     Seasonal Allergies      YEAR ROUND ALLERGIES     Sulfa Drugs Unknown     FAMILY HISTORY: There is no reported history of genitourinary carcinoma.  There is no history of urolithiasis.      Social History     Socioeconomic History     Marital status:      Spouse name: Not on file     Number of children: 1     Years of education: Not on file     Highest education level: Not on file   Occupational History     Occupation: , retired     Employer: [employer]     Employer: RETIRED   Tobacco Use     Smoking status: Former Smoker     Packs/day: 1.50     Years: 20.00     Pack years: 30.00     Types: Cigarettes     Quit date: 1992     Years since quittin.4     Smokeless tobacco: Never Used   Substance and Sexual Activity     Alcohol use: Yes     Alcohol/week: 2.0 - 4.0 standard drinks     Types: 2 - 4 Glasses of wine per week     Comment: 2 glasses 4 times per week     Drug use: No     Sexual activity: Not Currently     Partners: Male     Comment: vag hyst, BSO   Other Topics Concern     Parent/sibling w/ CABG, MI or angioplasty before 65F 55M? Not Asked   Social History Narrative     Not on file     Social Determinants of Health  "    Financial Resource Strain:      Difficulty of Paying Living Expenses:    Food Insecurity:      Worried About Running Out of Food in the Last Year:      Ran Out of Food in the Last Year:    Transportation Needs:      Lack of Transportation (Medical):      Lack of Transportation (Non-Medical):    Physical Activity:      Days of Exercise per Week:      Minutes of Exercise per Session:    Stress:      Feeling of Stress :    Social Connections:      Frequency of Communication with Friends and Family:      Frequency of Social Gatherings with Friends and Family:      Attends Alevism Services:      Active Member of Clubs or Organizations:      Attends Club or Organization Meetings:      Marital Status:    Intimate Partner Violence:      Fear of Current or Ex-Partner:      Emotionally Abused:      Physically Abused:      Sexually Abused:        ROS: A comprehensive 14 point ROS was obtained and negative except for that outlined above in the HPI.    PHYSICAL EXAM:   Vitals:    07/23/21 1435   BP: 130/60   Pulse: 64   SpO2: 98%   Weight: 72.1 kg (159 lb)   Height: 1.702 m (5' 7\")     PSYCH: NAD  EYES: EOMI  NEURO: AAO x3    Office Visit on 07/09/2021   Component Date Value Ref Range Status     Color Urine 07/09/2021 Yellow   Final     Appearance Urine 07/09/2021 Clear   Final     Glucose Urine 07/09/2021 Negative  NEG^Negative mg/dL Final     Bilirubin Urine 07/09/2021 Negative  NEG^Negative Final     Ketones Urine 07/09/2021 Negative  NEG^Negative mg/dL Final     Specific Gravity Urine 07/09/2021 1.015  1.003 - 1.035 Final     Blood Urine 07/09/2021 Negative  NEG^Negative Final     pH Urine 07/09/2021 7.0  5.0 - 7.0 pH Final     Protein Albumin Urine 07/09/2021 Negative  NEG^Negative mg/dL Final     Urobilinogen Urine 07/09/2021 0.2  0.2 - 1.0 EU/dL Final     Nitrite Urine 07/09/2021 Negative  NEG^Negative Final     Leukocyte Esterase Urine 07/09/2021 Trace* NEG^Negative Final     Source 07/09/2021 Midstream Urine   " Final     Specimen Description 07/09/2021 Midstream Urine   Final     Special Requests 07/09/2021 Specimen received in preservative   Final     Culture Micro 07/09/2021    Final                    Value:10,000 to 50,000 colonies/mL  mixed urogenital jane  Susceptibility testing not routinely done       WBC Urine 07/09/2021 0 - 5  OTO5^0 - 5 /HPF Final     RBC Urine 07/09/2021 O - 2  OTO2^O - 2 /HPF Final       IMAGING: none    ASSESSMENT and PLAN:    80 year old female with gross hematuria.  The differential diagnosis at this point includes stone disease, infection, vaginal contaminant, urothelial malignancy, renal disorder versus another yet unknown diagnosis.    At this time, recommend proceeding with comprehensive hematuria evaluation to include:  - Urine cytology to look for cells concerning for malignancy.  - CT urogram for upper tract imaging.  - Cystoscopy with the first available urologist to evaluate the interior of the bladder. Follow up for hematuria as recommended by urologist performing cystoscopic evaluation.    Thank you for allowing me to participate in Ms. Ivory's care. I will keep you updated of her progress, but please do not hesitate to contact me with any questions.    Andra Lopez PA-C  [facility]  27 minutes spent on the date of the encounter doing chart review, review of outside records, review of test results, interpretation of tests, patient visit and documentation

## 2021-07-23 NOTE — PROGRESS NOTES
CC: Hematuria.    HPI: It is a pleasure to see Ms. Cecily Ivory, a pleasant 80 year old female, asked to be seen in consultation by Dr. Ruiz for evaluation of gross hematuria (x2).       Ms. Ivory voids without difficulty.  She currently denies any dysuria, pyuria, hesitancy, intermittency, feelings of incomplete emptying, or any recent history of urinary tract infections or stones.    Hematuria Risk Factors:  Age >40: Yes     Smoking history: yes, former  Occupational exposure to chemicals or dyes (ie, benzenes, aromatic amines): no  History of urologic disorder or disease: no  History of irritative voiding symptoms: no  History of urinary tract infection: no  Analgesic abuse: no  History of pelvic irradiation: no    Past Medical History:   Diagnosis Date     Abdominal pain 2009, 2013    ct liver and renal cyst and 2mm lung nodule, repeat ct done 2013 and no significantly abnl.     Anxiety 2014    added med 3/14     Arthritis      ASCUS of cervix with negative high risk HPV 03/2018     Bilateral subdural hematomas (H) 09/2020    found incidentally on mri done for memory loss     Carotid bruit 2011    us nl     Gastritis 2011    egd done [facility]     GERD (gastroesophageal reflux disease) 2011     History of colonoscopy 2004, 2014    nl     Hypercholesteremia 2000    stopped lovastatin 2015, added lipitor 3/16     Insomnia     using otc      Lumbar spinal stenosis 2016    Dr. Wilde, had epidural     Lung nodule 2009, 2013    seen on ct for abd pain, fu done 3/13 and 2 nodules stable and benign, one new one needs fu 1 year.  fu done 3/14 and fu 1 year and then done; fu done 3/15 and unchanged, no fu needed     MCI (mild cognitive impairment) 2020    seen by neuro     Myocardial infarction (H) 01/2020    elev trop but clean coronaries on angio, felt to be stress induced cmyop     Odynophagia 2004    egd nl     Other chronic pain      Peripheral neuropathies     Dr. Kim     Screening 2006, 2017    nl dexa      Seasonal allergies      Takotsubo cardiomyopathy 01/2020    hosp fsd, ef 40%, mod ai, clean coronaries; fu echo nl 5/20     Vaginal dysplasia     chronic VAIN I, many colpos of vagina.  Now we only do an annual pap of vagina, no colpo since stable long term     Weight loss 07/2018    ct chest, abd and pelvis neg     Past Surgical History:   Procedure Laterality Date     ANKLE SURGERY  2000     APPENDECTOMY  13     ARTHROPLASTY KNEE Left 5/14/2018    Procedure: ARTHROPLASTY KNEE;  Left total knee arthroplasty;  Surgeon: William Pollard MD;  Location: [facility] OR     BREAST BIOPSY, CORE RT/LT       C VAGINAL HYSTERECTOMY  1995     CATARACT IOL, RT/LT  04/2019     CV HEART CATHETERIZATION WITH POSSIBLE INTERVENTION N/A 1/7/2020    Procedure: Heart Catheterization with Possible Intervention;  Surgeon: Bibiana Ruggiero MD;  Location:  HEART CARDIAC CATH LAB     GYN SURGERY      BSO     HYSTERECTOMY, PAP NO LONGER INDICATED       LAPAROSCOPIC CHOLECYSTECTOMY  2008     NOSE SURGERY  1990     right knee arthroscopy  2007     thumb surgery Left 2/2015     [facility] UGI ENDOSCOPY, SIMPLE EXAM  03/15/11    [facility]     Current Outpatient Medications   Medication Sig Dispense Refill     Ascorbic Acid (VITAMIN C PO) Take 500 mg by mouth daily.       Biotin 5000 MCG CAPS Take 1 tablet by mouth At Bedtime        calcium carb 1250 mg, 500 mg [place],/vitamin D 200 units (OSCAL WITH D) 500-200 MG-UNIT per tablet Take 1 tablet by mouth daily  100 tablet 3     Carboxymethylcellulose Sodium (THERATEARS) 0.25 % SOLN Place 1 drop into both eyes every evening       Cholecalciferol (VITAMIN D3 PO) Take 1,000 Units by mouth daily        CYANOCOBALAMIN PO Take 1,000 mcg by mouth daily       loratadine (CLARITIN) 10 MG tablet Take 10 mg by mouth daily        lovastatin (MEVACOR) 40 MG tablet Take 1 tablet (40 mg) by mouth At Bedtime 90 tablet 3     magnesium 100 MG CAPS Take 1 tablet by mouth daily        metoprolol succinate ER  (TOPROL-XL) 25 MG 24 hr tablet Take 1 tablet (25 mg) by mouth daily 90 tablet 3     Multiple Vitamins-Minerals (CENTRUM SILVER) per tablet Take 1 tablet by mouth daily       sertraline (ZOLOFT) 25 MG tablet Take 1 tablet (25 mg) by mouth daily 90 tablet 3     ketoconazole (NIZORAL) 2 % external shampoo SHAMPOO SCALP EVERY OTHER DAY (Patient not taking: Reported on 2021)       Allergies   Allergen Reactions     Latex Cough     Seasonal Allergies      YEAR ROUND ALLERGIES     Sulfa Drugs Unknown     FAMILY HISTORY: There is no reported history of genitourinary carcinoma.  There is no history of urolithiasis.      Social History     Socioeconomic History     Marital status:      Spouse name: Not on file     Number of children: 1     Years of education: Not on file     Highest education level: Not on file   Occupational History     Occupation: , retired     Employer: [employer]     Employer: RETIRED   Tobacco Use     Smoking status: Former Smoker     Packs/day: 1.50     Years: 20.00     Pack years: 30.00     Types: Cigarettes     Quit date: 1992     Years since quittin.4     Smokeless tobacco: Never Used   Substance and Sexual Activity     Alcohol use: Yes     Alcohol/week: 2.0 - 4.0 standard drinks     Types: 2 - 4 Glasses of wine per week     Comment: 2 glasses 4 times per week     Drug use: No     Sexual activity: Not Currently     Partners: Male     Comment: vag hyst, BSO   Other Topics Concern     Parent/sibling w/ CABG, MI or angioplasty before 65F 55M? Not Asked   Social History Narrative     Not on file     Social Determinants of Health     Financial Resource Strain:      Difficulty of Paying Living Expenses:    Food Insecurity:      Worried About Running Out of Food in the Last Year:      Ran Out of Food in the Last Year:    Transportation Needs:      Lack of Transportation (Medical):      Lack of Transportation (Non-Medical):    Physical Activity:      Days of  "Exercise per Week:      Minutes of Exercise per Session:    Stress:      Feeling of Stress :    Social Connections:      Frequency of Communication with Friends and Family:      Frequency of Social Gatherings with Friends and Family:      Attends Mu-ism Services:      Active Member of Clubs or Organizations:      Attends Club or Organization Meetings:      Marital Status:    Intimate Partner Violence:      Fear of Current or Ex-Partner:      Emotionally Abused:      Physically Abused:      Sexually Abused:        ROS: A comprehensive 14 point ROS was obtained and negative except for that outlined above in the HPI.    PHYSICAL EXAM:   Vitals:    07/23/21 1435   BP: 130/60   Pulse: 64   SpO2: 98%   Weight: 72.1 kg (159 lb)   Height: 1.702 m (5' 7\")     PSYCH: NAD  EYES: EOMI  NEURO: AAO x3    Office Visit on 07/09/2021   Component Date Value Ref Range Status     Color Urine 07/09/2021 Yellow   Final     Appearance Urine 07/09/2021 Clear   Final     Glucose Urine 07/09/2021 Negative  NEG^Negative mg/dL Final     Bilirubin Urine 07/09/2021 Negative  NEG^Negative Final     Ketones Urine 07/09/2021 Negative  NEG^Negative mg/dL Final     Specific Gravity Urine 07/09/2021 1.015  1.003 - 1.035 Final     Blood Urine 07/09/2021 Negative  NEG^Negative Final     pH Urine 07/09/2021 7.0  5.0 - 7.0 pH Final     Protein Albumin Urine 07/09/2021 Negative  NEG^Negative mg/dL Final     Urobilinogen Urine 07/09/2021 0.2  0.2 - 1.0 EU/dL Final     Nitrite Urine 07/09/2021 Negative  NEG^Negative Final     Leukocyte Esterase Urine 07/09/2021 Trace* NEG^Negative Final     Source 07/09/2021 Midstream Urine   Final     Specimen Description 07/09/2021 Midstream Urine   Final     Special Requests 07/09/2021 Specimen received in preservative   Final     Culture Micro 07/09/2021    Final                    Value:10,000 to 50,000 colonies/mL  mixed urogenital jane  Susceptibility testing not routinely done       WBC Urine 07/09/2021 0 - 5  " OTO5^0 - 5 /HPF Final     RBC Urine 07/09/2021 O - 2  OTO2^O - 2 /HPF Final       IMAGING: none    ASSESSMENT and PLAN:    80 year old female with gross hematuria.  The differential diagnosis at this point includes stone disease, infection, vaginal contaminant, urothelial malignancy, renal disorder versus another yet unknown diagnosis.    At this time, recommend proceeding with comprehensive hematuria evaluation to include:  - Urine cytology to look for cells concerning for malignancy.  - CT urogram for upper tract imaging.  - Cystoscopy with the first available urologist to evaluate the interior of the bladder. Follow up for hematuria as recommended by urologist performing cystoscopic evaluation.    Thank you for allowing me to participate in Ms. Ivory's care. I will keep you updated of her progress, but please do not hesitate to contact me with any questions.    Andra Lopez PA-C  [facility]  27 minutes spent on the date of the encounter doing chart review, review of outside records, review of test results, interpretation of tests, patient visit and documentation

## 2021-08-02 ENCOUNTER — TRANSFERRED RECORDS (OUTPATIENT)
Dept: HEALTH INFORMATION MANAGEMENT | Facility: CLINIC | Age: 81
End: 2021-08-02

## 2021-09-02 ENCOUNTER — TRANSFERRED RECORDS (OUTPATIENT)
Dept: HEALTH INFORMATION MANAGEMENT | Facility: CLINIC | Age: 81
End: 2021-09-02

## 2021-09-03 ENCOUNTER — OFFICE VISIT (OUTPATIENT)
Dept: UROLOGY | Facility: CLINIC | Age: 81
End: 2021-09-03
Payer: COMMERCIAL

## 2021-09-03 ENCOUNTER — HOSPITAL ENCOUNTER (OUTPATIENT)
Dept: CT IMAGING | Facility: CLINIC | Age: 81
Discharge: HOME | End: 2021-09-03
Attending: PHYSICIAN ASSISTANT | Admitting: PHYSICIAN ASSISTANT
Payer: COMMERCIAL

## 2021-09-03 VITALS
DIASTOLIC BLOOD PRESSURE: 80 MMHG | WEIGHT: 155 LBS | HEART RATE: 57 BPM | OXYGEN SATURATION: 99 % | BODY MASS INDEX: 24.33 KG/M2 | HEIGHT: 67 IN | SYSTOLIC BLOOD PRESSURE: 130 MMHG

## 2021-09-03 DIAGNOSIS — R31.0 GROSS HEMATURIA: Primary | ICD-10-CM

## 2021-09-03 DIAGNOSIS — R31.0 GROSS HEMATURIA: ICD-10-CM

## 2021-09-03 LAB
ALBUMIN UR-MCNC: NEGATIVE MG/DL
APPEARANCE UR: CLEAR
BILIRUB UR QL STRIP: NEGATIVE
COLOR UR AUTO: YELLOW
CREAT BLD-MCNC: 0.8 MG/DL (ref 0.5–1)
GFR SERPL CREATININE-BSD FRML MDRD: >60 ML/MIN/1.73M2
GLUCOSE UR STRIP-MCNC: NEGATIVE MG/DL
HGB UR QL STRIP: NEGATIVE
KETONES UR STRIP-MCNC: NEGATIVE MG/DL
LEUKOCYTE ESTERASE UR QL STRIP: NEGATIVE
NITRATE UR QL: NEGATIVE
PH UR STRIP: 6.5 [PH] (ref 5–7)
SP GR UR STRIP: 1.01 (ref 1–1.03)
UROBILINOGEN UR STRIP-ACNC: 0.2 E.U./DL

## 2021-09-03 PROCEDURE — 52000 CYSTOURETHROSCOPY: CPT | Performed by: UROLOGY

## 2021-09-03 PROCEDURE — 74178 CT ABD&PLV WO CNTR FLWD CNTR: CPT

## 2021-09-03 PROCEDURE — 88112 CYTOPATH CELL ENHANCE TECH: CPT | Performed by: PATHOLOGY

## 2021-09-03 PROCEDURE — 250N000009 HC RX 250: Performed by: PHYSICIAN ASSISTANT

## 2021-09-03 PROCEDURE — 81003 URINALYSIS AUTO W/O SCOPE: CPT | Performed by: UROLOGY

## 2021-09-03 PROCEDURE — 250N000011 HC RX IP 250 OP 636: Performed by: PHYSICIAN ASSISTANT

## 2021-09-03 PROCEDURE — 82565 ASSAY OF CREATININE: CPT

## 2021-09-03 RX ORDER — IOPAMIDOL 755 MG/ML
78 INJECTION, SOLUTION INTRAVASCULAR ONCE
Status: COMPLETED | OUTPATIENT
Start: 2021-09-03 | End: 2021-09-03

## 2021-09-03 RX ADMIN — IOPAMIDOL 78 ML: 755 INJECTION, SOLUTION INTRAVENOUS at 12:09

## 2021-09-03 RX ADMIN — SODIUM CHLORIDE 65 ML: 9 INJECTION, SOLUTION INTRAVENOUS at 12:09

## 2021-09-03 ASSESSMENT — MIFFLIN-ST. JEOR: SCORE: 1215.71

## 2021-09-03 ASSESSMENT — PAIN SCALES - GENERAL: PAINLEVEL: NO PAIN (0)

## 2021-09-03 NOTE — PROGRESS NOTES
CYSTOSCOPY PROCEDURE NOTE:    Cecily Ivory is a 80 year old female who presents with gross hematuria for a cystoscopy.    Pt ID verified with patient: Yes     Procedure verified with patient: Yes     Procedure confirmed with physician and support staff: Yes     Consent confirmed with physician and support staff.    Sign In:  History and Physical Exam reviewed  Primary Diagnosis: gross hematuria   Informed Consent Discussed: y y  Sign in Communication: Yes   Time Out:  Team Confirms the Correct Patient, Correct Procedure; Yes , Correct Site and Site Marking, Correct Position (if applicable).  Affirmation of Time Out: Yes   Sign Out:  Sign Out Discussion: Yes   Physician: Ezra Covarrubias MD  Indications for procedure:   Cecily Ivory is a 80 year old female with a history of gross hematuria.    Description of procedure:   After fully informed, voluntary consent was obtained, the patient was brought into the procedure room, identified and placed in a dorsal lithotomy position on the cystoscopy table.  The vagina/introitus were prepped with betadine and draped in a sterile fashion.  Urojet lidocaine gel was introduced.  A 15F flexible cystoscope was inserted into the urethra, and the bladder and urethra were examined in a systematic manner.  The patient tolerated the procedure well and there were no complications.      Findings:  External exam revealed no cystocele and no rectocele.   Cystoscopy then showed the urethra to be normal in appearance with normal coaptation. The bladder itself was completely surveyed.  The ureteric orifices were normal in position and number and effluxing clear urine.  There was no trabeculation.  There were no neoplasms or stones identifed.  There was a small left posterior/lateral diverticula    Assessment/Plan:   Cecily Ivory is a 80 year old female with a history of gross hematuria , now with normal findings on cystoscopy.      - Urine cytology pending  - I anticipate this will  be negative  - No further work up needed  - No evidence of malignancy    Ezra Covarrubias MD

## 2021-09-03 NOTE — NURSING NOTE
Chief Complaint   Patient presents with     Gross hematuria     Here for a in office cystoscopy      Prior to the start of the procedure and with procedural staff participation, I verbally confirmed the patient s identity using two indicators, relevant allergies, that the procedure was appropriate and matched the consent or emergent situation, and that the correct equipment/implants were available. Immediately prior to starting the procedure I conducted the Time Out with the procedural staff and re-confirmed the patient s name, procedure, and site/side. I have wiped the patient off with the povidone-Iodine solution, draped them,  used Lidocaine hydrochloride jelly, and instilled sterile water into the bladder. (The Joint Commission universal protocol was followed.)  Yes    Sedation (Moderate or Deep): None    May Mckeon

## 2021-09-03 NOTE — PATIENT INSTRUCTIONS
"AFTER YOUR CYSTOSCOPY  ?  ?  You have just completed a cystoscopy, or \"cysto\", which allowed your physician to learn more about your bladder (or to remove a stent placed after surgery). We suggest that you continue to avoid caffeine, fruit juice, and alcohol for the next 24 hours, however, you are encouraged to return to your normal activities.  ?  ?  A few things that are considered normal after your cystoscopy:  ?  * small amount of bleeding (or spotting) that clears within the next 24 hours  ?  * slight burning sensation with urination  ?  * sensation of needing to void (urinate) more frequently  ?  * the feeling of \"air\" in your urine  ?  * mild discomfort that is relieved with Tylenol    * bladder spasms  ?  ?  ?  Please contact our office promptly if you:  ?  * develop a fever above 101 degrees  ?  * are unable to urinate  ?  * develop bright red blood that does not stop  ?  * experience severe pain or swelling  ?  ?  ?  And of course, please contact our office with any concerns or questions [phone]  ?      AFTER YOUR CYSTOSCOPY        You have just completed a cystoscopy, or \"cysto\", which allowed your physician to learn more about your bladder (or to remove a stent placed after surgery). We suggest that you continue to avoid caffeine, fruit juice, and alcohol for the next 24 hours, however, you are encouraged to return to your normal activities.         A few things that are considered normal after your cystoscopy:     * Small amount of bleeding (or spotting) that clears within the next 24 hours     * Slight burning sensation with urination     * Sensation to of needing to avoid more frequently     * The feeling of \"air\" in your urine     * Mild discomfort that is relieved with Tylenol        Please contact our office promptly if you:     * Develop a fever above 101 degrees     * Are unable to urinate     * Develop bright red blood that does not stop     * Severe pain or swelling         Please contact " our office with any concerns or questions @[email or web address].

## 2021-09-03 NOTE — LETTER
9/3/2021       RE: Cecily Ivory  [address] [place]  [place] [address]     Dear Colleague,    Thank you for referring your patient, Cecily Ivory, to the [facility] [place] at [facility]. Please see a copy of my visit note below.    CYSTOSCOPY PROCEDURE NOTE:    Cecily Ivory is a 80 year old female who presents with gross hematuria for a cystoscopy.    Pt ID verified with patient: Yes     Procedure verified with patient: Yes     Procedure confirmed with physician and support staff: Yes     Consent confirmed with physician and support staff.    Sign In:  History and Physical Exam reviewed  Primary Diagnosis: gross hematuria   Informed Consent Discussed: y y  Sign in Communication: Yes   Time Out:  Team Confirms the Correct Patient, Correct Procedure; Yes , Correct Site and Site Marking, Correct Position (if applicable).  Affirmation of Time Out: Yes   Sign Out:  Sign Out Discussion: Yes   Physician: Ezra Covarrubias MD  Indications for procedure:   Cecily Ivory is a 80 year old female with a history of gross hematuria.    Description of procedure:   After fully informed, voluntary consent was obtained, the patient was brought into the procedure room, identified and placed in a dorsal lithotomy position on the cystoscopy table.  The vagina/introitus were prepped with betadine and draped in a sterile fashion.  Urojet lidocaine gel was introduced.  A 15F flexible cystoscope was inserted into the urethra, and the bladder and urethra were examined in a systematic manner.  The patient tolerated the procedure well and there were no complications.      Findings:  External exam revealed no cystocele and no rectocele.   Cystoscopy then showed the urethra to be normal in appearance with normal coaptation. The bladder itself was completely surveyed.  The ureteric orifices were normal in position and number and effluxing clear  urine.  There was no trabeculation.  There were no neoplasms or stones identifed.  There was a small left posterior/lateral diverticula    Assessment/Plan:   Cecily Ivory is a 80 year old female with a history of gross hematuria , now with normal findings on cystoscopy.      - Urine cytology pending  - I anticipate this will be negative  - No further work up needed  - No evidence of malignancy    Ezra Covarrubias MD

## 2021-09-08 LAB
PATH REPORT.COMMENTS IMP SPEC: NORMAL
PATH REPORT.FINAL DX SPEC: NORMAL
PATH REPORT.GROSS SPEC: NORMAL
PATH REPORT.MICROSCOPIC SPEC OTHER STN: NORMAL
PATH REPORT.RELEVANT HX SPEC: NORMAL

## 2021-09-09 ENCOUNTER — MEDICAL CORRESPONDENCE (OUTPATIENT)
Dept: HEALTH INFORMATION MANAGEMENT | Facility: CLINIC | Age: 81
End: 2021-09-09
Payer: COMMERCIAL

## 2021-09-09 ENCOUNTER — DOCUMENTATION ONLY (OUTPATIENT)
Dept: ADMINISTRATIVE | Facility: CLINIC | Age: 81
End: 2021-09-09

## 2021-09-14 ENCOUNTER — TELEPHONE (OUTPATIENT)
Dept: UROLOGY | Facility: CLINIC | Age: 81
End: 2021-09-14

## 2021-09-14 NOTE — TELEPHONE ENCOUNTER
Have been unable to reach pt by phone. Results letter sent.  ALBER Gramajo RN      ----- Message from Ezra Covarrubias MD sent at 9/9/2021  8:25 AM CDT -----  Please contact the patient to inform them of their normal urine cytology results.    Plan  - no further work up needed    Thanks,  Ezra Covarrubias MD

## 2021-09-27 ENCOUNTER — TRANSFERRED RECORDS (OUTPATIENT)
Dept: HEALTH INFORMATION MANAGEMENT | Facility: CLINIC | Age: 81
End: 2021-09-27

## 2021-10-05 ENCOUNTER — OFFICE VISIT (OUTPATIENT)
Dept: FAMILY MEDICINE | Facility: CLINIC | Age: 81
End: 2021-10-05
Payer: COMMERCIAL

## 2021-10-05 VITALS
RESPIRATION RATE: 16 BRPM | BODY MASS INDEX: 24.48 KG/M2 | SYSTOLIC BLOOD PRESSURE: 123 MMHG | DIASTOLIC BLOOD PRESSURE: 70 MMHG | HEART RATE: 57 BPM | TEMPERATURE: 97.7 F | OXYGEN SATURATION: 94 % | WEIGHT: 156 LBS | HEIGHT: 67 IN

## 2021-10-05 DIAGNOSIS — Z00.00 MEDICARE ANNUAL WELLNESS VISIT, SUBSEQUENT: Primary | ICD-10-CM

## 2021-10-05 DIAGNOSIS — E78.5 HYPERLIPIDEMIA LDL GOAL <130: ICD-10-CM

## 2021-10-05 DIAGNOSIS — I51.81 TAKOTSUBO CARDIOMYOPATHY: ICD-10-CM

## 2021-10-05 DIAGNOSIS — F41.9 ANXIETY: ICD-10-CM

## 2021-10-05 LAB
ERYTHROCYTE [DISTWIDTH] IN BLOOD BY AUTOMATED COUNT: 15.3 % (ref 10–15)
HCT VFR BLD AUTO: 42.1 % (ref 35–47)
HGB BLD-MCNC: 14.3 G/DL (ref 11.7–15.7)
MCH RBC QN AUTO: 31.4 PG (ref 26.5–33)
MCHC RBC AUTO-ENTMCNC: 34 G/DL (ref 31.5–36.5)
MCV RBC AUTO: 93 FL (ref 78–100)
PLATELET # BLD AUTO: 228 10E3/UL (ref 150–450)
RBC # BLD AUTO: 4.55 10E6/UL (ref 3.8–5.2)
WBC # BLD AUTO: 7.8 10E3/UL (ref 4–11)

## 2021-10-05 PROCEDURE — 80053 COMPREHEN METABOLIC PANEL: CPT | Performed by: INTERNAL MEDICINE

## 2021-10-05 PROCEDURE — 80061 LIPID PANEL: CPT | Performed by: INTERNAL MEDICINE

## 2021-10-05 PROCEDURE — 85027 COMPLETE CBC AUTOMATED: CPT | Performed by: INTERNAL MEDICINE

## 2021-10-05 PROCEDURE — 36415 COLL VENOUS BLD VENIPUNCTURE: CPT | Performed by: INTERNAL MEDICINE

## 2021-10-05 PROCEDURE — 99397 PER PM REEVAL EST PAT 65+ YR: CPT | Performed by: INTERNAL MEDICINE

## 2021-10-05 RX ORDER — SERTRALINE HYDROCHLORIDE 25 MG/1
25 TABLET, FILM COATED ORAL DAILY
Qty: 90 TABLET | Refills: 3 | Status: SHIPPED | OUTPATIENT
Start: 2021-10-05 | End: 2022-11-14

## 2021-10-05 RX ORDER — METOPROLOL SUCCINATE 25 MG/1
25 TABLET, EXTENDED RELEASE ORAL DAILY
Qty: 90 TABLET | Refills: 3 | Status: SHIPPED | OUTPATIENT
Start: 2021-10-05 | End: 2022-10-24

## 2021-10-05 RX ORDER — LOVASTATIN 40 MG
40 TABLET ORAL AT BEDTIME
Qty: 90 TABLET | Refills: 3 | Status: SHIPPED | OUTPATIENT
Start: 2021-10-05 | End: 2022-10-24

## 2021-10-05 ASSESSMENT — ACTIVITIES OF DAILY LIVING (ADL): CURRENT_FUNCTION: NO ASSISTANCE NEEDED

## 2021-10-05 ASSESSMENT — MIFFLIN-ST. JEOR: SCORE: 1215.24

## 2021-10-05 NOTE — LETTER
October 7, 2021      Cecily Ivory  [address] [place]  [place] [address]        Dear ,    It was a pleasure seeing you for your physical examination.  I wanted to get back to you with your test results.  I have enclosed a copy for your review.     I am happy to report that your cbc or complete blood count is normal with no signs of anemia, leukemia or platelet abnormalities. Your chemistry panel shows no signs of diabetes.  Your blood salts, kidney tests, liver tests, and proteins are all fine.     Your total cholesterol is 211 with the normal range being below 200.  Your HDL or good cholesterol is 87 with the normal range being above 50.  Your LDL or bad cholesterol is 100 with the normal range being below 130.  Overall these numbers are very good.     I am happy to bring you this overall excellent report.  If you have any questions please call me.       Resulted Orders   CBC with platelets   Result Value Ref Range    WBC Count 7.8 4.0 - 11.0 10e3/uL    RBC Count 4.55 3.80 - 5.20 10e6/uL    Hemoglobin 14.3 11.7 - 15.7 g/dL    Hematocrit 42.1 35.0 - 47.0 %    MCV 93 78 - 100 fL    MCH 31.4 26.5 - 33.0 pg    MCHC 34.0 31.5 - 36.5 g/dL    RDW 15.3 (H) 10.0 - 15.0 %    Platelet Count 228 150 - 450 10e3/uL   Comprehensive metabolic panel   Result Value Ref Range    Sodium 139 133 - 144 mmol/L    Potassium 4.6 3.4 - 5.3 mmol/L    Chloride 105 94 - 109 mmol/L    Carbon Dioxide (CO2) 30 20 - 32 mmol/L    Anion Gap 4 3 - 14 mmol/L    Urea Nitrogen 14 7 - 30 mg/dL    Creatinine 0.82 0.52 - 1.04 mg/dL    Calcium 9.2 8.5 - 10.1 mg/dL    Glucose 93 70 - 99 mg/dL    Alkaline Phosphatase 55 40 - 150 U/L    AST 26 0 - 45 U/L    ALT 34 0 - 50 U/L    Protein Total 7.3 6.8 - 8.8 g/dL    Albumin 3.8 3.4 - 5.0 g/dL    Bilirubin Total 0.5 0.2 - 1.3 mg/dL    GFR Estimate 68 >60 mL/min/1.73m2      Comment:      As of July 11, 2021, eGFR is calculated by the CKD-EPI creatinine equation, without race  adjustment. eGFR can be influenced by muscle mass, exercise, and diet. The reported eGFR is an estimation only and is only applicable if the renal function is stable.   Lipid Profile   Result Value Ref Range    Cholesterol 211 (H) <200 mg/dL    Triglycerides 118 <150 mg/dL    Direct Measure HDL 87 >=50 mg/dL    LDL Cholesterol Calculated 100 <=100 mg/dL    Non HDL Cholesterol 124 <130 mg/dL    Patient Fasting > 8hrs? No     Narrative    Cholesterol  Desirable:  <200 mg/dL    Triglycerides  Normal:  Less than 150 mg/dL  Borderline High:  150-199 mg/dL  High:  200-499 mg/dL  Very High:  Greater than or equal to 500 mg/dL    Direct Measure HDL  Female:  Greater than or equal to 50 mg/dL   Male:  Greater than or equal to 40 mg/dL    LDL Cholesterol  Desirable:  <100mg/dL  Above Desirable:  100-129 mg/dL   Borderline High:  130-159 mg/dL   High:  160-189 mg/dL   Very High:  >= 190 mg/dL    Non HDL Cholesterol  Desirable:  130 mg/dL  Above Desirable:  130-159 mg/dL  Borderline High:  160-189 mg/dL  High:  190-219 mg/dL  Very High:  Greater than or equal to 220 mg/dL       If you have any questions or concerns, please call the clinic at the number listed above.       Sincerely,      Ellis Ruiz MD        keanu

## 2021-10-05 NOTE — PROGRESS NOTES
SUBJECTIVE:   Cecily Ivory is a 81 year old female who presents for Preventive Visit.    She is doing very well and works out regularly.  No anxiety.  No further hematuria.  No other complaints x for right knee pain and may need jt surgery.               Past Medical History:      Past Medical History:   Diagnosis Date     Abdominal pain 2009, 2013    ct liver and renal cyst and 2mm lung nodule, repeat ct done 2013 and no significantly abnl.     Anxiety 2014    added med 3/14     Arthritis      ASCUS of cervix with negative high risk HPV 03/2018     Bilateral subdural hematomas (H) 09/2020    found incidentally on mri done for memory loss     Carotid bruit 2011    us nl     Gastritis 2011    egd done by [place] [facility]     GERD (gastroesophageal reflux disease) 2011     Gross hematuria 2021    cysto and ct neg     History of colonoscopy 2004, 2014    nl     Hypercholesteremia 2000    stopped lovastatin 2015, added lipitor 3/16     Insomnia     using otc      Lumbar spinal stenosis 2016    Dr. Wilde, had epidural     Lung nodule 2009, 2013    seen on ct for abd pain, fu done 3/13 and 2 nodules stable and benign, one new one needs fu 1 year.  fu done 3/14 and fu 1 year and then done; fu done 3/15 and unchanged, no fu needed     MCI (mild cognitive impairment) 2020    seen by neuro, Dr. Burnham     Myocardial infarction (H) 01/2020    elev trop but clean coronaries on angio, felt to be stress induced cmyop     Odynophagia 2004    egd nl     Other chronic pain      Peripheral neuropathies     Dr. Kim     Screening 2006, 2017    nl dexa     Seasonal allergies      Takotsubo cardiomyopathy 01/2020    hosp fsd, ef 40%, mod ai, clean coronaries; fu echo nl 5/20     Vaginal dysplasia     chronic VAIN I, many colpos of vagina.  Now we only do an annual pap of vagina, no colpo since stable long term     Weight loss 07/2018    ct chest, abd and pelvis neg             Past Surgical History:      Past Surgical History:   Procedure  Laterality Date     ANKLE SURGERY       APPENDECTOMY  13     ARTHROPLASTY KNEE Left 2018    Procedure: ARTHROPLASTY KNEE;  Left total knee arthroplasty;  Surgeon: William Pollard MD;  Location: [facility] OR     BREAST BIOPSY, CORE RT/LT       C VAGINAL HYSTERECTOMY       CATARACT IOL, RT/LT  2019     CV HEART CATHETERIZATION WITH POSSIBLE INTERVENTION N/A 2020    Procedure: Heart Catheterization with Possible Intervention;  Surgeon: Bibiana Ruggiero MD;  Location:  HEART CARDIAC CATH LAB     GYN SURGERY      BSO     HYSTERECTOMY, PAP NO LONGER INDICATED       LAPAROSCOPIC CHOLECYSTECTOMY       NOSE SURGERY       right knee arthroscopy       thumb surgery Left 2015     ZZHC UGI ENDOSCOPY, SIMPLE EXAM  03/15/11    [facility]             Social History:     Social History     Socioeconomic History     Marital status:      Spouse name: Not on file     Number of children: 1     Years of education: Not on file     Highest education level: Not on file   Occupational History     Occupation: , retired     Employer: [employer]     Employer: RETIRED   Tobacco Use     Smoking status: Former Smoker     Packs/day: 1.50     Years: 20.00     Pack years: 30.00     Types: Cigarettes     Quit date: 1992     Years since quittin.6     Smokeless tobacco: Never Used   Substance and Sexual Activity     Alcohol use: Yes     Alcohol/week: 2.0 - 4.0 standard drinks     Types: 2 - 4 Glasses of wine per week     Comment: 2 glasses 4 times per week     Drug use: No     Sexual activity: Not Currently     Partners: Male     Comment: vag hyst, BSO   Other Topics Concern     Parent/sibling w/ CABG, MI or angioplasty before 65F 55M? Not Asked   Social History Narrative     Not on file     Social Determinants of Health     Financial Resource Strain:      Difficulty of Paying Living Expenses:    Food Insecurity:      Worried About Running Out of Food in the  Last Year:      Ran Out of Food in the Last Year:    Transportation Needs:      Lack of Transportation (Medical):      Lack of Transportation (Non-Medical):    Physical Activity:      Days of Exercise per Week:      Minutes of Exercise per Session:    Stress:      Feeling of Stress :    Social Connections:      Frequency of Communication with Friends and Family:      Frequency of Social Gatherings with Friends and Family:      Attends Gnosticism Services:      Active Member of Clubs or Organizations:      Attends Club or Organization Meetings:      Marital Status:    Intimate Partner Violence:      Fear of Current or Ex-Partner:      Emotionally Abused:      Physically Abused:      Sexually Abused:              Family History:   reviewed         Allergies:     Allergies   Allergen Reactions     Latex Cough     Seasonal Allergies      YEAR ROUND ALLERGIES     Sulfa Drugs Unknown             Medications:     Current Outpatient Medications   Medication Sig Dispense Refill     Ascorbic Acid (VITAMIN C PO) Take 500 mg by mouth daily.       Biotin 5000 MCG CAPS Take 1 tablet by mouth At Bedtime        calcium carb 1250 mg, 500 mg [place],/vitamin D 200 units (OSCAL WITH D) 500-200 MG-UNIT per tablet Take 1 tablet by mouth daily  100 tablet 3     Carboxymethylcellulose Sodium (THERATEARS) 0.25 % SOLN Place 1 drop into both eyes every evening       Cholecalciferol (VITAMIN D3 PO) Take 1,000 Units by mouth daily        CYANOCOBALAMIN PO Take 1,000 mcg by mouth daily       loratadine (CLARITIN) 10 MG tablet Take 10 mg by mouth daily        lovastatin (MEVACOR) 40 MG tablet Take 1 tablet (40 mg) by mouth At Bedtime 90 tablet 3     magnesium 100 MG CAPS Take 1 tablet by mouth daily        metoprolol succinate ER (TOPROL-XL) 25 MG 24 hr tablet Take 1 tablet (25 mg) by mouth daily 90 tablet 3     Multiple Vitamins-Minerals (CENTRUM SILVER) per tablet Take 1 tablet by mouth daily       sertraline (ZOLOFT) 25 MG tablet Take 1 tablet  "(25 mg) by mouth daily 90 tablet 3               Review of Systems:   The 10 point Review of Systems is negative other than noted in the HPI           Physical Exam:   Blood pressure 123/70, pulse 57, temperature 97.7  F (36.5  C), temperature source Temporal, resp. rate 16, height 1.702 m (5' 7\"), weight 70.8 kg (156 lb), SpO2 94 %, not currently breastfeeding.    Exam:  Constitutional: healthy appearing, alert and in no distress  Heent: Normocephalic. Head without obvious masses or lesions. PERRLDC, EOMI. Mouth exam within normal limits: tongue, mucous membranes, posterior pharynx all normal, no lesions or abnormalities seen.  Tm's and canals within normal limits bilaterally. Neck supple, no nuchal rigidity or masses. No supraclavicular, or cervical adenopathy. Thyroid symmetric, no masses.  Cardiovascular: Regular rate and rhythm, no murmer, rub or gallops.  JVP not elevated, no edema.  Carotids within normal limits bilaterally, no bruits.  Respiratory: Normal respiratory effort.  Lungs clear, normal flow, no wheezing or crackles.  Gastrointestinal: Normal active bowel sounds.   Soft, not tender, no masses, guarding or rebound.  No hepatosplenomegaly.   Musculoskeletal: extremities normal, no gross deformities noted.  Skin: no suspicious lesions or rashes   Neurologic: Mental status within normal limits.  Speech fluent.  No gross motor abnormalities and gait intact.  Psychiatric: mentation appears normal and affect normal.         Data:   Labs sent        Assessment:   1. Normal complete physical exam  2. Cmyop, resolved, cont metop  3. Elevated cholesterol on statin  4. Anxiety, controlled  5. Knee djd, follow up ortho  6. hcm         Plan:   Up to date immunizations, mammogram  Exercise ,diet  Letter with labs      Ellis Ruiz M.D.                Patient has been advised of split billing requirements and indicates understanding:    Are you in the first 12 months of your Medicare coverage?      Healthy Habits: " "   In general, how would you rate your overall health?  Very good    Frequency of exercise:  6-7 days/week    Duration of exercise:  30-45 minutes    Do you usually eat at least 4 servings of fruit and vegetables a day, include whole grains    & fiber and avoid regularly eating high fat or \"junk\" foods?  Yes    Taking medications regularly:  Yes    Barriers to taking medications:  None    Medication side effects:  None    Ability to successfully perform activities of daily living:  No assistance needed    Home Safety:  No safety concerns identified    Hearing Impairment:  No hearing concerns    In the past 6 months, have you been bothered by leaking of urine?  No    In general, how would you rate your overall mental or emotional health?  Very good      PHQ-2 Total Score:    Additional concerns today:  Yes    Do you feel safe in your environment? Yes    Have you ever done Advance Care Planning? (For example, a Health Directive, POLST, or a discussion with a medical provider or your loved ones about your wishes): Yes, advance care planning is on file.       Fall risk       Cognitive Screening   1) Repeat 3 items (Leader, Season, Table)    2) Clock draw: NORMAL  3) 3 item recall: Recalls 3 objects  Results: 3 items recalled: COGNITIVE IMPAIRMENT LESS LIKELY    Mini-CogTM Copyright S Vianey. Licensed by the author for use in [facility]; reprinted with permission (raf@[facility]). All rights reserved.          Reviewed and updated as needed this visit by clinical staff                 Reviewed and updated as needed this visit by Provider                Social History     Tobacco Use     Smoking status: Former Smoker     Packs/day: 1.50     Years: 20.00     Pack years: 30.00     Types: Cigarettes     Quit date: 1992     Years since quittin.6     Smokeless tobacco: Never Used   Substance Use Topics     Alcohol use: Yes     Alcohol/week: 2.0 - 4.0 standard drinks     Types: 2 - 4 Glasses of wine per week " "    Comment: 2 glasses 4 times per week         Alcohol Use 4/9/2019   Prescreen: >3 drinks/day or >7 drinks/week? No               Current providers sharing in care for this patient include:   Patient Care Team:  Ellis Ruiz MD as PCP - General (Internal Medicine)  Ellis Ruiz MD as Assigned PCP  Dionte Burnham MD as Assigned Neuroscience Provider  Cameron Giron MD as Assigned Heart and Vascular Provider  Andra Lopez PA-C as Assigned OBGYN Provider  Ezra Covarrubias MD as Assigned Surgical Provider    The following health maintenance items are reviewed in Epic and correct as of today:  Health Maintenance Due   Topic Date Due     ANNUAL REVIEW OF  ORDERS  Never done     HEPATITIS C SCREENING  Never done     MEDICARE ANNUAL WELLNESS VISIT  08/28/2021     INFLUENZA VACCINE (1) 09/01/2021             Pertinent mammograms are reviewed under the imaging tab.    Review of Systems      OBJECTIVE:   There were no vitals taken for this visit. Estimated body mass index is 24.28 kg/m  as calculated from the following:    Height as of 9/3/21: 1.702 m (5' 7\").    Weight as of 9/3/21: 70.3 kg (155 lb).  Physical Exam          ASSESSMENT / PLAN:       Patient has been advised of split billing requirements and indicates understanding:   COUNSELING:  Reviewed preventive health counseling, as reflected in patient instructions       Regular exercise       Healthy diet/nutrition    Estimated body mass index is 24.28 kg/m  as calculated from the following:    Height as of 9/3/21: 1.702 m (5' 7\").    Weight as of 9/3/21: 70.3 kg (155 lb).        She reports that she quit smoking about 29 years ago. Her smoking use included cigarettes. She has a 30.00 pack-year smoking history. She has never used smokeless tobacco.      Appropriate preventive services were discussed with this patient, including applicable screening as appropriate for cardiovascular disease, diabetes, " osteopenia/osteoporosis, and glaucoma.  As appropriate for age/gender, discussed screening for colorectal cancer, prostate cancer, breast cancer, and cervical cancer. Checklist reviewing preventive services available has been given to the patient.    Reviewed patients plan of care and provided an AVS. The Basic Care Plan (routine screening as documented in Health Maintenance) for Cecily meets the Care Plan requirement. This Care Plan has been established and reviewed with the Patient.    Counseling Resources:  ATP IV Guidelines  Pooled Cohorts Equation Calculator  Breast Cancer Risk Calculator  Breast Cancer: Medication to Reduce Risk  FRAX Risk Assessment  ICSI Preventive Guidelines  Dietary Guidelines for Americans, 2010  USDA's MyPlate  ASA Prophylaxis  Lung CA Screening    Ellis Ruiz MD  [facility]    Identified Health Risks:

## 2021-10-06 LAB
ALBUMIN SERPL-MCNC: 3.8 G/DL (ref 3.4–5)
ALP SERPL-CCNC: 55 U/L (ref 40–150)
ALT SERPL W P-5'-P-CCNC: 34 U/L (ref 0–50)
ANION GAP SERPL CALCULATED.3IONS-SCNC: 4 MMOL/L (ref 3–14)
AST SERPL W P-5'-P-CCNC: 26 U/L (ref 0–45)
BILIRUB SERPL-MCNC: 0.5 MG/DL (ref 0.2–1.3)
BUN SERPL-MCNC: 14 MG/DL (ref 7–30)
CALCIUM SERPL-MCNC: 9.2 MG/DL (ref 8.5–10.1)
CHLORIDE BLD-SCNC: 105 MMOL/L (ref 94–109)
CHOLEST SERPL-MCNC: 211 MG/DL
CO2 SERPL-SCNC: 30 MMOL/L (ref 20–32)
CREAT SERPL-MCNC: 0.82 MG/DL (ref 0.52–1.04)
FASTING STATUS PATIENT QL REPORTED: NO
GFR SERPL CREATININE-BSD FRML MDRD: 68 ML/MIN/1.73M2
GLUCOSE BLD-MCNC: 93 MG/DL (ref 70–99)
HDLC SERPL-MCNC: 87 MG/DL
LDLC SERPL CALC-MCNC: 100 MG/DL
NONHDLC SERPL-MCNC: 124 MG/DL
POTASSIUM BLD-SCNC: 4.6 MMOL/L (ref 3.4–5.3)
PROT SERPL-MCNC: 7.3 G/DL (ref 6.8–8.8)
SODIUM SERPL-SCNC: 139 MMOL/L (ref 133–144)
TRIGL SERPL-MCNC: 118 MG/DL

## 2021-10-07 NOTE — RESULT ENCOUNTER NOTE
It was a pleasure seeing you for your physical examination.  I wanted to get back to you with your test results.  I have enclosed a copy for your review.     I am happy to report that your cbc or complete blood count is normal with no signs of anemia, leukemia or platelet abnormalities. Your chemistry panel shows no signs of diabetes.  Your blood salts, kidney tests, liver tests, and proteins are all fine.    Your total cholesterol is 211 with the normal range being below 200.  Your HDL or good cholesterol is 87 with the normal range being above 50.  Your LDL or bad cholesterol is 100 with the normal range being below 130.  Overall these numbers are very good.    I am happy to bring you this overall excellent report.  If you have any questions please call me.

## 2021-11-14 DIAGNOSIS — Z11.59 ENCOUNTER FOR SCREENING FOR OTHER VIRAL DISEASES: ICD-10-CM

## 2021-11-15 ENCOUNTER — OFFICE VISIT (OUTPATIENT)
Dept: FAMILY MEDICINE | Facility: CLINIC | Age: 81
End: 2021-11-15
Payer: COMMERCIAL

## 2021-11-15 VITALS
HEART RATE: 53 BPM | WEIGHT: 158 LBS | DIASTOLIC BLOOD PRESSURE: 79 MMHG | HEIGHT: 67 IN | SYSTOLIC BLOOD PRESSURE: 133 MMHG | RESPIRATION RATE: 16 BRPM | TEMPERATURE: 97 F | BODY MASS INDEX: 24.8 KG/M2

## 2021-11-15 DIAGNOSIS — Z01.818 PRE-OP EXAMINATION: Primary | ICD-10-CM

## 2021-11-15 DIAGNOSIS — G31.84 MCI (MILD COGNITIVE IMPAIRMENT): ICD-10-CM

## 2021-11-15 DIAGNOSIS — M17.11 PRIMARY OSTEOARTHRITIS OF RIGHT KNEE: ICD-10-CM

## 2021-11-15 DIAGNOSIS — I51.81 TAKOTSUBO CARDIOMYOPATHY: ICD-10-CM

## 2021-11-15 PROCEDURE — 99214 OFFICE O/P EST MOD 30 MIN: CPT | Performed by: INTERNAL MEDICINE

## 2021-11-15 PROCEDURE — 93000 ELECTROCARDIOGRAM COMPLETE: CPT | Performed by: INTERNAL MEDICINE

## 2021-11-15 ASSESSMENT — MIFFLIN-ST. JEOR: SCORE: 1224.31

## 2021-11-15 NOTE — PATIENT INSTRUCTIONS
Stop your aspirin a week prior to surgery.  Take all your other medicines when you normally would    Ellis Ruiz M.D.

## 2021-11-15 NOTE — H&P (VIEW-ONLY)
[address], [address]  [facility] [address]  Phone: [phone]  Primary Provider: Ellis Ruiz        PREOPERATIVE EVALUATION:  Today's date: 11/15/2021    Cecily Ivory is a 81 year old female who presents for a preoperative evaluation.    Surgical Information:  Surgery/Procedure: RIGHT TOTAL KNEE ARTHROPLASTY  Surgery Location:  OR  Surgeon: Earl Perez MD  Surgery Date: 12/9/21  Time of Surgery: 1 pm  Where patient plans to recover: At home with family  Fax number for surgical facility: in [facility]    Type of Anesthesia Anticipated: Spinal        Subjective     HPI related to upcoming procedure: This is a very pleasant 81-year-old female having surgery for DJD of her knee.  She otherwise feels very well.  She has a history of Takotsubo cardiomyopathy but this is resolved.  She has no chest pain or shortness of breath.  She has not been overly active.  She does have a history of mild memory loss as noted.  She had a recent physical and her labs are noted in the chart.                Past Medical History:      Past Medical History:   Diagnosis Date     Abdominal pain 2009, 2013    ct liver and renal cyst and 2mm lung nodule, repeat ct done 2013 and no significantly abnl.     Anxiety 2014    added med 3/14     Arthritis      ASCUS of cervix with negative high risk HPV 03/2018     Bilateral subdural hematomas (H) 09/2020    found incidentally on mri done for memory loss     Carotid bruit 2011    us nl     Gastritis 2011    egd done by [facility]     GERD (gastroesophageal reflux disease) 2011     Gross hematuria 2021    cysto and ct neg     History of colonoscopy 2004, 2014    nl     Hypercholesteremia 2000    stopped lovastatin 2015, added lipitor 3/16     Insomnia     using otc      Lumbar spinal stenosis 2016    Dr. Wilde, had epidural     Lung nodule 2009, 2013    seen on ct for abd pain, fu done 3/13 and 2 nodules stable and benign, one new one needs fu 1  year.  fu done 3/14 and fu 1 year and then done; fu done 3/15 and unchanged, no fu needed     MCI (mild cognitive impairment)     seen by neuro, Dr. Burnham     Myocardial infarction (H) 2020    elev trop but clean coronaries on angio, felt to be stress induced cmyop     Odynophagia     egd nl     Other chronic pain      Peripheral neuropathies     Dr. Kim     Screening ,     nl dexa     Seasonal allergies      Takotsubo cardiomyopathy 2020    hosp fsd, ef 40%, mod ai, clean coronaries; fu echo nl      Vaginal dysplasia     chronic VAIN I, many colpos of vagina.  Now we only do an annual pap of vagina, no colpo since stable long term     Weight loss 2018    ct chest, abd and pelvis neg             Past Surgical History:      Past Surgical History:   Procedure Laterality Date     ANKLE SURGERY       APPENDECTOMY  13     ARTHROPLASTY KNEE Left 2018    Procedure: ARTHROPLASTY KNEE;  Left total knee arthroplasty;  Surgeon: William Pollard MD;  Location: [facility] OR     BREAST BIOPSY, CORE RT/LT       C VAGINAL HYSTERECTOMY       CATARACT IOL, RT/LT  2019     CV HEART CATHETERIZATION WITH POSSIBLE INTERVENTION N/A 2020    Procedure: Heart Catheterization with Possible Intervention;  Surgeon: Bibiana Ruggiero MD;  Location:  HEART CARDIAC CATH LAB     GYN SURGERY      BSO     HYSTERECTOMY, PAP NO LONGER INDICATED       LAPAROSCOPIC CHOLECYSTECTOMY       NOSE SURGERY       right knee arthroscopy       thumb surgery Left 2015     [facility] UGI ENDOSCOPY, SIMPLE EXAM  03/15/11    [facility]             Social History:     Social History     Tobacco Use     Smoking status: Former Smoker     Packs/day: 1.50     Years: 20.00     Pack years: 30.00     Types: Cigarettes     Quit date: 1992     Years since quittin.7     Smokeless tobacco: Never Used   Substance Use Topics     Alcohol use: Yes     Alcohol/week: 2.0 - 4.0 standard drinks      "Types: 2 - 4 Glasses of wine per week     Comment: 2 glasses 4 times per week             Family History:   No family history of bleeding difficulty, anesthesia problems or blood clots.         Allergies:     Allergies   Allergen Reactions     Latex Cough     Seasonal Allergies      YEAR ROUND ALLERGIES     Sulfa Drugs Unknown             Medications:     Current Outpatient Medications   Medication Sig Dispense Refill     ASPIRIN NOT PRESCRIBED (INTENTIONAL) Please choose reason not prescribed from choices below.       Ascorbic Acid (VITAMIN C PO) Take 500 mg by mouth daily.       Biotin 5000 MCG CAPS Take 1 tablet by mouth At Bedtime        calcium carb 1250 mg, 500 mg [place],/vitamin D 200 units (OSCAL WITH D) 500-200 MG-UNIT per tablet Take 1 tablet by mouth daily  100 tablet 3     Carboxymethylcellulose Sodium (THERATEARS) 0.25 % SOLN Place 1 drop into both eyes every evening       Cholecalciferol (VITAMIN D3 PO) Take 1,000 Units by mouth daily        CYANOCOBALAMIN PO Take 1,000 mcg by mouth daily       loratadine (CLARITIN) 10 MG tablet Take 10 mg by mouth daily        lovastatin (MEVACOR) 40 MG tablet Take 1 tablet (40 mg) by mouth At Bedtime 90 tablet 3     magnesium 100 MG CAPS Take 1 tablet by mouth daily        metoprolol succinate ER (TOPROL-XL) 25 MG 24 hr tablet Take 1 tablet (25 mg) by mouth daily 90 tablet 3     Multiple Vitamins-Minerals (CENTRUM SILVER) per tablet Take 1 tablet by mouth daily       sertraline (ZOLOFT) 25 MG tablet Take 1 tablet (25 mg) by mouth daily 90 tablet 3               Review of Systems:   No history of bleeding difficulty, anesthesia problems or blood clots.  The 10 point Review of Systems is negative other than noted in the HPI           Physical Exam:   Blood pressure 133/79, pulse 53, temperature 97  F (36.1  C), temperature source Tympanic, resp. rate 16, height 1.702 m (5' 7\"), weight 71.7 kg (158 lb), not currently breastfeeding.    Constitutional: healthy appearing, " alert and in no distress  Heent: Normocephalic. Head without obvious masses or lesions. PERRLDC, EOMI. Mouth exam within normal limits: tongue, mucous membranes, posterior pharynx all normal, no lesions or abnormalities seen.  Tm's and canals within normal limits bilaterally. Neck supple, no nuchal rigidity or masses. No supraclavicular, or cervical adenopathy. Thyroid symmetric, no masses.  Cardiovascular: Regular rate and rhythm, no murmer, rub or gallops.  JVP not elevated, no edema.  Carotids within normal limits bilaterally, no bruits.  Respiratory: Normal respiratory effort.  Lungs clear, normal flow, no wheezing or crackles.  Gastrointestinal: Normal active bowel sounds.   Soft, not tender, no masses, guarding or rebound.  No hepatosplenomegaly.   Musculoskeletal: extremities normal, no gross deformities noted.  Skin: no suspicious lesions or rashes   Neurologic: Mental status within normal limits.  Speech fluent.  No gross motor abnormalities and gait intact.  Psychiatric: mentation appears normal and affect normal.         Data:   Labs noted; ekg - sinus ridge, nsivcd, no change from old        Assessment:   1. Normal pre op exam, this patient should be at low cardiopulmonary risk for the procedure.  Her cardiomyopathy has resolved.  Her angiogram was clear.  She does have mild memory loss and this could contribute to some confusion postop so I would be careful with narcotics and be sure to get the patient up as soon as possible.  2. Mild memory loss, as above           Plan:   The patient is ok for the procedure  Stop her aspirin, not needed  Careful with medication including narcotics postop.  Please ambulate patient as soon as possible and reorient her frequently.  She has been drinking 2 morning at night that are small but I have suggested she cut down to just 1 at night at the most prior to surgery.  She can continue her usual home medications on the day of surgery  Routine postop DVT  prophylaxis      Ellis Ruiz M.D.

## 2021-12-08 ENCOUNTER — ANESTHESIA EVENT (OUTPATIENT)
Dept: SURGERY | Facility: CLINIC | Age: 81
End: 2021-12-08
Payer: COMMERCIAL

## 2021-12-08 NOTE — PROGRESS NOTES
Total Joint Patient Screening    1. Do you have a ride available to come to the hospital the day after your surgery by 8am? YES  2. What is the name of this person? Silvia Guzmán (daughter)  3. Do you have a  set up after surgery? YES  4. Will your  be the same person that gives you a ride home after surgery? NO- Patient will be going to the [facility]   5. Have you received the Joint Replacement Guidebook? YES  6. Do you have any questions about your guidebook?  NO-this is not her 1st tka, she knows what to expect  7. Have you activated you [employer] account? NO  8. Havemargaretu signed up for MY Chart access? NO

## 2021-12-09 ENCOUNTER — HOSPITAL ENCOUNTER (OUTPATIENT)
Facility: CLINIC | Age: 81
Discharge: NURSING, REHAB OR LONG-TERM CARE | End: 2021-12-10
Attending: ORTHOPAEDIC SURGERY | Admitting: ORTHOPAEDIC SURGERY
Payer: COMMERCIAL

## 2021-12-09 ENCOUNTER — APPOINTMENT (OUTPATIENT)
Dept: GENERAL RADIOLOGY | Facility: CLINIC | Age: 81
End: 2021-12-09
Attending: PHYSICIAN ASSISTANT
Payer: COMMERCIAL

## 2021-12-09 ENCOUNTER — ANESTHESIA (OUTPATIENT)
Dept: SURGERY | Facility: CLINIC | Age: 81
End: 2021-12-09
Payer: COMMERCIAL

## 2021-12-09 DIAGNOSIS — Z96.651 STATUS POST TOTAL RIGHT KNEE REPLACEMENT: Primary | ICD-10-CM

## 2021-12-09 PROBLEM — Z96.659 S/P TOTAL KNEE ARTHROPLASTY: Status: ACTIVE | Noted: 2021-12-09

## 2021-12-09 LAB — HGB BLD-MCNC: 13.6 G/DL (ref 11.7–15.7)

## 2021-12-09 PROCEDURE — 250N000011 HC RX IP 250 OP 636: Performed by: ANESTHESIOLOGY

## 2021-12-09 PROCEDURE — 999N000141 HC STATISTIC PRE-PROCEDURE NURSING ASSESSMENT: Performed by: ORTHOPAEDIC SURGERY

## 2021-12-09 PROCEDURE — 93005 ELECTROCARDIOGRAM TRACING: CPT

## 2021-12-09 PROCEDURE — 250N000011 HC RX IP 250 OP 636: Performed by: REGISTERED NURSE

## 2021-12-09 PROCEDURE — 278N000051 HC OR IMPLANT GENERAL: Performed by: ORTHOPAEDIC SURGERY

## 2021-12-09 PROCEDURE — 258N000003 HC RX IP 258 OP 636: Performed by: ANESTHESIOLOGY

## 2021-12-09 PROCEDURE — 999N000063 XR KNEE PORT RIGHT 1/2 VIEWS: Mod: RT

## 2021-12-09 PROCEDURE — 250N000011 HC RX IP 250 OP 636: Performed by: ORTHOPAEDIC SURGERY

## 2021-12-09 PROCEDURE — 36415 COLL VENOUS BLD VENIPUNCTURE: CPT | Performed by: ANESTHESIOLOGY

## 2021-12-09 PROCEDURE — 370N000017 HC ANESTHESIA TECHNICAL FEE, PER MIN: Performed by: ORTHOPAEDIC SURGERY

## 2021-12-09 PROCEDURE — 710N000009 HC RECOVERY PHASE 1, LEVEL 1, PER MIN: Performed by: ORTHOPAEDIC SURGERY

## 2021-12-09 PROCEDURE — 250N000009 HC RX 250: Performed by: REGISTERED NURSE

## 2021-12-09 PROCEDURE — 250N000009 HC RX 250: Performed by: ORTHOPAEDIC SURGERY

## 2021-12-09 PROCEDURE — 250N000011 HC RX IP 250 OP 636: Performed by: NURSE ANESTHETIST, CERTIFIED REGISTERED

## 2021-12-09 PROCEDURE — 85018 HEMOGLOBIN: CPT | Performed by: ANESTHESIOLOGY

## 2021-12-09 PROCEDURE — 258N000001 HC RX 258: Performed by: ORTHOPAEDIC SURGERY

## 2021-12-09 PROCEDURE — 258N000003 HC RX IP 258 OP 636: Performed by: PHYSICIAN ASSISTANT

## 2021-12-09 PROCEDURE — 250N000009 HC RX 250: Performed by: ANESTHESIOLOGY

## 2021-12-09 PROCEDURE — 360N000077 HC SURGERY LEVEL 4, PER MIN: Performed by: ORTHOPAEDIC SURGERY

## 2021-12-09 PROCEDURE — 258N000003 HC RX IP 258 OP 636: Performed by: REGISTERED NURSE

## 2021-12-09 PROCEDURE — C1776 JOINT DEVICE (IMPLANTABLE): HCPCS | Performed by: ORTHOPAEDIC SURGERY

## 2021-12-09 PROCEDURE — 272N000001 HC OR GENERAL SUPPLY STERILE: Performed by: ORTHOPAEDIC SURGERY

## 2021-12-09 PROCEDURE — 250N000013 HC RX MED GY IP 250 OP 250 PS 637: Performed by: PHYSICIAN ASSISTANT

## 2021-12-09 PROCEDURE — 250N000011 HC RX IP 250 OP 636: Performed by: PHYSICIAN ASSISTANT

## 2021-12-09 DEVICE — FULL DOSE BONE CEMENT, 10 PACK CATALOG NUMBER IS 6191-1-010
Type: IMPLANTABLE DEVICE | Site: KNEE | Status: FUNCTIONAL
Brand: SIMPLEX

## 2021-12-09 DEVICE — TIBIAL BEARING INSERT - CS
Type: IMPLANTABLE DEVICE | Site: KNEE | Status: FUNCTIONAL
Brand: TRIATHLON

## 2021-12-09 DEVICE — PATELLA
Type: IMPLANTABLE DEVICE | Site: KNEE | Status: FUNCTIONAL
Brand: TRIATHLON

## 2021-12-09 DEVICE — CRUCIATE RETAINING FEMORAL
Type: IMPLANTABLE DEVICE | Site: KNEE | Status: FUNCTIONAL
Brand: TRIATHLON

## 2021-12-09 DEVICE — PRIMARY TIBIAL BASEPLATE
Type: IMPLANTABLE DEVICE | Site: KNEE | Status: FUNCTIONAL
Brand: TRIATHLON

## 2021-12-09 RX ORDER — VANCOMYCIN HYDROCHLORIDE 1 G/20ML
INJECTION, POWDER, LYOPHILIZED, FOR SOLUTION INTRAVENOUS PRN
Status: DISCONTINUED | OUTPATIENT
Start: 2021-12-09 | End: 2021-12-09 | Stop reason: HOSPADM

## 2021-12-09 RX ORDER — OXYCODONE HYDROCHLORIDE 5 MG/1
5-10 TABLET ORAL
Qty: 30 TABLET | Refills: 0 | Status: SHIPPED | OUTPATIENT
Start: 2021-12-09 | End: 2021-12-09

## 2021-12-09 RX ORDER — ONDANSETRON 2 MG/ML
4 INJECTION INTRAMUSCULAR; INTRAVENOUS EVERY 6 HOURS PRN
Status: DISCONTINUED | OUTPATIENT
Start: 2021-12-09 | End: 2021-12-10 | Stop reason: HOSPADM

## 2021-12-09 RX ORDER — CEFAZOLIN SODIUM 2 G/100ML
2 INJECTION, SOLUTION INTRAVENOUS SEE ADMIN INSTRUCTIONS
Status: DISCONTINUED | OUTPATIENT
Start: 2021-12-09 | End: 2021-12-09 | Stop reason: HOSPADM

## 2021-12-09 RX ORDER — SODIUM CHLORIDE, SODIUM LACTATE, POTASSIUM CHLORIDE, CALCIUM CHLORIDE 600; 310; 30; 20 MG/100ML; MG/100ML; MG/100ML; MG/100ML
INJECTION, SOLUTION INTRAVENOUS CONTINUOUS
Status: DISCONTINUED | OUTPATIENT
Start: 2021-12-09 | End: 2021-12-09 | Stop reason: HOSPADM

## 2021-12-09 RX ORDER — LIDOCAINE HYDROCHLORIDE 20 MG/ML
INJECTION, SOLUTION INFILTRATION; PERINEURAL PRN
Status: DISCONTINUED | OUTPATIENT
Start: 2021-12-09 | End: 2021-12-09

## 2021-12-09 RX ORDER — FENTANYL CITRATE 0.05 MG/ML
25 INJECTION, SOLUTION INTRAMUSCULAR; INTRAVENOUS EVERY 5 MIN PRN
Status: DISCONTINUED | OUTPATIENT
Start: 2021-12-09 | End: 2021-12-09 | Stop reason: HOSPADM

## 2021-12-09 RX ORDER — PROCHLORPERAZINE MALEATE 5 MG
5 TABLET ORAL EVERY 6 HOURS PRN
Status: DISCONTINUED | OUTPATIENT
Start: 2021-12-09 | End: 2021-12-10 | Stop reason: HOSPADM

## 2021-12-09 RX ORDER — CEFAZOLIN SODIUM 1 G/3ML
1 INJECTION, POWDER, FOR SOLUTION INTRAMUSCULAR; INTRAVENOUS EVERY 8 HOURS
Status: COMPLETED | OUTPATIENT
Start: 2021-12-09 | End: 2021-12-10

## 2021-12-09 RX ORDER — ONDANSETRON 4 MG/1
4 TABLET, ORALLY DISINTEGRATING ORAL EVERY 30 MIN PRN
Status: DISCONTINUED | OUTPATIENT
Start: 2021-12-09 | End: 2021-12-09 | Stop reason: HOSPADM

## 2021-12-09 RX ORDER — TRAMADOL HYDROCHLORIDE 50 MG/1
50 TABLET ORAL EVERY 6 HOURS PRN
Qty: 30 TABLET | Refills: 0 | Status: SHIPPED | OUTPATIENT
Start: 2021-12-09 | End: 2021-12-09

## 2021-12-09 RX ORDER — NALOXONE HYDROCHLORIDE 0.4 MG/ML
0.2 INJECTION, SOLUTION INTRAMUSCULAR; INTRAVENOUS; SUBCUTANEOUS
Status: DISCONTINUED | OUTPATIENT
Start: 2021-12-09 | End: 2021-12-10 | Stop reason: HOSPADM

## 2021-12-09 RX ORDER — LIDOCAINE 40 MG/G
CREAM TOPICAL
Status: DISCONTINUED | OUTPATIENT
Start: 2021-12-09 | End: 2021-12-10 | Stop reason: HOSPADM

## 2021-12-09 RX ORDER — TRANEXAMIC ACID 650 MG/1
1950 TABLET ORAL ONCE
Status: COMPLETED | OUTPATIENT
Start: 2021-12-09 | End: 2021-12-09

## 2021-12-09 RX ORDER — CHLORAL HYDRATE 500 MG
2 CAPSULE ORAL DAILY
COMMUNITY
End: 2024-07-24

## 2021-12-09 RX ORDER — LIDOCAINE 40 MG/G
CREAM TOPICAL
Status: DISCONTINUED | OUTPATIENT
Start: 2021-12-09 | End: 2021-12-09 | Stop reason: HOSPADM

## 2021-12-09 RX ORDER — POLYETHYLENE GLYCOL 3350 17 G/17G
17 POWDER, FOR SOLUTION ORAL DAILY
Status: DISCONTINUED | OUTPATIENT
Start: 2021-12-10 | End: 2021-12-10 | Stop reason: HOSPADM

## 2021-12-09 RX ORDER — ONDANSETRON 4 MG/1
4 TABLET, ORALLY DISINTEGRATING ORAL EVERY 6 HOURS PRN
Status: DISCONTINUED | OUTPATIENT
Start: 2021-12-09 | End: 2021-12-10 | Stop reason: HOSPADM

## 2021-12-09 RX ORDER — ACETAMINOPHEN 325 MG/1
650 TABLET ORAL EVERY 4 HOURS PRN
Status: DISCONTINUED | OUTPATIENT
Start: 2021-12-12 | End: 2021-12-10 | Stop reason: HOSPADM

## 2021-12-09 RX ORDER — HYDROMORPHONE HCL IN WATER/PF 6 MG/30 ML
0.4 PATIENT CONTROLLED ANALGESIA SYRINGE INTRAVENOUS
Status: DISCONTINUED | OUTPATIENT
Start: 2021-12-09 | End: 2021-12-10 | Stop reason: HOSPADM

## 2021-12-09 RX ORDER — SODIUM CHLORIDE, SODIUM LACTATE, POTASSIUM CHLORIDE, CALCIUM CHLORIDE 600; 310; 30; 20 MG/100ML; MG/100ML; MG/100ML; MG/100ML
INJECTION, SOLUTION INTRAVENOUS CONTINUOUS
Status: DISCONTINUED | OUTPATIENT
Start: 2021-12-09 | End: 2021-12-10 | Stop reason: HOSPADM

## 2021-12-09 RX ORDER — HYDROXYZINE HYDROCHLORIDE 10 MG/1
10 TABLET, FILM COATED ORAL EVERY 6 HOURS PRN
Qty: 30 TABLET | Refills: 0 | Status: SHIPPED | OUTPATIENT
Start: 2021-12-09 | End: 2021-12-16

## 2021-12-09 RX ORDER — METOPROLOL SUCCINATE 25 MG/1
25 TABLET, EXTENDED RELEASE ORAL DAILY
Status: DISCONTINUED | OUTPATIENT
Start: 2021-12-10 | End: 2021-12-10 | Stop reason: HOSPADM

## 2021-12-09 RX ORDER — OXYCODONE HYDROCHLORIDE 5 MG/1
5 TABLET ORAL EVERY 4 HOURS PRN
Status: DISCONTINUED | OUTPATIENT
Start: 2021-12-09 | End: 2021-12-09 | Stop reason: HOSPADM

## 2021-12-09 RX ORDER — OXYCODONE HYDROCHLORIDE 5 MG/1
5 TABLET ORAL EVERY 4 HOURS PRN
Status: DISCONTINUED | OUTPATIENT
Start: 2021-12-09 | End: 2021-12-10 | Stop reason: HOSPADM

## 2021-12-09 RX ORDER — BUPIVACAINE HYDROCHLORIDE 7.5 MG/ML
INJECTION, SOLUTION INTRASPINAL PRN
Status: DISCONTINUED | OUTPATIENT
Start: 2021-12-09 | End: 2021-12-09

## 2021-12-09 RX ORDER — BISACODYL 10 MG
10 SUPPOSITORY, RECTAL RECTAL DAILY PRN
Status: DISCONTINUED | OUTPATIENT
Start: 2021-12-09 | End: 2021-12-10 | Stop reason: HOSPADM

## 2021-12-09 RX ORDER — MAGNESIUM HYDROXIDE 1200 MG/15ML
LIQUID ORAL PRN
Status: DISCONTINUED | OUTPATIENT
Start: 2021-12-09 | End: 2021-12-09 | Stop reason: HOSPADM

## 2021-12-09 RX ORDER — ONDANSETRON 2 MG/ML
4 INJECTION INTRAMUSCULAR; INTRAVENOUS EVERY 30 MIN PRN
Status: DISCONTINUED | OUTPATIENT
Start: 2021-12-09 | End: 2021-12-09 | Stop reason: HOSPADM

## 2021-12-09 RX ORDER — FENTANYL CITRATE 0.05 MG/ML
50 INJECTION, SOLUTION INTRAMUSCULAR; INTRAVENOUS
Status: DISCONTINUED | OUTPATIENT
Start: 2021-12-09 | End: 2021-12-09 | Stop reason: HOSPADM

## 2021-12-09 RX ORDER — HYDROXYZINE HYDROCHLORIDE 10 MG/1
10 TABLET, FILM COATED ORAL EVERY 6 HOURS PRN
Status: DISCONTINUED | OUTPATIENT
Start: 2021-12-09 | End: 2021-12-10 | Stop reason: HOSPADM

## 2021-12-09 RX ORDER — PROPOFOL 10 MG/ML
INJECTION, EMULSION INTRAVENOUS PRN
Status: DISCONTINUED | OUTPATIENT
Start: 2021-12-09 | End: 2021-12-09

## 2021-12-09 RX ORDER — ASPIRIN 81 MG/1
81 TABLET ORAL DAILY
Status: ON HOLD | COMMUNITY
End: 2021-12-09

## 2021-12-09 RX ORDER — AMOXICILLIN 250 MG
1 CAPSULE ORAL 2 TIMES DAILY
Status: DISCONTINUED | OUTPATIENT
Start: 2021-12-09 | End: 2021-12-10 | Stop reason: HOSPADM

## 2021-12-09 RX ORDER — ONDANSETRON 2 MG/ML
INJECTION INTRAMUSCULAR; INTRAVENOUS PRN
Status: DISCONTINUED | OUTPATIENT
Start: 2021-12-09 | End: 2021-12-09

## 2021-12-09 RX ORDER — TRAMADOL HYDROCHLORIDE 50 MG/1
50 TABLET ORAL EVERY 6 HOURS PRN
Status: DISCONTINUED | OUTPATIENT
Start: 2021-12-09 | End: 2021-12-10 | Stop reason: HOSPADM

## 2021-12-09 RX ORDER — KETOROLAC TROMETHAMINE 30 MG/ML
INJECTION, SOLUTION INTRAMUSCULAR; INTRAVENOUS PRN
Status: DISCONTINUED | OUTPATIENT
Start: 2021-12-09 | End: 2021-12-09

## 2021-12-09 RX ORDER — PROPOFOL 10 MG/ML
INJECTION, EMULSION INTRAVENOUS CONTINUOUS PRN
Status: DISCONTINUED | OUTPATIENT
Start: 2021-12-09 | End: 2021-12-09

## 2021-12-09 RX ORDER — HYDROMORPHONE HCL IN WATER/PF 6 MG/30 ML
0.2 PATIENT CONTROLLED ANALGESIA SYRINGE INTRAVENOUS
Status: DISCONTINUED | OUTPATIENT
Start: 2021-12-09 | End: 2021-12-10 | Stop reason: HOSPADM

## 2021-12-09 RX ORDER — HYDROMORPHONE HCL IN WATER/PF 6 MG/30 ML
0.2 PATIENT CONTROLLED ANALGESIA SYRINGE INTRAVENOUS EVERY 5 MIN PRN
Status: DISCONTINUED | OUTPATIENT
Start: 2021-12-09 | End: 2021-12-09 | Stop reason: HOSPADM

## 2021-12-09 RX ORDER — OXYCODONE HYDROCHLORIDE 5 MG/1
10 TABLET ORAL EVERY 4 HOURS PRN
Status: DISCONTINUED | OUTPATIENT
Start: 2021-12-09 | End: 2021-12-10 | Stop reason: HOSPADM

## 2021-12-09 RX ORDER — SERTRALINE HYDROCHLORIDE 25 MG/1
25 TABLET, FILM COATED ORAL DAILY
Status: DISCONTINUED | OUTPATIENT
Start: 2021-12-10 | End: 2021-12-10 | Stop reason: HOSPADM

## 2021-12-09 RX ORDER — FENTANYL CITRATE 50 UG/ML
INJECTION, SOLUTION INTRAMUSCULAR; INTRAVENOUS PRN
Status: DISCONTINUED | OUTPATIENT
Start: 2021-12-09 | End: 2021-12-09

## 2021-12-09 RX ORDER — OXYCODONE HYDROCHLORIDE 5 MG/1
5-10 TABLET ORAL EVERY 4 HOURS PRN
Qty: 30 TABLET | Refills: 0 | Status: SHIPPED | OUTPATIENT
Start: 2021-12-09 | End: 2021-12-10

## 2021-12-09 RX ORDER — CEFAZOLIN SODIUM 2 G/100ML
2 INJECTION, SOLUTION INTRAVENOUS
Status: COMPLETED | OUTPATIENT
Start: 2021-12-09 | End: 2021-12-09

## 2021-12-09 RX ORDER — KETOROLAC TROMETHAMINE 15 MG/ML
15 INJECTION, SOLUTION INTRAMUSCULAR; INTRAVENOUS EVERY 6 HOURS
Status: DISCONTINUED | OUTPATIENT
Start: 2021-12-09 | End: 2021-12-10 | Stop reason: HOSPADM

## 2021-12-09 RX ORDER — NALOXONE HYDROCHLORIDE 0.4 MG/ML
0.4 INJECTION, SOLUTION INTRAMUSCULAR; INTRAVENOUS; SUBCUTANEOUS
Status: DISCONTINUED | OUTPATIENT
Start: 2021-12-09 | End: 2021-12-10 | Stop reason: HOSPADM

## 2021-12-09 RX ORDER — MAGNESIUM SULFATE HEPTAHYDRATE 40 MG/ML
INJECTION, SOLUTION INTRAVENOUS PRN
Status: DISCONTINUED | OUTPATIENT
Start: 2021-12-09 | End: 2021-12-09

## 2021-12-09 RX ORDER — ACETAMINOPHEN 325 MG/1
650 TABLET ORAL EVERY 4 HOURS PRN
Qty: 100 TABLET | Refills: 0 | Status: SHIPPED | OUTPATIENT
Start: 2021-12-09 | End: 2021-12-14 | Stop reason: DRUGHIGH

## 2021-12-09 RX ORDER — TRAMADOL HYDROCHLORIDE 50 MG/1
50 TABLET ORAL EVERY 6 HOURS PRN
Qty: 30 TABLET | Refills: 0 | Status: SHIPPED | OUTPATIENT
Start: 2021-12-09 | End: 2021-12-16

## 2021-12-09 RX ORDER — ACETAMINOPHEN 325 MG/1
975 TABLET ORAL EVERY 8 HOURS
Status: DISCONTINUED | OUTPATIENT
Start: 2021-12-09 | End: 2021-12-10 | Stop reason: HOSPADM

## 2021-12-09 RX ORDER — GLYCOPYRROLATE 0.2 MG/ML
INJECTION, SOLUTION INTRAMUSCULAR; INTRAVENOUS PRN
Status: DISCONTINUED | OUTPATIENT
Start: 2021-12-09 | End: 2021-12-09

## 2021-12-09 RX ORDER — AMOXICILLIN 250 MG
1-2 CAPSULE ORAL 2 TIMES DAILY
Qty: 30 TABLET | Refills: 0 | Status: SHIPPED | OUTPATIENT
Start: 2021-12-09 | End: 2021-12-14 | Stop reason: DRUGHIGH

## 2021-12-09 RX ADMIN — PHENYLEPHRINE HYDROCHLORIDE 100 MCG: 10 INJECTION INTRAVENOUS at 14:27

## 2021-12-09 RX ADMIN — FENTANYL CITRATE 25 MCG: 50 INJECTION, SOLUTION INTRAMUSCULAR; INTRAVENOUS at 15:32

## 2021-12-09 RX ADMIN — HYDROMORPHONE HYDROCHLORIDE 0.2 MG: 0.2 INJECTION, SOLUTION INTRAMUSCULAR; INTRAVENOUS; SUBCUTANEOUS at 16:00

## 2021-12-09 RX ADMIN — OXYCODONE HYDROCHLORIDE 5 MG: 5 TABLET ORAL at 18:49

## 2021-12-09 RX ADMIN — PROPOFOL 75 MCG/KG/MIN: 10 INJECTION, EMULSION INTRAVENOUS at 13:58

## 2021-12-09 RX ADMIN — HYDROMORPHONE HYDROCHLORIDE 0.2 MG: 0.2 INJECTION, SOLUTION INTRAMUSCULAR; INTRAVENOUS; SUBCUTANEOUS at 15:48

## 2021-12-09 RX ADMIN — KETOROLAC TROMETHAMINE 15 MG: 30 INJECTION, SOLUTION INTRAMUSCULAR at 15:15

## 2021-12-09 RX ADMIN — PROPOFOL 30 MG: 10 INJECTION, EMULSION INTRAVENOUS at 13:59

## 2021-12-09 RX ADMIN — FENTANYL CITRATE 25 MCG: 50 INJECTION, SOLUTION INTRAMUSCULAR; INTRAVENOUS at 14:00

## 2021-12-09 RX ADMIN — ONDANSETRON 4 MG: 2 INJECTION INTRAMUSCULAR; INTRAVENOUS at 15:07

## 2021-12-09 RX ADMIN — HYDROMORPHONE HYDROCHLORIDE 0.2 MG: 0.2 INJECTION, SOLUTION INTRAMUSCULAR; INTRAVENOUS; SUBCUTANEOUS at 16:10

## 2021-12-09 RX ADMIN — ACETAMINOPHEN 975 MG: 325 TABLET, FILM COATED ORAL at 18:49

## 2021-12-09 RX ADMIN — FENTANYL CITRATE 25 MCG: 50 INJECTION, SOLUTION INTRAMUSCULAR; INTRAVENOUS at 15:40

## 2021-12-09 RX ADMIN — SODIUM CHLORIDE, POTASSIUM CHLORIDE, SODIUM LACTATE AND CALCIUM CHLORIDE: 600; 310; 30; 20 INJECTION, SOLUTION INTRAVENOUS at 19:25

## 2021-12-09 RX ADMIN — MAGNESIUM SULFATE HEPTAHYDRATE 2 G: 40 INJECTION, SOLUTION INTRAVENOUS at 14:04

## 2021-12-09 RX ADMIN — TRANEXAMIC ACID 1950 MG: 650 TABLET ORAL at 11:14

## 2021-12-09 RX ADMIN — KETOROLAC TROMETHAMINE 15 MG: 15 INJECTION, SOLUTION INTRAMUSCULAR; INTRAVENOUS at 20:10

## 2021-12-09 RX ADMIN — SODIUM CHLORIDE, POTASSIUM CHLORIDE, SODIUM LACTATE AND CALCIUM CHLORIDE: 600; 310; 30; 20 INJECTION, SOLUTION INTRAVENOUS at 15:13

## 2021-12-09 RX ADMIN — SODIUM CHLORIDE, POTASSIUM CHLORIDE, SODIUM LACTATE AND CALCIUM CHLORIDE: 600; 310; 30; 20 INJECTION, SOLUTION INTRAVENOUS at 12:13

## 2021-12-09 RX ADMIN — PHENYLEPHRINE HYDROCHLORIDE 100 MCG: 10 INJECTION INTRAVENOUS at 14:12

## 2021-12-09 RX ADMIN — SODIUM CHLORIDE, POTASSIUM CHLORIDE, SODIUM LACTATE AND CALCIUM CHLORIDE: 600; 310; 30; 20 INJECTION, SOLUTION INTRAVENOUS at 18:54

## 2021-12-09 RX ADMIN — HYDROMORPHONE HYDROCHLORIDE 0.2 MG: 0.2 INJECTION, SOLUTION INTRAMUSCULAR; INTRAVENOUS; SUBCUTANEOUS at 15:55

## 2021-12-09 RX ADMIN — MIDAZOLAM HYDROCHLORIDE 1 MG: 1 INJECTION, SOLUTION INTRAMUSCULAR; INTRAVENOUS at 12:21

## 2021-12-09 RX ADMIN — BUPIVACAINE HYDROCHLORIDE 15 ML: 5 INJECTION, SOLUTION EPIDURAL; INTRACAUDAL; PERINEURAL at 13:19

## 2021-12-09 RX ADMIN — CEFAZOLIN 1 G: 1 INJECTION, POWDER, FOR SOLUTION INTRAMUSCULAR; INTRAVENOUS at 22:14

## 2021-12-09 RX ADMIN — ASPIRIN 325 MG: 325 TABLET, COATED ORAL at 18:49

## 2021-12-09 RX ADMIN — CEFAZOLIN SODIUM 2 G: 2 INJECTION, SOLUTION INTRAVENOUS at 13:48

## 2021-12-09 RX ADMIN — SENNOSIDES AND DOCUSATE SODIUM 1 TABLET: 50; 8.6 TABLET ORAL at 20:11

## 2021-12-09 RX ADMIN — BUPIVACAINE HYDROCHLORIDE IN DEXTROSE 12 MG: 7.5 INJECTION, SOLUTION SUBARACHNOID at 13:56

## 2021-12-09 RX ADMIN — LIDOCAINE HYDROCHLORIDE 60 MG: 20 INJECTION, SOLUTION INFILTRATION; PERINEURAL at 13:59

## 2021-12-09 RX ADMIN — GLYCOPYRROLATE 0.1 MG: 0.2 INJECTION, SOLUTION INTRAMUSCULAR; INTRAVENOUS at 14:00

## 2021-12-09 ASSESSMENT — LIFESTYLE VARIABLES: TOBACCO_USE: 1

## 2021-12-09 ASSESSMENT — ENCOUNTER SYMPTOMS: SEIZURES: 0

## 2021-12-09 ASSESSMENT — MIFFLIN-ST. JEOR: SCORE: 1243.36

## 2021-12-09 NOTE — BRIEF OP NOTE
[facility]    Brief Operative Note    Pre-operative diagnosis: Primary osteoarthritis of right knee [M17.11]  Post-operative diagnosis Same as pre-operative diagnosis    Procedure: Procedure(s):  RIGHT TOTAL KNEE ARTHROPLASTY  Surgeon: Surgeon(s) and Role:     * Earl Perez MD - Primary     * Herbre Metcalf PA-C - Assisting  Anesthesia: Spinal   Estimated Blood Loss: 30 mL from 12/9/2021  1:48 PM to 12/9/2021  3:26 PM      Drains: None  Specimens: * No specimens in log *  Findings:   None.  Complications: None.  Implants:   Implant Name Type Inv. Item Serial No.  Lot No. LRB No. Used Action   BONE CEMENT SIMPLEX FULL DOSE 6191-1-001 - [id number] Cement, Bone BONE CEMENT SIMPLEX FULL DOSE 6191-1-001  [employer] [id number] Right 2 Implanted   IMP COMP FEM STRK TRIATHLN CR RT 5 5510-F-502 - [id number] Total Joint Component/Insert IMP COMP FEM STRK TRIATHLN CR RT 5 5510-F-502  [employer] [id number] Right 1 Implanted   IMP BASEPLATE TIBIAL HOWM TRI 5 5520-B-500 - [id number] Total Joint Component/Insert IMP BASEPLATE TIBIAL HOWM TRI 5 5520-B-500  [employer] [id number] Right 1 Implanted   SIZE 5 CS 9MM TIBIAL BEARING    FAM [id number] Right 1 Implanted   IMP COMP PATELLA TRI 31X9MM 5550-L-319 - [id number] Total Joint Component/Insert IMP COMP PATELLA TRI 31X9MM 5550-L-319  [employer] [id number] Right 1 Implanted

## 2021-12-09 NOTE — OP NOTE
[facility]  Operative Note    Cemented Total Knee Arthroplasty     Cecily Ivory MRN# [id number]   YOB: 1940  Procedure Date:  12/9/2021  Age: 81 year old       PREOPERATIVE DIAGNOSIS:   Degenerative osteoarthritis, primary,right  knee.    POSTOPERATIVE DIAGNOSIS:   Degenerative osteoarthritis, primary, right} knee.    OPERATIVE PROCEDURE:  Right total knee arthroplasty.    SURGEON:  Earl Perez M.D.    FIRST ASSISTANT:  Herber Metcalf PA-C whose assistance was critical for positioning, exposure during bony resection and implantation as well as closure.     ANESTHESIA:  Block with spinal.      INDICATIONS:  Cecily Ivory is a 81 year old-year-old  with advanced primary degenerative tricompartmental osteoarthritis of the right knee.  They have failed nonoperative care with anti-inflammatory drugs, corticosteroid injections, and reduced activity.   Discussed continued nonoperative management versus arthroplasty.  The risks for arthroplasty discussed including but not limited to bleeding, infection and instrument neurovascular structures and tendons, continued knee pain and stiffness, blood clots to go to the heart, lung or brain, anesthetic complications and even death.  They understand and wish to proceed.  Consent signed.        ESTIMATED BLOOD LOSS:  30 mL.       IMPLANTS:  María Triatholon System CR Size 5 Femur, size 5 tibia, , size 31 mm patella.  The cruciate retaining CS poly size 9.  These sizes matched contralateral side.      FINDINGS:  Tricompartmental osteoarthritis of the knee.        DESCRIPTION OF PROCEDURE:  The patient identified in the preoperative holding area per hospital policy and the correct site marked to the OR on the OR table in Ancef and TXA given.  Chlorohexidine prescrub and ChloraPrep, standard drape.  Time out performed, all in the room agreed.       A midline incision to the knee and medial parapatellar arthrotomy.  Resected the fat pad and  limited medial release.  Removed the meniscus and ACL.  Inserted intramedullary femoral guide elizabeth at 5 degrees valgus for 8 mm cut from the distal femur which was made while protecting collateral structures.  All cuts were made while protecting the collateral and posterior structures.  Used a sizing guide and sized the femur with the posterior reference to a size 5, insuring that it would not notch.   Assessed Whitley's and epicondylar axis and set for 3 degrees of external rotation.  Used angle wing with cutting block in place to check for notching.  The 4 chamfer cuts were made while protecting collateral structures.       Turned attention to the tibia using extramedullary tibia guide, lined with anterior tibialis, tibia spine and second ray with appropriate slope and taken just over 2 mm off the low point of the medial side.  Tibia was cut while protecting collateral and posterior structures and the remaining meniscus were removed.   Resected posterior osteophytes and injected the posterior capsule with a joint injection cocktail including ketorolac, morphine, epinephrine and Marcaine. Spacer block placed and appropriately stable in 90 degrees of flexion and full extension with a drop elizabeth well aligned.       Sized the tibia with and selected a 6.  Prepared for the tibia in appropriate rotation, placed the femoral trial and a 9 mm trial.  The knee came out to full extension, was quite stable.  Also at 40 degrees flexion stable to varus/valgus, able flex to 125 degrees.  Stable anterior to posterior.  Used the patellar cutting guide to make resection for 31 mm patella.  The patella trial tracked appropriately.       All trials were removed.  Copiously irrigated with pulse lavage and dried bone surfaces.       Components were cemented into place and all visible excess cement was removed.   Cement allowed to harden with knee in full extension.  Rush Betadine lavage protocol and then thoroughly irrigated.  1 gram  vancomycin placed deep.  Used a 2-0 barbed Stratafix suture to close the arthrotomy followed by 0 Vicryl fatty layer, 2-0 Stratafix subcutaneous and subcuticular followed by Exofin glue, and sterile dressing applied.  Tourniquet let down.  Awakened, transferred stable in the PACU.       POSTOPERATIVE PLAN:   1.  Weightbearing as tolerated.   2.  Physical therapy.   3.  SCD's and Aspirin for DVT prophylaxis, 325 mg daily x6 weeks.   4.  24 hours antibiotics  5.  Followup plan 2-week wound check and dressing off, no x-rays.     6.  8-week follow up with repeat x-rays of the knee.     [facility]    Monday 1-5 PM  and Thursday 7:30-12:00 AM\  [address] [place] [address]  [phone]    [phone]    Or    Tuesday 7:30-5 PM  [address] [facility] [address]  [place], [place]        ARMANI WASHINGTON MD

## 2021-12-09 NOTE — PROGRESS NOTES
Call from [employer]. Patient has pre registered for TCU after surgery. Writer sent facesheet via DOD per [employer] request. They expect to have a room available for patient tomorrow.    АНДРЕЙ Dailey

## 2021-12-09 NOTE — PROGRESS NOTES
PTA medications updated by Medication Scribe prior to surgery via phone call with patient (last doses completed by Nurse)     Medication history sources: Patient, Surescripts, H&P and Patient's home med list  In the past week, patient estimated taking medication this percent of the time: Greater than 90%  Adherence assessment: N/A Not Observed    Significant changes made to the medication list:  None      Additional medication history information:   Patient brought own home meds: Theratears Eye Solution    Medication reconciliation completed by provider prior to medication history? No    Time spent in this activity: 25 minutes    The information provided in this note is only as accurate as the sources available at the time of update(s)  Prior to Admission medications    Medication Sig Last Dose Taking? Auth Provider   Ascorbic Acid (VITAMIN C PO) Take 500 mg by mouth daily. 12/8/2021 at AM Yes Reported, Patient   aspirin 81 MG EC tablet Take 81 mg by mouth daily 12/3/2021 at AM Yes Reported, Patient   Biotin 5000 MCG CAPS Take 1 tablet by mouth At Bedtime  12/8/2021 at PM Yes Reported, Patient   calcium carb 1250 mg, 500 mg [place],/vitamin D 200 units (OSCAL WITH D) 500-200 MG-UNIT per tablet Take 1 tablet by mouth daily  12/8/2021 at AM Yes Ellis Ruiz MD   Carboxymethylcellulose Sodium (THERATEARS) 0.25 % SOLN Place 1 drop into both eyes every evening 12/8/2021 at PM Yes Unknown, Entered By History   Cholecalciferol (VITAMIN D3) 50 MCG (2000 UT) CAPS Take 2,000 Units by mouth daily  12/8/2021 at AM Yes Reported, Patient   CYANOCOBALAMIN PO Take 1,000 mcg by mouth daily 12/8/2021 at PM Yes Reported, Patient   fish oil-omega-3 fatty acids 1000 MG capsule Take 2 g by mouth daily 12/8/2021 at AM Yes Reported, Patient   loratadine (CLARITIN) 10 MG tablet Take 10 mg by mouth daily  12/9/2021 at AM Yes Ellis Ruiz MD   lovastatin (MEVACOR) 40 MG tablet Take 1 tablet (40 mg) by mouth At Bedtime 12/8/2021  at PM Yes Ellis Ruiz MD   magnesium 100 MG CAPS Take 1 tablet by mouth daily  12/8/2021 at AM Yes Reported, Patient   metoprolol succinate ER (TOPROL-XL) 25 MG 24 hr tablet Take 1 tablet (25 mg) by mouth daily 12/9/2021 at AM Yes Ellis Ruiz MD   Multiple Vitamins-Minerals (CENTRUM SILVER) per tablet Take 1 tablet by mouth daily 12/8/2021 at AM Yes Reported, Patient   sertraline (ZOLOFT) 25 MG tablet Take 1 tablet (25 mg) by mouth daily 12/9/2021 at AM Yes Ellis Ruiz MD     Medication history completed by:    London Carlson CPhT  Medication [facility]

## 2021-12-09 NOTE — ANESTHESIA POSTPROCEDURE EVALUATION
Patient: Cecily Ivory    Procedure: Procedure(s):  RIGHT TOTAL KNEE ARTHROPLASTY       Diagnosis:Primary osteoarthritis of right knee [M17.11]  Diagnosis Additional Information: No value filed.    Anesthesia Type:  Spinal    Note:     Postop Pain Control: Uneventful            Sign Out: Well controlled pain   PONV: No   Neuro/Psych: Uneventful            Sign Out: Acceptable/Baseline neuro status   Airway/Respiratory: Uneventful            Sign Out: Acceptable/Baseline resp. status   CV/Hemodynamics: Uneventful            Sign Out: Acceptable CV status; No obvious hypovolemia; No obvious fluid overload   Other NRE: NONE   DID A NON-ROUTINE EVENT OCCUR? No           Last vitals:  Vitals Value Taken Time   /63 12/09/21 1620   Temp 36.3  C (97.4  F) 12/09/21 1527   Pulse 59 12/09/21 1630   Resp 10 12/09/21 1630   SpO2 94 % 12/09/21 1630   Vitals shown include unvalidated device data.    Electronically Signed By: Kevin Diehl MD  December 9, 2021  4:32 PM

## 2021-12-09 NOTE — OR NURSING
Late entry:  Pt had a run of V tach.  Dr. Diehl notified.  Pt asymptomatic.  VSS. 12 lead EKG obtained. Daughter at bedside.

## 2021-12-09 NOTE — PROGRESS NOTES
ELTON Diehl at bedside for sign-out. Patient approved to transfer to Ortho unit.  Belonging returned to patient. Daughter notified via text-ready for transfer to inpatient unit. RN will follow-up with phonecall with room number.

## 2021-12-09 NOTE — ANESTHESIA PROCEDURE NOTES
Adductor canal and Femoral Procedure Note    Pre-Procedure   Staff -        Anesthesiologist:  Kevin Diehl MD       Performed By: anesthesiologist       Location: pre-op       Pre-Anesthestic Checklist: patient identified, IV checked, site marked, risks and benefits discussed, informed consent, monitors and equipment checked, pre-op evaluation, at physician/surgeon's request and post-op pain management  Timeout:       Correct Patient: Yes        Correct Procedure: Yes        Correct Site: Yes        Correct Position: Yes        Correct Laterality: Yes        Site Marked: Yes  Procedure Documentation  Procedure: Adductor canal, Femoral       Patient Position: supine       Patient Prep/Sterile Barriers: sterile gloves, mask, patient draped       Skin prep: Chloraprep      Local skin infiltrated with mL of 1% lidocaine.        Needle Type: other       Needle Gauge: 22.        Needle Length (millimeters): 90        Ultrasound guided       1. Ultrasound was used to identify targeted nerve, plexus, vascular marker, or fascial plane and place a needle adjacent to it in real-time.       2. Ultrasound was used to visualize the spread of anesthetic in close proximity to the above referenced structure.       3. A permanent image is entered into the patient's record.       4. The visualized anatomic structures appeared normal.       5. There were no apparent abnormal pathologic findings.    Assessment/Narrative         The placement was negative for: blood aspirated, painful injection and site bleeding       Paresthesias: No.     Bolus given via needle..        Secured via.        Insertion/Infusion Method: Single Shot       Complications: none    Medication(s) Administered   Bupivacaine 0.5% w/ 1:400K Epi (Injection), 15 mL  Medication Administration Time: 12/9/2021 1:19 PM     Comments:  Bupivacaine 0.5% with 1:400,000 epi 15ml   Patient sedated but commutative throughout the procedure.   Patient tolerated  well.    Ultrasound Interpretation, peripheral nerve block    1.  Under ultrasound guidance, the needle was inserted and placed in close proximity to the nerve.  2. Ultrasound was also used to visualize the spread of the anesthetic in close proximity to the nerve being blocked.  3. The nerve(s) appeared anatomically normal.   4. There were no apparent abnormal pathological findings.  5. A permanent ultrasound image was saved n the patient's record.    The surgeon has given a verbal order transferring this pt to me for a performance of regional analgesia block for post op pain control. It is requested of me because I am trained and qualifed to perform this block and the surgeon is nether trained nor qualified to perform this procedure.

## 2021-12-09 NOTE — ANESTHESIA PREPROCEDURE EVALUATION
Anesthesia Pre-Procedure Evaluation    Patient: Cecily Ivory   MRN: [id number] : 1940        Preoperative Diagnosis: Primary osteoarthritis of right knee [M17.11]    Procedure : Procedure(s):  RIGHT TOTAL KNEE ARTHROPLASTY          Past Medical History:   Diagnosis Date     Abdominal pain ,     ct liver and renal cyst and 2mm lung nodule, repeat ct done  and no significantly abnl.     Anxiety     added med 3/14     Arthritis      ASCUS of cervix with negative high risk HPV 2018     Bilateral subdural hematomas (H) 2020    found incidentally on mri done for memory loss     Carotid bruit     us nl     Gastritis     egd done by [facility]     GERD (gastroesophageal reflux disease)      Gross hematuria     cysto and ct neg     History of colonoscopy ,     nl     Hypercholesteremia     stopped lovastatin , added lipitor 3/16     Insomnia     using otc      Lumbar spinal stenosis     Dr. Wilde, had epidural     Lung nodule ,     seen on ct for abd pain, fu done 3/13 and 2 nodules stable and benign, one new one needs fu 1 year.  fu done 3/14 and fu 1 year and then done; fu done 3/15 and unchanged, no fu needed     MCI (mild cognitive impairment)     seen by neuro, Dr. Burnham     Myocardial infarction (H) 2020    elev trop but clean coronaries on angio, felt to be stress induced cmyop     Odynophagia 2004    egd nl     Other chronic pain      Peripheral neuropathies     Dr. Kim     Screening ,     nl dexa     Seasonal allergies      Takotsubo cardiomyopathy 2020    hosp fsd, ef 40%, mod ai, clean coronaries; fu echo nl      Vaginal dysplasia     chronic VAIN I, many colpos of vagina.  Now we only do an annual pap of vagina, no colpo since stable long term     Weight loss 2018    ct chest, abd and pelvis neg      Past Surgical History:   Procedure Laterality Date     ANKLE SURGERY       APPENDECTOMY  13     ARTHROPLASTY  KNEE Left 2018    Procedure: ARTHROPLASTY KNEE;  Left total knee arthroplasty;  Surgeon: William Pollard MD;  Location: [facility] OR     BREAST BIOPSY, CORE RT/LT       C VAGINAL HYSTERECTOMY       CATARACT IOL, RT/LT  2019     CV HEART CATHETERIZATION WITH POSSIBLE INTERVENTION N/A 2020    Procedure: Heart Catheterization with Possible Intervention;  Surgeon: Bibiana Ruggiero MD;  Location:  HEART CARDIAC CATH LAB     GYN SURGERY      BSO     HYSTERECTOMY, PAP NO LONGER INDICATED       LAPAROSCOPIC CHOLECYSTECTOMY       NOSE SURGERY       right knee arthroscopy       thumb surgery Left 2015     [facility] UGI ENDOSCOPY, SIMPLE EXAM  03/15/11    [facility]      Allergies   Allergen Reactions     Latex Cough     Seasonal Allergies      YEAR ROUND ALLERGIES     Sulfa Drugs Unknown      Social History     Tobacco Use     Smoking status: Former Smoker     Packs/day: 1.50     Years: 20.00     Pack years: 30.00     Types: Cigarettes     Quit date: 1992     Years since quittin.8     Smokeless tobacco: Never Used   Substance Use Topics     Alcohol use: Yes     Alcohol/week: 2.0 - 4.0 standard drinks     Types: 2 - 4 Glasses of wine per week     Comment: 2 glasses 4 times per week      Wt Readings from Last 1 Encounters:   21 73.6 kg (162 lb 3.2 oz)        Anesthesia Evaluation   Pt has had prior anesthetic.     No history of anesthetic complications       ROS/MED HX  ENT/Pulmonary:     (+) tobacco use, Past use,  (-) sleep apnea   Neurologic: Comment: Hx of bilateral SDH   Memory loss    (+) peripheral neuropathy,  (-) no seizures and migraines   Cardiovascular:     (+) Dyslipidemia hypertension--CAD -past MI -- (-) taking anticoagulants/antiplatelets, stent and CABG   METS/Exercise Tolerance:     Hematologic:       Musculoskeletal:   (+) arthritis,     GI/Hepatic:     (+) GERD, Asymptomatic on medication,  (-) liver disease   Renal/Genitourinary:    (-) renal disease    Endo:    (-) Type II DM and thyroid disease   Psychiatric/Substance Use:       Infectious Disease:       Malignancy:       Other:      (+) , H/O Chronic Pain,        Physical Exam    Airway        Mallampati: II   TM distance: > 3 FB   Neck ROM: full   Mouth opening: > 3 cm    Respiratory Devices and Support         Dental  no notable dental history         Cardiovascular   cardiovascular exam normal          Pulmonary   pulmonary exam normal                OUTSIDE LABS:  CBC:   Lab Results   Component Value Date    WBC 7.8 10/05/2021    WBC 6.9 09/24/2020    HGB 13.6 12/09/2021    HGB 14.3 10/05/2021    HCT 42.1 10/05/2021    HCT 44.0 09/24/2020     10/05/2021     09/24/2020     BMP:   Lab Results   Component Value Date     10/05/2021     09/24/2020    POTASSIUM 4.6 10/05/2021    POTASSIUM 4.0 09/24/2020    CHLORIDE 105 10/05/2021    CHLORIDE 105 09/24/2020    CO2 30 10/05/2021    CO2 30 09/24/2020    BUN 14 10/05/2021    BUN 16 09/24/2020    CR 0.82 10/05/2021    CR 0.8 09/03/2021    GLC 93 10/05/2021    GLC 83 09/24/2020     COAGS:   Lab Results   Component Value Date    INR 1.06 09/24/2020     POC: No results found for: BGM, HCG, HCGS  HEPATIC:   Lab Results   Component Value Date    ALBUMIN 3.8 10/05/2021    PROTTOTAL 7.3 10/05/2021    ALT 34 10/05/2021    AST 26 10/05/2021    ALKPHOS 55 10/05/2021    BILITOTAL 0.5 10/05/2021     OTHER:   Lab Results   Component Value Date    A1C 5.3 07/12/2018    SUGAR 9.2 10/05/2021    MAG 2.1 01/09/2020    LIPASE 85 01/07/2020    TSH 1.64 06/07/2021       Anesthesia Plan    ASA Status:  3      Anesthesia Type: Spinal.              Consents    Anesthesia Plan(s) and associated risks, benefits, and realistic alternatives discussed. Questions answered and patient/representative(s) expressed understanding.    - Discussed:     - Discussed with:  Patient         Postoperative Care    Pain management: Multi-modal analgesia, Peripheral nerve block (Single  Shot).   PONV prophylaxis: Ondansetron (or other 5HT-3), Background Propofol Infusion     Comments:                Kevin Diehl MD

## 2021-12-09 NOTE — ANESTHESIA PROCEDURE NOTES
Intrathecal Procedure Note    Pre-Procedure   Staff -        Anesthesiologist:  Kevin Diehl MD       Performed By: anesthesiologist       Location: OR       Pre-Anesthestic Checklist: patient identified, IV checked, site marked, risks and benefits discussed, informed consent, monitors and equipment checked, pre-op evaluation, at physician/surgeon's request and post-op pain management  Timeout:       Correct Patient: Yes        Correct Procedure: Yes        Correct Site: Yes        Correct Position: Yes   Procedure Documentation  Procedure: intrathecal       Patient Position: sitting       Patient Prep/Sterile Barriers: sterile gloves, mask, patient draped       Skin prep: Betadine       Insertion Site: L3-4. (midline approach).       Needle Gauge: 24.        Needle Length (Inches): 4        Spinal Needle Type: Pencan       Introducer used      # of attempts: 1 and  # of redirects:     Assessment/Narrative         Paresthesias: No.       CSF fluid: clear.     Comments:  No complications

## 2021-12-10 ENCOUNTER — APPOINTMENT (OUTPATIENT)
Dept: PHYSICAL THERAPY | Facility: CLINIC | Age: 81
End: 2021-12-10
Attending: PHYSICIAN ASSISTANT
Payer: COMMERCIAL

## 2021-12-10 VITALS
SYSTOLIC BLOOD PRESSURE: 111 MMHG | DIASTOLIC BLOOD PRESSURE: 55 MMHG | WEIGHT: 162.2 LBS | TEMPERATURE: 98.1 F | HEART RATE: 57 BPM | BODY MASS INDEX: 25.46 KG/M2 | OXYGEN SATURATION: 93 % | RESPIRATION RATE: 16 BRPM | HEIGHT: 67 IN

## 2021-12-10 LAB
FASTING STATUS PATIENT QL REPORTED: NO
GLUCOSE BLD-MCNC: 113 MG/DL (ref 70–99)
HGB BLD-MCNC: 10.5 G/DL (ref 11.7–15.7)

## 2021-12-10 PROCEDURE — 97110 THERAPEUTIC EXERCISES: CPT | Mod: GP

## 2021-12-10 PROCEDURE — 36415 COLL VENOUS BLD VENIPUNCTURE: CPT | Performed by: ORTHOPAEDIC SURGERY

## 2021-12-10 PROCEDURE — 97116 GAIT TRAINING THERAPY: CPT | Mod: GP

## 2021-12-10 PROCEDURE — 999N000111 HC STATISTIC OT IP EVAL DEFER: Performed by: OCCUPATIONAL THERAPIST

## 2021-12-10 PROCEDURE — 250N000011 HC RX IP 250 OP 636: Performed by: PHYSICIAN ASSISTANT

## 2021-12-10 PROCEDURE — 97161 PT EVAL LOW COMPLEX 20 MIN: CPT | Mod: GP

## 2021-12-10 PROCEDURE — 82947 ASSAY GLUCOSE BLOOD QUANT: CPT | Performed by: ORTHOPAEDIC SURGERY

## 2021-12-10 PROCEDURE — 250N000013 HC RX MED GY IP 250 OP 250 PS 637: Performed by: PHYSICIAN ASSISTANT

## 2021-12-10 PROCEDURE — 85018 HEMOGLOBIN: CPT | Performed by: PHYSICIAN ASSISTANT

## 2021-12-10 RX ORDER — OXYCODONE HYDROCHLORIDE 5 MG/1
5-10 TABLET ORAL EVERY 4 HOURS PRN
Qty: 30 TABLET | Refills: 0 | Status: SHIPPED | OUTPATIENT
Start: 2021-12-10 | End: 2021-12-16

## 2021-12-10 RX ADMIN — SERTRALINE HYDROCHLORIDE 25 MG: 25 TABLET ORAL at 09:00

## 2021-12-10 RX ADMIN — CEFAZOLIN 1 G: 1 INJECTION, POWDER, FOR SOLUTION INTRAMUSCULAR; INTRAVENOUS at 06:32

## 2021-12-10 RX ADMIN — KETOROLAC TROMETHAMINE 15 MG: 15 INJECTION, SOLUTION INTRAMUSCULAR; INTRAVENOUS at 03:37

## 2021-12-10 RX ADMIN — METOPROLOL SUCCINATE 25 MG: 25 TABLET, EXTENDED RELEASE ORAL at 09:00

## 2021-12-10 RX ADMIN — ACETAMINOPHEN 975 MG: 325 TABLET, FILM COATED ORAL at 11:34

## 2021-12-10 RX ADMIN — ASPIRIN 325 MG: 325 TABLET, COATED ORAL at 09:00

## 2021-12-10 RX ADMIN — OXYCODONE HYDROCHLORIDE 5 MG: 5 TABLET ORAL at 00:18

## 2021-12-10 RX ADMIN — KETOROLAC TROMETHAMINE 15 MG: 15 INJECTION, SOLUTION INTRAMUSCULAR; INTRAVENOUS at 09:00

## 2021-12-10 RX ADMIN — ONDANSETRON 4 MG: 2 INJECTION INTRAMUSCULAR; INTRAVENOUS at 00:18

## 2021-12-10 RX ADMIN — OXYCODONE HYDROCHLORIDE 5 MG: 5 TABLET ORAL at 09:00

## 2021-12-10 RX ADMIN — ACETAMINOPHEN 975 MG: 325 TABLET, FILM COATED ORAL at 03:37

## 2021-12-10 NOTE — PROGRESS NOTES
Orthopedic Surgery  Cecily Ivory  12/10/2021  Admit Date:  2021  POD 1 Day Post-Op  S/P Procedure(s):  RIGHT TOTAL KNEE ARTHROPLASTY    Patient is feeling good.  Pain controlled.  Tolerating oral intake.  No events overnight. Discussed discharge instructions.     Alert and orient to person, place, and time.  Vital Sign Ranges  Temperature Temp  Av.7  F (36.5  C)  Min: 97.4  F (36.3  C)  Max: 98.1  F (36.7  C)   Blood pressure Systolic (24hrs), Av , Min:109 , Max:142        Diastolic (24hrs), Av, Min:43, Max:70      Pulse Pulse  Av.8  Min: 53  Max: 83   Respirations Resp  Av.3  Min: 10  Max: 30   Pulse oximetry SpO2  Av %  Min: 90 %  Max: 99 %       Right knee post-operative dressing is clean, dry, and intact. Minimal erythema of the surrounding skin.  Bilateral calves are soft, non-tender.  bilateral lower extremities are NVI.    Labs:  Recent Labs   Lab Test 10/05/21  1033 20  1417 20  0845   POTASSIUM 4.6 4.0 4.1     Recent Labs   Lab Test 21  1105 10/05/21  1033 20  0847   HGB 13.6 14.3 13.6     Recent Labs   Lab Test 20  1417 18  0608 18  0605   INR 1.06 1.49* 1.34*     Recent Labs   Lab Test 10/05/21  1033 20  1417 20  0845    283 217       A/P  1. S/pp right TKA   Continue aspirin for DVT prophylaxis.     Mobilize with PT/OT WBAT.     Continue current pain regimen.    2. Disposition   Anticipate d/c to [facility] today.    Kjerstin L Foss, PA-C     Pt seen and examined by me, agree with above.    Earl Perez MD

## 2021-12-10 NOTE — PROGRESS NOTES
Care Management Discharge Note    Discharge Date: 12/10/2021  Expected Time of Departure: Klatcher wheelchair 1245    Discharge Disposition: Transitional Care    Discharge Services: None    Discharge DME: None    Discharge Transportation: health plan transportation    Private pay costs discussed: private room/amenity fees and transportation costs    PAS Confirmation Code:    Patient/family educated on Medicare website which has current facility and service quality ratings: yes    Education Provided on the Discharge Plan:    Persons Notified of Discharge Plans: yes  Patient/Family in Agreement with the Plan: yes    Handoff Referral Completed: Yes    Additional Information:  Patient accepted to [facility] today. Patient will transport via wheelchair Klatcher at 1230. Discharge orders received for discharge today and faxed to [place]. Writer confirmed ride time with facility, patient and family. PAS completed.     PAS-RR    D: Per DHS regulation, SW completed and submitted PAS-RR to [place] [employer] Direct Connect via the Senior LinkAge Line.  PAS-RR confirmation # is : [id number]    I: SW spoke with daughter and they are aware a PAS-RR has been submitted.  SW reviewed with daughter that they may be contacted for a follow up appointment within 10 days of hospital discharge if their SNF stay is < 30 days.  Contact information for [facility] LinkAge Line was also provided.    A: daughter verbalized understanding.    P: Further questions may be directed to [facility] LinkAge Line at #[phone], option #4 for PAS-RR staff.    Care Transitions staff discussed current COVID 19 pandemic and Medicare waiver of the traditional 3 day stay requirement. Referred patient to medicare.gov, insurance provider, and admissions department at accepting facility for further questions or concerns on coverage for SNF stay.    1. This patient has been evacuated from a nursing home in the emergency area? no  2. This patient is being discharged from a  hospital (in the emergency area) in order to provide care to more seriously ill patients? yes  3. This patient needs SNF care as a result of the emergency, regardless of whether he/she was in a hospital/nursing home prior to this stay? yes              АНДРЕЙ Dailey

## 2021-12-10 NOTE — PROGRESS NOTES
12/10/21 0928   Quick Adds   Quick Adds Certification   Type of Visit Initial PT Evaluation   Living Environment   People in home alone   Current Living Arrangements apartment   Home Accessibility no concerns   Transportation Anticipated health plan transportation   Living Environment Comments Patient lives in a senior living apartment.    Self-Care   Usual Activity Tolerance good   Current Activity Tolerance moderate   Regular Exercise Yes   Activity/Exercise Type walking   Exercise Amount/Frequency daily   Equipment Currently Used at Home cane, straight   Activity/Exercise/Self-Care Comment Patient reports she takes daily walks for exercise, brings a cane for when her knee is sore. She keeps active by going to all of the events at her senior apartment.    Disability/Function   Hearing Difficulty or Deaf no   Wear Glasses or Blind no   Concentrating, Remembering or Making Decisions Difficulty yes   Concentration Management history of mild cognitive impairment   Difficulty Communicating no   Difficulty Eating/Swallowing no   Fall history within last six months no   Change in Functional Status Since Onset of Current Illness/Injury yes   General Information   Onset of Illness/Injury or Date of Surgery 12/09/21   Referring Physician Herber Metcalf PA-C   Patient/Family Therapy Goals Statement (PT) to be able to return to her activities at her apartment complex   Pertinent History of Current Problem (include personal factors and/or comorbidities that impact the POC) Patient is 82 YO F with history of mild cognitive impairment, anxiety, lumbar spinal stenosis, B subdural hematomas, and peripheral neuropathy who is POD #1 s/p R TKA.    Existing Precautions/Restrictions fall   Weight-Bearing Status - LUE full weight-bearing   Weight-Bearing Status - RUE full weight-bearing   Weight-Bearing Status - LLE full weight-bearing   Weight-Bearing Status - RLE weight-bearing as tolerated   General Observations Patient in bed  "upon arrival with daughter Mariaelena present in room. Activity orders: ambulate with assist   Cognition   Orientation Status (Cognition) oriented x 3   Affect/Mental Status (Cognition) WFL   Follows Commands (Cognition) WFL   Safety Deficit (Cognition) judgment   Memory Deficit (Cognition) short-term memory   Cognitive Status Comments Patient forgetful during session.    Pain Assessment   Patient Currently in Pain No   Integumentary/Edema   Integumentary/Edema Comments R LE wrapped in ACE bandage with no drainage noted   Posture    Posture Protracted shoulders;Forward head position   Range of Motion (ROM)   ROM Comment R knee AAROM in supine 4-79 degrees   Strength   Manual Muscle Testing Quick Adds Able to perform R SLR   Strength Comments R knee extension at least 3/5 but functional weakness noted during transfers   Bed Mobility   Bed Mobility supine-sit;sit-supine   Supine-Sit [place] (Bed Mobility) supervision;verbal cues   Sit-Supine [place] (Bed Mobility) supervision;verbal cues   Transfers   Transfers sit-stand transfer   Sit-Stand Transfer   Sit-Stand [place] (Transfers) contact guard;verbal cues   Assistive Device (Sit-Stand Transfers) walker, front-wheeled   Sit/Stand Transfer Comments From edge of bed, patient pulling up on walker   Gait/Stairs (Locomotion)   [place] Level (Gait) minimum assist (75% patient effort)   Assistive Device (Gait) walker, front-wheeled   Distance in Feet (Required for LE Total Joints) 12' during eval + 60' and 160' during treatment   Pattern (Gait) step-to   Deviations/Abnormal Patterns (Gait) right sided deviations;conner decreased;stride length decreased   Right Sided Gait Deviations heel strike decreased   Comment (Gait/Stairs) Patient ambulated to bathroom with FWW and gait belt donned, keeping R LE locked in extension and sliding foot on floor. \"that's what they told me to do\"   Balance   Balance Comments Independent sitting balance; SBA for standing " balance with UE support   Sensory Examination   Sensory Perception patient reports no sensory changes   Clinical Impression   Criteria for Skilled Therapeutic Intervention yes, treatment indicated   PT Diagnosis (PT) impaired mobility   Influenced by the following impairments decreased ROM, weakness, decreased activity tolerance   Functional limitations due to impairments increased difficulty with bed mobility, transfers and gait   Clinical Presentation Stable/Uncomplicated   Clinical Presentation Rationale medical status, stable vital signs, clinical judgement   Clinical Decision Making (Complexity) low complexity   Therapy Frequency (PT) 2x/day   Predicted Duration of Therapy Intervention (days/wks) 4 days   Planned Therapy Interventions (PT) bed mobility training;cryotherapy;gait training;home exercise program;patient/family education;ROM (range of motion);strengthening;transfer training;progressive activity/exercise   Anticipated Equipment Needs at Discharge (PT) walker, rolling   Risk & Benefits of therapy have been explained evaluation/treatment results reviewed;care plan/treatment goals reviewed;risks/benefits reviewed;current/potential barriers reviewed;participants voiced agreement with care plan;participants included;patient   PT Discharge Planning    PT Rationale for DC Rec Patient lives alone in a senior apartment. Per chart review, d/c planned to TCU due to no assist available at home. Anticipate patient will need SBA-CGA for mobility at time of discharge.    Therapy Certification   Start of care date 12/10/21   Certification date from 12/10/21   Certification date to 12/14/21   Medical Diagnosis R TKA   Total Evaluation Time   Total Evaluation Time (Minutes) 8

## 2021-12-10 NOTE — PROGRESS NOTES
Patient vitals stable overnight on room air. Up with assist x1 and GB/walker, voiding. Pain controlled with tylenol and oxycodone.

## 2021-12-10 NOTE — PLAN OF CARE
Physical Therapy Discharge Summary    Reason for therapy discharge:    Discharged to transitional care facility.    Progress towards therapy goal(s). See goals on Care Plan in [facility] electronic health record for goal details.  Goals not met.  Barriers to achieving goals:   discharge from facility and discharge on same date as initial evaluation.    Therapy recommendation(s):    Continue home exercise program.  Patient with planned discharge to TCU to progress independence with mobility prior to return home.

## 2021-12-10 NOTE — PLAN OF CARE
Patient vital signs are at baseline: Yes  Patient able to ambulate as they were prior to admission or with assist devices provided by therapies during their stay:  No,  Reason:  PT continuing to follow   Patient MUST void prior to discharge:  Yes  Patient able to tolerate oral intake:  Yes  Pain has adequate pain control using Oral analgesics:  Yes     Pt to discharge to TCU via wheelchair transport today at 1245 to [facility].

## 2021-12-10 NOTE — PLAN OF CARE
OT: Orders received. Chart reviewed and discussed with care team.  OT not indicated due to:Pt. is s/p TKA, will be discharging to TCU. Confirmed w/ pt. and nursing this is the plan. In order to best utilize OT services, will defer to TCU setting.  Defer discharge recommendations to PT.  Will complete orders.

## 2021-12-10 NOTE — PLAN OF CARE
Patient vital signs are at baseline: Yes  Patient able to ambulate as they were prior to admission or with assist devices provided by therapies during their stay:  No,  Reason:  Yet to see PT  Patient MUST void prior to discharge:  Yes  Patient able to tolerate oral intake:  Yes  Pain has adequate pain control using Oral analgesics:  Yes     Orientation: A & O x 4. Forgetful at baseline.   VS/ O2/ IV: VSS on RA. Capno. JOBY LR @ 75 ml/hr  Mobility: A1 w/ GB and walker. WBAT.   Pain Management: Oxycodone and scheduled tylenol for RLE pain.   Diet: Regular, adequate intake.   Bowel/ Bladder: Continent of Bowel and Bladder. No BM +BS. Adequate output.  Skin: ACE wrap to RLE c/d/I.   CMS: Intact. +2 pulses.   Discharge/ Plan:  PT in morning, will go to TCU.

## 2021-12-11 ASSESSMENT — MIFFLIN-ST. JEOR: SCORE: 1250.62

## 2021-12-13 RX ORDER — AMOXICILLIN 250 MG
1 CAPSULE ORAL 2 TIMES DAILY
COMMUNITY

## 2021-12-13 NOTE — PROGRESS NOTES
"[facility] GERIATRIC SERVICES    PRIMARY CARE PROVIDER AND CLINIC:  Ellis Ruiz MD, [address] / [place] [address]  Chief Complaint   Patient presents with     Hospital F/U      [facility] Record Number:  [id number]  Place of Service where encounter took place:  [facility] (U) [25]    Cecily Ivory  is a 81 year old  (1940), admitted to the above facility from  [facility]. Hospital stay 12/9/21 through 12/10/21..   HPI:    PMH of CAD, cardiomyopathy (resolved), HLD, GERD who presents for elective Right TKA  DJD s/p right TKA on 12/9/21 Dr. Ng  No post op complications noted  ON exam today: patient sitting up in w/c, pleasant, denies pain in knee, states she is \"tired\" no other c/o, denies fever, chills, cough, congestion, N/V/D or constipation, states she had \"3 bowel movements\" this AM.     CODE STATUS/ADVANCE DIRECTIVES DISCUSSION:  Prior  CPR/Full code   ALLERGIES:   Allergies   Allergen Reactions     Latex Cough     Seasonal Allergies      YEAR ROUND ALLERGIES     Sulfa Drugs Unknown      PAST MEDICAL HISTORY:   Past Medical History:   Diagnosis Date     Abdominal pain 2009, 2013    ct liver and renal cyst and 2mm lung nodule, repeat ct done 2013 and no significantly abnl.     Anxiety 2014    added med 3/14     Arthritis      ASCUS of cervix with negative high risk HPV 03/2018     Bilateral subdural hematomas (H) 09/2020    found incidentally on mri done for memory loss     Carotid bruit 2011    us nl     Gastritis 2011    egd done by [facility]     GERD (gastroesophageal reflux disease) 2011     Gross hematuria 2021    cysto and ct neg     History of colonoscopy 2004, 2014    nl     Hypercholesteremia 2000    stopped lovastatin 2015, added lipitor 3/16     Insomnia     using otc      Lumbar spinal stenosis 2016    Dr. Wilde, had epidural     Lung nodule 2009, 2013    seen on ct for abd pain, fu done 3/13 and 2 nodules stable and benign, one new one needs fu 1 " year.  fu done 3/14 and fu 1 year and then done; fu done 3/15 and unchanged, no fu needed     MCI (mild cognitive impairment) 2020    seen by neuro, Dr. Burnham     Myocardial infarction (H) 01/2020    elev trop but clean coronaries on angio, felt to be stress induced cmyop     Odynophagia 2004    egd nl     Other chronic pain      Peripheral neuropathies     Dr. Kim     Screening 2006, 2017    nl dexa     Seasonal allergies      Takotsubo cardiomyopathy 01/2020    hosp fsd, ef 40%, mod ai, clean coronaries; fu echo nl 5/20     Vaginal dysplasia     chronic VAIN I, many colpos of vagina.  Now we only do an annual pap of vagina, no colpo since stable long term     Weight loss 07/2018    ct chest, abd and pelvis neg      PAST SURGICAL HISTORY:   has a past surgical history that includes appendectomy (13); Ankle surgery (2000); Nose surgery (1990); breast biopsy, core rt/lt; right knee arthroscopy (2007); laparoscopic cholecystectomy (2008); VAGINAL HYSTERECTOMY (1995); UPPER GI ENDOSCOPY,EXAM (03/15/11); thumb surgery (Left, 2/2015); GYN surgery; Arthroplasty knee (Left, 5/14/2018); hysterectomy, pap no longer indicated; cataract iol, rt/lt (04/2019); Heart Catheterization with Possible Intervention (N/A, 1/7/2020); and Arthroplasty knee (Right, 12/9/2021).  FAMILY HISTORY: family history includes Dementia in her mother; Hyperlipidemia in her mother; No Known Problems in her brother, father, maternal grandfather, maternal grandmother, paternal grandmother, and another family member.  SOCIAL HISTORY:   reports that she quit smoking about 29 years ago. Her smoking use included cigarettes. She has a 30.00 pack-year smoking history. She has never used smokeless tobacco. She reports current alcohol use of about 2.0 - 4.0 standard drinks of alcohol per week. She reports that she does not use drugs.  Patient's living condition: lives alone    Post Discharge Medication Reconciliation Status: discharge medications  reconciled, continue medications without change  Current Outpatient Medications   Medication Sig     acetaminophen (TYLENOL) 325 MG tablet Take 2 tablets (650 mg) by mouth every 4 hours as needed for other (mild pain)     Ascorbic Acid (VITAMIN C PO) Take 500 mg by mouth daily.     aspirin (ASA) 325 MG EC tablet Take 1 tablet (325 mg) by mouth daily     Biotin 5000 MCG CAPS Take 1 tablet by mouth At Bedtime      calcium carb 1250 mg, 500 mg [place],/vitamin D 200 units (OSCAL WITH D) 500-200 MG-UNIT per tablet Take 1 tablet by mouth daily      Carboxymethylcellulose Sodium (THERATEARS) 0.25 % SOLN Place 1 drop into both eyes every evening     Cholecalciferol (VITAMIN D3) 50 MCG (2000 UT) CAPS Take 2,000 Units by mouth daily      CYANOCOBALAMIN PO Take 1,000 mcg by mouth daily     fish oil-omega-3 fatty acids 1000 MG capsule Take 2 g by mouth daily     hydrOXYzine (ATARAX) 10 MG tablet Take 1 tablet (10 mg) by mouth every 6 hours as needed for itching or anxiety (with pain, moderate pain)     loratadine (CLARITIN) 10 MG tablet Take 10 mg by mouth daily      lovastatin (MEVACOR) 40 MG tablet Take 1 tablet (40 mg) by mouth At Bedtime     magnesium 100 MG CAPS Take 1 tablet by mouth daily      metoprolol succinate ER (TOPROL-XL) 25 MG 24 hr tablet Take 1 tablet (25 mg) by mouth daily     Multiple Vitamins-Minerals (CENTRUM SILVER) per tablet Take 1 tablet by mouth daily     oxyCODONE (ROXICODONE) 5 MG tablet Take 1-2 tablets (5-10 mg) by mouth every 4 hours as needed for pain (Moderate to Severe) Take 1 pill for pain rated 1-5 and 2 pills for pain rated 6-10     senna-docusate (SENOKOT-S/PERICOLACE) 8.6-50 MG tablet Take 1 tablet by mouth 2 times daily Hold for loose stools     sertraline (ZOLOFT) 25 MG tablet Take 1 tablet (25 mg) by mouth daily     traMADol (ULTRAM) 50 MG tablet Take 1 tablet (50 mg) by mouth every 6 hours as needed for moderate to severe pain     senna-docusate (SENOKOT-S/PERICOLACE) 8.6-50 MG tablet  "Take 1-2 tablets by mouth 2 times daily Take while on oral narcotics to prevent or treat constipation. (Patient not taking: Reported on 12/13/2021)     No current facility-administered medications for this visit.       ROS:  10 point ROS of systems including Constitutional, Eyes, Respiratory, Cardiovascular, Gastroenterology, Genitourinary, Integumentary, Musculoskeletal, Psychiatric were all negative except for pertinent positives noted in my HPI.    Vitals:  /80   Pulse 60   Temp 97.9  F (36.6  C)   Resp 18   Ht 1.702 m (5' 7\")   Wt 74.3 kg (163 lb 12.8 oz)   SpO2 91%   BMI 25.65 kg/m    Exam:  GENERAL APPEARANCE:  Alert, in no distress  ENT:  Mouth and posterior oropharynx normal, moist mucous membranes, normal hearing acuity  EYES:  EOM, conjunctivae, lids, pupils and irises normal, PERRL  RESP:  respiratory effort and palpation of chest normal, lungs clear to auscultation , no respiratory distress  CV:  Palpation and auscultation of heart done , regular rate and rhythm, no murmur, rub, or gallop, no edema  ABDOMEN:  normal bowel sounds, soft, nontender, no hepatosplenomegaly or other masses  M/S:   patient sitting up in w/c able to move all 4 extremities  SKIN:  Inspection of skin and subcutaneous tissue baseline, did not visualize right knee incision  NEURO:   speech WNL  PSYCH:  affect and mood normal    Lab/Diagnostic data:  Recent labs in [facility] reviewed by me today.  and   Most Recent 3 CBC's:Recent Labs   Lab Test 12/10/21  1011 12/09/21  1105 10/05/21  1033 09/25/20  0847 09/24/20  1417 03/12/20  0845   WBC  --   --  7.8  --  6.9 6.9   HGB 10.5* 13.6 14.3   < > 14.6 13.5   MCV  --   --  93  --  92 94   PLT  --   --  228  --  283 217    < > = values in this interval not displayed.     Most Recent 3 BMP's:Recent Labs   Lab Test 12/10/21  1011 10/05/21  1033 09/03/21  1205 09/24/20  1417 03/12/20  0845   NA  --  139  --  140 139   POTASSIUM  --  4.6  --  4.0 4.1   CHLORIDE  --  105  --  105 108 "   CO2  --  30  --  30 22   BUN  --  14  --  16 12   CR  --  0.82 0.8 0.70 0.70   ANIONGAP  --  4  --  5 9   SUGAR  --  9.2  --  9.4 8.9   * 93  --  83 104*       ASSESSMENT/PLAN:    (M17.11) Primary osteoarthritis of right knee  (primary encounter diagnosis)  (Z96.651) Status post total right knee replacement  Comment: acute/ongoing  Plan: PT and OT, increase tylenol to 1000mg q 6 hours prn, atarax 10mg q 6 hours prn, oxycodone 5-10mg q 4 hours prn, tramadol 50mg q 6 hours prn, ASA 325mg QD  F/u with Dr. Ng as directed    (D62) Anemia due to blood loss, acute  Comment: acute/ongoing  Plan: Hgb on Thursday, follow    (I51.81) Takotsubo cardiomyopathy  Comment: stable  Plan: vitals daily and prn, metoprolol xl 25mg QD    (F41.9) Anxiety  Comment: ongoing  Plan: continue zoloft 25mg QD      Orders:  Hgb and BMP on Thursday  Increase tylenol to 1000mg q 6 hours prn    Total time spent with patient visit at the skilled nursing facility was 25 min including patient visit and review of past records. Greater than 50% of total time spent with counseling and coordinating care due to discussed hospitalization, discussed pain control, patient is using tylenol with good relief, education on oxycodone and tramadol, use of ice prn, discussed lab monitoring and medication management. .     Electronically signed by:  Tonya Lynn Haase, APRN CNP

## 2021-12-14 ENCOUNTER — TRANSITIONAL CARE UNIT VISIT (OUTPATIENT)
Dept: GERIATRICS | Facility: CLINIC | Age: 81
End: 2021-12-14
Payer: COMMERCIAL

## 2021-12-14 VITALS
HEART RATE: 60 BPM | DIASTOLIC BLOOD PRESSURE: 80 MMHG | RESPIRATION RATE: 18 BRPM | WEIGHT: 163.8 LBS | BODY MASS INDEX: 25.71 KG/M2 | OXYGEN SATURATION: 91 % | SYSTOLIC BLOOD PRESSURE: 134 MMHG | TEMPERATURE: 97.9 F | HEIGHT: 67 IN

## 2021-12-14 DIAGNOSIS — Z96.651 STATUS POST TOTAL RIGHT KNEE REPLACEMENT: ICD-10-CM

## 2021-12-14 DIAGNOSIS — M17.11 PRIMARY OSTEOARTHRITIS OF RIGHT KNEE: Primary | ICD-10-CM

## 2021-12-14 DIAGNOSIS — D62 ANEMIA DUE TO BLOOD LOSS, ACUTE: ICD-10-CM

## 2021-12-14 DIAGNOSIS — I51.81 TAKOTSUBO CARDIOMYOPATHY: ICD-10-CM

## 2021-12-14 DIAGNOSIS — F41.9 ANXIETY: ICD-10-CM

## 2021-12-14 PROCEDURE — 99309 SBSQ NF CARE MODERATE MDM 30: CPT | Performed by: NURSE PRACTITIONER

## 2021-12-14 RX ORDER — ACETAMINOPHEN 500 MG
1000 TABLET ORAL EVERY 6 HOURS PRN
Start: 2021-12-14

## 2021-12-14 NOTE — LETTER
"    12/14/2021        RE: Cecily Ivory  [address]  [place] [address]        [facility] GERIATRIC SERVICES    PRIMARY CARE PROVIDER AND CLINIC:  Ellis Ruiz MD, [address] / [place] [address]  Chief Complaint   Patient presents with     Hospital F/U      [facility] Medical Record Number:  [id number]  Place of Service where encounter took place:  [facility] [25]    Cecily vIory  is a 81 year old  (1940), admitted to the above facility from  [facility]. Hospital stay 12/9/21 through 12/10/21..   HPI:    PMH of CAD, cardiomyopathy (resolved), HLD, GERD who presents for elective Right TKA  DJD s/p right TKA on 12/9/21 Dr. Ng  No post op complications noted  ON exam today: patient sitting up in w/c, pleasant, denies pain in knee, states she is \"tired\" no other c/o, denies fever, chills, cough, congestion, N/V/D or constipation, states she had \"3 bowel movements\" this AM.     CODE STATUS/ADVANCE DIRECTIVES DISCUSSION:  Prior  CPR/Full code   ALLERGIES:   Allergies   Allergen Reactions     Latex Cough     Seasonal Allergies      YEAR ROUND ALLERGIES     Sulfa Drugs Unknown      PAST MEDICAL HISTORY:   Past Medical History:   Diagnosis Date     Abdominal pain 2009, 2013    ct liver and renal cyst and 2mm lung nodule, repeat ct done 2013 and no significantly abnl.     Anxiety 2014    added med 3/14     Arthritis      ASCUS of cervix with negative high risk HPV 03/2018     Bilateral subdural hematomas (H) 09/2020    found incidentally on mri done for memory loss     Carotid bruit 2011    us nl     Gastritis 2011    egd done by [facility]     GERD (gastroesophageal reflux disease) 2011     Gross hematuria 2021    cysto and ct neg     History of colonoscopy 2004, 2014    nl     Hypercholesteremia 2000    stopped lovastatin 2015, added lipitor 3/16     Insomnia     using otc      Lumbar spinal stenosis 2016    Dr. Wilde, had epidural     Lung nodule " 2009, 2013    seen on ct for abd pain, fu done 3/13 and 2 nodules stable and benign, one new one needs fu 1 year.  fu done 3/14 and fu 1 year and then done; fu done 3/15 and unchanged, no fu needed     MCI (mild cognitive impairment) 2020    seen by neuro, Dr. Burnham     Myocardial infarction (H) 01/2020    elev trop but clean coronaries on angio, felt to be stress induced cmyop     Odynophagia 2004    egd nl     Other chronic pain      Peripheral neuropathies     Dr. Kim     Screening 2006, 2017    nl dexa     Seasonal allergies      Takotsubo cardiomyopathy 01/2020    hosp fsd, ef 40%, mod ai, clean coronaries; fu echo nl 5/20     Vaginal dysplasia     chronic VAIN I, many colpos of vagina.  Now we only do an annual pap of vagina, no colpo since stable long term     Weight loss 07/2018    ct chest, abd and pelvis neg      PAST SURGICAL HISTORY:   has a past surgical history that includes appendectomy (13); Ankle surgery (2000); Nose surgery (1990); breast biopsy, core rt/lt; right knee arthroscopy (2007); laparoscopic cholecystectomy (2008); VAGINAL HYSTERECTOMY (1995); UPPER GI ENDOSCOPY,EXAM (03/15/11); thumb surgery (Left, 2/2015); GYN surgery; Arthroplasty knee (Left, 5/14/2018); hysterectomy, pap no longer indicated; cataract iol, rt/lt (04/2019); Heart Catheterization with Possible Intervention (N/A, 1/7/2020); and Arthroplasty knee (Right, 12/9/2021).  FAMILY HISTORY: family history includes Dementia in her mother; Hyperlipidemia in her mother; No Known Problems in her brother, father, maternal grandfather, maternal grandmother, paternal grandmother, and another family member.  SOCIAL HISTORY:   reports that she quit smoking about 29 years ago. Her smoking use included cigarettes. She has a 30.00 pack-year smoking history. She has never used smokeless tobacco. She reports current alcohol use of about 2.0 - 4.0 standard drinks of alcohol per week. She reports that she does not use drugs.  Patient's  living condition: lives alone    Post Discharge Medication Reconciliation Status: discharge medications reconciled, continue medications without change  Current Outpatient Medications   Medication Sig     acetaminophen (TYLENOL) 325 MG tablet Take 2 tablets (650 mg) by mouth every 4 hours as needed for other (mild pain)     Ascorbic Acid (VITAMIN C PO) Take 500 mg by mouth daily.     aspirin (ASA) 325 MG EC tablet Take 1 tablet (325 mg) by mouth daily     Biotin 5000 MCG CAPS Take 1 tablet by mouth At Bedtime      calcium carb 1250 mg, 500 mg [place],/vitamin D 200 units (OSCAL WITH D) 500-200 MG-UNIT per tablet Take 1 tablet by mouth daily      Carboxymethylcellulose Sodium (THERATEARS) 0.25 % SOLN Place 1 drop into both eyes every evening     Cholecalciferol (VITAMIN D3) 50 MCG (2000 UT) CAPS Take 2,000 Units by mouth daily      CYANOCOBALAMIN PO Take 1,000 mcg by mouth daily     fish oil-omega-3 fatty acids 1000 MG capsule Take 2 g by mouth daily     hydrOXYzine (ATARAX) 10 MG tablet Take 1 tablet (10 mg) by mouth every 6 hours as needed for itching or anxiety (with pain, moderate pain)     loratadine (CLARITIN) 10 MG tablet Take 10 mg by mouth daily      lovastatin (MEVACOR) 40 MG tablet Take 1 tablet (40 mg) by mouth At Bedtime     magnesium 100 MG CAPS Take 1 tablet by mouth daily      metoprolol succinate ER (TOPROL-XL) 25 MG 24 hr tablet Take 1 tablet (25 mg) by mouth daily     Multiple Vitamins-Minerals (CENTRUM SILVER) per tablet Take 1 tablet by mouth daily     oxyCODONE (ROXICODONE) 5 MG tablet Take 1-2 tablets (5-10 mg) by mouth every 4 hours as needed for pain (Moderate to Severe) Take 1 pill for pain rated 1-5 and 2 pills for pain rated 6-10     senna-docusate (SENOKOT-S/PERICOLACE) 8.6-50 MG tablet Take 1 tablet by mouth 2 times daily Hold for loose stools     sertraline (ZOLOFT) 25 MG tablet Take 1 tablet (25 mg) by mouth daily     traMADol (ULTRAM) 50 MG tablet Take 1 tablet (50 mg) by mouth every 6  "hours as needed for moderate to severe pain     senna-docusate (SENOKOT-S/PERICOLACE) 8.6-50 MG tablet Take 1-2 tablets by mouth 2 times daily Take while on oral narcotics to prevent or treat constipation. (Patient not taking: Reported on 12/13/2021)     No current facility-administered medications for this visit.       ROS:  10 point ROS of systems including Constitutional, Eyes, Respiratory, Cardiovascular, Gastroenterology, Genitourinary, Integumentary, Musculoskeletal, Psychiatric were all negative except for pertinent positives noted in my HPI.    Vitals:  /80   Pulse 60   Temp 97.9  F (36.6  C)   Resp 18   Ht 1.702 m (5' 7\")   Wt 74.3 kg (163 lb 12.8 oz)   SpO2 91%   BMI 25.65 kg/m    Exam:  GENERAL APPEARANCE:  Alert, in no distress  ENT:  Mouth and posterior oropharynx normal, moist mucous membranes, normal hearing acuity  EYES:  EOM, conjunctivae, lids, pupils and irises normal, PERRL  RESP:  respiratory effort and palpation of chest normal, lungs clear to auscultation , no respiratory distress  CV:  Palpation and auscultation of heart done , regular rate and rhythm, no murmur, rub, or gallop, no edema  ABDOMEN:  normal bowel sounds, soft, nontender, no hepatosplenomegaly or other masses  M/S:   patient sitting up in w/c able to move all 4 extremities  SKIN:  Inspection of skin and subcutaneous tissue baseline, did not visualize right knee incision  NEURO:   speech WNL  PSYCH:  affect and mood normal    Lab/Diagnostic data:  Recent labs in [facility] reviewed by me today.  and   Most Recent 3 CBC's:Recent Labs   Lab Test 12/10/21  1011 12/09/21  1105 10/05/21  1033 09/25/20  0847 09/24/20  1417 03/12/20  0845   WBC  --   --  7.8  --  6.9 6.9   HGB 10.5* 13.6 14.3   < > 14.6 13.5   MCV  --   --  93  --  92 94   PLT  --   --  228  --  283 217    < > = values in this interval not displayed.     Most Recent 3 BMP's:Recent Labs   Lab Test 12/10/21  1011 10/05/21  1033 09/03/21  1205 09/24/20  1417 " 03/12/20  0845   NA  --  139  --  140 139   POTASSIUM  --  4.6  --  4.0 4.1   CHLORIDE  --  105  --  105 108   CO2  --  30  --  30 22   BUN  --  14  --  16 12   CR  --  0.82 0.8 0.70 0.70   ANIONGAP  --  4  --  5 9   SUGAR  --  9.2  --  9.4 8.9   * 93  --  83 104*       ASSESSMENT/PLAN:    (M17.11) Primary osteoarthritis of right knee  (primary encounter diagnosis)  (Z96.651) Status post total right knee replacement  Comment: acute/ongoing  Plan: PT and OT, increase tylenol to 1000mg q 6 hours prn, atarax 10mg q 6 hours prn, oxycodone 5-10mg q 4 hours prn, tramadol 50mg q 6 hours prn, ASA 325mg QD  F/u with Dr. Ng as directed    (D62) Anemia due to blood loss, acute  Comment: acute/ongoing  Plan: Hgb on Thursday, follow    (I51.81) Takotsubo cardiomyopathy  Comment: stable  Plan: vitals daily and prn, metoprolol xl 25mg QD    (F41.9) Anxiety  Comment: ongoing  Plan: continue zoloft 25mg QD      Orders:  Hgb and BMP on Thursday  Increase tylenol to 1000mg q 6 hours prn    Total time spent with patient visit at the skilled nursing facility was 25 min including patient visit and review of past records. Greater than 50% of total time spent with counseling and coordinating care due to discussed hospitalization, discussed pain control, patient is using tylenol with good relief, education on oxycodone and tramadol, use of ice prn, discussed lab monitoring and medication management. .     Electronically signed by:  Tonya Lynn Haase, APRN CNP                     Sincerely,        Tonya Lynn Haase, APRN CNP

## 2021-12-15 LAB
ATRIAL RATE - MUSE: 57 BPM
DIASTOLIC BLOOD PRESSURE - MUSE: NORMAL MMHG
INTERPRETATION ECG - MUSE: NORMAL
P AXIS - MUSE: 61 DEGREES
PR INTERVAL - MUSE: 156 MS
QRS DURATION - MUSE: 98 MS
QT - MUSE: 480 MS
QTC - MUSE: 467 MS
R AXIS - MUSE: -57 DEGREES
SYSTOLIC BLOOD PRESSURE - MUSE: NORMAL MMHG
T AXIS - MUSE: 32 DEGREES
VENTRICULAR RATE- MUSE: 57 BPM

## 2021-12-16 ENCOUNTER — TRANSFERRED RECORDS (OUTPATIENT)
Dept: HEALTH INFORMATION MANAGEMENT | Facility: CLINIC | Age: 81
End: 2021-12-16

## 2021-12-16 ENCOUNTER — DISCHARGE SUMMARY NURSING HOME (OUTPATIENT)
Dept: GERIATRICS | Facility: CLINIC | Age: 81
End: 2021-12-16
Payer: COMMERCIAL

## 2021-12-16 VITALS
RESPIRATION RATE: 18 BRPM | OXYGEN SATURATION: 94 % | WEIGHT: 165.3 LBS | HEART RATE: 51 BPM | TEMPERATURE: 98.2 F | SYSTOLIC BLOOD PRESSURE: 151 MMHG | BODY MASS INDEX: 25.94 KG/M2 | DIASTOLIC BLOOD PRESSURE: 62 MMHG | HEIGHT: 67 IN

## 2021-12-16 DIAGNOSIS — Z96.651 STATUS POST TOTAL RIGHT KNEE REPLACEMENT: ICD-10-CM

## 2021-12-16 DIAGNOSIS — M17.11 PRIMARY OSTEOARTHRITIS OF RIGHT KNEE: Primary | ICD-10-CM

## 2021-12-16 DIAGNOSIS — D62 ANEMIA DUE TO BLOOD LOSS, ACUTE: ICD-10-CM

## 2021-12-16 DIAGNOSIS — F41.9 ANXIETY: ICD-10-CM

## 2021-12-16 DIAGNOSIS — I51.81 TAKOTSUBO CARDIOMYOPATHY: ICD-10-CM

## 2021-12-16 LAB
ANION GAP SERPL CALC-SCNC: 9 MMOL/L (ref 7–16)
BUN SERPL-MCNC: 11 MG/DL (ref 7–26)
CALCIUM (EXTERNAL): 9 MG/DL (ref 8.4–10.4)
CHLORIDE (EXTERNAL): 104 MMOL/L (ref 98–109)
CO2 (EXTERNAL): 24 MMOL/L (ref 20–29)
CREATININE (EXTERNAL): 0.67 MG/DL (ref 0.55–1.02)
GFR ESTIMATED (EXTERNAL): >60 ML/MIN/1.73M2
GLUCOSE (EXTERNAL): 109 MG/DL (ref 70–100)
HEMOGLOBIN (EXTERNAL): 10.6 G/DL (ref 12–15.5)
POTASSIUM (EXTERNAL): 4 MMOL/L (ref 3.5–5.1)
SODIUM (EXTERNAL): 137 MMOL/L (ref 136–145)

## 2021-12-16 PROCEDURE — 99316 NF DSCHRG MGMT 30 MIN+: CPT | Performed by: NURSE PRACTITIONER

## 2021-12-16 ASSESSMENT — MIFFLIN-ST. JEOR: SCORE: 1257.43

## 2021-12-16 NOTE — PROGRESS NOTES
[facility] GERIATRIC SERVICES DISCHARGE SUMMARY  PATIENT'S NAME: Cecily Ivory  YOB: 1940  MEDICAL RECORD NUMBER:  [id number]  Place of Service where encounter took place:  [facility]) [25]    PRIMARY CARE PROVIDER AND CLINIC RESPONSIBLE AFTER TRANSFER:   Ellis Ruiz MD, [address] / [place] [address]    [facility] Provider     Transferring providers: Tonya Lynn Haase, APRN CNP, Dr. Olegario Maier MD  Recent Hospitalization/ED:  [facility] stay 12/9/21 to 12/10/21.  Date of SNF Admission: December 10, 2021  Date of SNF (anticipated) Discharge: December 19, 2021  Discharged to: previous independent home  Cognitive Scores: BIMS: 15/15 and Short blessed: 0/28  Physical Function: Ambulating > 300 feet ft with RW independent  DME: Walker    CODE STATUS/ADVANCE DIRECTIVES DISCUSSION:  Prior   ALLERGIES: Latex, Seasonal allergies, and Sulfa drugs    NURSING FACILITY COURSE   Medication Changes/Rationale:     DC tramadol and oxycodone at discharge    Summary of nursing facility stay:   Patient progressed to walking > 300 feet using a RW independent, independent [facility]     Discharge Medications:    Current Outpatient Medications   Medication Sig Dispense Refill     acetaminophen (TYLENOL) 500 MG tablet Take 2 tablets (1,000 mg) by mouth every 6 hours as needed for mild pain       Ascorbic Acid (VITAMIN C PO) Take 500 mg by mouth daily.       aspirin (ASA) 325 MG EC tablet Take 1 tablet (325 mg) by mouth daily 42 tablet 0     Biotin 5000 MCG CAPS Take 1 tablet by mouth At Bedtime        calcium carb 1250 mg, 500 mg [place],/vitamin D 200 units (OSCAL WITH D) 500-200 MG-UNIT per tablet Take 1 tablet by mouth daily  100 tablet 3     Carboxymethylcellulose Sodium (THERATEARS) 0.25 % SOLN Place 1 drop into both eyes every evening       Cholecalciferol (VITAMIN D3) 50 MCG (2000 UT) CAPS Take 2,000 Units by mouth daily         CYANOCOBALAMIN PO Take 1,000 mcg by mouth daily       fish oil-omega-3 fatty acids 1000 MG capsule Take 2 g by mouth daily       hydrOXYzine (ATARAX) 10 MG tablet Take 1 tablet (10 mg) by mouth every 6 hours as needed for itching or anxiety (with pain, moderate pain) 30 tablet 0     loratadine (CLARITIN) 10 MG tablet Take 10 mg by mouth daily        lovastatin (MEVACOR) 40 MG tablet Take 1 tablet (40 mg) by mouth At Bedtime 90 tablet 3     magnesium 100 MG CAPS Take 1 tablet by mouth daily        metoprolol succinate ER (TOPROL-XL) 25 MG 24 hr tablet Take 1 tablet (25 mg) by mouth daily 90 tablet 3     Multiple Vitamins-Minerals (CENTRUM SILVER) per tablet Take 1 tablet by mouth daily       oxyCODONE (ROXICODONE) 5 MG tablet Take 1-2 tablets (5-10 mg) by mouth every 4 hours as needed for pain (Moderate to Severe) Take 1 pill for pain rated 1-5 and 2 pills for pain rated 6-10 30 tablet 0     senna-docusate (SENOKOT-S/PERICOLACE) 8.6-50 MG tablet Take 1 tablet by mouth 2 times daily Hold for loose stools       sertraline (ZOLOFT) 25 MG tablet Take 1 tablet (25 mg) by mouth daily 90 tablet 3     traMADol (ULTRAM) 50 MG tablet Take 1 tablet (50 mg) by mouth every 6 hours as needed for moderate to severe pain 30 tablet 0          Controlled medications:   not applicable/none     Past Medical History:   Past Medical History:   Diagnosis Date     Abdominal pain 2009, 2013    ct liver and renal cyst and 2mm lung nodule, repeat ct done 2013 and no significantly abnl.     Anxiety 2014    added med 3/14     Arthritis      ASCUS of cervix with negative high risk HPV 03/2018     Bilateral subdural hematomas (H) 09/2020    found incidentally on mri done for memory loss     Carotid bruit 2011    us nl     Gastritis 2011    egd done by [facility]     GERD (gastroesophageal reflux disease) 2011     Gross hematuria 2021    cysto and ct neg     History of colonoscopy 2004, 2014    nl     Hypercholesteremia 2000    stopped lovastatin 2015,  "added lipitor 3/16     Insomnia     using otc      Lumbar spinal stenosis 2016    Dr. Wilde, had epidural     Lung nodule 2009, 2013    seen on ct for abd pain, fu done 3/13 and 2 nodules stable and benign, one new one needs fu 1 year.  fu done 3/14 and fu 1 year and then done; fu done 3/15 and unchanged, no fu needed     MCI (mild cognitive impairment) 2020    seen by neuro, Dr. Burnham     Myocardial infarction (H) 01/2020    elev trop but clean coronaries on angio, felt to be stress induced cmyop     Odynophagia 2004    egd nl     Other chronic pain      Peripheral neuropathies     Dr. Kim     Screening 2006, 2017    nl dexa     Seasonal allergies      Takotsubo cardiomyopathy 01/2020    hosp fsd, ef 40%, mod ai, clean coronaries; fu echo nl 5/20     Vaginal dysplasia     chronic VAIN I, many colpos of vagina.  Now we only do an annual pap of vagina, no colpo since stable long term     Weight loss 07/2018    ct chest, abd and pelvis neg     Physical Exam:   Vitals: BP (!) 151/62   Pulse 51   Temp 98.2  F (36.8  C)   Resp 18   Ht 1.702 m (5' 7\")   Wt 75 kg (165 lb 4.8 oz)   SpO2 94%   BMI 25.89 kg/m    BMI= Body mass index is 25.89 kg/m .  GENERAL APPEARANCE:  Alert, in no distress  ENT:  Mouth and posterior oropharynx normal, moist mucous membranes, normal hearing acuity  EYES:  EOM, conjunctivae, lids, pupils and irises normal, PERRL  RESP:  respiratory effort and palpation of chest normal, lungs clear to auscultation , no respiratory distress  CV:  Palpation and auscultation of heart done , regular rate and rhythm, no murmur, rub, or gallop, peripheral edema trace+ in RLE  ABDOMEN:  normal bowel sounds, soft, nontender, no hepatosplenomegaly or other masses  M/S:   patient sitting up in edge of bed, able to  move all 4 extremities  SKIN:  Inspection of skin and subcutaneous tissue baseline, did not visualize right knee incision  NEURO:   speech WNL  PSYCH:  affect and mood normal     SNF labs: Recent " labs in EPIC reviewed by me today.  and   Most Recent 3 CBC's:Recent Labs   Lab Test 12/10/21  1011 12/09/21  1105 10/05/21  1033 09/25/20  0847 09/24/20  1417 03/12/20  0845   WBC  --   --  7.8  --  6.9 6.9   HGB 10.5* 13.6 14.3   < > 14.6 13.5   MCV  --   --  93  --  92 94   PLT  --   --  228  --  283 217    < > = values in this interval not displayed.     Most Recent 3 BMP's:Recent Labs   Lab Test 12/10/21  1011 10/05/21  1033 09/03/21  1205 09/24/20  1417 03/12/20  0845   NA  --  139  --  140 139   POTASSIUM  --  4.6  --  4.0 4.1   CHLORIDE  --  105  --  105 108   CO2  --  30  --  30 22   BUN  --  14  --  16 12   CR  --  0.82 0.8 0.70 0.70   ANIONGAP  --  4  --  5 9   SUGAR  --  9.2  --  9.4 8.9   * 93  --  83 104*       ASSESSMENT/PLAN:     (M17.11) Primary osteoarthritis of right knee  (primary encounter diagnosis)  (Z96.651) Status post total right knee replacement  Comment: acute/ongoing  Plan: [place] [facility] with [place] PT, continuetylenol to 1000mg q 6 hours prn,   DC tramadol, oxycodone and atarax at discharge, continue ASA 325mg QD  F/u with Dr. Ng as directed     (D62) Anemia due to blood loss, acute  Comment: acute/ongoing  Plan: Hgb pending for today    (I51.81) Takotsubo cardiomyopathy  Comment: stable  Plan: continue metoprolol xl 25mg QD     (F41.9) Anxiety  Comment: ongoing  Plan: continue zoloft 25mg QD    DISCHARGE PLAN:    Follow up labs: No labs orders/due    Medical Follow Up:      Follow up with primary care provider in 1 weeks    [facility] scheduled appointments:     Future Appointments   Date Time Provider Department Center   No future appts.       Discharge Services: Out Patient:  physical therapy    Discharge Instructions Verbalized to Patient at Discharge:     None    TOTAL DISCHARGE TIME:   Greater than 30 minutes  Electronically signed by:  Tonya Lynn Haase, APRN CNP

## 2021-12-16 NOTE — LETTER
12/16/2021        RE: Cecily Ivory  [address]  [place] [address]        [facility] GERIATRIC SERVICES DISCHARGE SUMMARY  PATIENT'S NAME: Cecily Ivory  YOB: 1940  MEDICAL RECORD NUMBER:  [id number]  Place of Service where encounter took place:  [facility]) [25]    PRIMARY CARE PROVIDER AND CLINIC RESPONSIBLE AFTER TRANSFER:   Ellis Ruiz MD, [address] / [place] [address]    [facility] Provider     Transferring providers: Tonya Lynn Haase, APRN CNP, Dr. Olegario Maier MD  Recent Hospitalization/ED:  [facility] stay 12/9/21 to 12/10/21.  Date of SNF Admission: December 10, 2021  Date of SNF (anticipated) Discharge: December 19, 2021  Discharged to: previous independent home  Cognitive Scores: BIMS: 15/15 and Short blessed: 0/28  Physical Function: Ambulating > 300 feet ft with RW independent  DME: Walker    CODE STATUS/ADVANCE DIRECTIVES DISCUSSION:  Prior   ALLERGIES: Latex, Seasonal allergies, and Sulfa drugs    NURSING FACILITY COURSE   Medication Changes/Rationale:     DC tramadol and oxycodone at discharge    Summary of nursing facility stay:   Patient progressed to walking > 300 feet using a RW independent, independent with ADL's [facility]     Discharge Medications:    Current Outpatient Medications   Medication Sig Dispense Refill     acetaminophen (TYLENOL) 500 MG tablet Take 2 tablets (1,000 mg) by mouth every 6 hours as needed for mild pain       Ascorbic Acid (VITAMIN C PO) Take 500 mg by mouth daily.       aspirin (ASA) 325 MG EC tablet Take 1 tablet (325 mg) by mouth daily 42 tablet 0     Biotin 5000 MCG CAPS Take 1 tablet by mouth At Bedtime        calcium carb 1250 mg, 500 mg [place],/vitamin D 200 units (OSCAL WITH D) 500-200 MG-UNIT per tablet Take 1 tablet by mouth daily  100 tablet 3     Carboxymethylcellulose Sodium (THERATEARS) 0.25 % SOLN Place 1 drop into both eyes every  evening       Cholecalciferol (VITAMIN D3) 50 MCG (2000 UT) CAPS Take 2,000 Units by mouth daily        CYANOCOBALAMIN PO Take 1,000 mcg by mouth daily       fish oil-omega-3 fatty acids 1000 MG capsule Take 2 g by mouth daily       hydrOXYzine (ATARAX) 10 MG tablet Take 1 tablet (10 mg) by mouth every 6 hours as needed for itching or anxiety (with pain, moderate pain) 30 tablet 0     loratadine (CLARITIN) 10 MG tablet Take 10 mg by mouth daily        lovastatin (MEVACOR) 40 MG tablet Take 1 tablet (40 mg) by mouth At Bedtime 90 tablet 3     magnesium 100 MG CAPS Take 1 tablet by mouth daily        metoprolol succinate ER (TOPROL-XL) 25 MG 24 hr tablet Take 1 tablet (25 mg) by mouth daily 90 tablet 3     Multiple Vitamins-Minerals (CENTRUM SILVER) per tablet Take 1 tablet by mouth daily       oxyCODONE (ROXICODONE) 5 MG tablet Take 1-2 tablets (5-10 mg) by mouth every 4 hours as needed for pain (Moderate to Severe) Take 1 pill for pain rated 1-5 and 2 pills for pain rated 6-10 30 tablet 0     senna-docusate (SENOKOT-S/PERICOLACE) 8.6-50 MG tablet Take 1 tablet by mouth 2 times daily Hold for loose stools       sertraline (ZOLOFT) 25 MG tablet Take 1 tablet (25 mg) by mouth daily 90 tablet 3     traMADol (ULTRAM) 50 MG tablet Take 1 tablet (50 mg) by mouth every 6 hours as needed for moderate to severe pain 30 tablet 0          Controlled medications:   not applicable/none     Past Medical History:   Past Medical History:   Diagnosis Date     Abdominal pain 2009, 2013    ct liver and renal cyst and 2mm lung nodule, repeat ct done 2013 and no significantly abnl.     Anxiety 2014    added med 3/14     Arthritis      ASCUS of cervix with negative high risk HPV 03/2018     Bilateral subdural hematomas (H) 09/2020    found incidentally on mri done for memory loss     Carotid bruit 2011    us nl     Gastritis 2011    egd done by [facility]     GERD (gastroesophageal reflux disease) 2011     Gross hematuria 2021    cysto and ct  "neg     History of colonoscopy 2004, 2014    nl     Hypercholesteremia 2000    stopped lovastatin 2015, added lipitor 3/16     Insomnia     using otc      Lumbar spinal stenosis 2016    Dr. Wilde, had epidural     Lung nodule 2009, 2013    seen on ct for abd pain, fu done 3/13 and 2 nodules stable and benign, one new one needs fu 1 year.  fu done 3/14 and fu 1 year and then done; fu done 3/15 and unchanged, no fu needed     MCI (mild cognitive impairment) 2020    seen by neuro, Dr. Burnham     Myocardial infarction (H) 01/2020    elev trop but clean coronaries on angio, felt to be stress induced cmyop     Odynophagia 2004    egd nl     Other chronic pain      Peripheral neuropathies     Dr. Kim     Screening 2006, 2017    nl dexa     Seasonal allergies      Takotsubo cardiomyopathy 01/2020    hosp fsd, ef 40%, mod ai, clean coronaries; fu echo nl 5/20     Vaginal dysplasia     chronic VAIN I, many colpos of vagina.  Now we only do an annual pap of vagina, no colpo since stable long term     Weight loss 07/2018    ct chest, abd and pelvis neg     Physical Exam:   Vitals: BP (!) 151/62   Pulse 51   Temp 98.2  F (36.8  C)   Resp 18   Ht 1.702 m (5' 7\")   Wt 75 kg (165 lb 4.8 oz)   SpO2 94%   BMI 25.89 kg/m    BMI= Body mass index is 25.89 kg/m .  GENERAL APPEARANCE:  Alert, in no distress  ENT:  Mouth and posterior oropharynx normal, moist mucous membranes, normal hearing acuity  EYES:  EOM, conjunctivae, lids, pupils and irises normal, PERRL  RESP:  respiratory effort and palpation of chest normal, lungs clear to auscultation , no respiratory distress  CV:  Palpation and auscultation of heart done , regular rate and rhythm, no murmur, rub, or gallop, peripheral edema trace+ in RLE  ABDOMEN:  normal bowel sounds, soft, nontender, no hepatosplenomegaly or other masses  M/S:   patient sitting up in edge of bed, able to  move all 4 extremities  SKIN:  Inspection of skin and subcutaneous tissue baseline, did not " visualize right knee incision  NEURO:   speech WNL  PSYCH:  affect and mood normal     SNF labs: Recent labs in EPIC reviewed by me today.  and   Most Recent 3 CBC's:Recent Labs   Lab Test 12/10/21  1011 12/09/21  1105 10/05/21  1033 09/25/20  0847 09/24/20  1417 03/12/20  0845   WBC  --   --  7.8  --  6.9 6.9   HGB 10.5* 13.6 14.3   < > 14.6 13.5   MCV  --   --  93  --  92 94   PLT  --   --  228  --  283 217    < > = values in this interval not displayed.     Most Recent 3 BMP's:Recent Labs   Lab Test 12/10/21  1011 10/05/21  1033 09/03/21  1205 09/24/20  1417 03/12/20  0845   NA  --  139  --  140 139   POTASSIUM  --  4.6  --  4.0 4.1   CHLORIDE  --  105  --  105 108   CO2  --  30  --  30 22   BUN  --  14  --  16 12   CR  --  0.82 0.8 0.70 0.70   ANIONGAP  --  4  --  5 9   SUGAR  --  9.2  --  9.4 8.9   * 93  --  83 104*       ASSESSMENT/PLAN:     (M17.11) Primary osteoarthritis of right knee  (primary encounter diagnosis)  (Z96.651) Status post total right knee replacement  Comment: acute/ongoing  Plan: DC to [place] [facility] with OP PT, continuetylenol to 1000mg q 6 hours prn,   DC tramadol, oxycodone and atarax at discharge, continue ASA 325mg QD  F/u with Dr. Ng as directed     (D62) Anemia due to blood loss, acute  Comment: acute/ongoing  Plan: Hgb pending for today    (I51.81) Takotsubo cardiomyopathy  Comment: stable  Plan: continue metoprolol xl 25mg QD     (F41.9) Anxiety  Comment: ongoing  Plan: continue zoloft 25mg QD    DISCHARGE PLAN:    Follow up labs: No labs orders/due    Medical Follow Up:      Follow up with primary care provider in 1 weeks    Current [place] scheduled appointments:     Future Appointments   Date Time Provider Department Center   No future appts.       Discharge Services: Out Patient:  physical therapy    Discharge Instructions Verbalized to Patient at Discharge:     None    TOTAL DISCHARGE TIME:   Greater than 30 minutes  Electronically signed by:  Tonya Lynn Haase,  CARLITOS CNP                     Sincerely,        Tonya Lynn Haase, APRN CNP

## 2021-12-27 ENCOUNTER — TRANSFERRED RECORDS (OUTPATIENT)
Dept: HEALTH INFORMATION MANAGEMENT | Facility: CLINIC | Age: 81
End: 2021-12-27
Payer: COMMERCIAL

## 2022-01-24 ENCOUNTER — TRANSFERRED RECORDS (OUTPATIENT)
Dept: HEALTH INFORMATION MANAGEMENT | Facility: CLINIC | Age: 82
End: 2022-01-24
Payer: COMMERCIAL

## 2022-03-17 NOTE — TELEPHONE ENCOUNTER
Pt requesting to go back to lovastatin, does not want to take atorvastatin  Anymore.  Has been trying atorvastatin since 3/27/17 Px, but has been having leg cramping again since restarting this.  Looks to be ongoing statin management trials for this pt.  Unable to get more information on why lovastatin was discontinued in first place from pt or chart.    Pended requested lovastatin, please order if appropriate, or advise if not.  Angela Castillo RN     Wound Care: No ointment

## 2022-03-23 ENCOUNTER — NURSE TRIAGE (OUTPATIENT)
Dept: CARDIOLOGY | Facility: CLINIC | Age: 82
End: 2022-03-23
Payer: COMMERCIAL

## 2022-03-23 NOTE — TELEPHONE ENCOUNTER
Called pt back regarding symptoms of chest pain  No answer.  Left voice mail message to please call me to review or if having chest pain appropriate to go to the Ed is felt needed.

## 2022-03-23 NOTE — TELEPHONE ENCOUNTER
"Received a call from the call center to discuss chest pain.  Spoke to Cecily who says she has been having a more mild intermittent chest pain for a few weeks now, but had a worsened episode today, just prior to calling. She describes the chest pain in the left breast area as \"quick and sharp\" lasting a few seconds. She rates the pain #5. She says it always happens when she is sitting. She says she does not move around much so could not answer if the pain worsens on exertion. She denies any radiating pain, nausea, sweating, shortness of breath. She says she is not currently having chest pain. She says she is taking her medications as prescribed. She says it is not interfering with her daily routine. She has an appointment to see Dr. Giron in May but is wondering if she should be seen sooner.  Advised a message will be sent to Dr. Giron team nurse for follow up.  Advised if chest pain worsens or develops symptoms mentioned above, it is recommended she go to ER for an evaluation. She verbalized agreement and understanding.  1. LOCATION: \"Where does it hurt?\" Left breast area of chest  2. RADIATION: \"Does the pain go anywhere else?\" (e.g., into neck, jaw, arms, back) Denies  3. ONSET: \"When did the chest pain begin?\" (Minutes, hours or days) A few weeks ago, but sharp pain worse today  4. PATTERN \"Does the pain come and go, or has it been constant since it started?\" \"Does it get worse with exertion?\" Intermittent. Unable to answer if worse on exertion due to lack of activity  5. DURATION: \"How long does it last\" (e.g., seconds, minutes, hours) seconds  6. SEVERITY: \"How bad is the pain?\" (e.g., Scale 1-10; mild, moderate, or severe) #5, but does not interfere with routine  - MILD (1-3): doesn't interfere with normal activities   - MODERATE (4-7): interferes with normal activities or awakens from sleep  - SEVERE (8-10): excruciating pain, unable to do any normal activities   7. CARDIAC RISK FACTORS: \"Do you have any history " "of heart problems or risk factors for heart disease?\" (e.g., angina, prior heart attack; diabetes, high blood pressure, high cholesterol, smoker, or strong family history of heart disease) Stress induced CM, CHRISTEL, intercostal pain  8. PULMONARY RISK FACTORS: \"Do you have any history of lung disease?\" (e.g., blood clots in lung, asthma, emphysema, birth control pills)  9. CAUSE: \"What do you think is causing the chest pain?\" Not sure  10. OTHER SYMPTOMS: \"Do you have any other symptoms?\" (e.g., dizziness, nausea, vomiting, sweating, fever, difficulty breathing, cough) Denies      Additional Information    Negative: Chest pain or 'angina' comes and goes and is happening more often (increasing in frequency) or getting worse (increasing in severity) (Exception: chest pains that last only a few seconds)    Negative: Dizziness or lightheadedness    Negative: Pain also in shoulder(s) or arm(s) or jaw    Negative: Difficulty breathing    Protocols used: CHEST PAIN-A-OH      "

## 2022-04-08 NOTE — PLAN OF CARE
Problem: Patient Care Overview  Goal: Plan of Care/Patient Progress Review  Outcome: Improving    VSS, afebrile   Neuro: A&O x4, CMS intact   Resp: RA, no reports of SOB   GI/: Saenz cath patent, hypoactive BS, not passing flatus, LBM 5/14/18 in AM   Skin: Incision site CDI   Activity: A2 w/ walker and gait belt   Diet: CL, no complaints of n/v   IVs/lines: IVF @ 75 ml/hr, schedule IV abx    Misc: Lethargic during shift but easily aroused; will continue to monitor; denies pain   Plan: PT/OT to assess in AM, continue POC            no concerns

## 2022-04-11 ENCOUNTER — TRANSFERRED RECORDS (OUTPATIENT)
Dept: HEALTH INFORMATION MANAGEMENT | Facility: CLINIC | Age: 82
End: 2022-04-11
Payer: COMMERCIAL

## 2022-05-16 ENCOUNTER — TRANSFERRED RECORDS (OUTPATIENT)
Dept: HEALTH INFORMATION MANAGEMENT | Facility: CLINIC | Age: 82
End: 2022-05-16
Payer: COMMERCIAL

## 2022-05-18 ENCOUNTER — OFFICE VISIT (OUTPATIENT)
Dept: CARDIOLOGY | Facility: CLINIC | Age: 82
End: 2022-05-18
Attending: INTERNAL MEDICINE
Payer: COMMERCIAL

## 2022-05-18 VITALS
WEIGHT: 160 LBS | SYSTOLIC BLOOD PRESSURE: 133 MMHG | HEART RATE: 56 BPM | HEIGHT: 67 IN | BODY MASS INDEX: 25.11 KG/M2 | DIASTOLIC BLOOD PRESSURE: 72 MMHG

## 2022-05-18 DIAGNOSIS — I51.81 TAKOTSUBO CARDIOMYOPATHY: ICD-10-CM

## 2022-05-18 DIAGNOSIS — K21.00 GASTROESOPHAGEAL REFLUX DISEASE WITH ESOPHAGITIS WITHOUT HEMORRHAGE: Primary | ICD-10-CM

## 2022-05-18 DIAGNOSIS — I35.1 NONRHEUMATIC AORTIC VALVE INSUFFICIENCY: ICD-10-CM

## 2022-05-18 PROCEDURE — 99213 OFFICE O/P EST LOW 20 MIN: CPT | Performed by: INTERNAL MEDICINE

## 2022-05-18 NOTE — LETTER
5/18/2022    Ellis Ruiz MD  [address]  [facility] [address]    RE: Cecily Ivory       Dear Colleague,     I had the pleasure of seeing Cecily Ivory in the [facility].  HPI and Plan:    I had the pleasure of seeing Aranza Ivory in cardiology clinic follow-up.    She is a 81-year-old female with history of Takotsubo cardiomyopathy in January 2020 when she was admitted with chest pain elevated troponin up to 4.5.  Coronary angiography revealed angiographically normal coronary arteries.  Echocardiogram revealed regional wall motion abnormalities consistent with stress-induced cardiomyopathy and since then the LV wall motion ejection fraction has improved.    She is here for follow-up.  She has aortic insufficiency and the last echocardiogram in 2021, it was moderate.  She does not have any shortness of breath.  Recently she had couple days of premature beats which last for seconds.  She told me that she felt that something was coming in early.  It would last only seconds and then come back again.    It would come frequently for the 2 days and then it resolved completely.  It was not related to any exertion.  She has had knee surgery about 3 months ago and since then she has increased her walking to about 7000 steps per day from 4000 steps per day.  Ambulation has not caused any issues for her    Exam  See below     Impression     1.  Question of transient palpitations or premature impulses for 2days, resolved completely  This lasted only seconds.  It was not anything related to exertion.  I told her that this could mean premature impulses and if this occurs again, she should call us and we might schedule her for heart monitor and a follow-up.  Fortunately, this is resolved and she is able to walk 7000 steps without any limitations.    2.  History of stress-induced cardiomyopathy, echocardiogram last revealed normalization of ejection fraction.  Angiographically normal coronary  arteries in 2020    3.  Aortic regurgitation, last echo in 2021 revealed moderate range.  We will repeat echocardiogram next year to reassess that and will have her follow-up with my midlevel provider at that time.    4.Hyperlipidemia  Continue  lovastatin and managed by primary care physician.        Sincerely,     Cameron Giron MD     Today's clinic visit entailed:    25 minutes spent on the date of the encounter doing chart review, patient visit and documentation   Provider  Link to MDM Help Grid           No orders of the defined types were placed in this encounter.      Orders Placed This Encounter   Medications     aspirin (ASPIRIN) 81 MG EC tablet     Sig: Take 81 mg by mouth daily       Medications Discontinued During This Encounter   Medication Reason     aspirin (ASA) 325 MG EC tablet Dose adjustment         Encounter Diagnoses   Name Primary?     Takotsubo cardiomyopathy      Nonrheumatic aortic valve insufficiency        CURRENT MEDICATIONS:  Current Outpatient Medications   Medication Sig Dispense Refill     acetaminophen (TYLENOL) 500 MG tablet Take 2 tablets (1,000 mg) by mouth every 6 hours as needed for mild pain       Ascorbic Acid (VITAMIN C PO) Take 500 mg by mouth daily.       aspirin (ASPIRIN) 81 MG EC tablet Take 81 mg by mouth daily       Biotin 5000 MCG CAPS Take 1 tablet by mouth At Bedtime        calcium carb 1250 mg, 500 mg [place],/vitamin D 200 units (OSCAL WITH D) 500-200 MG-UNIT per tablet Take 1 tablet by mouth daily  100 tablet 3     Carboxymethylcellulose Sodium 0.25 % SOLN Place 1 drop into both eyes every evening       Cholecalciferol (VITAMIN D3) 50 MCG (2000 UT) CAPS Take 2,000 Units by mouth daily        CYANOCOBALAMIN PO Take 1,000 mcg by mouth daily       fish oil-omega-3 fatty acids 1000 MG capsule Take 2 g by mouth daily       loratadine (CLARITIN) 10 MG tablet Take 10 mg by mouth daily       lovastatin (MEVACOR) 40 MG tablet Take 1 tablet (40 mg) by mouth At Bedtime 90  tablet 3     magnesium 100 MG CAPS Take 1 tablet by mouth daily        metoprolol succinate ER (TOPROL-XL) 25 MG 24 hr tablet Take 1 tablet (25 mg) by mouth daily 90 tablet 3     Multiple Vitamins-Minerals (CENTRUM SILVER) per tablet Take 1 tablet by mouth daily       senna-docusate (SENOKOT-S/PERICOLACE) 8.6-50 MG tablet Take 1 tablet by mouth 2 times daily Hold for loose stools       sertraline (ZOLOFT) 25 MG tablet Take 1 tablet (25 mg) by mouth daily 90 tablet 3       ALLERGIES     Allergies   Allergen Reactions     Latex Cough     Seasonal Allergies      YEAR ROUND ALLERGIES     Sulfa Drugs Unknown       PAST MEDICAL HISTORY:  Past Medical History:   Diagnosis Date     Abdominal pain 2009, 2013    ct liver and renal cyst and 2mm lung nodule, repeat ct done 2013 and no significantly abnl.     Anxiety 2014    added med 3/14     Arthritis      ASCUS of cervix with negative high risk HPV 03/2018     Bilateral subdural hematomas (H) 09/2020    found incidentally on mri done for memory loss     Carotid bruit 2011    us nl     Gastritis 2011    egd done by [facility]     GERD (gastroesophageal reflux disease) 2011     Gross hematuria 2021    cysto and ct neg     History of colonoscopy 2004, 2014    nl     Hypercholesteremia 2000    stopped lovastatin 2015, added lipitor 3/16     Insomnia     using otc      Lumbar spinal stenosis 2016    Dr. Wilde, had epidural     Lung nodule 2009, 2013    seen on ct for abd pain, fu done 3/13 and 2 nodules stable and benign, one new one needs fu 1 year.  fu done 3/14 and fu 1 year and then done; fu done 3/15 and unchanged, no fu needed     MCI (mild cognitive impairment) 2020    seen by neuro, Dr. Burnham     Myocardial infarction (H) 01/2020    elev trop but clean coronaries on angio, felt to be stress induced cmyop     Odynophagia 2004    egd nl     Other chronic pain      Peripheral neuropathies     Dr. Kim     Screening 2006, 2017    nl dexa     Seasonal allergies      Takotsubo  cardiomyopathy 01/2020    hosp fsd, ef 40%, mod ai, clean coronaries; fu echo nl 5/20     Vaginal dysplasia     chronic VAIN I, many colpos of vagina.  Now we only do an annual pap of vagina, no colpo since stable long term     Weight loss 07/2018    ct chest, abd and pelvis neg       PAST SURGICAL HISTORY:  Past Surgical History:   Procedure Laterality Date     ANKLE SURGERY  2000     APPENDECTOMY  13     ARTHROPLASTY KNEE Left 5/14/2018    Procedure: ARTHROPLASTY KNEE;  Left total knee arthroplasty;  Surgeon: William Pollard MD;  Location: [facility] OR     ARTHROPLASTY KNEE Right 12/9/2021    Procedure: RIGHT TOTAL KNEE ARTHROPLASTY;  Surgeon: Earl Perez MD;  Location:  OR     BREAST BIOPSY, CORE RT/LT       CATARACT IOL, RT/LT  04/2019     CV HEART CATHETERIZATION WITH POSSIBLE INTERVENTION N/A 1/7/2020    Procedure: Heart Catheterization with Possible Intervention;  Surgeon: Bibiana Ruggiero MD;  Location:  HEART CARDIAC CATH LAB     GYN SURGERY      BSO     HYSTERECTOMY, PAP NO LONGER INDICATED       LAPAROSCOPIC CHOLECYSTECTOMY  2008     NOSE SURGERY  1990     right knee arthroscopy  2007     thumb surgery Left 2/2015     ZZC VAGINAL HYSTERECTOMY  1995     ZZ UGI ENDOSCOPY, SIMPLE EXAM  03/15/11    [facility]       FAMILY HISTORY:  Family History   Problem Relation Age of Onset     Hyperlipidemia Mother      Dementia Mother      No Known Problems Father      No Known Problems Brother      No Known Problems Maternal Grandmother      No Known Problems Maternal Grandfather      No Known Problems Paternal Grandmother      No Known Problems Other        SOCIAL HISTORY:  Social History     Socioeconomic History     Marital status:      Spouse name: None     Number of children: 1     Years of education: None     Highest education level: None   Occupational History     Occupation: , retired     Employer: [employer]     Employer: RETIRED   Tobacco Use      "Smoking status: Former Smoker     Packs/day: 1.50     Years: 20.00     Pack years: 30.00     Types: Cigarettes     Quit date: 1992     Years since quittin.2     Smokeless tobacco: Never Used   Substance and Sexual Activity     Alcohol use: Yes     Alcohol/week: 2.0 - 4.0 standard drinks     Types: 2 - 4 Glasses of wine per week     Comment: 2 glasses 4 times per week     Drug use: No     Sexual activity: Not Currently     Partners: Male     Comment: vag hyst, BSO     Review of Systems:  Skin:  Negative       Eyes:  Negative      ENT:  Negative      Respiratory:  Positive for cough     Cardiovascular:  Negative;chest pain;dizziness;syncope or near-syncope;lightheadedness;cyanosis;fatigue;exercise intolerance Positive for;palpitations    Gastroenterology: not assessed      Genitourinary:  not assessed      Musculoskeletal:  Negative      Neurologic:  not assessed      Psychiatric:  Negative      Heme/Lymph/Imm:  Negative      Endocrine:  Negative        Physical Exam:  Vitals: /72   Pulse 56   Ht 1.702 m (5' 7\")   Wt 72.6 kg (160 lb)   BMI 25.06 kg/m      Constitutional:  in no acute distress        Skin:  warm and dry to the touch          Head:           Eyes:  sclera white        Lymph:      ENT:           Neck:  JVP normal        Respiratory:  clear to auscultation         Cardiac: normal S1 and S2;no S3 or S4                                           2/6 ESM in A area    GI:  not assessed this visit        Extremities and Muscular Skeletal:  no edema              Neurological:  no gross motor deficits        Psych:  Alert and Oriented x 3      CC  Cameron Giron MD  [address]  [place] [address]  Thank you for allowing me to participate in the care of your patient.      Sincerely,     Cameron Giron MD     [facility]  "

## 2022-05-18 NOTE — PROGRESS NOTES
HPI and Plan:    I had the pleasure of seeing Aranza Ivory in cardiology clinic follow-up.    She is a 81-year-old female with history of Takotsubo cardiomyopathy in January 2020 when she was admitted with chest pain elevated troponin up to 4.5.  Coronary angiography revealed angiographically normal coronary arteries.  Echocardiogram revealed regional wall motion abnormalities consistent with stress-induced cardiomyopathy and since then the LV wall motion ejection fraction has improved.    She is here for follow-up.  She has aortic insufficiency and the last echocardiogram in 2021, it was moderate.  She does not have any shortness of breath.  Recently she had couple days of premature beats which last for seconds.  She told me that she felt that something was coming in early.  It would last only seconds and then come back again.    It would come frequently for the 2 days and then it resolved completely.  It was not related to any exertion.  She has had knee surgery about 3 months ago and since then she has increased her walking to about 7000 steps per day from 4000 steps per day.  Ambulation has not caused any issues for her    Exam  See below     Impression     1.  Question of transient palpitations or premature impulses for 2days, resolved completely  This lasted only seconds.  It was not anything related to exertion.  I told her that this could mean premature impulses and if this occurs again, she should call us and we might schedule her for heart monitor and a follow-up.  Fortunately, this is resolved and she is able to walk 7000 steps without any limitations.    2.  History of stress-induced cardiomyopathy, echocardiogram last revealed normalization of ejection fraction.  Angiographically normal coronary arteries in 2020    3.  Aortic regurgitation, last echo in 2021 revealed moderate range.  We will repeat echocardiogram next year to reassess that and will have her follow-up with my midlevel provider at that  time.    4.Hyperlipidemia  Continue  lovastatin and managed by primary care physician.        Sincerely,     Cameron Giron MD     Today's clinic visit entailed:    25 minutes spent on the date of the encounter doing chart review, patient visit and documentation   Provider  Link to [facility] Help Grid           No orders of the defined types were placed in this encounter.      Orders Placed This Encounter   Medications     aspirin (ASPIRIN) 81 MG EC tablet     Sig: Take 81 mg by mouth daily       Medications Discontinued During This Encounter   Medication Reason     aspirin (ASA) 325 MG EC tablet Dose adjustment         Encounter Diagnoses   Name Primary?     Takotsubo cardiomyopathy      Nonrheumatic aortic valve insufficiency        CURRENT MEDICATIONS:  Current Outpatient Medications   Medication Sig Dispense Refill     acetaminophen (TYLENOL) 500 MG tablet Take 2 tablets (1,000 mg) by mouth every 6 hours as needed for mild pain       Ascorbic Acid (VITAMIN C PO) Take 500 mg by mouth daily.       aspirin (ASPIRIN) 81 MG EC tablet Take 81 mg by mouth daily       Biotin 5000 MCG CAPS Take 1 tablet by mouth At Bedtime        calcium carb 1250 mg, 500 mg [place],/vitamin D 200 units (OSCAL WITH D) 500-200 MG-UNIT per tablet Take 1 tablet by mouth daily  100 tablet 3     Carboxymethylcellulose Sodium 0.25 % SOLN Place 1 drop into both eyes every evening       Cholecalciferol (VITAMIN D3) 50 MCG (2000 UT) CAPS Take 2,000 Units by mouth daily        CYANOCOBALAMIN PO Take 1,000 mcg by mouth daily       fish oil-omega-3 fatty acids 1000 MG capsule Take 2 g by mouth daily       loratadine (CLARITIN) 10 MG tablet Take 10 mg by mouth daily       lovastatin (MEVACOR) 40 MG tablet Take 1 tablet (40 mg) by mouth At Bedtime 90 tablet 3     magnesium 100 MG CAPS Take 1 tablet by mouth daily        metoprolol succinate ER (TOPROL-XL) 25 MG 24 hr tablet Take 1 tablet (25 mg) by mouth daily 90 tablet 3     Multiple Vitamins-Minerals (CENTRUM  SILVER) per tablet Take 1 tablet by mouth daily       senna-docusate (SENOKOT-S/PERICOLACE) 8.6-50 MG tablet Take 1 tablet by mouth 2 times daily Hold for loose stools       sertraline (ZOLOFT) 25 MG tablet Take 1 tablet (25 mg) by mouth daily 90 tablet 3       ALLERGIES     Allergies   Allergen Reactions     Latex Cough     Seasonal Allergies      YEAR ROUND ALLERGIES     Sulfa Drugs Unknown       PAST MEDICAL HISTORY:  Past Medical History:   Diagnosis Date     Abdominal pain 2009, 2013    ct liver and renal cyst and 2mm lung nodule, repeat ct done 2013 and no significantly abnl.     Anxiety 2014    added med 3/14     Arthritis      ASCUS of cervix with negative high risk HPV 03/2018     Bilateral subdural hematomas (H) 09/2020    found incidentally on mri done for memory loss     Carotid bruit 2011    us nl     Gastritis 2011    egd done by [facility]     GERD (gastroesophageal reflux disease) 2011     Gross hematuria 2021    cysto and ct neg     History of colonoscopy 2004, 2014    nl     Hypercholesteremia 2000    stopped lovastatin 2015, added lipitor 3/16     Insomnia     using otc      Lumbar spinal stenosis 2016    Dr. Wilde, had epidural     Lung nodule 2009, 2013    seen on ct for abd pain, fu done 3/13 and 2 nodules stable and benign, one new one needs fu 1 year.  fu done 3/14 and fu 1 year and then done; fu done 3/15 and unchanged, no fu needed     MCI (mild cognitive impairment) 2020    seen by neuro, Dr. Burnham     Myocardial infarction (H) 01/2020    elev trop but clean coronaries on angio, felt to be stress induced cmyop     Odynophagia 2004    egd nl     Other chronic pain      Peripheral neuropathies     Dr. Kim     Screening 2006, 2017    nl dexa     Seasonal allergies      Takotsubo cardiomyopathy 01/2020    hosp fsd, ef 40%, mod ai, clean coronaries; fu echo nl 5/20     Vaginal dysplasia     chronic VAIN I, many colpos of vagina.  Now we only do an annual pap of vagina, no colpo since stable  long term     Weight loss 2018    ct chest, abd and pelvis neg       PAST SURGICAL HISTORY:  Past Surgical History:   Procedure Laterality Date     ANKLE SURGERY       APPENDECTOMY  13     ARTHROPLASTY KNEE Left 2018    Procedure: ARTHROPLASTY KNEE;  Left total knee arthroplasty;  Surgeon: William Pollard MD;  Location: [facility] OR     ARTHROPLASTY KNEE Right 2021    Procedure: RIGHT TOTAL KNEE ARTHROPLASTY;  Surgeon: Earl Perez MD;  Location: [facility] OR     BREAST BIOPSY, CORE RT/LT       CATARACT IOL, RT/LT  2019     CV HEART CATHETERIZATION WITH POSSIBLE INTERVENTION N/A 2020    Procedure: Heart Catheterization with Possible Intervention;  Surgeon: Bibiana Ruggiero MD;  Location:  HEART CARDIAC CATH LAB     GYN SURGERY      BSO     HYSTERECTOMY, PAP NO LONGER INDICATED       LAPAROSCOPIC CHOLECYSTECTOMY       NOSE SURGERY       right knee arthroscopy       thumb surgery Left 2015     [facility]AGIN[facility]CTOM[facility]       FAMILY HISTORY:  Family History   Problem Relation Age of Onset     Hyperlipidemia Mother      Dementia Mother      No Known Problems Father      No Known Problems Brother      No Known Problems Maternal Grandmother      No Known Problems Maternal Grandfather      No Known Problems Paternal Grandmother      No Known Problems Other        SOCIAL HISTORY:  Social History     Socioeconomic History     Marital status:      Spouse name: None     Number of children: 1     Years of education: None     Highest education level: None   Occupational History     Occupation: , retired     Employer: [employer]     Employer: RETIRED   Tobacco Use     Smoking status: Former Smoker     Packs/day: 1.50     Years: 20.00     Pack years: 30.00     Types: Cigarettes     Quit date: 1992     Years since quittin.2     Smokeless tobacco: Never Used   Substance  "and Sexual Activity     Alcohol use: Yes     Alcohol/week: 2.0 - 4.0 standard drinks     Types: 2 - 4 Glasses of wine per week     Comment: 2 glasses 4 times per week     Drug use: No     Sexual activity: Not Currently     Partners: Male     Comment: vag hyst, BSO       Review of Systems:  Skin:  Negative       Eyes:  Negative      ENT:  Negative      Respiratory:  Positive for cough     Cardiovascular:  Negative;chest pain;dizziness;syncope or near-syncope;lightheadedness;cyanosis;fatigue;exercise intolerance Positive for;palpitations    Gastroenterology: not assessed      Genitourinary:  not assessed      Musculoskeletal:  Negative      Neurologic:  not assessed      Psychiatric:  Negative      Heme/Lymph/Imm:  Negative      Endocrine:  Negative        Physical Exam:  Vitals: /72   Pulse 56   Ht 1.702 m (5' 7\")   Wt 72.6 kg (160 lb)   BMI 25.06 kg/m      Constitutional:  in no acute distress        Skin:  warm and dry to the touch          Head:           Eyes:  sclera white        Lymph:      ENT:           Neck:  JVP normal        Respiratory:  clear to auscultation         Cardiac: normal S1 and S2;no S3 or S4                                           2/6 ESM in A area    GI:  not assessed this visit        Extremities and Muscular Skeletal:  no edema              Neurological:  no gross motor deficits        Psych:  Alert and Oriented x 3        CC  Cameron Giron MD  [address]  [address]  [place] [id number]              "

## 2022-08-26 NOTE — TELEPHONE ENCOUNTER
"To PCP:     Hospital Discharge notes recommend f/u with PCP 1-2 weeks (no labs needed). Your schedule is full 3-4 weeks out- Can pt be added onto schedule? Mid-morning 10/8?     Thank you,   Mihaela WHITMAN RN      ED / Discharge Outreach Protocol    Patient Contact    Pt returned call     Hospital/TCU/ED for chronic condition Discharge Protocol    \"Hi, my name is Mihaela Norman RN, a registered nurse, and I am calling from [facility].  I am calling to follow up and see how things are going for you after your recent emergency visit/hospital/TCU stay.\"    Tell me how you are doing now that you are home?\"     Doing fine all along     Denies dizziness, denies weakness, denies headache     They performed 3 scans and it never changed.       Discharge Instructions    \"Let's review your discharge instructions.  What is/are the follow-up recommendations?  Pt. Response: I have so many appointments scheduled. She has them all written down       \"Has an appointment with your primary care provider been scheduled?\"   Will inquire if PCP can fit in for f/u     \"When you see the provider, I would recommend that you bring your medications with you.\"    Medications    \"Tell me what changed about your medicines when you discharged?\"    Changes to chronic meds?    0-1    \"What questions do you have about your medications?\"    None     New diagnoses of heart failure, COPD, diabetes, or MI?    No              Post Discharge Medication Reconciliation Status: discharge medications reconciled, continue medications without change.    Was MTM referral placed (*Make sure to put transitions as reason for referral)?   No    Call Summary    \"What questions or concerns do you have about your recent visit and your follow-up care?\"     none    \"If you have questions or things don't continue to improve, we encourage you contact us through the main clinic number (give number).  Even if the clinic is not open, triage nurses are available 24/7 to " Called and spoke with patient. Verified name/. Patient saw Dr. Brandan Peacock in  for neck pain and arm/shoulder weakness. Patient stated since 2022 she has been having severe headaches and TIA events. She is working with a lot of different people regarding this situation. Patient has a Hx of 3 vessel artery dissection of neck in 2016. Patient stated she did have dopplers done and were told they were normal. Patient had MRI/MRA done on 2022, ordered by virtual headache clinic, Dr. Tali Hawkins in [place]. Image showed a possible clot in her carotid artery. She stated no one is doing anything and [facility] neurosurgery has not been helpful. I asked patient about the events she has been experiencing, they are listed below:     -Right sided headaches frontal suboccipital since   -Severe fatigue 2 weeks   -Phonophobia, photophobia nausea, tinnitus both ears, pulsatile whooshing sounds and feels it in the right side of her neck (intermittent)  -Out of character irritability   -Muscle weakness/pain    Events that are more prominent right now:    -cognitive slowing with short-term/working memory care   For example she would forget if she has a movie ticket that she had in her hand  -Mild expresses aphasia  -Occasional lisping that she has never had before   -Left eye blurriness, same symptom when had dissection in 2016   -1 mild episode of apraxia, having trouble complying with simple instructions during an exam last week -physical medicine and rehab doctor @ [facility]   For example: the provider asked her to squeeze his fingers, instead she put her fingers out for him to squeeze them    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 She currently is not working and on medical leave. She noted having spinal stenosis. Lastly, she noted she was suppose to have an EEG ordered last week and has not heard anything.  I notified Dr. Hilary Kyle of this and this is what Dr. Hilary Kyle verbally told me to say to patient: "help you.     We would like you to know that our clinic has extended hours (provide information).  We also have urgent care (provide details on closest location and hours/contact info)\"      \"Thank you for your time and take care!\"             " Patient needs to go to [facility] or [facility] ED. She needs repeat CTA of head/neck to further eval thrombosis, if its truly there it needs to be treated with anticoagulation vs neuro IR eval, it could also be an artifact because of the stent that's why we need repeat, this is all from report we can't see images. Patient stated she is currently in [address], going to have  bring her back down to the ED tonight or tomorrow. Notified I will forward this to Dr. Marty Tapia for him to review on Monday and go from there. She verbalized understanding.      Total time on call: 30 minutes

## 2022-10-03 ENCOUNTER — TRANSFERRED RECORDS (OUTPATIENT)
Dept: HEALTH INFORMATION MANAGEMENT | Facility: CLINIC | Age: 82
End: 2022-10-03

## 2022-10-10 ENCOUNTER — TRANSFERRED RECORDS (OUTPATIENT)
Dept: HEALTH INFORMATION MANAGEMENT | Facility: CLINIC | Age: 82
End: 2022-10-10

## 2022-10-24 ENCOUNTER — OFFICE VISIT (OUTPATIENT)
Dept: FAMILY MEDICINE | Facility: CLINIC | Age: 82
End: 2022-10-24
Payer: COMMERCIAL

## 2022-10-24 VITALS
BODY MASS INDEX: 24.96 KG/M2 | HEART RATE: 58 BPM | OXYGEN SATURATION: 100 % | SYSTOLIC BLOOD PRESSURE: 137 MMHG | DIASTOLIC BLOOD PRESSURE: 78 MMHG | WEIGHT: 159 LBS | HEIGHT: 67 IN | RESPIRATION RATE: 16 BRPM | TEMPERATURE: 97 F

## 2022-10-24 DIAGNOSIS — G31.84 MCI (MILD COGNITIVE IMPAIRMENT): ICD-10-CM

## 2022-10-24 DIAGNOSIS — I51.81 TAKOTSUBO CARDIOMYOPATHY: ICD-10-CM

## 2022-10-24 DIAGNOSIS — Z00.00 MEDICARE ANNUAL WELLNESS VISIT, SUBSEQUENT: Primary | ICD-10-CM

## 2022-10-24 DIAGNOSIS — E78.5 HYPERLIPIDEMIA LDL GOAL <130: ICD-10-CM

## 2022-10-24 PROBLEM — Z96.659 S/P TOTAL KNEE ARTHROPLASTY: Status: RESOLVED | Noted: 2021-12-09 | Resolved: 2022-10-24

## 2022-10-24 PROBLEM — S06.5XAA BILATERAL SUBDURAL HEMATOMAS (H): Status: RESOLVED | Noted: 2020-09-24 | Resolved: 2022-10-24

## 2022-10-24 PROBLEM — R07.82 INTERCOSTAL PAIN: Status: RESOLVED | Noted: 2020-01-09 | Resolved: 2022-10-24

## 2022-10-24 LAB
ERYTHROCYTE [DISTWIDTH] IN BLOOD BY AUTOMATED COUNT: 13.1 % (ref 10–15)
HCT VFR BLD AUTO: 43.2 % (ref 35–47)
HGB BLD-MCNC: 14.1 G/DL (ref 11.7–15.7)
MCH RBC QN AUTO: 30.7 PG (ref 26.5–33)
MCHC RBC AUTO-ENTMCNC: 32.6 G/DL (ref 31.5–36.5)
MCV RBC AUTO: 94 FL (ref 78–100)
PLATELET # BLD AUTO: 195 10E3/UL (ref 150–450)
RBC # BLD AUTO: 4.6 10E6/UL (ref 3.8–5.2)
WBC # BLD AUTO: 9.6 10E3/UL (ref 4–11)

## 2022-10-24 PROCEDURE — 85027 COMPLETE CBC AUTOMATED: CPT | Performed by: INTERNAL MEDICINE

## 2022-10-24 PROCEDURE — 36415 COLL VENOUS BLD VENIPUNCTURE: CPT | Performed by: INTERNAL MEDICINE

## 2022-10-24 PROCEDURE — G0439 PPPS, SUBSEQ VISIT: HCPCS | Performed by: INTERNAL MEDICINE

## 2022-10-24 PROCEDURE — 80053 COMPREHEN METABOLIC PANEL: CPT | Performed by: INTERNAL MEDICINE

## 2022-10-24 PROCEDURE — 80061 LIPID PANEL: CPT | Performed by: INTERNAL MEDICINE

## 2022-10-24 RX ORDER — METOPROLOL SUCCINATE 25 MG/1
25 TABLET, EXTENDED RELEASE ORAL DAILY
Qty: 90 TABLET | Refills: 3 | Status: SHIPPED | OUTPATIENT
Start: 2022-10-24 | End: 2023-08-08

## 2022-10-24 RX ORDER — LOVASTATIN 40 MG
40 TABLET ORAL AT BEDTIME
Qty: 90 TABLET | Refills: 3 | Status: SHIPPED | OUTPATIENT
Start: 2022-10-24 | End: 2023-08-08

## 2022-10-24 ASSESSMENT — PAIN SCALES - GENERAL: PAINLEVEL: NO PAIN (0)

## 2022-10-24 ASSESSMENT — ACTIVITIES OF DAILY LIVING (ADL): CURRENT_FUNCTION: NO ASSISTANCE NEEDED

## 2022-10-24 NOTE — LETTER
October 26, 2022      Cecily GOINS Dianelys  [address] [place]  [place] [address]        Dear ,    We are writing to inform you of your test results.    I am happy to report that your cbc or complete blood count is normal with no signs of anemia, leukemia or platelet abnormalities.   Your chemistry panel shows no signs of diabetes.    Your blood salts, kidney tests, liver tests, and proteins are all fine.     Your total cholesterol is 227 with the normal range being below 200.    Your HDL or good cholesterol is 77 with the normal range being above 50.  Your LDL or bad cholesterol is 99 with the normal range being below 130.  T  hese numbers are very good.    The triglycerides are not a worry but be sure to exercise and keep your weight down for this.     I am happy to bring you this overall excellent report.              Resulted Orders   CBC with platelets   Result Value Ref Range    WBC Count 9.6 4.0 - 11.0 10e3/uL    RBC Count 4.60 3.80 - 5.20 10e6/uL    Hemoglobin 14.1 11.7 - 15.7 g/dL    Hematocrit 43.2 35.0 - 47.0 %    MCV 94 78 - 100 fL    MCH 30.7 26.5 - 33.0 pg    MCHC 32.6 31.5 - 36.5 g/dL    RDW 13.1 10.0 - 15.0 %    Platelet Count 195 150 - 450 10e3/uL   Comprehensive metabolic panel   Result Value Ref Range    Sodium 139 133 - 144 mmol/L    Potassium 4.4 3.4 - 5.3 mmol/L    Chloride 107 94 - 109 mmol/L    Carbon Dioxide (CO2) 28 20 - 32 mmol/L    Anion Gap 4 3 - 14 mmol/L    Urea Nitrogen 28 7 - 30 mg/dL    Creatinine 0.79 0.52 - 1.04 mg/dL    Calcium 8.9 8.5 - 10.1 mg/dL    Glucose 96 70 - 99 mg/dL    Alkaline Phosphatase 54 40 - 150 U/L    AST 26 0 - 45 U/L    ALT 40 0 - 50 U/L    Protein Total 7.4 6.8 - 8.8 g/dL    Albumin 3.7 3.4 - 5.0 g/dL    Bilirubin Total 0.3 0.2 - 1.3 mg/dL    GFR Estimate 75 >60 mL/min/1.73m2      Comment:      Effective December 21, 2021 eGFRcr in adults is calculated using the 2021 CKD-EPI creatinine equation which includes age and gender (Rox et  al., [place], DOI: 10.1056/KPQFse0061149)   Lipid panel reflex to direct LDL Fasting   Result Value Ref Range    Cholesterol 227 (H) <200 mg/dL    Triglycerides 254 (H) <150 mg/dL    Direct Measure HDL 77 >=50 mg/dL    LDL Cholesterol Calculated 99 <=100 mg/dL    Non HDL Cholesterol 150 (H) <130 mg/dL    Patient Fasting > 8hrs? No     Narrative    Cholesterol  Desirable:  <200 mg/dL    Triglycerides  Normal:  Less than 150 mg/dL  Borderline High:  150-199 mg/dL  High:  200-499 mg/dL  Very High:  Greater than or equal to 500 mg/dL    Direct Measure HDL  Female:  Greater than or equal to 50 mg/dL   Male:  Greater than or equal to 40 mg/dL    LDL Cholesterol  Desirable:  <100mg/dL  Above Desirable:  100-129 mg/dL   Borderline High:  130-159 mg/dL   High:  160-189 mg/dL   Very High:  >= 190 mg/dL    Non HDL Cholesterol  Desirable:  130 mg/dL  Above Desirable:  130-159 mg/dL  Borderline High:  160-189 mg/dL  High:  190-219 mg/dL  Very High:  Greater than or equal to 220 mg/dL       If you have any questions or concerns, please call the clinic at the number listed above.       Sincerely,      Ellis Ruiz MD

## 2022-10-24 NOTE — NURSING NOTE
"Cecily Ivory is a 82 year old patient who comes in today for a Blood Pressure check.  Initial BP:  /79 (BP Location: Right arm, Patient Position: Chair, Cuff Size: Adult Large)   Pulse 58   Temp 97  F (36.1  C) (Tympanic)   Resp 16   Ht 1.702 m (5' 7\")   Wt 72.1 kg (159 lb)   SpO2 100%   BMI 24.90 kg/m       58  Disposition: provider notified while patient in the clinic  Lydia Gustafson CMA      "

## 2022-10-24 NOTE — PROGRESS NOTES
SUBJECTIVE:   Cecily is a 82 year old who presents for Preventive Visit.    She is doing well and has no complaints.  She has been seeing neurology regularly.  She is up-to-date with cardiology.  She has no complaints.               Past Medical History:      Past Medical History:   Diagnosis Date     Abdominal pain 2009, 2013    ct liver and renal cyst and 2mm lung nodule, repeat ct done 2013 and no significantly abnl.     Anxiety 2014    added med 3/14     Arthritis      ASCUS of cervix with negative high risk HPV 03/2018     Bilateral subdural hematomas 09/2020    found incidentally on mri done for memory loss     Carotid bruit 2011    us nl     Gastritis 2011    egd done by [facility]     GERD (gastroesophageal reflux disease) 2011     Gross hematuria 2021    cysto and ct neg     History of colonoscopy 2004, 2014    nl     Hypercholesteremia 2000    stopped lovastatin 2015, added lipitor 3/16     Insomnia     using otc      Lumbar spinal stenosis 2016    Dr. Wilde, had epidural     Lung nodule 2009, 2013    seen on ct for abd pain, fu done 3/13 and 2 nodules stable and benign, one new one needs fu 1 year.  fu done 3/14 and fu 1 year and then done; fu done 3/15 and unchanged, no fu needed     MCI (mild cognitive impairment) 2020    seen by neuro, Dr. Burnham     Myocardial infarction (H) 01/2020    elev trop but clean coronaries on angio, felt to be stress induced cmyop     Odynophagia 2004    egd nl     Other chronic pain      Peripheral neuropathies     Dr. Kim     Screening 2006, 2017    nl dexa     Seasonal allergies      Takotsubo cardiomyopathy 01/2020    hosp fsd, ef 40%, mod ai, clean coronaries; fu echo nl 5/20     Vaginal dysplasia     chronic VAIN I, many colpos of vagina.  Now we only do an annual pap of vagina, no colpo since stable long term     Weight loss 07/2018    ct chest, abd and pelvis neg             Past Surgical History:      Past Surgical History:   Procedure Laterality Date     ANKLE  SURGERY  2000     APPENDECTOMY  13     ARTHROPLASTY KNEE Left 2018    Procedure: ARTHROPLASTY KNEE;  Left total knee arthroplasty;  Surgeon: William Pollard MD;  Location: [facility] OR     ARTHROPLASTY KNEE Right 2021    Procedure: RIGHT TOTAL KNEE ARTHROPLASTY;  Surgeon: Earl Perez MD;  Location:  OR     BREAST BIOPSY, CORE RT/LT       CATARACT IOL, RT/LT  2019     CV HEART CATHETERIZATION WITH POSSIBLE INTERVENTION N/A 2020    Procedure: Heart Catheterization with Possible Intervention;  Surgeon: Bibiana Ruggiero MD;  Location:  HEART CARDIAC CATH LAB     GYN SURGERY      BSO     HYSTERECTOMY, PAP NO LONGER INDICATED       LAPAROSCOPIC CHOLECYSTECTOMY       NOSE SURGERY       right knee arthroscopy       thumb surgery Left 2015     ZZC VAGINAL HYSTERECTOMY       ZZ UGI ENDOSCOPY, SIMPLE EXAM  03/15/11    [facility]             Social History:     Social History     Socioeconomic History     Marital status:      Spouse name: Not on file     Number of children: 1     Years of education: Not on file     Highest education level: Not on file   Occupational History     Occupation: , retired     Employer: [employer]     Employer: RETIRED   Tobacco Use     Smoking status: Former     Packs/day: 1.50     Years: 20.00     Pack years: 30.00     Types: Cigarettes     Quit date: 1992     Years since quittin.7     Smokeless tobacco: Never   Substance and Sexual Activity     Alcohol use: Yes     Alcohol/week: 2.0 - 4.0 standard drinks     Types: 2 - 4 Glasses of wine per week     Comment: 2 glasses 4 times per week     Drug use: No     Sexual activity: Not Currently     Partners: Male     Comment: vag hyst, BSO   Other Topics Concern     Parent/sibling w/ CABG, MI or angioplasty before 65F 55M? Not Asked   Social History Narrative     Not on file     Social Determinants of Health     Financial Resource Strain: Not on file    Food Insecurity: Not on file   Transportation Needs: Not on file   Physical Activity: Not on file   Stress: Not on file   Social Connections: Not on file   Intimate Partner Violence: Not on file   Housing Stability: Not on file             Family History:   reviewed         Allergies:     Allergies   Allergen Reactions     Latex Cough     Seasonal Allergies      YEAR ROUND ALLERGIES     Sulfa Drugs Unknown             Medications:     Current Outpatient Medications   Medication Sig Dispense Refill     acetaminophen (TYLENOL) 500 MG tablet Take 2 tablets (1,000 mg) by mouth every 6 hours as needed for mild pain       Ascorbic Acid (VITAMIN C PO) Take 500 mg by mouth daily.       aspirin (ASA) 81 MG EC tablet Take 81 mg by mouth daily       Biotin 5000 MCG CAPS Take 1 tablet by mouth At Bedtime        calcium carb 1250 mg, 500 mg [place],/vitamin D 200 units (OSCAL WITH D) 500-200 MG-UNIT per tablet Take 1 tablet by mouth daily  100 tablet 3     Carboxymethylcellulose Sodium 0.25 % SOLN Place 1 drop into both eyes every evening       Cholecalciferol (VITAMIN D3) 50 MCG (2000 UT) CAPS Take 2,000 Units by mouth daily        CYANOCOBALAMIN PO Take 1,000 mcg by mouth daily       fish oil-omega-3 fatty acids 1000 MG capsule Take 2 g by mouth daily       loratadine (CLARITIN) 10 MG tablet Take 10 mg by mouth daily       lovastatin (MEVACOR) 40 MG tablet Take 1 tablet (40 mg) by mouth At Bedtime 90 tablet 3     magnesium 100 MG CAPS Take 1 tablet by mouth daily        metoprolol succinate ER (TOPROL XL) 25 MG 24 hr tablet Take 1 tablet (25 mg) by mouth daily 90 tablet 3     Multiple Vitamins-Minerals (CENTRUM SILVER) per tablet Take 1 tablet by mouth daily       senna-docusate (SENOKOT-S/PERICOLACE) 8.6-50 MG tablet Take 1 tablet by mouth 2 times daily Hold for loose stools       sertraline (ZOLOFT) 25 MG tablet Take 1 tablet (25 mg) by mouth daily 90 tablet 3               Review of Systems:   The 10 point Review of Systems  "is negative other than noted in the HPI           Physical Exam:   Blood pressure 137/78, pulse 58, temperature 97  F (36.1  C), temperature source Tympanic, resp. rate 16, height 1.702 m (5' 7\"), weight 72.1 kg (159 lb), SpO2 100 %, not currently breastfeeding.    Exam:  Constitutional: healthy appearing, alert and in no distress  Heent: Normocephalic. Head without obvious masses or lesions. PERRLDC, EOMI. Mouth exam within normal limits: tongue, mucous membranes, posterior pharynx all normal, no lesions or abnormalities seen.  Tm's and canals within normal limits bilaterally. Neck supple, no nuchal rigidity or masses. No supraclavicular, or cervical adenopathy. Thyroid symmetric, no masses.  Cardiovascular: Regular rate and rhythm, no murmer, rub or gallops.  JVP not elevated, no edema.  Carotids within normal limits bilaterally, no bruits.  Respiratory: Normal respiratory effort.  Lungs clear, normal flow, no wheezing or crackles.  Breasts: Normal bilaterally.  No masses or lesions.  Nipples within normal limits.  No axillary lesions or nodes.  My M.A. Was present during this part of the examination.  Gastrointestinal: Normal active bowel sounds.   Soft, not tender, no masses, guarding or rebound.  No hepatosplenomegaly.   Musculoskeletal: extremities normal, no gross deformities noted.  Skin: no suspicious lesions or rashes   Neurologic: Mental status within normal limits.  Speech fluent.  No gross motor abnormalities and gait intact.  Psychiatric: mentation appears normal and affect normal.         Data:   Labs sent        Assessment:   1. Normal complete physical exam  2. Mci, follow up neuro  3. Elevated cholesterol, follow up labs  4. Cmyop, resolved  5. hcm         Plan:   Getting immunizations elsewhere  Exercise, diet  Letter with labs  Follow up neuro      Ellis Ruiz M.D.              Patient has been advised of split billing requirements and indicates understanding: Yes  Are you in the first 12 " "months of your Medicare coverage?  No    Healthy Habits:    In general, how would you rate your overall health?  Very good    Frequency of exercise:  6-7 days/week    Duration of exercise:  30-45 minutes    Do you usually eat at least 4 servings of fruit and vegetables a day, include whole grains    & fiber and avoid regularly eating high fat or \"junk\" foods?  Yes    Taking medications regularly:  No    Barriers to taking medications:  None    Medication side effects:  None    Ability to successfully perform activities of daily living:  No assistance needed    Home Safety:  No safety concerns identified    Hearing Impairment:  No hearing concerns    In the past 6 months, have you been bothered by leaking of urine?  No    In general, how would you rate your overall mental or emotional health?  Very good      PHQ-2 Total Score:    Additional concerns today:  No    Do you feel safe in your environment? Yes    Have you ever done Advance Care Planning? (For example, a Health Directive, POLST, or a discussion with a medical provider or your loved ones about your wishes): No, advance care planning information given to patient to review.  Patient plans to discuss their wishes with loved ones or provider.         Fall risk       Cognitive Screening   1) Repeat 3 items (Leader, Season, Table)    2) Clock draw: NORMAL  3) 3 item recall: Recalls 3 objects  Results: 3 items recalled: COGNITIVE IMPAIRMENT LESS LIKELY    Mini-CogTM Copyright TRACI Winters. Licensed by the author for use in [facility]; reprinted with permission (raf@.[facility]). All rights reserved.      Do you have sleep apnea, excessive snoring or daytime drowsiness?: no    Reviewed and updated as needed this visit by clinical staff                  Reviewed and updated as needed this visit by Provider                 Social History     Tobacco Use     Smoking status: Former     Packs/day: 1.50     Years: 20.00     Pack years: 30.00     Types: Cigarettes    " " Quit date: 1992     Years since quittin.7     Smokeless tobacco: Never   Substance Use Topics     Alcohol use: Yes     Alcohol/week: 2.0 - 4.0 standard drinks     Types: 2 - 4 Glasses of wine per week     Comment: 2 glasses 4 times per week     If you drink alcohol do you typically have >3 drinks per day or >7 drinks per week? No    Alcohol Use 2019   Prescreen: >3 drinks/day or >7 drinks/week? No               Current providers sharing in care for this patient include:   Patient Care Team:  Ellis Ruiz MD as PCP - General (Internal Medicine)  Ellis Ruiz MD as Assigned PCP  Dionte Burnham MD as Assigned Neuroscience Provider  Cameron Giron MD as Assigned Heart and Vascular Provider  Ezra Covarrubias MD as Assigned Surgical Provider  Andra Lopez PA-C as Assigned OBGYN Provider    The following health maintenance items are reviewed in Epic and correct as of today:  Health Maintenance   Topic Date Due     ANNUAL REVIEW OF HM ORDERS  Never done     HEPATITIS B IMMUNIZATION (1 of 3 - 3-dose series) Never done     PHQ-2 (once per calendar year)  2022     MAMMO SCREENING  2022     COVID-19 Vaccine (5 - Booster for Moderna series) 2022     INFLUENZA VACCINE (1) 2022     MEDICARE ANNUAL WELLNESS VISIT  10/05/2022     FALL RISK ASSESSMENT  11/15/2022     DTAP/TDAP/TD IMMUNIZATION (4 - Td or Tdap) 2023     LIPID  10/05/2026     ADVANCE CARE PLANNING  10/05/2026     DEXA  2032     Pneumococcal Vaccine: 65+ Years  Completed     ZOSTER IMMUNIZATION  Completed     IPV IMMUNIZATION  Aged Out     MENINGITIS IMMUNIZATION  Aged Out             Pertinent mammograms are reviewed under the imaging tab.    Review of Systems      OBJECTIVE:   There were no vitals taken for this visit. Estimated body mass index is 25.06 kg/m  as calculated from the following:    Height as of 22: 1.702 m (5' 7\").    Weight as of 22: 72.6 kg (160 " "lb).  Physical Exam          ASSESSMENT / PLAN:             COUNSELING:  Reviewed preventive health counseling, as reflected in patient instructions       Regular exercise       Healthy diet/nutrition    Estimated body mass index is 25.06 kg/m  as calculated from the following:    Height as of 5/18/22: 1.702 m (5' 7\").    Weight as of 5/18/22: 72.6 kg (160 lb).        She reports that she quit smoking about 30 years ago. Her smoking use included cigarettes. She has a 30.00 pack-year smoking history. She has never used smokeless tobacco.      Appropriate preventive services were discussed with this patient, including applicable screening as appropriate for cardiovascular disease, diabetes, osteopenia/osteoporosis, and glaucoma.  As appropriate for age/gender, discussed screening for colorectal cancer, prostate cancer, breast cancer, and cervical cancer. Checklist reviewing preventive services available has been given to the patient.    Reviewed patients plan of care and provided an AVS. The Basic Care Plan (routine screening as documented in Health Maintenance) for Cecily meets the Care Plan requirement. This Care Plan has been established and reviewed with the Patient.    Counseling Resources:  ATP IV Guidelines  Pooled Cohorts Equation Calculator  Breast Cancer Risk Calculator  Breast Cancer: Medication to Reduce Risk  FRAX Risk Assessment  ICSI Preventive Guidelines  Dietary Guidelines for Americans, 2010  [employer]'s MyPlate  ASA Prophylaxis  Lung CA Screening    Ellis Ruiz MD  [facility]    Identified Health Risks:  "

## 2022-10-25 LAB
ALBUMIN SERPL-MCNC: 3.7 G/DL (ref 3.4–5)
ALP SERPL-CCNC: 54 U/L (ref 40–150)
ALT SERPL W P-5'-P-CCNC: 40 U/L (ref 0–50)
ANION GAP SERPL CALCULATED.3IONS-SCNC: 4 MMOL/L (ref 3–14)
AST SERPL W P-5'-P-CCNC: 26 U/L (ref 0–45)
BILIRUB SERPL-MCNC: 0.3 MG/DL (ref 0.2–1.3)
BUN SERPL-MCNC: 28 MG/DL (ref 7–30)
CALCIUM SERPL-MCNC: 8.9 MG/DL (ref 8.5–10.1)
CHLORIDE BLD-SCNC: 107 MMOL/L (ref 94–109)
CHOLEST SERPL-MCNC: 227 MG/DL
CO2 SERPL-SCNC: 28 MMOL/L (ref 20–32)
CREAT SERPL-MCNC: 0.79 MG/DL (ref 0.52–1.04)
FASTING STATUS PATIENT QL REPORTED: NO
GFR SERPL CREATININE-BSD FRML MDRD: 75 ML/MIN/1.73M2
GLUCOSE BLD-MCNC: 96 MG/DL (ref 70–99)
HDLC SERPL-MCNC: 77 MG/DL
LDLC SERPL CALC-MCNC: 99 MG/DL
NONHDLC SERPL-MCNC: 150 MG/DL
POTASSIUM BLD-SCNC: 4.4 MMOL/L (ref 3.4–5.3)
PROT SERPL-MCNC: 7.4 G/DL (ref 6.8–8.8)
SODIUM SERPL-SCNC: 139 MMOL/L (ref 133–144)
TRIGL SERPL-MCNC: 254 MG/DL

## 2022-10-26 NOTE — RESULT ENCOUNTER NOTE
It was a pleasure seeing you for your physical examination.  I wanted to get back to you with your test results.  I have enclosed a copy for your review.     I am happy to report that your cbc or complete blood count is normal with no signs of anemia, leukemia or platelet abnormalities. Your chemistry panel shows no signs of diabetes.  Your blood salts, kidney tests, liver tests, and proteins are all fine.    Your total cholesterol is 227 with the normal range being below 200.  Your HDL or good cholesterol is 77 with the normal range being above 50.  Your LDL or bad cholesterol is 99 with the normal range being below 130.  These numbers are very good.  The triglycerides are not a worry but be sure to exercise and keep your weight down for this.    I am happy to bring you this overall excellent report.  If you have any questions please call me.    Ellis Ruiz M.D.

## 2022-11-01 ENCOUNTER — TRANSFERRED RECORDS (OUTPATIENT)
Dept: HEALTH INFORMATION MANAGEMENT | Facility: CLINIC | Age: 82
End: 2022-11-01

## 2022-11-14 DIAGNOSIS — F41.9 ANXIETY: ICD-10-CM

## 2022-11-14 RX ORDER — SERTRALINE HYDROCHLORIDE 25 MG/1
TABLET, FILM COATED ORAL
Qty: 90 TABLET | Refills: 2 | Status: SHIPPED | OUTPATIENT
Start: 2022-11-14 | End: 2023-08-08

## 2022-11-14 NOTE — TELEPHONE ENCOUNTER
Prescription approved per [facility] Refill Protocol.      Mary Lou Brady, RN BSN MSN  [facility]

## 2022-12-12 ENCOUNTER — NURSE TRIAGE (OUTPATIENT)
Dept: FAMILY MEDICINE | Facility: CLINIC | Age: 82
End: 2022-12-12

## 2022-12-12 NOTE — TELEPHONE ENCOUNTER
"Patient requesting a referral/recommendation for a dermatologist in the [place] or [facility].     Patient states that she has \"a growth that hurts\" on the inner aspect of the upper left leg near the groin area. Patient states that PCP was informed of the growth at the last office visit but states that she didn't receive a recommendation/referral for a dermatologist.     Appearance: dry and raised slightly from the skin. No discoloration  Size: 1/4 inch  Color: same as surrounding skin, but some skin surrounding is reddened  Shape: rounded, somewhat symmetrical  Raised: patient estimates 1mm elevation  Tender: mild pain with touching  Location: upper left leg near the groin area  Onset: has had \"for a while\" but cannot recall how long  Number: just the one  Cause: no idea  Other symptoms: denies    Callback: [phone] ok to leave detailed vm    Disposition  See in Office Within 2 Weeks. PCP, patient is requesting a referral or recommendation for a dermatologist. Patient requests a call back to let her know about a dermatologist and how to schedule, please send back to team unless dermatology clinic will contact patient to make appointment.    Shu Staley, RN  [facility]      Reason for Disposition    Sticks up out of the skin (elevated), and feels rough to the touch    Additional Information    Negative: Patient sounds very sick or weak to the triager    Negative: Fever and bump is tender to touch    Negative: Looks like a boil, infected sore, or deep ulcer    Negative: Caller can't describe it clearly    Negative: Patient wants to be seen    Negative: Looks infected (e.g., spreading redness, red streak, pus)    Negative: Skin growth or mole and two sides do not look the same (it is asymmetric)    Negative: Skin growth or mole and border is irregular or blurry    Negative: Skin growth or mole and changes color or has more than one color    Negative: Skin growth or mole and it is " larger than a pencil eraser or increasing in size    Negative: Skin growth or mole and bleeds    Protocols used: SKIN LESION - MOLES OR GROWTHS-A-OH

## 2022-12-13 NOTE — TELEPHONE ENCOUNTER
Call to patient. Detailed message left with Dr. Ruiz's below response.       Mary Lou Brady, RN BSN MSN  [facility]

## 2022-12-19 ENCOUNTER — TRANSFERRED RECORDS (OUTPATIENT)
Dept: HEALTH INFORMATION MANAGEMENT | Facility: CLINIC | Age: 82
End: 2022-12-19

## 2023-01-26 ENCOUNTER — HOSPITAL ENCOUNTER (OUTPATIENT)
Dept: MAMMOGRAPHY | Facility: CLINIC | Age: 83
Discharge: HOME | End: 2023-01-26
Attending: INTERNAL MEDICINE | Admitting: INTERNAL MEDICINE
Payer: COMMERCIAL

## 2023-01-26 DIAGNOSIS — Z12.31 VISIT FOR SCREENING MAMMOGRAM: ICD-10-CM

## 2023-01-26 PROCEDURE — 77067 SCR MAMMO BI INCL CAD: CPT

## 2023-01-30 ENCOUNTER — NURSE TRIAGE (OUTPATIENT)
Dept: NURSING | Facility: CLINIC | Age: 83
End: 2023-01-30
Payer: COMMERCIAL

## 2023-01-30 NOTE — TELEPHONE ENCOUNTER
Reason for Call:  Appointment Request    Patient requesting this type of appt:  appt     Requested provider: Ellis Ruiz    Reason patient unable to be scheduled: Not within requested timeframe    When does patient want to be seen/preferred time: 1-2 days    Comments: Upper left side back pain     Okay to leave a detailed message?: Yes at Home number on file [phone] (home)    Call taken on 1/30/2023 at 9:52 AM by Jenni Hays

## 2023-01-31 NOTE — TELEPHONE ENCOUNTER
Call to patient. No answer. No option to leave message.     Call to patient again. Message left for return call to clinic and ask to speak with a triage nurse.       Mary Lou Brady, RN BSN MSN  [facility]

## 2023-01-31 NOTE — TELEPHONE ENCOUNTER
"Patient calling stating, \"I've had back pain for about a week and a friend told me it could be my spleen so I want to get it checked out.\" Disposition states to be seen today or tomorrow. Appointment scheduled.     Appointments in Next Year    Feb 01, 2023 11:30 AM  (Arrive by 11:10 AM)  Provider Visit with Jess Gaines PA-C  [facility] ([facility] ) [phone]        Rhianna Rodriguez RN on 1/31/2023 at 5:38 PM        Reason for Disposition    Age > 50 and no history of prior similar back pain    Additional Information    Negative: Passed out (i.e., fainted, collapsed and was not responding)    Negative: Shock suspected (e.g., cold/pale/clammy skin, too weak to stand, low BP, rapid pulse)    Negative: Sounds like a life-threatening emergency to the triager    Negative: Major injury to the back (e.g., MVA, fall > 10 feet or 3 meters, penetrating injury, etc.)    Negative: Pain in the upper back over the ribs (rib cage) that radiates (travels) into the chest    Negative: Pain in the upper back over the ribs (rib cage) and worsened by coughing (or clearly increases with breathing)    Negative: Back pain during pregnancy    Negative: SEVERE back pain of sudden onset and age > 60 years    Negative: SEVERE abdominal pain (e.g., excruciating)    Negative: Abdominal pain and age > 60 years    Negative: Unable to urinate (or only a few drops) and bladder feels very full    Negative: Loss of bladder or bowel control (urine or bowel incontinence; wetting self, leaking stool) of new-onset    Negative: Numbness (loss of sensation) in groin or rectal area    Negative: Pain radiates into groin, scrotum    Negative: Blood in urine (red, pink, or tea-colored)    Negative: Vomiting and pain over lower ribs of back (i.e., flank - kidney area)    Negative: Weakness of a leg or foot (e.g., unable to bear weight, dragging foot)    Negative: Patient sounds very sick or weak to " "the triager    Negative: Fever > 100.4 F (38.0 C) and flank pain    Negative: Pain or burning with passing urine (urination)    Negative: SEVERE back pain (e.g., excruciating, unable to do any normal activities) and not improved after pain medicine and CARE ADVICE    Negative: Numbness in an arm or hand (i.e., loss of sensation) and upper back pain    Negative: Numbness in a leg or foot (i.e., loss of sensation)    Negative: High-risk adult (e.g., history of cancer, history of HIV, or history of IV Drug Use)    Negative: Soft tissue infection (e.g., abscess, cellulitis) or other serious infection (e.g., bacteremia) in last 2 weeks    Negative: Painful rash with multiple small blisters grouped together (i.e., dermatomal distribution or 'band' or 'stripe')    Negative: Pain radiates into the thigh or further down the leg, and in both legs    Answer Assessment - Initial Assessment Questions  1. ONSET: \"When did the pain begin?\"       A good week ago  2. LOCATION: \"Where does it hurt?\" (upper, mid or lower back)      Left side lower rib in back  3. SEVERITY: \"How bad is the pain?\"  (e.g., Scale 1-10; mild, moderate, or severe)    - MILD (1-3): doesn't interfere with normal activities     - MODERATE (4-7): interferes with normal activities or awakens from sleep     - SEVERE (8-10): excruciating pain, unable to do any normal activities       Moderate  4. PATTERN: \"Is the pain constant?\" (e.g., yes, no; constant, intermittent)       Its constant  5. RADIATION: \"Does the pain shoot into your legs or elsewhere?\"      no  6. CAUSE:  \"What do you think is causing the back pain?\"       I don't know  7. BACK OVERUSE:  \"Any recent lifting of heavy objects, strenuous work or exercise?\"      no  8. MEDICATIONS: \"What have you taken so far for the pain?\" (e.g., nothing, acetaminophen, NSAIDS)      nothing  9. NEUROLOGIC SYMPTOMS: \"Do you have any weakness, numbness, or problems with bowel/bladder control?\"      No  10. OTHER " "SYMPTOMS: \"Do you have any other symptoms?\" (e.g., fever, abdominal pain, burning with urination, blood in urine)        No  11. PREGNANCY: \"Is there any chance you are pregnant?\" (e.g., yes, no; LMP)        N/A    Protocols used: BACK PAIN-A-OH    "

## 2023-02-01 ENCOUNTER — OFFICE VISIT (OUTPATIENT)
Dept: FAMILY MEDICINE | Facility: CLINIC | Age: 83
End: 2023-02-01
Payer: COMMERCIAL

## 2023-02-01 VITALS
HEIGHT: 67 IN | TEMPERATURE: 97.2 F | BODY MASS INDEX: 26.63 KG/M2 | SYSTOLIC BLOOD PRESSURE: 137 MMHG | HEART RATE: 65 BPM | OXYGEN SATURATION: 95 % | WEIGHT: 169.7 LBS | DIASTOLIC BLOOD PRESSURE: 70 MMHG

## 2023-02-01 DIAGNOSIS — M54.6 LEFT-SIDED THORACIC BACK PAIN, UNSPECIFIED CHRONICITY: Primary | ICD-10-CM

## 2023-02-01 PROCEDURE — 99213 OFFICE O/P EST LOW 20 MIN: CPT | Performed by: PHYSICIAN ASSISTANT

## 2023-02-01 ASSESSMENT — PAIN SCALES - GENERAL: PAINLEVEL: SEVERE PAIN (7)

## 2023-02-01 NOTE — PROGRESS NOTES
"Assessment and Plan:     (M54.6) Left-sided thoracic back pain, unspecified chronicity  (primary encounter diagnosis)  Comment: onset 1-2 weeks ago, DDx considered include stone, pyelo, pneumo, PE, discitis, MSK pain, I suspect msk pain, worsens with certain movements, no sob, no cough, not pleuritic, no urinary or neuro symptoms  Plan: try gentle stretch, tylenol  She declined PT  See me in one month if not better, sooner if worsens  Discussed when to be seen promptly     Jess Gaines PA-C        Subjective   Cecily is a 82 year old  presenting for the following health issues:  Back Pain (Upper left side back pain )      HPI     Cecily is here for back pain x 1-2 weeks  It is left-sided, mid back, constant, mild  It does worsen with certain movements  She denies dysuria, bloody urine, fever/chills, trauma    She denies focal weakness, loss of control of bowel/bladder, saddle anesthesia     She walks daily 5-6K steps/day and has been able to continue walking     Review of Systems   See above      Objective      /70 (BP Location: Left arm, Patient Position: Sitting, Cuff Size: Adult Regular)   Pulse 65   Temp 97.2  F (36.2  C) (Oral)   Ht 1.702 m (5' 7\")   Wt 77 kg (169 lb 11.2 oz)   SpO2 95%   BMI 26.58 kg/m        Physical Exam     GENERAL: healthy, alert and no distress  RESP: lungs clear to auscultation - no rales, no rhonchi, no wheezes  CV: regular rates and rhythm, normal S1 S2, no S3 or S4 and no murmur, no click or rub   MS: extremities- no gross deformities noted, no edema  SKIN: no suspicious lesions, no rashes  SPINE:  No visual deformities, swelling, erythema or skin lesions over the thoracic and lumbar spine  No pain with palpation over spinous processes/musculature over the  thoracic and lumbar spine   Normal range of motion at cervical, thoracic and lumbar spines  Sensation intact bilateral lower extremities  Gait is normal                  "

## 2023-02-01 NOTE — PATIENT INSTRUCTIONS
Try warm compress to area and stretching    Take tylenol as needed for pain up to 1000mg three times daily, do not exceed 3000mg in 24 hour period

## 2023-03-03 ENCOUNTER — OFFICE VISIT (OUTPATIENT)
Dept: CARDIOLOGY | Facility: CLINIC | Age: 83
End: 2023-03-03
Payer: COMMERCIAL

## 2023-03-03 VITALS
HEIGHT: 67 IN | DIASTOLIC BLOOD PRESSURE: 76 MMHG | WEIGHT: 165.8 LBS | SYSTOLIC BLOOD PRESSURE: 119 MMHG | BODY MASS INDEX: 26.02 KG/M2 | HEART RATE: 76 BPM

## 2023-03-03 DIAGNOSIS — R94.31 ABNORMAL ELECTROCARDIOGRAM: ICD-10-CM

## 2023-03-03 DIAGNOSIS — R07.2 PRECORDIAL PAIN: Primary | ICD-10-CM

## 2023-03-03 DIAGNOSIS — I44.4 LAFB (LEFT ANTERIOR FASCICULAR BLOCK): ICD-10-CM

## 2023-03-03 DIAGNOSIS — I51.81 TAKOTSUBO CARDIOMYOPATHY: ICD-10-CM

## 2023-03-03 PROCEDURE — 99214 OFFICE O/P EST MOD 30 MIN: CPT | Performed by: INTERNAL MEDICINE

## 2023-03-03 PROCEDURE — 93000 ELECTROCARDIOGRAM COMPLETE: CPT | Performed by: INTERNAL MEDICINE

## 2023-03-03 NOTE — LETTER
3/3/2023    Ellis Ruiz MD  [address]  [place] [address]    RE: Cecily Ivory       Dear Colleague,     I had the pleasure of seeing Cecily Ivory in the [facility].  HPI and Plan:   I the pleasure of seeing Aranza Ivory in cardiology clinic in follow-up of her history of Takotsubo cardiomyopathy.  In 2020 she presented with palpitations and chest pain and had mild troponin leak.  Coronary angiography revealed no obstructive coronary disease.  Echo revealed Takotsubo cardiomyopathy.  Since then, her LV systolic function wall motion has improved.    She returns for follow-up.  She had an episode of chest pain last week which she describes as a moderate intensity gripping pain in left precordial region that lasted for few minutes.  She was not doing any kind of activity at that time.  There was no arm or jaw pain.  This was quite bothersome for her but eventually subsided.  We did an EKG today which revealed sinus rhythm with left anterior fascicular block with slight QRS widening which is a change compared to prior study.    On exam, regular rate and rhythm.  Soft 2 x 6 ejection stock murmur in the aortic area.  Chest is clear to auscultation.    Impression  1.  Left-sided precordial chest pain, gripping type  2.  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  History of Takotsubo cardiomyopathy  4.  Aortic insufficiency on prior echocardiogram.    Plan  Given the chest pain and abnormal EKG, recommend a Lexiscan stress nuclear study to rule out ischemia.    If that is negative, she can follow-up with us in 1 to 2 years.  I would recommend an echocardiogram in 2 years to reassess her aortic valve.  In the past she has had moderate aortic insufficiency.    Sincerely,    Cameron Giron MD      Today's clinic visit entailed:    32 minutes spent on the date of the encounter doing chart review, review of test results, patient visit and documentation   Provider  Link to [facility]  Help Grid     The level of medical decision making during this visit was of moderate complexity.      Orders Placed This Encounter   Procedures     Follow-Up with Cardiologist     EKG 12-lead complete w/read - Clinics (performed today)       No orders of the defined types were placed in this encounter.      There are no discontinued medications.      Encounter Diagnoses   Name Primary?     Takotsubo cardiomyopathy      Precordial pain Yes     Abnormal electrocardiogram      LAFB (left anterior fascicular block)        CURRENT MEDICATIONS:  Current Outpatient Medications   Medication Sig Dispense Refill     acetaminophen (TYLENOL) 500 MG tablet Take 2 tablets (1,000 mg) by mouth every 6 hours as needed for mild pain       Ascorbic Acid (VITAMIN C PO) Take 500 mg by mouth daily.       aspirin (ASA) 81 MG EC tablet Take 81 mg by mouth daily       Biotin 5000 MCG CAPS Take 1 tablet by mouth At Bedtime        calcium carb 1250 mg, 500 mg [place],/vitamin D 200 units (OSCAL WITH D) 500-200 MG-UNIT per tablet Take 1 tablet by mouth daily  100 tablet 3     Carboxymethylcellulose Sodium 0.25 % SOLN Place 1 drop into both eyes every evening       Cholecalciferol (VITAMIN D3) 50 MCG (2000 UT) CAPS Take 2,000 Units by mouth daily        CYANOCOBALAMIN PO Take 1,000 mcg by mouth daily       fish oil-omega-3 fatty acids 1000 MG capsule Take 2 g by mouth daily       loratadine (CLARITIN) 10 MG tablet Take 10 mg by mouth daily       lovastatin (MEVACOR) 40 MG tablet Take 1 tablet (40 mg) by mouth At Bedtime 90 tablet 3     magnesium 100 MG CAPS Take 1 tablet by mouth daily        metoprolol succinate ER (TOPROL XL) 25 MG 24 hr tablet Take 1 tablet (25 mg) by mouth daily 90 tablet 3     Multiple Vitamins-Minerals (CENTRUM SILVER) per tablet Take 1 tablet by mouth daily       senna-docusate (SENOKOT-S/PERICOLACE) 8.6-50 MG tablet Take 1 tablet by mouth 2 times daily Hold for loose stools       sertraline (ZOLOFT) 25 MG tablet TAKE 1  TABLET DAILY 90 tablet 2       ALLERGIES     Allergies   Allergen Reactions     Latex Cough     Seasonal Allergies      YEAR ROUND ALLERGIES     Sulfa Drugs Unknown       PAST MEDICAL HISTORY:  Past Medical History:   Diagnosis Date     Abdominal pain 2009, 2013    ct liver and renal cyst and 2mm lung nodule, repeat ct done 2013 and no significantly abnl.     Anxiety 2014    added med 3/14     Arthritis      ASCUS of cervix with negative high risk HPV 03/2018     Bilateral subdural hematomas 09/2020    found incidentally on mri done for memory loss     Carotid bruit 2011    us nl     Gastritis 2011    egd done by [facility]     GERD (gastroesophageal reflux disease) 2011     Gross hematuria 2021    cysto and ct neg     History of colonoscopy 2004, 2014    nl     Hypercholesteremia 2000    stopped lovastatin 2015, added lipitor 3/16     Insomnia     using otc      Lumbar spinal stenosis 2016    Dr. Wilde, had epidural     Lung nodule 2009, 2013    seen on ct for abd pain, fu done 3/13 and 2 nodules stable and benign, one new one needs fu 1 year.  fu done 3/14 and fu 1 year and then done; fu done 3/15 and unchanged, no fu needed     MCI (mild cognitive impairment) 2020    seen by neuro, Dr. Burnham     Myocardial infarction (H) 01/2020    elev trop but clean coronaries on angio, felt to be stress induced cmyop     Odynophagia 2004    egd nl     Other chronic pain      Peripheral neuropathies     Dr. Kim     Screening 2006, 2017    nl dexa     Seasonal allergies      Takotsubo cardiomyopathy 01/2020    hosp fsd, ef 40%, mod ai, clean coronaries; fu echo nl 5/20     Vaginal dysplasia     chronic VAIN I, many colpos of vagina.  Now we only do an annual pap of vagina, no colpo since stable long term     Weight loss 07/2018    ct chest, abd and pelvis neg       PAST SURGICAL HISTORY:  Past Surgical History:   Procedure Laterality Date     ANKLE SURGERY  2000     APPENDECTOMY  13     ARTHROPLASTY KNEE Left 5/14/2018     Procedure: ARTHROPLASTY KNEE;  Left total knee arthroplasty;  Surgeon: William Pollard MD;  Location: [facility] OR     ARTHROPLASTY KNEE Right 2021    Procedure: RIGHT TOTAL KNEE ARTHROPLASTY;  Surgeon: Earl Perez MD;  Location:  OR     BREAST BIOPSY, CORE RT/LT       CATARACT IOL, RT/LT  2019     CV HEART CATHETERIZATION WITH POSSIBLE INTERVENTION N/A 2020    Procedure: Heart Catheterization with Possible Intervention;  Surgeon: Bibiana Ruggiero MD;  Location:  HEART CARDIAC CATH LAB     GYN SURGERY      BSO     HYSTERECTOMY, PAP NO LONGER INDICATED       LAPAROSCOPIC CHOLECYSTECTOMY       NOSE SURGERY       right knee arthroscopy       thumb surgery Left 2015     ZZC VAGINAL HYSTERECTOMY       ZZ UGI ENDOSCOPY, SIMPLE EXAM  03/15/11    [facility]       FAMILY HISTORY:  Family History   Problem Relation Age of Onset     Hyperlipidemia Mother      Dementia Mother      No Known Problems Father      No Known Problems Brother      No Known Problems Maternal Grandmother      No Known Problems Maternal Grandfather      No Known Problems Paternal Grandmother      No Known Problems Other        SOCIAL HISTORY:  Social History     Socioeconomic History     Marital status:      Spouse name: None     Number of children: 1     Years of education: None     Highest education level: None   Occupational History     Occupation: , retired     Employer: [employer]     Employer: RETIRED   Tobacco Use     Smoking status: Former     Packs/day: 1.50     Years: 20.00     Pack years: 30.00     Types: Cigarettes     Quit date: 1992     Years since quittin.0     Smokeless tobacco: Never   Substance and Sexual Activity     Alcohol use: Yes     Alcohol/week: 2.0 - 4.0 standard drinks     Types: 2 - 4 Glasses of wine per week     Comment: 2 glasses 4 times per week     Drug use: No     Sexual activity: Not Currently     Partners: Male      "Comment: vag hyst, BSO       Review of Systems:  Skin:          Eyes:         ENT:         Respiratory:  Negative       Cardiovascular:  Negative;palpitations;dizziness;lightheadedness;syncope or near-syncope;cyanosis;edema;exercise intolerance Positive for;fatigue chest pain last week, at rest  Gastroenterology:        Genitourinary:         Musculoskeletal:         Neurologic:  Positive for memory problems    Psychiatric:         Heme/Lymph/Imm:         Endocrine:  Negative        Physical Exam:  Vitals: /76   Pulse 76   Ht 1.702 m (5' 7\")   Wt 75.2 kg (165 lb 12.8 oz)   BMI 25.97 kg/m          CC  No referring provider defined for this encounter.    Thank you for allowing me to participate in the care of your patient.      Sincerely,     Cameron Giron MD     [facility]  "

## 2023-03-03 NOTE — PROGRESS NOTES
HPI and Plan:   I the pleasure of seeing Aranza Ivory in cardiology clinic in follow-up of her history of Takotsubo cardiomyopathy.  In 2020 she presented with palpitations and chest pain and had mild troponin leak.  Coronary angiography revealed no obstructive coronary disease.  Echo revealed Takotsubo cardiomyopathy.  Since then, her LV systolic function wall motion has improved.    She returns for follow-up.  She had an episode of chest pain last week which she describes as a moderate intensity gripping pain in left precordial region that lasted for few minutes.  She was not doing any kind of activity at that time.  There was no arm or jaw pain.  This was quite bothersome for her but eventually subsided.  We did an EKG today which revealed sinus rhythm with left anterior fascicular block with slight QRS widening which is a change compared to prior study.    On exam, regular rate and rhythm.  Soft 2 x 6 ejection stock murmur in the aortic area.  Chest is clear to auscultation.    Impression  1.  Left-sided precordial chest pain, gripping type  2.  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  History of Takotsubo cardiomyopathy  4.  Aortic insufficiency on prior echocardiogram.    Plan  Given the chest pain and abnormal EKG, recommend a Lexiscan stress nuclear study to rule out ischemia.    If that is negative, she can follow-up with us in 1 to 2 years.  I would recommend an echocardiogram in 2 years to reassess her aortic valve.  In the past she has had moderate aortic insufficiency.    Sincerely,    Cameron Giron MD      Today's clinic visit entailed:    32 minutes spent on the date of the encounter doing chart review, review of test results, patient visit and documentation   Provider  Link to [facility] Help Grid     The level of medical decision making during this visit was of moderate complexity.      Orders Placed This Encounter   Procedures     Follow-Up with Cardiologist     EKG 12-lead complete w/read  - Clinics (performed today)       No orders of the defined types were placed in this encounter.      There are no discontinued medications.      Encounter Diagnoses   Name Primary?     Takotsubo cardiomyopathy      Precordial pain Yes     Abnormal electrocardiogram      LAFB (left anterior fascicular block)        CURRENT MEDICATIONS:  Current Outpatient Medications   Medication Sig Dispense Refill     acetaminophen (TYLENOL) 500 MG tablet Take 2 tablets (1,000 mg) by mouth every 6 hours as needed for mild pain       Ascorbic Acid (VITAMIN C PO) Take 500 mg by mouth daily.       aspirin (ASA) 81 MG EC tablet Take 81 mg by mouth daily       Biotin 5000 MCG CAPS Take 1 tablet by mouth At Bedtime        calcium carb 1250 mg, 500 mg [place],/vitamin D 200 units (OSCAL WITH D) 500-200 MG-UNIT per tablet Take 1 tablet by mouth daily  100 tablet 3     Carboxymethylcellulose Sodium 0.25 % SOLN Place 1 drop into both eyes every evening       Cholecalciferol (VITAMIN D3) 50 MCG (2000 UT) CAPS Take 2,000 Units by mouth daily        CYANOCOBALAMIN PO Take 1,000 mcg by mouth daily       fish oil-omega-3 fatty acids 1000 MG capsule Take 2 g by mouth daily       loratadine (CLARITIN) 10 MG tablet Take 10 mg by mouth daily       lovastatin (MEVACOR) 40 MG tablet Take 1 tablet (40 mg) by mouth At Bedtime 90 tablet 3     magnesium 100 MG CAPS Take 1 tablet by mouth daily        metoprolol succinate ER (TOPROL XL) 25 MG 24 hr tablet Take 1 tablet (25 mg) by mouth daily 90 tablet 3     Multiple Vitamins-Minerals (CENTRUM SILVER) per tablet Take 1 tablet by mouth daily       senna-docusate (SENOKOT-S/PERICOLACE) 8.6-50 MG tablet Take 1 tablet by mouth 2 times daily Hold for loose stools       sertraline (ZOLOFT) 25 MG tablet TAKE 1 TABLET DAILY 90 tablet 2       ALLERGIES     Allergies   Allergen Reactions     Latex Cough     Seasonal Allergies      YEAR ROUND ALLERGIES     Sulfa Drugs Unknown       PAST MEDICAL HISTORY:  Past Medical  History:   Diagnosis Date     Abdominal pain 2009, 2013    ct liver and renal cyst and 2mm lung nodule, repeat ct done 2013 and no significantly abnl.     Anxiety 2014    added med 3/14     Arthritis      ASCUS of cervix with negative high risk HPV 03/2018     Bilateral subdural hematomas 09/2020    found incidentally on mri done for memory loss     Carotid bruit 2011    us nl     Gastritis 2011    egd done by [facility]     GERD (gastroesophageal reflux disease) 2011     Gross hematuria 2021    cysto and ct neg     History of colonoscopy 2004, 2014    nl     Hypercholesteremia 2000    stopped lovastatin 2015, added lipitor 3/16     Insomnia     using otc      Lumbar spinal stenosis 2016    Dr. Wilde, had epidural     Lung nodule 2009, 2013    seen on ct for abd pain, fu done 3/13 and 2 nodules stable and benign, one new one needs fu 1 year.  fu done 3/14 and fu 1 year and then done; fu done 3/15 and unchanged, no fu needed     MCI (mild cognitive impairment) 2020    seen by neuro, Dr. Burnham     Myocardial infarction (H) 01/2020    elev trop but clean coronaries on angio, felt to be stress induced cmyop     Odynophagia 2004    egd nl     Other chronic pain      Peripheral neuropathies     Dr. Kim     Screening 2006, 2017    nl dexa     Seasonal allergies      Takotsubo cardiomyopathy 01/2020    hosp fsd, ef 40%, mod ai, clean coronaries; fu echo nl 5/20     Vaginal dysplasia     chronic VAIN I, many colpos of vagina.  Now we only do an annual pap of vagina, no colpo since stable long term     Weight loss 07/2018    ct chest, abd and pelvis neg       PAST SURGICAL HISTORY:  Past Surgical History:   Procedure Laterality Date     ANKLE SURGERY  2000     APPENDECTOMY  13     ARTHROPLASTY KNEE Left 5/14/2018    Procedure: ARTHROPLASTY KNEE;  Left total knee arthroplasty;  Surgeon: William Pollard MD;  Location: [facility] OR     ARTHROPLASTY KNEE Right 12/9/2021    Procedure: RIGHT TOTAL KNEE ARTHROPLASTY;  Surgeon:  Earl Perez MD;  Location:  OR     BREAST BIOPSY, CORE RT/LT       CATARACT IOL, RT/LT  2019     CV HEART CATHETERIZATION WITH POSSIBLE INTERVENTION N/A 2020    Procedure: Heart Catheterization with Possible Intervention;  Surgeon: Bibiana Ruggiero MD;  Location:  HEART CARDIAC CATH LAB     GYN SURGERY      BSO     HYSTERECTOMY, PAP NO LONGER INDICATED       LAPAROSCOPIC CHOLECYSTECTOMY       NOSE SURGERY       right knee arthroscopy       thumb surgery Left 2015     ZZC VAGINAL HYSTERECTOMY       ZZ UGI ENDOSCOPY, SIMPLE EXAM  03/15/11    [facility]       FAMILY HISTORY:  Family History   Problem Relation Age of Onset     Hyperlipidemia Mother      Dementia Mother      No Known Problems Father      No Known Problems Brother      No Known Problems Maternal Grandmother      No Known Problems Maternal Grandfather      No Known Problems Paternal Grandmother      No Known Problems Other        SOCIAL HISTORY:  Social History     Socioeconomic History     Marital status:      Spouse name: None     Number of children: 1     Years of education: None     Highest education level: None   Occupational History     Occupation: , retired     Employer: [employer]     Employer: RETIRED   Tobacco Use     Smoking status: Former     Packs/day: 1.50     Years: 20.00     Pack years: 30.00     Types: Cigarettes     Quit date: 1992     Years since quittin.0     Smokeless tobacco: Never   Substance and Sexual Activity     Alcohol use: Yes     Alcohol/week: 2.0 - 4.0 standard drinks     Types: 2 - 4 Glasses of wine per week     Comment: 2 glasses 4 times per week     Drug use: No     Sexual activity: Not Currently     Partners: Male     Comment: vag hyst, BSO       Review of Systems:  Skin:          Eyes:         ENT:         Respiratory:  Negative       Cardiovascular:  Negative;palpitations;dizziness;lightheadedness;syncope or  "near-syncope;cyanosis;edema;exercise intolerance Positive for;fatigue chest pain last week, at rest  Gastroenterology:        Genitourinary:         Musculoskeletal:         Neurologic:  Positive for memory problems    Psychiatric:         Heme/Lymph/Imm:         Endocrine:  Negative        Physical Exam:  Vitals: /76   Pulse 76   Ht 1.702 m (5' 7\")   Wt 75.2 kg (165 lb 12.8 oz)   BMI 25.97 kg/m          CC  No referring provider defined for this encounter.              "

## 2023-03-03 NOTE — PROGRESS NOTES
HPI and Plan:   I had the pleasure of seeing Aranza Ivory in cardiology clinic follow-up.     She is a 81-year-old female with history of Takotsubo cardiomyopathy in January 2020 when she was admitted with chest pain elevated troponin up to 4.5.  Coronary angiography revealed angiographically normal coronary arteries.  Echocardiogram revealed regional wall motion abnormalities consistent with stress-induced cardiomyopathy and since then the LV wall motion ejection fraction has improved.     She is here for follow-up.  She has aortic insufficiency and the last echocardiogram in 2021, it was moderate.  She does not have any shortness of breath.  Recently she had couple days of premature beats which last for seconds.  She told me that she felt that something was coming in early.  It would last only seconds and then come back again.    It would come frequently for the 2 days and then it resolved completely.  It was not related to any exertion.  She has had knee surgery about 3 months ago and since then she has increased her walking to about 7000 steps per day from 4000 steps per day.  Ambulation has not caused any issues for her     Exam  See below     Impression     1.  Question of transient palpitations or premature impulses for 2days, resolved completely  This lasted only seconds.  It was not anything related to exertion.  I told her that this could mean premature impulses and if this occurs again, she should call us and we might schedule her for heart monitor and a follow-up.  Fortunately, this is resolved and she is able to walk 7000 steps without any limitations.     2.  History of stress-induced cardiomyopathy, echocardiogram last revealed normalization of ejection fraction.  Angiographically normal coronary arteries in 2020     3.  Aortic regurgitation, last echo in 2021 revealed moderate range.  We will repeat echocardiogram next year to reassess that and will have her follow-up with my midlevel provider at  that time.     4.Hyperlipidemia  Continue  lovastatin and managed by primary care physician.        Sincerely,     Cameron Giron MD       Today's clinic visit entailed:  {[facility] 2021 Documentation (Optional):164811}  {2021 E&M time:680272}  Provider  Link to [facility] Help Grid     {[facility] Level:332512}      Orders Placed This Encounter   Procedures     EKG 12-lead complete w/read - Clinics (performed today)       No orders of the defined types were placed in this encounter.      There are no discontinued medications.      Encounter Diagnosis   Name Primary?     Takotsubo cardiomyopathy Yes       CURRENT MEDICATIONS:  Current Outpatient Medications   Medication Sig Dispense Refill     acetaminophen (TYLENOL) 500 MG tablet Take 2 tablets (1,000 mg) by mouth every 6 hours as needed for mild pain       Ascorbic Acid (VITAMIN C PO) Take 500 mg by mouth daily.       aspirin (ASA) 81 MG EC tablet Take 81 mg by mouth daily       Biotin 5000 MCG CAPS Take 1 tablet by mouth At Bedtime        calcium carb 1250 mg, 500 mg Samish,/vitamin D 200 units (OSCAL WITH D) 500-200 MG-UNIT per tablet Take 1 tablet by mouth daily  100 tablet 3     Carboxymethylcellulose Sodium 0.25 % SOLN Place 1 drop into both eyes every evening       Cholecalciferol (VITAMIN D3) 50 MCG (2000 UT) CAPS Take 2,000 Units by mouth daily        CYANOCOBALAMIN PO Take 1,000 mcg by mouth daily       fish oil-omega-3 fatty acids 1000 MG capsule Take 2 g by mouth daily       loratadine (CLARITIN) 10 MG tablet Take 10 mg by mouth daily       lovastatin (MEVACOR) 40 MG tablet Take 1 tablet (40 mg) by mouth At Bedtime 90 tablet 3     magnesium 100 MG CAPS Take 1 tablet by mouth daily        metoprolol succinate ER (TOPROL XL) 25 MG 24 hr tablet Take 1 tablet (25 mg) by mouth daily 90 tablet 3     Multiple Vitamins-Minerals (CENTRUM SILVER) per tablet Take 1 tablet by mouth daily       senna-docusate (SENOKOT-S/PERICOLACE) 8.6-50 MG tablet Take 1 tablet by mouth 2 times daily  Hold for loose stools       sertraline (ZOLOFT) 25 MG tablet TAKE 1 TABLET DAILY 90 tablet 2       ALLERGIES     Allergies   Allergen Reactions     Latex Cough     Seasonal Allergies      YEAR ROUND ALLERGIES     Sulfa Drugs Unknown       PAST MEDICAL HISTORY:  Past Medical History:   Diagnosis Date     Abdominal pain 2009, 2013    ct liver and renal cyst and 2mm lung nodule, repeat ct done 2013 and no significantly abnl.     Anxiety 2014    added med 3/14     Arthritis      ASCUS of cervix with negative high risk HPV 03/2018     Bilateral subdural hematomas 09/2020    found incidentally on mri done for memory loss     Carotid bruit 2011    us nl     Gastritis 2011    egd done by [facility]     GERD (gastroesophageal reflux disease) 2011     Gross hematuria 2021    cysto and ct neg     History of colonoscopy 2004, 2014    nl     Hypercholesteremia 2000    stopped lovastatin 2015, added lipitor 3/16     Insomnia     using otc      Lumbar spinal stenosis 2016    Dr. Wilde, had epidural     Lung nodule 2009, 2013    seen on ct for abd pain, fu done 3/13 and 2 nodules stable and benign, one new one needs fu 1 year.  fu done 3/14 and fu 1 year and then done; fu done 3/15 and unchanged, no fu needed     MCI (mild cognitive impairment) 2020    seen by neuro, Dr. Burnham     Myocardial infarction (H) 01/2020    elev trop but clean coronaries on angio, felt to be stress induced cmyop     Odynophagia 2004    egd nl     Other chronic pain      Peripheral neuropathies     Dr. Kim     Screening 2006, 2017    nl dexa     Seasonal allergies      Takotsubo cardiomyopathy 01/2020    hosp fsd, ef 40%, mod ai, clean coronaries; fu echo nl 5/20     Vaginal dysplasia     chronic VAIN I, many colpos of vagina.  Now we only do an annual pap of vagina, no colpo since stable long term     Weight loss 07/2018    ct chest, abd and pelvis neg       PAST SURGICAL HISTORY:  Past Surgical History:   Procedure Laterality Date     ANKLE SURGERY  2000      APPENDECTOMY  13     ARTHROPLASTY KNEE Left 2018    Procedure: ARTHROPLASTY KNEE;  Left total knee arthroplasty;  Surgeon: William Pollard MD;  Location: [facility] OR     ARTHROPLASTY KNEE Right 2021    Procedure: RIGHT TOTAL KNEE ARTHROPLASTY;  Surgeon: Earl Perez MD;  Location:  OR     BREAST BIOPSY, CORE RT/LT       CATARACT IOL, RT/LT  2019     CV HEART CATHETERIZATION WITH POSSIBLE INTERVENTION N/A 2020    Procedure: Heart Catheterization with Possible Intervention;  Surgeon: Bibiana Ruggiero MD;  Location:  HEART CARDIAC CATH LAB     GYN SURGERY      BSO     HYSTERECTOMY, PAP NO LONGER INDICATED       LAPAROSCOPIC CHOLECYSTECTOMY       NOSE SURGERY       right knee arthroscopy       thumb surgery Left 2015     ZZC VAGINAL HYSTERECTOMY       ZZ UGI ENDOSCOPY, SIMPLE EXAM  03/15/11    [facility]       FAMILY HISTORY:  Family History   Problem Relation Age of Onset     Hyperlipidemia Mother      Dementia Mother      No Known Problems Father      No Known Problems Brother      No Known Problems Maternal Grandmother      No Known Problems Maternal Grandfather      No Known Problems Paternal Grandmother      No Known Problems Other        SOCIAL HISTORY:  Social History     Socioeconomic History     Marital status:      Spouse name: None     Number of children: 1     Years of education: None     Highest education level: None   Occupational History     Occupation: , retired     Employer: [employer]     Employer: RETIRED   Tobacco Use     Smoking status: Former     Packs/day: 1.50     Years: 20.00     Pack years: 30.00     Types: Cigarettes     Quit date: 1992     Years since quittin.0     Smokeless tobacco: Never   Substance and Sexual Activity     Alcohol use: Yes     Alcohol/week: 2.0 - 4.0 standard drinks     Types: 2 - 4 Glasses of wine per week     Comment: 2 glasses 4 times per week     Drug use: No  "    Sexual activity: Not Currently     Partners: Male     Comment: vag hyst, BSO       Review of Systems:  Skin:          Eyes:         ENT:         Respiratory:  Negative       Cardiovascular:  Negative;palpitations;dizziness;lightheadedness;syncope or near-syncope;cyanosis;edema;exercise intolerance Positive for;fatigue chest pain last week, at rest  Gastroenterology:        Genitourinary:         Musculoskeletal:         Neurologic:  Positive for memory problems    Psychiatric:         Heme/Lymph/Imm:         Endocrine:  Negative        Physical Exam:  Vitals: /76   Pulse 76   Ht 1.702 m (5' 7\")   Wt 75.2 kg (165 lb 12.8 oz)   BMI 25.97 kg/m      Constitutional:           Skin:             Head:           Eyes:           Lymph:      ENT:           Neck:           Respiratory:            Cardiac:                                                           GI:           Extremities and Muscular Skeletal:                 Neurological:           Psych:           CC  No referring provider defined for this encounter.              "

## 2023-03-08 ENCOUNTER — HOSPITAL ENCOUNTER (OUTPATIENT)
Dept: CARDIOLOGY | Facility: CLINIC | Age: 83
Discharge: HOME | End: 2023-03-08
Attending: INTERNAL MEDICINE
Payer: COMMERCIAL

## 2023-03-08 ENCOUNTER — TELEPHONE (OUTPATIENT)
Dept: CARDIOLOGY | Facility: CLINIC | Age: 83
End: 2023-03-08

## 2023-03-08 VITALS
OXYGEN SATURATION: 98 % | HEART RATE: 62 BPM | HEIGHT: 67 IN | BODY MASS INDEX: 27.28 KG/M2 | SYSTOLIC BLOOD PRESSURE: 122 MMHG | DIASTOLIC BLOOD PRESSURE: 70 MMHG | WEIGHT: 173.8 LBS

## 2023-03-08 DIAGNOSIS — R07.2 PRECORDIAL PAIN: ICD-10-CM

## 2023-03-08 DIAGNOSIS — R94.31 ABNORMAL ELECTROCARDIOGRAM: ICD-10-CM

## 2023-03-08 LAB
CV STRESS MAX HR HE: 82
NUC STRESS EJECTION FRACTION: 59 %
RATE PRESSURE PRODUCT: 9020
STRESS ECHO BASELINE DIASTOLIC HE: 70
STRESS ECHO BASELINE HR: 62 BPM
STRESS ECHO BASELINE SYSTOLIC BP: 122
STRESS ECHO CALCULATED PERCENT HR: 59 %
STRESS ECHO LAST STRESS DIASTOLIC BP: 70
STRESS ECHO LAST STRESS SYSTOLIC BP: 110
STRESS ECHO TARGET HR: 140
STRESS/REST PERFUSION RATIO: 1.14

## 2023-03-08 PROCEDURE — 93017 CV STRESS TEST TRACING ONLY: CPT

## 2023-03-08 PROCEDURE — 78452 HT MUSCLE IMAGE SPECT MULT: CPT | Mod: 26 | Performed by: INTERNAL MEDICINE

## 2023-03-08 PROCEDURE — 78452 HT MUSCLE IMAGE SPECT MULT: CPT

## 2023-03-08 PROCEDURE — 93018 CV STRESS TEST I&R ONLY: CPT | Performed by: INTERNAL MEDICINE

## 2023-03-08 PROCEDURE — A9502 TC99M TETROFOSMIN: HCPCS | Performed by: INTERNAL MEDICINE

## 2023-03-08 PROCEDURE — 250N000011 HC RX IP 250 OP 636: Performed by: INTERNAL MEDICINE

## 2023-03-08 PROCEDURE — 343N000001 HC RX 343: Performed by: INTERNAL MEDICINE

## 2023-03-08 PROCEDURE — 93016 CV STRESS TEST SUPVJ ONLY: CPT | Performed by: INTERNAL MEDICINE

## 2023-03-08 RX ORDER — ALBUTEROL SULFATE 90 UG/1
2 AEROSOL, METERED RESPIRATORY (INHALATION) EVERY 5 MIN PRN
Status: DISCONTINUED | OUTPATIENT
Start: 2023-03-08 | End: 2023-03-09 | Stop reason: HOSPADM

## 2023-03-08 RX ORDER — CAFFEINE CITRATE 20 MG/ML
60 SOLUTION INTRAVENOUS
Status: DISCONTINUED | OUTPATIENT
Start: 2023-03-08 | End: 2023-03-09 | Stop reason: HOSPADM

## 2023-03-08 RX ORDER — AMINOPHYLLINE 25 MG/ML
50-100 INJECTION, SOLUTION INTRAVENOUS
Status: DISCONTINUED | OUTPATIENT
Start: 2023-03-08 | End: 2023-03-09 | Stop reason: HOSPADM

## 2023-03-08 RX ORDER — ACYCLOVIR 200 MG/1
0-1 CAPSULE ORAL
Status: DISCONTINUED | OUTPATIENT
Start: 2023-03-08 | End: 2023-03-09 | Stop reason: HOSPADM

## 2023-03-08 RX ORDER — REGADENOSON 0.08 MG/ML
0.4 INJECTION, SOLUTION INTRAVENOUS ONCE
Status: COMPLETED | OUTPATIENT
Start: 2023-03-08 | End: 2023-03-08

## 2023-03-08 RX ADMIN — TETROFOSMIN 3.13 MILLICURIE: 1.38 INJECTION, POWDER, LYOPHILIZED, FOR SOLUTION INTRAVENOUS at 09:52

## 2023-03-08 RX ADMIN — REGADENOSON 0.4 MG: 0.08 INJECTION, SOLUTION INTRAVENOUS at 11:23

## 2023-03-08 RX ADMIN — TETROFOSMIN 10.52 MILLICURIE: 1.38 INJECTION, POWDER, LYOPHILIZED, FOR SOLUTION INTRAVENOUS at 11:26

## 2023-03-08 NOTE — TELEPHONE ENCOUNTER
Stress test post OV:       Myocardial perfusion imaging using single isotope technique demonstrated no clear evidence of significant inducible myocardial ischemia or myocardial infarction.     Left ventricular function is normal.     The left ventricular ejection fraction at stress is 59%.  Rest ejection fraction is inaccurate due to poor image quality.     LV cavity size normal.     Stress to rest cavity ratio is 1.14.  TID is absent.     Technically challenging study due to patient body habitus.     There is patchy radiotracer uptake throughout the myocardium on rest images, with decreased perfusion in the septal segments on stress and rest imaging, most likely reflecting soft tissue attenuation artifact.     There is no prior study for comparison.          ECG Summary    ECG Baseline electrocardiogram demonstrates sinus rhythm and left anterior fascicular block, poor R wave progression across precordium. No significant ST segment changes. There were no arrhythmias during stress.  There were no arrhythmias during recovery.     BRISEIDA Pierre RN

## 2023-03-13 NOTE — TELEPHONE ENCOUNTER
Left message fo rPt testing fine no issues, and can return to PMD for care. Left phone number for questions. BRISEIDA Pierre RN

## 2023-07-10 ENCOUNTER — TRANSFERRED RECORDS (OUTPATIENT)
Dept: HEALTH INFORMATION MANAGEMENT | Facility: CLINIC | Age: 83
End: 2023-07-10
Payer: COMMERCIAL

## 2023-07-14 ENCOUNTER — HOSPITAL ENCOUNTER (EMERGENCY)
Facility: CLINIC | Age: 83
Discharge: HOME | End: 2023-07-14
Attending: EMERGENCY MEDICINE | Admitting: EMERGENCY MEDICINE
Payer: COMMERCIAL

## 2023-07-14 ENCOUNTER — APPOINTMENT (OUTPATIENT)
Dept: GENERAL RADIOLOGY | Facility: CLINIC | Age: 83
End: 2023-07-14
Attending: EMERGENCY MEDICINE
Payer: COMMERCIAL

## 2023-07-14 ENCOUNTER — NURSE TRIAGE (OUTPATIENT)
Dept: NURSING | Facility: CLINIC | Age: 83
End: 2023-07-14
Payer: COMMERCIAL

## 2023-07-14 VITALS
SYSTOLIC BLOOD PRESSURE: 130 MMHG | HEART RATE: 91 BPM | BODY MASS INDEX: 27.41 KG/M2 | WEIGHT: 175 LBS | TEMPERATURE: 99 F | DIASTOLIC BLOOD PRESSURE: 64 MMHG | OXYGEN SATURATION: 99 % | RESPIRATION RATE: 18 BRPM

## 2023-07-14 DIAGNOSIS — R07.89 LEFT-SIDED CHEST WALL PAIN: ICD-10-CM

## 2023-07-14 LAB
ANION GAP SERPL CALCULATED.3IONS-SCNC: 11 MMOL/L (ref 7–15)
ATRIAL RATE - MUSE: 49 BPM
BASOPHILS # BLD AUTO: 0 10E3/UL (ref 0–0.2)
BASOPHILS NFR BLD AUTO: 0 %
BUN SERPL-MCNC: 25.6 MG/DL (ref 8–23)
CALCIUM SERPL-MCNC: 9 MG/DL (ref 8.8–10.2)
CHLORIDE SERPL-SCNC: 104 MMOL/L (ref 98–107)
CREAT SERPL-MCNC: 0.73 MG/DL (ref 0.51–0.95)
DEPRECATED HCO3 PLAS-SCNC: 21 MMOL/L (ref 22–29)
DIASTOLIC BLOOD PRESSURE - MUSE: NORMAL MMHG
EOSINOPHIL # BLD AUTO: 0 10E3/UL (ref 0–0.7)
EOSINOPHIL NFR BLD AUTO: 0 %
ERYTHROCYTE [DISTWIDTH] IN BLOOD BY AUTOMATED COUNT: 13.3 % (ref 10–15)
GFR SERPL CREATININE-BSD FRML MDRD: 83 ML/MIN/1.73M2
GLUCOSE SERPL-MCNC: 107 MG/DL (ref 70–99)
HCT VFR BLD AUTO: 39.7 % (ref 35–47)
HGB BLD-MCNC: 13.3 G/DL (ref 11.7–15.7)
IMM GRANULOCYTES # BLD: 0 10E3/UL
IMM GRANULOCYTES NFR BLD: 0 %
INTERPRETATION ECG - MUSE: NORMAL
LYMPHOCYTES # BLD AUTO: 2.4 10E3/UL (ref 0.8–5.3)
LYMPHOCYTES NFR BLD AUTO: 24 %
MCH RBC QN AUTO: 30.2 PG (ref 26.5–33)
MCHC RBC AUTO-ENTMCNC: 33.5 G/DL (ref 31.5–36.5)
MCV RBC AUTO: 90 FL (ref 78–100)
MONOCYTES # BLD AUTO: 1 10E3/UL (ref 0–1.3)
MONOCYTES NFR BLD AUTO: 10 %
NEUTROPHILS # BLD AUTO: 6.4 10E3/UL (ref 1.6–8.3)
NEUTROPHILS NFR BLD AUTO: 66 %
NRBC # BLD AUTO: 0 10E3/UL
NRBC BLD AUTO-RTO: 0 /100
P AXIS - MUSE: 58 DEGREES
PLATELET # BLD AUTO: 236 10E3/UL (ref 150–450)
POTASSIUM SERPL-SCNC: 4.5 MMOL/L (ref 3.4–5.3)
PR INTERVAL - MUSE: 140 MS
QRS DURATION - MUSE: 94 MS
QT - MUSE: 482 MS
QTC - MUSE: 435 MS
R AXIS - MUSE: -47 DEGREES
RBC # BLD AUTO: 4.4 10E6/UL (ref 3.8–5.2)
SODIUM SERPL-SCNC: 136 MMOL/L (ref 136–145)
SYSTOLIC BLOOD PRESSURE - MUSE: NORMAL MMHG
T AXIS - MUSE: 43 DEGREES
TROPONIN T SERPL HS-MCNC: 6 NG/L
TROPONIN T SERPL HS-MCNC: <6 NG/L
VENTRICULAR RATE- MUSE: 49 BPM
WBC # BLD AUTO: 9.8 10E3/UL (ref 4–11)

## 2023-07-14 PROCEDURE — 99285 EMERGENCY DEPT VISIT HI MDM: CPT | Mod: 25

## 2023-07-14 PROCEDURE — 85025 COMPLETE CBC W/AUTO DIFF WBC: CPT | Performed by: EMERGENCY MEDICINE

## 2023-07-14 PROCEDURE — 84484 ASSAY OF TROPONIN QUANT: CPT | Performed by: EMERGENCY MEDICINE

## 2023-07-14 PROCEDURE — 36415 COLL VENOUS BLD VENIPUNCTURE: CPT | Performed by: EMERGENCY MEDICINE

## 2023-07-14 PROCEDURE — 80048 BASIC METABOLIC PNL TOTAL CA: CPT | Performed by: EMERGENCY MEDICINE

## 2023-07-14 PROCEDURE — 93005 ELECTROCARDIOGRAM TRACING: CPT

## 2023-07-14 PROCEDURE — 71046 X-RAY EXAM CHEST 2 VIEWS: CPT

## 2023-07-14 ASSESSMENT — ACTIVITIES OF DAILY LIVING (ADL)
ADLS_ACUITY_SCORE: 35
ADLS_ACUITY_SCORE: 35

## 2023-07-14 NOTE — TELEPHONE ENCOUNTER
"Pt reports moderate pain, \"My heart hurts for 10-15 minutes and I am just sitting, not doing anything.\"    History of a \"light\" heart attack a few years ago, in which she had to come in to the hospital.    No SOB  Pt states she can still walk    PLAN: Call 911. Pt verbalized agreement and will call.    Shereen Jean-Baptiste RN/[place] Nurse Advisor          Reason for Disposition   Chest pain lasting longer than 5 minutes and ANY of the following:* Over 44 years old* Over 30 years old and at least one cardiac risk factor (e.g., diabetes mellitus, high blood pressure, high cholesterol, smoker, or strong family history of heart disease)* History of heart disease (i.e., angina, heart attack, heart failure, bypass surgery, takes nitroglycerin)* Pain is crushing, pressure-like, or heavy    Additional Information   Negative: SEVERE difficulty breathing (e.g., struggling for each breath, speaks in single words)   Negative: Passed out (i.e., fainted, collapsed and was not responding)   Negative: Difficult to awaken or acting confused (e.g., disoriented, slurred speech)   Negative: Shock suspected (e.g., cold/pale/clammy skin, too weak to stand, low BP, rapid pulse)    Protocols used: Chest Pain-A-OH    "

## 2023-07-14 NOTE — ED PROVIDER NOTES
"  History   Chief Complaint:  Chest Pain    The history is provided by the patient.      Cecily Ivory is a 82 year old female with a history of MI who presents to the ED for evaluation of chest pain. The patient reports that this morning she went to her non-strenuous exercise class, and about 1 hour later had onset of sharp chest pain in the left upper chest. She did not have any chest pain during her exercise class or recently when she has been going for walks. She denies any shortness of breath, nausea, or diaphoresis. She was able to participate in the class as usual. Breathing deeply did not make the pain worse. She cannot remember if it was similar to her previous heart attack. No cough, cold symptoms, nausea, diaphoresis, abdominal pain, or leg swelling. Chest pain is now significantly improved but occasionally causes a brief \"twinge.\" The area is mildly sore to the touch. She denies any other symptoms.     Independent Historian:   None - Patient Only    Review of External Notes:  3/8/2023 NM Lexiscan stress test results:     Myocardial perfusion imaging using single isotope technique demonstrated no clear evidence of significant inducible myocardial ischemia or myocardial infarction.     Left ventricular function is normal.     The left ventricular ejection fraction at stress is 59%.  Rest ejection fraction is inaccurate due to poor image quality.     LV cavity size normal.     Stress to rest cavity ratio is 1.14.  TID is absent.     Technically challenging study due to patient body habitus.     There is patchy radiotracer uptake throughout the myocardium on rest images, with decreased perfusion in the septal segments on stress and rest imaging, most likely reflecting soft tissue attenuation artifact.     There is no prior study for comparison.     ROS:  See HPI    Allergies:  Latex  Seasonal Allergies  Sulfa Antibiotics     Physical Exam     Patient Vitals for the past 24 hrs:   BP Temp Temp src Pulse " Resp SpO2 Weight   07/14/23 1422 130/64 99  F (37.2  C) Temporal 91 18 99 % 79.4 kg (175 lb)      Physical Exam  General: Well appearing, nontoxic.  Resting comfortably  Head:  Scalp, face, and head appear normal  Eyes:  Pupils are equal, round    Conjunctivae non-injected and sclerae white  ENT:    The external nose is normal    Pinnae are normal  Neck:  Normal range of motion    There is no rigidity noted    Trachea is in the midline  CV:  Regular rate and rhythm     Normal S1/S2, no S3/S4    No murmur or rub. Radial pulses 2+ bilaterally.  Resp:  Lungs are clear and equal bilaterally  There is no tachypnea    No increased work of breathing    No rales, wheezing, or rhonchi  GI:  Abdomen is soft, no rigidity or guarding    No distension, or mass    No tenderness or rebound tenderness   MS:  Normal muscular tone.  Mild tenderness to the left upper anterior chest wall without overlying swelling erythema induration or crepitus.    Symmetric motor strength    No lower extremity edema. No calf swelling or tenderness.  Skin:  No rash or acute skin lesions noted  Neuro: Awake and alert  Speech is normal and fluent  Moves all extremities spontaneously  Psych:  Normal affect. Appropriate interactions.    Emergency Department Course   EKG  ECG results from 07/14/23   EKG 12-lead, tracing only     Value    Systolic Blood Pressure     Diastolic Blood Pressure     Ventricular Rate 49    Atrial Rate 49    CT Interval 140    QRS Duration 94        QTc 435    P Axis 58    R AXIS -47    T Axis 43    Interpretation ECG      Sinus bradycardia  Left anterior fascicular block  Minimal voltage criteria for LVH, may be normal variant ( Rigo product )  Abnormal ECG  When compared with ECG of 09-DEC-2021 12:03,  No significant change was found       Imaging:  XR Chest 2 Views   Final Result   IMPRESSION: No acute cardiopulmonary disease.      LENNY COOPER MD            SYSTEM ID:  [id number]         Report per  radiology    Laboratory:  Labs Ordered and Resulted from Time of ED Arrival to Time of ED Departure   BASIC METABOLIC PANEL - Abnormal       Result Value    Sodium 136      Potassium 4.5      Chloride 104      Carbon Dioxide (CO2) 21 (*)     Anion Gap 11      Urea Nitrogen 25.6 (*)     Creatinine 0.73      Calcium 9.0      Glucose 107 (*)     GFR Estimate 83     TROPONIN T, HIGH SENSITIVITY - Normal    Troponin T, High Sensitivity 6     TROPONIN T, HIGH SENSITIVITY - Normal    Troponin T, High Sensitivity <6     CBC WITH PLATELETS AND DIFFERENTIAL    WBC Count 9.8      RBC Count 4.40      Hemoglobin 13.3      Hematocrit 39.7      MCV 90      MCH 30.2      MCHC 33.5      RDW 13.3      Platelet Count 236      % Neutrophils 66      % Lymphocytes 24      % Monocytes 10      % Eosinophils 0      % Basophils 0      % Immature Granulocytes 0      NRBCs per 100 WBC 0      Absolute Neutrophils 6.4      Absolute Lymphocytes 2.4      Absolute Monocytes 1.0      Absolute Eosinophils 0.0      Absolute Basophils 0.0      Absolute Immature Granulocytes 0.0      Absolute NRBCs 0.0        Emergency Department Course & Assessments:  Interventions:  Medications - No data to display     Assessments, Independent Interpretation, Consult/Discussion of ManagementTests:  ED Course as of 07/14/23 2040 Fri Jul 14, 2023 1329 I obtained history and examined the patient as reported above.    1512 I performed an independent interpretation of the patient's chest radiograph.  No pneumothorax or large pleural effusion.   1536 I rechecked the patient.      Social Determinants of Health affecting care:  None    Disposition:  The patient was discharged to home.     Impression & Plan    Medical Decision Making:  Cecily Ivory is a 82 year old female who presents to the ED for evaluation of sharp right upper chest pain. On my evaluation the patient is well appearing, hemodynamically stable and afebrile. ED evaluation is consistent with  musculoskeletal pain of the chest wall. ECG without ischemia or dysrhythmia. hsTN neg x 2. CBC and BMP otherwise reassuring. CXR neg for any other acute findings. PE clinically unlikely. I recommended supportive care with warm compresses, tylenol, gentle stretching. No evidence of infection or pneumonia. Recent NM Lexiscan without evidence of ischemia. Presentation is highly unlikely to represent ACS. Return precautions were discussed with patient. The patient's questions were answered and the patient was agreeable with discharge. Close follow up with PCP recommended if symptoms do not resolve.     Diagnosis:    ICD-10-CM    1. Left-sided chest wall pain  R07.89            Discharge Medications:  Discharge Medication List as of 7/14/2023  5:16 PM        7/14/2023   Earl Moore MD     Historical Data:  ______________________________________________________________________  Medications:    Aspirin   Claritin   Mevacor   Toprol XL   Centrum silver   Senokot   Zoloft    Past Medical History:   Past Surgical History:     Past Medical History:   Diagnosis Date     Abdominal pain 2009, 2013     Anxiety 2014     Arthritis      ASCUS of cervix with negative high risk HPV 03/2018     Bilateral subdural hematomas (H) 09/2020     Carotid bruit 2011     Gastritis 2011     GERD (gastroesophageal reflux disease) 2011     Gross hematuria 2021     History of colonoscopy 2004, 2014     Hypercholesteremia 2000     Insomnia      Lumbar spinal stenosis 2016     Lung nodule 2009, 2013     MCI (mild cognitive impairment) 2020     Myocardial infarction (H) 01/2020     Odynophagia 2004     Other chronic pain      Peripheral neuropathies      Screening 2006, 2017     Seasonal allergies      Takotsubo cardiomyopathy 01/2020     Vaginal dysplasia      Weight loss 07/2018    Past Surgical History:   Procedure Laterality Date     ANKLE SURGERY  2000     APPENDECTOMY  13     ARTHROPLASTY KNEE Left 5/14/2018    Procedure: ARTHROPLASTY KNEE;   Left total knee arthroplasty;  Surgeon: William Pollard MD;  Location: [facility] OR     ARTHROPLASTY KNEE Right 12/9/2021    Procedure: RIGHT TOTAL KNEE ARTHROPLASTY;  Surgeon: Earl Perez MD;  Location:  OR     BREAST BIOPSY, CORE RT/LT       CATARACT IOL, RT/LT  04/2019     CV HEART CATHETERIZATION WITH POSSIBLE INTERVENTION N/A 1/7/2020    Procedure: Heart Catheterization with Possible Intervention;  Surgeon: Bibiana Ruggiero MD;  Location:  HEART CARDIAC CATH LAB     GYN SURGERY      BSO     HYSTERECTOMY, PAP NO LONGER INDICATED       LAPAROSCOPIC CHOLECYSTECTOMY  2008     NOSE SURGERY  1990     right knee arthroscopy  2007     thumb surgery Left 2/2015     ZZC VAGINAL HYSTERECTOMY  1995     [facility]      Patient Active Problem List    Diagnosis Date Noted     LAFB (left anterior fascicular block) 03/03/2023     Priority: Medium     Abnormal electrocardiogram 03/03/2023     Priority: Medium     Nonrheumatic aortic valve insufficiency 05/24/2021     Priority: Medium     Arthritis      Priority: Medium     Takotsubo cardiomyopathy 01/01/2020     Priority: Medium     hosp fsd, ef 40%, mod ai, clean coronaries       MCI (mild cognitive impairment) 2020     Priority: Medium     seen by neuro       Degenerative arthritis of knee 05/14/2018     Priority: Medium     ASCUS of cervix with negative high risk HPV 03/01/2018     Priority: Medium     Lumbar spinal stenosis      Priority: Medium     Dr. Wilde, had epidural       Vaginal dysplasia      Priority: Medium     chronic VAIN I, many colpos of vagina.  Now we only do an annual pap of vagina, no colpo since stable long term       GERD (gastroesophageal reflux disease)      Priority: Medium     Insomnia      Priority: Medium     using otc        Anxiety      Priority: Medium     HL (hearing loss) 07/19/2013     Priority: Medium     Dysphonia 07/19/2013     Priority: Medium     Hyperlipidemia LDL  goal <130 02/05/2012     Priority: Medium     Peripheral neuropathy      Priority: Medium     Dr. Kim  (Problem list name updated by automated process. Provider to review and confirm.)       Pain in joint, lower leg 11/16/2007     Priority: Medium          Family History:    family history includes Dementia in her mother; Hyperlipidemia in her mother; No Known Problems in her brother, father, maternal grandfather, maternal grandmother, paternal grandmother, and another family member. Social History:   reports that she quit smoking about 31 years ago. Her smoking use included cigarettes. She has a 30.00 pack-year smoking history. She has never used smokeless tobacco. She reports current alcohol use of about 2.0 - 4.0 standard drinks of alcohol per week. She reports that she does not use drugs.     PCP: Ellis Ruiz, Shawn Dover, am serving as a scribe to document services based on my observations and the provider's statements to me.       Earl Moore MD  07/14/23 2041

## 2023-07-14 NOTE — ED NOTES
Bed: ED07  Expected date:   Expected time:   Means of arrival:   Comments:  414  82 F chest pain  1307

## 2023-07-14 NOTE — ED TRIAGE NOTES
Pt BIBA-for CP that lasted about 20 minutes prior to EMS call. CP comes and goes. ASA x1, Nitroglycerin x1.     Triage Assessment     Row Name 07/14/23 3261       Triage Assessment (Adult)    Airway WDL WDL       Respiratory WDL    Respiratory WDL WDL       Skin Circulation/Temperature WDL    Skin Circulation/Temperature WDL WDL       Cardiac WDL    Cardiac WDL chest pain       Chest Pain Assessment    Chest Pain Location midsternal    Character pressure       Peripheral/Neurovascular WDL    Peripheral Neurovascular WDL WDL       Cognitive/Neuro/Behavioral WDL    Cognitive/Neuro/Behavioral WDL WDL

## 2023-07-17 ENCOUNTER — PATIENT OUTREACH (OUTPATIENT)
Dept: FAMILY MEDICINE | Facility: CLINIC | Age: 83
End: 2023-07-17
Payer: COMMERCIAL

## 2023-07-17 NOTE — TELEPHONE ENCOUNTER
What type of discharge? Emergency Department  Risk of Hospital admission or ED visit: 53%  Is a TCM episode required? No  When should the patient follow up with PCP? N/A    Rhianna Rodriguez RN  [facility]

## 2023-07-19 NOTE — TELEPHONE ENCOUNTER
"  Patient Contact    Attempt # 1    Was call answered?  Yes     ED/Discharge Protocol    \"Hi, my name is Brenda Graham RN, a registered nurse, and I am calling on behalf of Dr. Ruiz's office at [place].  I am calling to follow up and see how things are going for you after your recent visit.\"    \"I see that you were in the (ER/UC/IP) on 7/14/23.    How are you doing now that you are home?\" doing well     Is patient experiencing symptoms that may require a hospital visit?  No     Discharge Instructions    \"Let's review your discharge instructions.  What is/are the follow-up recommendations?  Pt. Response: follow up with Dr Ruiz     \"Were you instructed to make a follow-up appointment?\"  Pt. Response: Yes.  Has appointment been made?   No.  \"Can I help you schedule that appointment?\" Yes       \"When you see the provider, I would recommend that you bring your discharge instructions with you.    Medications    \"How many new medications are you on since your hospitalization/ED visit?\"    0-1  \"How many of your current medicines changed (dose, timing, name, etc.) while you were in the hospital/ED visit?\"   0-1  \"Do you have questions about your medications?\"   No  \"Were you newly diagnosed with heart failure, COPD, diabetes or did you have a heart attack?\"   No  For patients on insulin: \"Did you start on insulin in the hospital or did you have your insulin dose changed?\"   No  Post Discharge Medication Reconciliation Status: discharge medications reconciled, continue medications without change.    Was MTM referral placed (*Make sure to put transitions as reason for referral)?   No    Call Summary    \"Do you have any questions or concerns about your condition or care plan at the moment?\"    No     Patient was in ER 1x in the past year (assess appropriateness of ER visits.)      \"If you have questions or things don't continue to improve, we encourage you contact us through the main clinic number,  ([phone])  .  " "Even if the clinic is not open, triage nurses are available 24/7 to help you.     We would like you to know that our clinic has extended hours (provide information).  We also have urgent care (provide details on closest location and hours/contact info)\"      \"Thank you for your time and take care!\"    Brenda QUACH, Triage RN  [facility]       "

## 2023-08-08 ENCOUNTER — OFFICE VISIT (OUTPATIENT)
Dept: FAMILY MEDICINE | Facility: CLINIC | Age: 83
End: 2023-08-08
Payer: COMMERCIAL

## 2023-08-08 VITALS
HEART RATE: 50 BPM | TEMPERATURE: 97.9 F | RESPIRATION RATE: 19 BRPM | BODY MASS INDEX: 25.65 KG/M2 | WEIGHT: 163.4 LBS | DIASTOLIC BLOOD PRESSURE: 87 MMHG | SYSTOLIC BLOOD PRESSURE: 134 MMHG | HEIGHT: 67 IN | OXYGEN SATURATION: 94 %

## 2023-08-08 DIAGNOSIS — I51.81 TAKOTSUBO CARDIOMYOPATHY: ICD-10-CM

## 2023-08-08 DIAGNOSIS — F41.9 ANXIETY: ICD-10-CM

## 2023-08-08 DIAGNOSIS — Z23 NEED FOR VACCINATION: ICD-10-CM

## 2023-08-08 DIAGNOSIS — Z78.0 ASYMPTOMATIC MENOPAUSAL STATE: ICD-10-CM

## 2023-08-08 DIAGNOSIS — E78.5 HYPERLIPIDEMIA LDL GOAL <130: ICD-10-CM

## 2023-08-08 DIAGNOSIS — Z23 NEED FOR VACCINE FOR TD (TETANUS-DIPHTHERIA): ICD-10-CM

## 2023-08-08 DIAGNOSIS — G31.84 MCI (MILD COGNITIVE IMPAIRMENT): ICD-10-CM

## 2023-08-08 DIAGNOSIS — R29.890 LOSS OF HEIGHT: Primary | ICD-10-CM

## 2023-08-08 PROCEDURE — 90471 IMMUNIZATION ADMIN: CPT | Performed by: INTERNAL MEDICINE

## 2023-08-08 PROCEDURE — 99214 OFFICE O/P EST MOD 30 MIN: CPT | Mod: 25 | Performed by: INTERNAL MEDICINE

## 2023-08-08 PROCEDURE — 90714 TD VACC NO PRESV 7 YRS+ IM: CPT | Performed by: INTERNAL MEDICINE

## 2023-08-08 RX ORDER — METOPROLOL SUCCINATE 25 MG/1
25 TABLET, EXTENDED RELEASE ORAL DAILY
Qty: 90 TABLET | Refills: 3 | Status: SHIPPED | OUTPATIENT
Start: 2023-08-08 | End: 2023-12-22

## 2023-08-08 RX ORDER — LOVASTATIN 40 MG
40 TABLET ORAL AT BEDTIME
Qty: 90 TABLET | Refills: 3 | Status: SHIPPED | OUTPATIENT
Start: 2023-08-08 | End: 2023-12-22

## 2023-08-08 RX ORDER — SERTRALINE HYDROCHLORIDE 25 MG/1
25 TABLET, FILM COATED ORAL DAILY
Qty: 90 TABLET | Refills: 3 | Status: SHIPPED | OUTPATIENT
Start: 2023-08-08 | End: 2023-12-22

## 2023-08-08 ASSESSMENT — PAIN SCALES - GENERAL: PAINLEVEL: NO PAIN (0)

## 2023-08-08 NOTE — PATIENT INSTRUCTIONS
Do the bone density test  Follow up for your complete physical exam in the fall  See the neurologist      Ellis Ruiz M.D.

## 2023-08-08 NOTE — NURSING NOTE
Prior to immunization administration, verified patients identity using patient s name and date of birth. Please see Immunization Activity for additional information.     Screening Questionnaire for Adult Immunization    Are you sick today?   No   Do you have allergies to medications, food, a vaccine component or latex?   Yes   Have you ever had a serious reaction after receiving a vaccination?   No   Do you have a long-term health problem with heart, lung, kidney, or metabolic disease (e.g., diabetes), asthma, a blood disorder, no spleen, complement component deficiency, a cochlear implant, or a spinal fluid leak?  Are you on long-term aspirin therapy?   No   Do you have cancer, leukemia, HIV/AIDS, or any other immune system problem?   No   Do you have a parent, brother, or sister with an immune system problem?   No   In the past 3 months, have you taken medications that affect  your immune system, such as prednisone, other steroids, or anticancer drugs; drugs for the treatment of rheumatoid arthritis, Crohn s disease, or psoriasis; or have you had radiation treatments?   No   Have you had a seizure, or a brain or other nervous system problem?   No   During the past year, have you received a transfusion of blood or blood    products, or been given immune (gamma) globulin or antiviral drug?   No   For women: Are you pregnant or is there a chance you could become       pregnant during the next month?   No   Have you received any vaccinations in the past 4 weeks?   No     Immunization questionnaire was positive for at least one answer.  Notified Dr. Ruiz.      Patient instructed to remain in clinic for 15 minutes afterwards, and to report any adverse reactions.     Screening performed by Ruth Slaughter CMA on 8/8/2023 at 12:14 PM.

## 2023-08-08 NOTE — PROGRESS NOTES
This is a follow-up to the patient's recent ER visit which I reviewed.  She was having chest pain.  Since then she has not had chest pain or shortness of breath and in fact states that she feels quite good.  She does have a significant history of memory loss.  She did not quite remember the first time that she was in the ER.  She is also worried about some height loss.  She has not had a DEXA in quite some time.  She has a history of cardiomyopathy but this has not been active for quite some time.  She takes medication for anxiety.  She is due for a physical in October.    Past Medical History:   Diagnosis Date    Abdominal pain 2009, 2013    ct liver and renal cyst and 2mm lung nodule, repeat ct done 2013 and no significantly abnl.    Anxiety 2014    added med 3/14    Arthritis     ASCUS of cervix with negative high risk HPV 03/2018    Bilateral subdural hematomas (H) 09/2020    found incidentally on mri done for memory loss    Carotid bruit 2011    us nl    Gastritis 2011    egd done by [facility] [facility]    GERD (gastroesophageal reflux disease) 2011    Gross hematuria 2021    cysto and ct neg    History of colonoscopy 2004, 2014    nl    Hypercholesteremia 2000    stopped lovastatin 2015, added lipitor 3/16    Insomnia     using otc     Lumbar spinal stenosis 2016    Dr. Wilde, had epidural    Lung nodule 2009, 2013    seen on ct for abd pain, fu done 3/13 and 2 nodules stable and benign, one new one needs fu 1 year.  fu done 3/14 and fu 1 year and then done; fu done 3/15 and unchanged, no fu needed    MCI (mild cognitive impairment) 2020    seen by neuro, Dr. Burnham    Myocardial infarction (H) 01/2020    elev trop but clean coronaries on angio, felt to be stress induced cmyop    Odynophagia 2004    egd nl    Other chronic pain     Peripheral neuropathies     Dr. Kim    Screening 2006, 2017    nl dexa    Seasonal allergies     Takotsubo cardiomyopathy 01/2020    hosp fsd, ef 40%, mod ai, clean coronaries; fu echo nl      Vaginal dysplasia     chronic VAIN I, many colpos of vagina.  Now we only do an annual pap of vagina, no colpo since stable long term    Weight loss 2018    ct chest, abd and pelvis neg     Past Surgical History:   Procedure Laterality Date    ANKLE SURGERY      APPENDECTOMY  13    ARTHROPLASTY KNEE Left 2018    Procedure: ARTHROPLASTY KNEE;  Left total knee arthroplasty;  Surgeon: William Pollard MD;  Location: [facility] OR    ARTHROPLASTY KNEE Right 2021    Procedure: RIGHT TOTAL KNEE ARTHROPLASTY;  Surgeon: Earl Perez MD;  Location:  OR    BREAST BIOPSY, CORE RT/LT      CATARACT IOL, RT/LT  2019    CV HEART CATHETERIZATION WITH POSSIBLE INTERVENTION N/A 2020    Procedure: Heart Catheterization with Possible Intervention;  Surgeon: Bibiana Ruggiero MD;  Location:  HEART CARDIAC CATH LAB    GYN SURGERY      BSO    HYSTERECTOMY, PAP NO LONGER INDICATED      LAPAROSCOPIC CHOLECYSTECTOMY      NOSE SURGERY      right knee arthroscopy      thumb surgery Left 2015    ZZC VAGINAL HYSTERECTOMY      ZZHC UGI ENDOSCOPY, SIMPLE EXAM  03/15/11    [facility]     Social History     Socioeconomic History    Marital status:      Spouse name: Not on file    Number of children: 1    Years of education: Not on file    Highest education level: Not on file   Occupational History    Occupation: , retired     Employer: [employer]     Employer: RETIRED   Tobacco Use    Smoking status: Former     Packs/day: 1.50     Years: 20.00     Pack years: 30.00     Types: Cigarettes     Quit date: 1992     Years since quittin.5    Smokeless tobacco: Never   Substance and Sexual Activity    Alcohol use: Yes     Alcohol/week: 2.0 - 4.0 standard drinks of alcohol     Types: 2 - 4 Glasses of wine per week     Comment: 2 glasses 4 times per week    Drug use: No    Sexual activity: Not Currently     Partners: Male     Comment: vag hyst,  BSO   Other Topics Concern    Parent/sibling w/ CABG, MI or angioplasty before 65F 55M? Not Asked   Social History Narrative    Not on file     Social Determinants of Health     Financial Resource Strain: Not on file   Food Insecurity: Not on file   Transportation Needs: Not on file   Physical Activity: Not on file   Stress: Not on file   Social Connections: Not on file   Intimate Partner Violence: Not on file   Housing Stability: Not on file     Current Outpatient Medications   Medication Sig Dispense Refill    acetaminophen (TYLENOL) 500 MG tablet Take 2 tablets (1,000 mg) by mouth every 6 hours as needed for mild pain      Ascorbic Acid (VITAMIN C PO) Take 500 mg by mouth daily.      aspirin (ASA) 81 MG EC tablet Take 81 mg by mouth daily      Biotin 5000 MCG CAPS Take 1 tablet by mouth At Bedtime       calcium carb 1250 mg, 500 mg [place],/vitamin D 200 units (OSCAL WITH D) 500-200 MG-UNIT per tablet Take 1 tablet by mouth daily  100 tablet 3    Carboxymethylcellulose Sodium 0.25 % SOLN Place 1 drop into both eyes every evening      Cholecalciferol (VITAMIN D3) 50 MCG (2000 UT) CAPS Take 2,000 Units by mouth daily       CYANOCOBALAMIN PO Take 1,000 mcg by mouth daily      fish oil-omega-3 fatty acids 1000 MG capsule Take 2 g by mouth daily      loratadine (CLARITIN) 10 MG tablet Take 10 mg by mouth daily      lovastatin (MEVACOR) 40 MG tablet Take 1 tablet (40 mg) by mouth At Bedtime 90 tablet 3    magnesium 100 MG CAPS Take 1 tablet by mouth daily       metoprolol succinate ER (TOPROL XL) 25 MG 24 hr tablet Take 1 tablet (25 mg) by mouth daily 90 tablet 3    Multiple Vitamins-Minerals (CENTRUM SILVER) per tablet Take 1 tablet by mouth daily      senna-docusate (SENOKOT-S/PERICOLACE) 8.6-50 MG tablet Take 1 tablet by mouth 2 times daily Hold for loose stools      sertraline (ZOLOFT) 25 MG tablet Take 1 tablet (25 mg) by mouth daily 90 tablet 3     Allergies   Allergen Reactions    Latex Cough    Seasonal Allergies   "    YEAR ROUND ALLERGIES    Sulfa Antibiotics Unknown     FAMILY HISTORY NOTED AND REVIEWED    REVIEW OF SYSTEMS: above    PHYSICAL EXAM    /87 (BP Location: Right arm, Patient Position: Sitting, Cuff Size: Adult Regular)   Pulse 50   Temp 97.9  F (36.6  C) (Temporal)   Resp 19   Ht 1.702 m (5' 7\")   Wt 74.1 kg (163 lb 6.4 oz)   SpO2 94%   BMI 25.59 kg/m      Patient appears non toxic  No exam    ASSESSMENT:.  1 chest pain, doubt cv, resolved, call if more  2. Memory loss, suspect real, follow up neuro  3. Ht loss, to get dexa  4. Elevated cholesterol, follow up labs this fall  5. Anxiety, stable  6. Cmyop, no issues    PLAN:  Dexa  Neuro eval  Follow up complete physical exam this fall      Ellis Ruiz M.D.        "

## 2023-09-21 NOTE — PLAN OF CARE
Problem: Knee Arthroplasty (Total, Partial) (Adult)  Goal: Signs and Symptoms of Listed Potential Problems Will be Absent, Minimized or Managed (Knee Arthroplasty)  Signs and symptoms of listed potential problems will be absent, minimized or managed by discharge/transition of care (reference Knee Arthroplasty (Total, Partial) (Adult) CPG).   Outcome: Improving  Afeb, vss, cms intact, ace wrap clean and dry, no drain in place. Saenz draining opal fluid, hesitant to d/c because knee was quite lentz when up with therapy this am. Up with assist of 1 and immobilizer and walker for just a short distance to the chair. Nausea she had yesterday improved since 12mn. Tolerating regular diet and po meds. Min to no pain yet, using just the scheduled tylenol and toradol with control. Plans on going to TCU on discharge for therapy, care coordinator aware and involved.       Humira Counseling:  I discussed with the patient the risks of adalimumab including but not limited to myelosuppression, immunosuppression, autoimmune hepatitis, demyelinating diseases, lymphoma, and serious infections.  The patient understands that monitoring is required including a PPD at baseline and must alert us or the primary physician if symptoms of infection or other concerning signs are noted.

## 2023-12-22 ENCOUNTER — OFFICE VISIT (OUTPATIENT)
Dept: FAMILY MEDICINE | Facility: CLINIC | Age: 83
End: 2023-12-22
Payer: COMMERCIAL

## 2023-12-22 VITALS
BODY MASS INDEX: 27.31 KG/M2 | SYSTOLIC BLOOD PRESSURE: 146 MMHG | HEIGHT: 67 IN | OXYGEN SATURATION: 95 % | HEART RATE: 54 BPM | WEIGHT: 174 LBS | DIASTOLIC BLOOD PRESSURE: 84 MMHG

## 2023-12-22 DIAGNOSIS — G31.84 MCI (MILD COGNITIVE IMPAIRMENT): ICD-10-CM

## 2023-12-22 DIAGNOSIS — F41.9 ANXIETY: ICD-10-CM

## 2023-12-22 DIAGNOSIS — E78.5 HYPERLIPIDEMIA LDL GOAL <130: ICD-10-CM

## 2023-12-22 DIAGNOSIS — I51.81 TAKOTSUBO CARDIOMYOPATHY: ICD-10-CM

## 2023-12-22 DIAGNOSIS — Z00.00 MEDICARE ANNUAL WELLNESS VISIT, SUBSEQUENT: Primary | ICD-10-CM

## 2023-12-22 LAB
ALBUMIN SERPL BCG-MCNC: 4.4 G/DL (ref 3.5–5.2)
ALP SERPL-CCNC: 64 U/L (ref 40–150)
ALT SERPL W P-5'-P-CCNC: 24 U/L (ref 0–50)
ANION GAP SERPL CALCULATED.3IONS-SCNC: 11 MMOL/L (ref 7–15)
AST SERPL W P-5'-P-CCNC: 31 U/L (ref 0–45)
BILIRUB SERPL-MCNC: 0.4 MG/DL
BUN SERPL-MCNC: 15.9 MG/DL (ref 8–23)
CALCIUM SERPL-MCNC: 9.4 MG/DL (ref 8.8–10.2)
CHLORIDE SERPL-SCNC: 102 MMOL/L (ref 98–107)
CHOLEST SERPL-MCNC: 194 MG/DL
CREAT SERPL-MCNC: 0.78 MG/DL (ref 0.51–0.95)
DEPRECATED HCO3 PLAS-SCNC: 27 MMOL/L (ref 22–29)
EGFRCR SERPLBLD CKD-EPI 2021: 76 ML/MIN/1.73M2
ERYTHROCYTE [DISTWIDTH] IN BLOOD BY AUTOMATED COUNT: 12.2 % (ref 10–15)
FASTING STATUS PATIENT QL REPORTED: NO
GLUCOSE SERPL-MCNC: 93 MG/DL (ref 70–99)
HCT VFR BLD AUTO: 43.9 % (ref 35–47)
HDLC SERPL-MCNC: 60 MG/DL
HGB BLD-MCNC: 14.1 G/DL (ref 11.7–15.7)
LDLC SERPL CALC-MCNC: 91 MG/DL
MCH RBC QN AUTO: 30.3 PG (ref 26.5–33)
MCHC RBC AUTO-ENTMCNC: 32.1 G/DL (ref 31.5–36.5)
MCV RBC AUTO: 94 FL (ref 78–100)
NONHDLC SERPL-MCNC: 134 MG/DL
PLATELET # BLD AUTO: 245 10E3/UL (ref 150–450)
POTASSIUM SERPL-SCNC: 4.2 MMOL/L (ref 3.4–5.3)
PROT SERPL-MCNC: 7.2 G/DL (ref 6.4–8.3)
RBC # BLD AUTO: 4.66 10E6/UL (ref 3.8–5.2)
SODIUM SERPL-SCNC: 140 MMOL/L (ref 135–145)
TRIGL SERPL-MCNC: 214 MG/DL
WBC # BLD AUTO: 7.6 10E3/UL (ref 4–11)

## 2023-12-22 PROCEDURE — G0439 PPPS, SUBSEQ VISIT: HCPCS | Performed by: INTERNAL MEDICINE

## 2023-12-22 PROCEDURE — 80061 LIPID PANEL: CPT | Performed by: INTERNAL MEDICINE

## 2023-12-22 PROCEDURE — 80053 COMPREHEN METABOLIC PANEL: CPT | Performed by: INTERNAL MEDICINE

## 2023-12-22 PROCEDURE — 36415 COLL VENOUS BLD VENIPUNCTURE: CPT | Performed by: INTERNAL MEDICINE

## 2023-12-22 PROCEDURE — 85027 COMPLETE CBC AUTOMATED: CPT | Performed by: INTERNAL MEDICINE

## 2023-12-22 RX ORDER — LOVASTATIN 40 MG
40 TABLET ORAL AT BEDTIME
Qty: 90 TABLET | Refills: 3 | Status: SHIPPED | OUTPATIENT
Start: 2023-12-22

## 2023-12-22 RX ORDER — SERTRALINE HYDROCHLORIDE 25 MG/1
25 TABLET, FILM COATED ORAL DAILY
Qty: 90 TABLET | Refills: 3 | Status: SHIPPED | OUTPATIENT
Start: 2023-12-22

## 2023-12-22 RX ORDER — METOPROLOL SUCCINATE 25 MG/1
25 TABLET, EXTENDED RELEASE ORAL DAILY
Qty: 90 TABLET | Refills: 3 | Status: SHIPPED | OUTPATIENT
Start: 2023-12-22

## 2023-12-22 ASSESSMENT — ENCOUNTER SYMPTOMS
PALPITATIONS: 0
CHILLS: 0
HEADACHES: 0
COUGH: 0
HEMATURIA: 0
DYSURIA: 0
SORE THROAT: 0
DIZZINESS: 0
BREAST MASS: 0
WEAKNESS: 0
PARESTHESIAS: 0
HEMATOCHEZIA: 0
DIARRHEA: 0
JOINT SWELLING: 0
MYALGIAS: 0
EYE PAIN: 0
SHORTNESS OF BREATH: 0
ABDOMINAL PAIN: 0
FEVER: 0
FREQUENCY: 0
ARTHRALGIAS: 0
CONSTIPATION: 0
NERVOUS/ANXIOUS: 0
HEARTBURN: 0
NAUSEA: 0

## 2023-12-22 ASSESSMENT — ACTIVITIES OF DAILY LIVING (ADL): CURRENT_FUNCTION: MEDICATION ADMINISTRATION REQUIRES ASSISTANCE

## 2023-12-22 NOTE — LETTER
[place] [address] [facility]  [address]  [place]  [address]  Tel: [phone]    December 26, 2023    Cecily Ivory  [address] [place] [address]        Dear Ms. Ivory,    It was a pleasure seeing you for your physical examination.  I wanted to get back to you with your test results.  I have enclosed a copy for your review.     I am happy to report that your cbc or complete blood count is normal with no signs of anemia, leukemia or platelet abnormalities. Your chemistry panel shows no signs of diabetes.  Your blood salts, kidney tests, liver tests, and proteins are all fine.     Your total cholesterol is 194 with the normal range being below 200.  Your HDL or good cholesterol is 60 with the normal range being above 50.  Your LDL or bad cholesterol is 91 with the normal range being below 130.  These numbers are very good.     I am happy to bring you this overall excellent report.  If you have any questions please call me.     Ellis Ruiz M.D./[facility]      Enclosure: Lab Results  Results for orders placed or performed in visit on 12/22/23   CBC with platelets     Status: Normal   Result Value Ref Range    WBC Count 7.6 4.0 - 11.0 10e3/uL    RBC Count 4.66 3.80 - 5.20 10e6/uL    Hemoglobin 14.1 11.7 - 15.7 g/dL    Hematocrit 43.9 35.0 - 47.0 %    MCV 94 78 - 100 fL    MCH 30.3 26.5 - 33.0 pg    MCHC 32.1 31.5 - 36.5 g/dL    RDW 12.2 10.0 - 15.0 %    Platelet Count 245 150 - 450 10e3/uL   Comprehensive metabolic panel     Status: Normal   Result Value Ref Range    Sodium 140 135 - 145 mmol/L    Potassium 4.2 3.4 - 5.3 mmol/L    Carbon Dioxide (CO2) 27 22 - 29 mmol/L    Anion Gap 11 7 - 15 mmol/L    Urea Nitrogen 15.9 8.0 - 23.0 mg/dL    Creatinine 0.78 0.51 - 0.95 mg/dL    GFR Estimate 76 >60 mL/min/1.73m2    Calcium 9.4 8.8 - 10.2 mg/dL    Chloride 102 98 - 107 mmol/L    Glucose 93 70 - 99 mg/dL    Alkaline Phosphatase 64 40 - 150 U/L    AST 31 0 - 45 U/L     ALT 24 0 - 50 U/L    Protein Total 7.2 6.4 - 8.3 g/dL    Albumin 4.4 3.5 - 5.2 g/dL    Bilirubin Total 0.4 <=1.2 mg/dL   Lipid panel reflex to direct LDL Fasting     Status: Abnormal   Result Value Ref Range    Cholesterol 194 <200 mg/dL    Triglycerides 214 (H) <150 mg/dL    Direct Measure HDL 60 >=50 mg/dL    LDL Cholesterol Calculated 91 <=100 mg/dL    Non HDL Cholesterol 134 (H) <130 mg/dL    Patient Fasting > 8hrs? No     Narrative    Cholesterol  Desirable:  <200 mg/dL    Triglycerides  Normal:  Less than 150 mg/dL  Borderline High:  150-199 mg/dL  High:  200-499 mg/dL  Very High:  Greater than or equal to 500 mg/dL    Direct Measure HDL  Female:  Greater than or equal to 50 mg/dL   Male:  Greater than or equal to 40 mg/dL    LDL Cholesterol  Desirable:  <100mg/dL  Above Desirable:  100-129 mg/dL   Borderline High:  130-159 mg/dL   High:  160-189 mg/dL   Very High:  >= 190 mg/dL    Non HDL Cholesterol  Desirable:  130 mg/dL  Above Desirable:  130-159 mg/dL  Borderline High:  160-189 mg/dL  High:  190-219 mg/dL  Very High:  Greater than or equal to 220 mg/dL

## 2023-12-22 NOTE — PROGRESS NOTES
SUBJECTIVE:   Cecily is a 83 year old, presenting for the following:    She presents with her daughter and overall is doing well and she has no complaints and no pains.  The patient was independently in her apartment and is happy about that.  She does see neurology and has an appointment in January.  She does exercise.  Her anxiety is controlled.               Past Medical History:      Past Medical History:   Diagnosis Date     Abdominal pain 2009, 2013    ct liver and renal cyst and 2mm lung nodule, repeat ct done 2013 and no significantly abnl.     Anxiety 2014    added med 3/14     Arthritis      ASCUS of cervix with negative high risk HPV 03/2018     Bilateral subdural hematomas (H) 09/2020    found incidentally on mri done for memory loss     Carotid bruit 2011    us nl     Gastritis 2011    egd done by [facility]     GERD (gastroesophageal reflux disease) 2011     Gross hematuria 2021    cysto and ct neg     History of colonoscopy 2004, 2014    nl     Hypercholesteremia 2000    stopped lovastatin 2015, added lipitor 3/16     Insomnia     using otc      Lumbar spinal stenosis 2016    Dr. Wilde, had epidural     Lung nodule 2009, 2013    seen on ct for abd pain, fu done 3/13 and 2 nodules stable and benign, one new one needs fu 1 year.  fu done 3/14 and fu 1 year and then done; fu done 3/15 and unchanged, no fu needed     MCI (mild cognitive impairment) 2020    seen by neuro, Dr. Burnham     Myocardial infarction (H) 01/2020    elev trop but clean coronaries on angio, felt to be stress induced cmyop     Odynophagia 2004    egd nl     Other chronic pain      Peripheral neuropathies     Dr. Kim     Screening 2006, 2017    nl dexa     Seasonal allergies      Takotsubo cardiomyopathy 01/2020    hosp fsd, ef 40%, mod ai, clean coronaries; fu echo nl 5/20     Vaginal dysplasia     chronic VAIN I, many colpos of vagina.  Now we only do an annual pap of vagina, no colpo since stable long term     Weight loss 07/2018    ct  chest, abd and pelvis neg             Past Surgical History:      Past Surgical History:   Procedure Laterality Date     ANKLE SURGERY       APPENDECTOMY  13     ARTHROPLASTY KNEE Left 2018    Procedure: ARTHROPLASTY KNEE;  Left total knee arthroplasty;  Surgeon: William Pollard MD;  Location: [facility] OR     ARTHROPLASTY KNEE Right 2021    Procedure: RIGHT TOTAL KNEE ARTHROPLASTY;  Surgeon: Earl Perez MD;  Location:  OR     BREAST BIOPSY, CORE RT/LT       CATARACT IOL, RT/LT  2019     CV HEART CATHETERIZATION WITH POSSIBLE INTERVENTION N/A 2020    Procedure: Heart Catheterization with Possible Intervention;  Surgeon: Bibiana Ruggiero MD;  Location:  HEART CARDIAC CATH LAB     GYN SURGERY      BSO     HYSTERECTOMY, PAP NO LONGER INDICATED       LAPAROSCOPIC CHOLECYSTECTOMY       NOSE SURGERY       right knee arthroscopy       thumb surgery Left 2015     ZZC VAGINAL HYSTERECTOMY       ZZ UGI ENDOSCOPY, SIMPLE EXAM  03/15/11    [facility]             Social History:     Social History     Socioeconomic History     Marital status:      Spouse name: Not on file     Number of children: 1     Years of education: Not on file     Highest education level: Not on file   Occupational History     Occupation: , retired     Employer: [employer]     Employer: RETIRED   Tobacco Use     Smoking status: Former     Packs/day: 1.50     Years: 20.00     Additional pack years: 0.00     Total pack years: 30.00     Types: Cigarettes     Quit date: 1992     Years since quittin.8     Smokeless tobacco: Never   Substance and Sexual Activity     Alcohol use: Yes     Alcohol/week: 2.0 - 4.0 standard drinks of alcohol     Types: 2 - 4 Glasses of wine per week     Comment: 2 glasses 4 times per week     Drug use: No     Sexual activity: Not Currently     Partners: Male     Comment: vag hyst, BSO   Other Topics Concern      Parent/sibling w/ CABG, MI or angioplasty before 65F 55M? Not Asked   Social History Narrative     Not on file     Social Determinants of Health     Financial Resource Strain: Low Risk  (12/22/2023)    Financial Resource Strain      Within the past 12 months, have you or your family members you live with been unable to get utilities (heat, electricity) when it was really needed?: No   Food Insecurity: Low Risk  (12/22/2023)    Food Insecurity      Within the past 12 months, did you worry that your food would run out before you got money to buy more?: No      Within the past 12 months, did the food you bought just not last and you didn t have money to get more?: No   Transportation Needs: Low Risk  (12/22/2023)    Transportation Needs      Within the past 12 months, has lack of transportation kept you from medical appointments, getting your medicines, non-medical meetings or appointments, work, or from getting things that you need?: No   Physical Activity: Not on file   Stress: Not on file   Social Connections: Not on file   Interpersonal Safety: Not on file   Housing Stability: Low Risk  (12/22/2023)    Housing Stability      Do you have housing? : Yes      Are you worried about losing your housing?: No             Family History:   reviewed         Allergies:     Allergies   Allergen Reactions     Latex Cough     Seasonal Allergies      YEAR ROUND ALLERGIES     Sulfa Antibiotics Unknown             Medications:     Current Outpatient Medications   Medication Sig Dispense Refill     acetaminophen (TYLENOL) 500 MG tablet Take 2 tablets (1,000 mg) by mouth every 6 hours as needed for mild pain       Ascorbic Acid (VITAMIN C PO) Take 500 mg by mouth daily.       aspirin (ASA) 81 MG EC tablet Take 81 mg by mouth daily       Biotin 5000 MCG CAPS Take 1 tablet by mouth At Bedtime        calcium carb 1250 mg, 500 mg [place],/vitamin D 200 units (OSCAL WITH D) 500-200 MG-UNIT per tablet Take 1 tablet by mouth daily  100 tablet  "3     Carboxymethylcellulose Sodium 0.25 % SOLN Place 1 drop into both eyes every evening       Cholecalciferol (VITAMIN D3) 50 MCG (2000 UT) CAPS Take 2,000 Units by mouth daily        CYANOCOBALAMIN PO Take 1,000 mcg by mouth daily       fish oil-omega-3 fatty acids 1000 MG capsule Take 2 g by mouth daily       loratadine (CLARITIN) 10 MG tablet Take 10 mg by mouth daily       lovastatin (MEVACOR) 40 MG tablet Take 1 tablet (40 mg) by mouth at bedtime 90 tablet 3     magnesium 100 MG CAPS Take 1 tablet by mouth daily        metoprolol succinate ER (TOPROL XL) 25 MG 24 hr tablet Take 1 tablet (25 mg) by mouth daily 90 tablet 3     Multiple Vitamins-Minerals (CENTRUM SILVER) per tablet Take 1 tablet by mouth daily       sertraline (ZOLOFT) 25 MG tablet Take 1 tablet (25 mg) by mouth daily 90 tablet 3     senna-docusate (SENOKOT-S/PERICOLACE) 8.6-50 MG tablet Take 1 tablet by mouth 2 times daily Hold for loose stools                 Review of Systems:     The 10 point Review of Systems is negative other than noted in the HPI           Physical Exam:   Blood pressure (!) 146/84, pulse 54, height 1.702 m (5' 7\"), weight 78.9 kg (174 lb), SpO2 95%, not currently breastfeeding.    Exam:  Constitutional: healthy appearing, alert and in no distress  Heent: Normocephalic. Head without obvious masses or lesions. PERRLDC, EOMI. Mouth exam within normal limits: tongue, mucous membranes, posterior pharynx all normal, no lesions or abnormalities seen.  Tm's and canals within normal limits bilaterally. Neck supple, no nuchal rigidity or masses. No supraclavicular, or cervical adenopathy. Thyroid symmetric, no masses.  Cardiovascular: Regular rate and rhythm, no murmer, rub or gallops.  JVP not elevated, no edema.  Carotids within normal limits bilaterally, no bruits.  Respiratory: Normal respiratory effort.  Lungs clear, normal flow, no wheezing or crackles.  Breasts: Normal bilaterally.  No masses or lesions.  Nipples within " "normal limits.  No axillary lesions or nodes.  My M.A. Was present during this part of the examination.  Gastrointestinal: Normal active bowel sounds.   Soft, not tender, no masses, guarding or rebound.  No hepatosplenomegaly.   Musculoskeletal: extremities normal, no gross deformities noted.  Skin: no suspicious lesions or rashes   Neurologic: Mental status within normal limits.  Speech fluent.  No gross motor abnormalities and gait intact.  Psychiatric: mentation appears normal and affect normal.         Data:   Labs sent        Assessment:   Normal complete physical exam  Mci, follow up neuro  Cmyop, resolved  Elevated cholesterol, follow up labs, on statin  Anxiety, controlled  hcm         Plan:   Rsv at pharm  Exercise, diet  Letter with labs  Follow up neuro      Ellis Ruiz M.D.          Physical    Are you in the first 12 months of your Medicare coverage?  No  Part B 03/01/2008    Healthy Habits:     In general, how would you rate your overall health?  Good    Frequency of exercise:  4-5 days/week    Duration of exercise:  15-30 minutes    Do you usually eat at least 4 servings of fruit and vegetables a day, include whole grains    & fiber and avoid regularly eating high fat or \"junk\" foods?  No    Taking medications regularly:  Yes    Medication side effects:  None    Ability to successfully perform activities of daily living:  Medication administration requires assistance    Home Safety:  No safety concerns identified    Hearing Impairment:  No hearing concerns    In the past 6 months, have you been bothered by leaking of urine? Yes    In general, how would you rate your overall mental or emotional health?  Good    Additional concerns today:  No      Have you ever done Advance Care Planning? (For example, a Health Directive, POLST, or a discussion with a medical provider or your loved ones about your wishes): Yes, advance care planning is on file.     Fall risk  Fallen 2 or more times in the past year?: " No  Any fall with injury in the past year?: No    Cognitive Screening - mild cognitive impairment       Reviewed and updated as needed this visit by clinical staff   Tobacco  Allergies  Meds              Reviewed and updated as needed this visit by Provider                 Social History     Tobacco Use     Smoking status: Former     Packs/day: 1.50     Years: 20.00     Additional pack years: 0.00     Total pack years: 30.00     Types: Cigarettes     Quit date: 1992     Years since quittin.8     Smokeless tobacco: Never   Substance Use Topics     Alcohol use: Yes     Alcohol/week: 2.0 - 4.0 standard drinks of alcohol     Types: 2 - 4 Glasses of wine per week     Comment: 2 glasses 4 times per week             2023    12:07 PM   Alcohol Use   Prescreen: >3 drinks/day or >7 drinks/week? Yes   AUDIT SCORE  4         2023    12:07 PM   AUDIT - Alcohol Use Disorders Identification Test - Reproduced from the World Health Organization Audit 2001 (Second Edition)   1.  How often do you have a drink containing alcohol? 4 or more times a week   2.  How many drinks containing alcohol do you have on a typical day when you are drinking? 1 or 2   3.  How often do you have five or more drinks on one occasion? Never   4.  How often during the last year have you found that you were not able to stop drinking once you had started? Never   5.  How often during the last year have you failed to do what was normally expected of you because of drinking? Never   6.  How often during the last year have you needed a first drink in the morning to get yourself going after a heavy drinking session? Never   7.  How often during the last year have you had a feeling of guilt or remorse after drinking? Never   8.  How often during the last year have you been unable to remember what happened the night before because of your drinking? Never   9.  Have you or someone else been injured because of your drinking? No   10. Has a  relative, friend, doctor or other health care worker been concerned about your drinking or suggested you cut down? No   TOTAL SCORE 4     Do you have a current opioid prescription? No  Do you use any other controlled substances or medications that are not prescribed by a provider? None              Current providers sharing in care for this patient include:   Patient Care Team:  Ellis Ruiz MD as PCP - General (Internal Medicine)  Ellis Ruiz MD as Assigned PCP  Cameron Giron MD as Assigned Heart and Vascular Provider  Dionte Burnham MD as MD (Neurology)    The following health maintenance items are reviewed in Epic and correct as of today:  Health Maintenance   Topic Date Due     ANNUAL REVIEW OF  ORDERS  Never done     RSV VACCINE (Pregnancy & 60+) (1 - 1-dose 60+ series) Never done     MEDICARE ANNUAL WELLNESS VISIT  10/24/2023     IPV IMMUNIZATION (2 of 3 - Adult catch-up series) 01/01/2080 (Originally 10/8/2001)     MAMMO SCREENING  01/26/2024     FALL RISK ASSESSMENT  12/22/2024     ADVANCE CARE PLANNING  12/22/2028     DEXA  04/03/2032     DTAP/TDAP/TD IMMUNIZATION (3 - Td or Tdap) 08/08/2033     PHQ-2 (once per calendar year)  Completed     INFLUENZA VACCINE  Completed     Pneumococcal Vaccine: 65+ Years  Completed     ZOSTER IMMUNIZATION  Completed     COVID-19 Vaccine  Completed     HPV IMMUNIZATION  Aged Out     MENINGITIS IMMUNIZATION  Aged Out     RSV MONOCLONAL ANTIBODY  Aged Out             Pertinent mammograms are reviewed under the imaging tab.    Review of Systems   Constitutional:  Negative for chills and fever.   HENT:  Negative for congestion, ear pain, hearing loss and sore throat.    Eyes:  Negative for pain and visual disturbance.   Respiratory:  Negative for cough and shortness of breath.    Cardiovascular:  Negative for chest pain, palpitations and peripheral edema.   Gastrointestinal:  Negative for abdominal pain, constipation, diarrhea,  "heartburn, hematochezia and nausea.   Breasts:  Negative for tenderness, breast mass and discharge.   Genitourinary:  Negative for dysuria, frequency, genital sores, hematuria, pelvic pain, urgency, vaginal bleeding and vaginal discharge.   Musculoskeletal:  Negative for arthralgias, joint swelling and myalgias.   Skin:  Negative for rash.   Neurological:  Negative for dizziness, weakness, headaches and paresthesias.   Psychiatric/Behavioral:  Negative for mood changes. The patient is not nervous/anxious.          OBJECTIVE:   BP (!) 153/65   Pulse 54   Ht 1.702 m (5' 7\")   Wt 78.9 kg (174 lb)   SpO2 95%   BMI 27.25 kg/m   Estimated body mass index is 27.25 kg/m  as calculated from the following:    Height as of this encounter: 1.702 m (5' 7\").    Weight as of this encounter: 78.9 kg (174 lb).  Physical Exam          ASSESSMENT / PLAN:             COUNSELING:  Reviewed preventive health counseling, as reflected in patient instructions       Regular exercise      BMI:   Estimated body mass index is 27.25 kg/m  as calculated from the following:    Height as of this encounter: 1.702 m (5' 7\").    Weight as of this encounter: 78.9 kg (174 lb).         She reports that she quit smoking about 31 years ago. Her smoking use included cigarettes. She has a 30 pack-year smoking history. She has never used smokeless tobacco.      Appropriate preventive services were discussed with this patient, including applicable screening as appropriate for fall prevention, nutrition, physical activity, Tobacco-use cessation, weight loss and cognition.  Checklist reviewing preventive services available has been given to the patient.    Reviewed patients plan of care and provided an AVS. The Basic Care Plan (routine screening as documented in Health Maintenance) for Cecily meets the Care Plan requirement. This Care Plan has been established and reviewed with the Patient and daughter.          Ellis Ruiz MD  [facility]    Identified Health Risks:  I have reviewed Opioid Use Disorder and Substance Use Disorder risk factors and made any needed referrals.

## 2023-12-24 NOTE — RESULT ENCOUNTER NOTE
It was a pleasure seeing you for your physical examination.  I wanted to get back to you with your test results.  I have enclosed a copy for your review.     I am happy to report that your cbc or complete blood count is normal with no signs of anemia, leukemia or platelet abnormalities. Your chemistry panel shows no signs of diabetes.  Your blood salts, kidney tests, liver tests, and proteins are all fine.    Your total cholesterol is 194 with the normal range being below 200.  Your HDL or good cholesterol is 60 with the normal range being above 50.  Your LDL or bad cholesterol is 91 with the normal range being below 130.  These numbers are very good.    I am happy to bring you this overall excellent report.  If you have any questions please call me.    Ellis Ruiz M.D.

## 2023-12-25 ENCOUNTER — MYC MEDICAL ADVICE (OUTPATIENT)
Dept: FAMILY MEDICINE | Facility: CLINIC | Age: 83
End: 2023-12-25
Payer: COMMERCIAL

## 2023-12-26 ENCOUNTER — TELEPHONE (OUTPATIENT)
Dept: FAMILY MEDICINE | Facility: CLINIC | Age: 83
End: 2023-12-26
Payer: COMMERCIAL

## 2023-12-26 NOTE — TELEPHONE ENCOUNTER
Medication Question or Refill    Contacts         Type Contact Phone/Fax    12/26/2023 01:03 PM CST Phone (Incoming) Dianelys Baylee (Self)             What medication are you calling about (include dose and sig)?: pt. Doesn't have the name    Preferred Pharmacy:   [id number] [place] - [address]  [address]  [place] [address]  Phone: [phone] Fax: [phone]    [id number] - [place] - [address]  AT [facility] [place] [address] [facility]  [address]   [place] [address]  Phone: [phone] Fax: [phone]    [facility] - [place], [place] - [address] [place] [address]  Phone: [phone] Fax: [phone]      Controlled Substance Agreement on file:   CSA -- Patient Level:    CSA: None found at the patient level.       Who prescribed the medication?: PCP    Do you need a refill? Yes    When did you use the medication last? Every morning    Patient offered an appointment? No    Do you have any questions or concerns?  Yes: doesn't know why they can't refill, pt. Was just seen in November       Could we send this information to you in [place] or would you prefer to receive a phone call?:   Patient would prefer a phone call   Okay to leave a detailed message?: Yes at Cell number on file:    No relevant phone numbers on file. [phone]

## 2023-12-26 NOTE — TELEPHONE ENCOUNTER
Patient Contact    Attempt # 1    Was call answered?  No.  Left message on voicemail with information to call clinic back.    Shu Staley RN  [facility]

## 2023-12-26 NOTE — TELEPHONE ENCOUNTER
Reason for Call:  Other prescription    Detailed comments: patient called and states out of medication for heart pills (doesn't know the name).    Needs sent [facility] today.  Home delivery    Please contact patient.  Thank you.    Phone Number Patient can be reached at: [phone]       Best Time: any    Can we leave a detailed message on this number? YES    Call taken on 12/26/2023 at 9:59 AM by Stefanie De La Rosa

## 2023-12-27 ENCOUNTER — PATIENT OUTREACH (OUTPATIENT)
Dept: CARE COORDINATION | Facility: CLINIC | Age: 83
End: 2023-12-27
Payer: COMMERCIAL

## 2023-12-27 NOTE — TELEPHONE ENCOUNTER
Spoke with Patient's daughter earlier today( C2C) on file. Patient having confusion. Daughter confirmed patient has meds and they have been renewed.     MARICRUZ Garza  [facility]

## 2023-12-27 NOTE — TELEPHONE ENCOUNTER
Spoke to patient's daughter on the C2C. Patient has been more confused. Patient's daughter said that patient should have all her meds. Going to [facility] to get renewal through her.     MARICRUZ Garza  [facility]

## 2024-01-18 ENCOUNTER — OFFICE VISIT (OUTPATIENT)
Dept: NEUROLOGY | Facility: CLINIC | Age: 84
End: 2024-01-18
Attending: INTERNAL MEDICINE
Payer: COMMERCIAL

## 2024-01-18 VITALS — SYSTOLIC BLOOD PRESSURE: 139 MMHG | OXYGEN SATURATION: 96 % | HEART RATE: 57 BPM | DIASTOLIC BLOOD PRESSURE: 82 MMHG

## 2024-01-18 DIAGNOSIS — F02.A0 MILD LATE ONSET ALZHEIMER'S DEMENTIA WITHOUT BEHAVIORAL DISTURBANCE, PSYCHOTIC DISTURBANCE, MOOD DISTURBANCE, OR ANXIETY (H): Primary | ICD-10-CM

## 2024-01-18 DIAGNOSIS — G30.1 MILD LATE ONSET ALZHEIMER'S DEMENTIA WITHOUT BEHAVIORAL DISTURBANCE, PSYCHOTIC DISTURBANCE, MOOD DISTURBANCE, OR ANXIETY (H): Primary | ICD-10-CM

## 2024-01-18 PROCEDURE — 99214 OFFICE O/P EST MOD 30 MIN: CPT | Performed by: PSYCHIATRY & NEUROLOGY

## 2024-01-18 RX ORDER — DONEPEZIL HYDROCHLORIDE 10 MG/1
TABLET, FILM COATED ORAL
Qty: 90 TABLET | Refills: 1 | Status: SHIPPED | OUTPATIENT
Start: 2024-01-18 | End: 2024-07-24

## 2024-01-18 ASSESSMENT — MONTREAL COGNITIVE ASSESSMENT (MOCA)
7. [VIGILENCE] TAP WHEN HEARING DESIGNATED LETTER: 1
11. FOR EACH PAIR OF WORDS, WHAT CATEGORY DO THEY BELONG TO (OUT OF 2): 2
WHAT LEVEL OF EDUCATION WAS ATTAINED: 0
6. READ LIST OF DIGITS [FORWARD/BACKWARD]: 1
VISUOSPATIAL/EXECUTIVE SUBSCORE: 2
10. [FLUENCY] NAME WORDS STARTING WITH DESIGNATED LETTER: 0
8. SERIAL SUBTRACTION OF 7S: 3
13. ORIENTATION SUBSCORE: 5
4. NAME EACH OF THE THREE ANIMALS SHOWN: 1
12. MEMORY INDEX SCORE: 0
9. REPEAT EACH SENTENCE: 1
WHAT IS THE TOTAL SCORE (OUT OF 30): 16
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
## 2024-01-18 NOTE — PATIENT INSTRUCTIONS
AFTER VISIT SUMMARY (AVS):    At today's visit we thoroughly discussed current symptoms, cognitive test results, diagnosis, available treatment options, and the plan.    We decided to try donepezil. Please take 5 mg daily for the first 4 weeks. Then, increase to 10 mg daily if tolerated. Please contact my clinic with any intolerable side effects (as discussed during today's visit).     Next follow-up appointment is in the next 6 months or earlier if needed.    Please do not hesitate to call me with any questions or concerns.    Thanks.
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
## 2024-01-18 NOTE — PROGRESS NOTES
"Cecily Iovry is a 83 year old female who presents for:  Chief Complaint   Patient presents with    Follow Up     Cognitive Changes- want to get new baseline as they feel there may be changes        Initial Vitals:  /82 (BP Location: Right arm, Patient Position: Sitting, Cuff Size: Adult Regular)   Pulse 57   SpO2 96%  Estimated body mass index is 27.25 kg/m  as calculated from the following:    Height as of 12/22/23: 1.702 m (5' 7\").    Weight as of 12/22/23: 78.9 kg (174 lb).. There is no height or weight on file to calculate BSA. BP completed using cuff size: regular    MoCA 7.2= 16/30    Deya Nichole   "

## 2024-01-22 DIAGNOSIS — F02.A0 MILD LATE ONSET ALZHEIMER'S DEMENTIA WITHOUT BEHAVIORAL DISTURBANCE, PSYCHOTIC DISTURBANCE, MOOD DISTURBANCE, OR ANXIETY (H): ICD-10-CM

## 2024-01-22 DIAGNOSIS — G30.1 MILD LATE ONSET ALZHEIMER'S DEMENTIA WITHOUT BEHAVIORAL DISTURBANCE, PSYCHOTIC DISTURBANCE, MOOD DISTURBANCE, OR ANXIETY (H): ICD-10-CM

## 2024-01-22 NOTE — TELEPHONE ENCOUNTER
Pending Prescriptions:                       Disp   Refills    donepezil (ARICEPT) 10 MG tablet          90 tab*1            Sig: For the first 4 weeks of the original           prescription, please take 5 mg daily. Take 10 mg           daily thereafter and with future refills.       Last Appt: 1/18/2024  Next Appt: 7/24/2024      Pharmacy Note/clarification: For the first 4 weeks of the original prescription, please take 5mg daily. Take 10mg daily thereafter and with future refills.

## 2024-01-23 RX ORDER — DONEPEZIL HYDROCHLORIDE 10 MG/1
TABLET, FILM COATED ORAL
Qty: 90 TABLET | Refills: 1 | OUTPATIENT
Start: 2024-01-23

## 2024-01-23 NOTE — TELEPHONE ENCOUNTER
Called pharmacy and spoke with Florecita to give  Clarification on donepezil directions.     Gave VORB for sig to say:  Donepezil 10 mg tablet  Take 0.5 tablet (5 mg) for 4 weeks, then take 1 tablet (10 mg) thereafter.     No further questions, they will update script and fill script.     Sidra STREET RN, BSN  [facility] Neurology

## 2024-01-24 ENCOUNTER — PATIENT OUTREACH (OUTPATIENT)
Dept: CARE COORDINATION | Facility: CLINIC | Age: 84
End: 2024-01-24
Payer: COMMERCIAL

## 2024-05-08 ENCOUNTER — TELEPHONE (OUTPATIENT)
Dept: FAMILY MEDICINE | Facility: CLINIC | Age: 84
End: 2024-05-08
Payer: COMMERCIAL

## 2024-05-08 NOTE — TELEPHONE ENCOUNTER
She could do it via one of our providers that does that, or to [facility], but either way no referral needed    Ellis Ruiz M.D.
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
## 2024-05-09 NOTE — TELEPHONE ENCOUNTER
EVELIN for patient to return our call back - please see message below from Dr. Ruiz - Patient was seen by [facility] back in 7/10/2023 for shoulder injection.    Luli OSARES MA on 5/9/2024 at 10:15 AM

## 2024-05-13 ENCOUNTER — TRANSFERRED RECORDS (OUTPATIENT)
Dept: HEALTH INFORMATION MANAGEMENT | Facility: CLINIC | Age: 84
End: 2024-05-13
Payer: COMMERCIAL

## 2024-05-15 NOTE — TELEPHONE ENCOUNTER
Spoke with patient daughter Silvia (C2C) to let her know that patient was seen by [facility] back in 7/10/2023 for shoulder injection, no referral need it per .

## 2024-05-26 ENCOUNTER — HEALTH MAINTENANCE LETTER (OUTPATIENT)
Age: 84
End: 2024-05-26

## 2024-06-26 ENCOUNTER — MYC MEDICAL ADVICE (OUTPATIENT)
Dept: NEUROLOGY | Facility: CLINIC | Age: 84
End: 2024-06-26
Payer: COMMERCIAL

## 2024-07-24 ENCOUNTER — PATIENT OUTREACH (OUTPATIENT)
Dept: CARE COORDINATION | Facility: CLINIC | Age: 84
End: 2024-07-24

## 2024-07-24 ENCOUNTER — OFFICE VISIT (OUTPATIENT)
Dept: NEUROLOGY | Facility: CLINIC | Age: 84
End: 2024-07-24
Payer: COMMERCIAL

## 2024-07-24 VITALS — DIASTOLIC BLOOD PRESSURE: 71 MMHG | HEART RATE: 59 BPM | OXYGEN SATURATION: 96 % | SYSTOLIC BLOOD PRESSURE: 138 MMHG

## 2024-07-24 DIAGNOSIS — G30.1 MILD LATE ONSET ALZHEIMER'S DEMENTIA WITHOUT BEHAVIORAL DISTURBANCE, PSYCHOTIC DISTURBANCE, MOOD DISTURBANCE, OR ANXIETY (H): Primary | ICD-10-CM

## 2024-07-24 DIAGNOSIS — F02.A0 MILD LATE ONSET ALZHEIMER'S DEMENTIA WITHOUT BEHAVIORAL DISTURBANCE, PSYCHOTIC DISTURBANCE, MOOD DISTURBANCE, OR ANXIETY (H): Primary | ICD-10-CM

## 2024-07-24 PROCEDURE — 99214 OFFICE O/P EST MOD 30 MIN: CPT | Performed by: PSYCHIATRY & NEUROLOGY

## 2024-07-24 PROCEDURE — G2211 COMPLEX E/M VISIT ADD ON: HCPCS | Performed by: PSYCHIATRY & NEUROLOGY

## 2024-07-24 ASSESSMENT — MONTREAL COGNITIVE ASSESSMENT (MOCA)
13. ORIENTATION SUBSCORE: 3
8. SERIAL SUBTRACTION OF 7S: 2
WHAT LEVEL OF EDUCATION WAS ATTAINED: 0
9. REPEAT EACH SENTENCE: 0
7. [VIGILENCE] TAP WHEN HEARING DESIGNATED LETTER: 1
WHAT IS THE TOTAL SCORE (OUT OF 30): 13
VISUOSPATIAL/EXECUTIVE SUBSCORE: 2
6. READ LIST OF DIGITS [FORWARD/BACKWARD]: 2
4. NAME EACH OF THE THREE ANIMALS SHOWN: 1
11. FOR EACH PAIR OF WORDS, WHAT CATEGORY DO THEY BELONG TO (OUT OF 2): 2
12. MEMORY INDEX SCORE: 0
10. [FLUENCY] NAME WORDS STARTING WITH DESIGNATED LETTER: 0

## 2024-07-24 NOTE — PROGRESS NOTES
"Cecily Ivory is a 83 year old female who presents for:  Chief Complaint   Patient presents with    Follow Up     Cognitive Concerns- per family patient's memory has declined         Initial Vitals:  /71 (BP Location: Right arm, Patient Position: Sitting, Cuff Size: Adult Regular)   Pulse 59   SpO2 96%  Estimated body mass index is 27.25 kg/m  as calculated from the following:    Height as of 12/22/23: 1.702 m (5' 7\").    Weight as of 12/22/23: 78.9 kg (174 lb).. There is no height or weight on file to calculate BSA. BP completed using cuff size: regular    MoCA 7.3= 13/30     Deya Nichole   "

## 2024-07-24 NOTE — PROGRESS NOTES
ESTABLISHED PATIENT NEUROLOGY NOTE    DATE OF VISIT: 2024  CLINIC LOCATION: [facility]  MRN: [id number]  PATIENT NAME: Cecily Ivory  YOB: 1940    REASON FOR VISIT:   Chief Complaint   Patient presents with    Follow Up     Cognitive Concerns- per family patient's memory has declined      SUBJECTIVE:                                                      HISTORY OF PRESENT ILLNESS: Patient is here to follow up regarding Alzheimer's disease.  The last visit was on 2024.  Please refer to my initial/other prior notes for further information.  Accompanied by her daughter.    Since the last visit, the patient reports that her memory is not good, but unsure if worse.  However, her daughter reports that she notices quite significant decline.  The patient tends to repeat questions every few minutes.  Daughter manages her medications and finances.  The patient needs assistance with shopping, eating, transportation, and light cleaning.  She is starting  Cares program next week.  She tried 10 mg of donepezil at bedtime, but developed intolerable side effects and eventually stopped it.  She denies interval development of new neurological symptoms.  EXAM:                                                    Physical Exam:   Vitals: There were no vitals taken for this visit.  [place] Cognitive Assessment:    [place] Cognitive Assessment (MOCA)  Visuospatial/Executive : 2  Namin  Attention - Digits: 2  Attention - Letters: 1  Attention - Subtraction: 2  Language - Repeat: 0  Language - Fluency : 0  Abstraction: 2  Delayed Recall: 0  Orientation: 3  Education: 0  MOCA Score: 13  Administered by: : Deya MAYORGA     Ad Cognitive Assessment Score:  MOCA Score: 13/30.   General: pt is in NAD, cooperative.  Skin: normal turgor, moist mucous membranes, no lesions/rashes noticed.  HEENT: ATNC, white sclera, normal conjunctiva.  Respiratory: Symmetric lung excursion, no accessory  respiratory muscle use.  Abdomen: Non distended.  Neurological: awake, cooperative, follows commands, no exam changes compared to previous visits.  ASSESSMENT AND PLAN:                                                    Assessment: 83 year old female patient presents for follow-up of Alzheimer's disease.      She was not able to tolerate donepezil.  Daughter reports further cognitive decline, which is apparent with her MoCA score today (13/30, reduced), compared to a previous range of 23-24/30 and latest score of 16/30 in January 2024.  We discussed that rivastigmine and galantamine might not be tolerated well, especially in combination with metoprolol that the patient currently takes, but we could consider a trial of Namenda.  The patient needed more time to think about it.    In addition, we also discussed regarding amount of assistance that she needs at home related to her mild dementia.  It sounds like the patient needs assistance with activities of daily living on a regular basis (as detailed above), but I would like to obtain CPT, which was previously done in 2020.  Anticipate that it would be significantly lower, compared to 5.0/5.6 in 2020.    Diagnoses:    ICD-10-CM    1. 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    We decided not to start any new medications at this time, but discussed option of Namenda.  I also placed an order for CPT to evaluate for amount of assistance that she needs.    Next follow-up appointment is in the next 6 months or earlier if needed.    Total Time: 21 minutes spent on the date of the encounter doing chart review, history and exam, documentation and further activities per the note.    Dionte Burnham MD  [facility]  Neurology  (Chart documentation was completed in part with Dragon voice-recognition software. Even though reviewed, some grammatical, spelling, and word errors may remain.)

## 2024-07-24 NOTE — PATIENT INSTRUCTIONS
AFTER VISIT SUMMARY (AVS):    At today's visit we thoroughly discussed current symptoms, cognitive test results, available treatment options, and the plan.    We decided not to start any new medications at this time, but discussed option of Namenda.  I also placed an order for CPT to evaluate for amount of assistance that you need.    Next follow-up appointment is in the next 6 months or earlier if needed.    Please do not hesitate to call me with any questions or concerns.    Thanks.

## 2024-07-24 NOTE — LETTER
2024      Cecily Ivory  [address] [place]  [place] [address]      Dear Colleague,    Thank you for referring your patient, Cecily Ivory, to the [facility] NEUROLOGY CLINICS [facility]. Please see a copy of my visit note below.    ESTABLISHED PATIENT NEUROLOGY NOTE    DATE OF VISIT: 2024  CLINIC LOCATION: [facility]  MRN: [id number]  PATIENT NAME: Cecily Ivory  YOB: 1940    REASON FOR VISIT:   Chief Complaint   Patient presents with     Follow Up     Cognitive Concerns- per family patient's memory has declined      SUBJECTIVE:                                                      HISTORY OF PRESENT ILLNESS: Patient is here to follow up regarding Alzheimer's disease.  The last visit was on 2024.  Please refer to my initial/other prior notes for further information.  Accompanied by her daughter.    Since the last visit, the patient reports that her memory is not good, but unsure if worse.  However, her daughter reports that she notices quite significant decline.  The patient tends to repeat questions every few minutes.  Daughter manages her medications and finances.  The patient needs assistance with shopping, eating, transportation, and light cleaning.  She is starting  Cares program next week.  She tried 10 mg of donepezil at bedtime, but developed intolerable side effects and eventually stopped it.  She denies interval development of new neurological symptoms.  EXAM:                                                    Physical Exam:   Vitals: There were no vitals taken for this visit.  [place] Cognitive Assessment:    [place] Cognitive Assessment (MOCA)  Visuospatial/Executive : 2  Namin  Attention - Digits: 2  Attention - Letters: 1  Attention - Subtraction: 2  Language - Repeat: 0  Language - Fluency : 0  Abstraction: 2  Delayed Recall: 0  Orientation: 3  Education: 0  MOCA Score: 13  Administered by: : Deya MAYORGA      [place] Cognitive Assessment Score:  MOCA Score: 13/30.   General: pt is in NAD, cooperative.  Skin: normal turgor, moist mucous membranes, no lesions/rashes noticed.  HEENT: ATNC, white sclera, normal conjunctiva.  Respiratory: Symmetric lung excursion, no accessory respiratory muscle use.  Abdomen: Non distended.  Neurological: awake, cooperative, follows commands, no exam changes compared to previous visits.  ASSESSMENT AND PLAN:                                                    Assessment: 83 year old female patient presents for follow-up of Alzheimer's disease.      She was not able to tolerate donepezil.  Daughter reports further cognitive decline, which is apparent with her MoCA score today (13/30, reduced), compared to a previous range of 23-24/30 and latest score of 16/30 in January 2024.  We discussed that rivastigmine and galantamine might not be tolerated well, especially in combination with metoprolol that the patient currently takes, but we could consider a trial of Namenda.  The patient needed more time to think about it.    In addition, we also discussed regarding amount of assistance that she needs at home related to her mild dementia.  It sounds like the patient needs assistance with activities of daily living on a regular basis (as detailed above), but I would like to obtain CPT, which was previously done in 2020.  Anticipate that it would be significantly lower, compared to 5.0/5.6 in 2020.    Diagnoses:    ICD-10-CM    1. 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    We decided not to start any new medications at this time, but discussed option of Namenda.  I also placed an order for CPT to evaluate for amount of assistance that she needs.    Next  follow-up appointment is in the next 6 months or earlier if needed.    Total Time: 21 minutes spent on the date of the encounter doing chart review, history and exam, documentation and further activities per the note.    Dionte Burnham MD  [facility] Neurology  (Chart documentation was completed in part with Dragon voice-recognition software. Even though reviewed, some grammatical, spelling, and word errors may remain.)      Again, thank you for allowing me to participate in the care of your patient.        Sincerely,        Dionte Burnham MD

## 2024-08-19 ENCOUNTER — THERAPY VISIT (OUTPATIENT)
Dept: OCCUPATIONAL THERAPY | Facility: CLINIC | Age: 84
End: 2024-08-19
Attending: PSYCHIATRY & NEUROLOGY
Payer: COMMERCIAL

## 2024-08-19 DIAGNOSIS — R41.3 MEMORY CHANGES: Primary | ICD-10-CM

## 2024-08-19 DIAGNOSIS — F02.A0 MILD LATE ONSET ALZHEIMER'S DEMENTIA WITHOUT BEHAVIORAL DISTURBANCE, PSYCHOTIC DISTURBANCE, MOOD DISTURBANCE, OR ANXIETY (H): ICD-10-CM

## 2024-08-19 DIAGNOSIS — G30.1 MILD LATE ONSET ALZHEIMER'S DEMENTIA WITHOUT BEHAVIORAL DISTURBANCE, PSYCHOTIC DISTURBANCE, MOOD DISTURBANCE, OR ANXIETY (H): ICD-10-CM

## 2024-08-19 PROCEDURE — 96125 COGNITIVE TEST BY HC PRO: CPT | Mod: GO

## 2024-08-19 PROCEDURE — 97535 SELF CARE MNGMENT TRAINING: CPT | Mod: GO

## 2024-08-19 PROCEDURE — 97165 OT EVAL LOW COMPLEX 30 MIN: CPT | Mod: GO

## 2024-08-19 NOTE — PROGRESS NOTES
OCCUPATIONAL THERAPY EVALUATION  Type of Visit: Evaluation       Fall Risk Screen:  Fall screen completed by: OT  Have you fallen 2 or more times in the past year?: No  Have you fallen and had an injury in the past year?: No  Is patient a fall risk?: Department fall risk interventions implemented    Subjective      Presenting condition or subjective complaint: memory loss  Date of onset: 07/24/24 (order date)    Relevant medical history:     Dates & types of surgery:      Prior diagnostic imaging/testing results:       Prior therapy history for the same diagnosis, illness or injury: Yes unknown    Occupational Profile: Patient is am 83 year old female with a diagnosis of Alzheimer's who was referred to outpatient occupational therapy therapy at the request of Dr. Burnham for administration of The Cognitive Performance Test (CPT). She presented to session accompanied by her daughter, Diana, who was present and contributed to history. She has previously undergone CPT in 2020 with results revealing a score of 5.0/5.6. She was most recently seen for a follow up with Dr. Burnham on 7/24/24. Per this visit, her daughter reports noticeable decline.  The patient tends to repeat questions every few minutes, has difficulty with a calendar, and requires more frequent calls/reminders about appointments and when to be ready.  Daughter manages her medications and finances and calls to check in each morning. She has starting  Cares program for 2 hours, 2 days per week who helps with shopping, light cleaning, and monitoring items in fridge. She lives in an independent senior living apartment.     Prior Level of Function  Transfers: Independent  Ambulation: Independent  ADL: Independent  IADL: Housekeeping, Laundry, simple meals    Living Environment  Social support: Alone   Type of home: Apartment/condo   Stairs to enter the home: No       Ramp: No   Stairs inside the home: No       Help at home: Self Cares (home  health aide/personal care attendant, family, etc); Home management tasks (cooking, cleaning); Medication and/or finances  Equipment owned:       Employment: No    Hobbies/Interests:      Patient goals for therapy:      Pain assessment: Pain denied     Objective     Cognitive Status Examination  Previous cognitive screens completed in OT or by Physician and score:   MoCA 7/24/24=13/30 (16/30 in January 2024).  CPT 2020=5.0/5.6  Client s perception of memory/ thinking or functional difficulties: Patient acknowledges memory changes and notes she is able to function within her familiar environment in her apartment without concerns. She accepts assistance from her daughter and  cares.  Her daughter has notices worsening memory including repeating questions more often, has difficulty with a calendar, and requires more frequent calls/reminders about appointments and when to be ready.    CPT performed this date, see separate note for details.        INSTRUMENTAL ACTIVITIES OF DAILY LIVING (IADL): c  Living situation: Independent senior living apartment. Diana calls and checks in every morning   Home maintenance activities: She manages her own laundry and receives some help for cleaning  Meal preparation and grocery shopping: She makes simple, ready made meals from [employer] using micrwoave or goes to the dinning room. She does not use stove/oven   Finances and paying bills: Diana manages finances  Medication management: Diana sets up medications in pillbox. Pt occasionally misses medications in the AM   Appointments: Diana manages appointments  Driving and transportation: She has not driven in ~5 years  Leisure and routine activities: She previously enjoyed reading; occassional attends activities; walking daily on site   ADL/Mobility/Physical Functioning: IND     Assessment & Plan   CLINICAL IMPRESSIONS  Medical Diagnosis: Mild late onset Alzheimer's dementia without behavioral disturbance, psychotic disturbance, mood  disturbance, or anxiety (H) (G30.1, F02.A0)  - Primary    Treatment Diagnosis: memory changes    Impression/Assessment: Patient is an 83 year old female with a diagnosis of Alzheimer's who was referred to outpatient occupational therapy therapy at the request of Dr. Burnham for administration of The Cognitive Performance Test (CPT). The following significant findings have been identified: Impaired cognition.  These identified deficits interfere with their ability to perform household chores, medication management, financial management, meal planning and preparation, and community or volunteer activities as compared to previous level of function.     Clinical Decision Making (Complexity):  Assessment of Occupational Performance: 1-3 Performance Deficits  Occupational Performance Limitations: driving and community mobility, health management and maintenance, home establishment and management, meal preparation and cleanup, shopping, and leisure activities  Clinical Decision Making (Complexity): Low complexity    PLAN OF CARE  Treatment Interventions:  Interventions: Self-Care/Home Management, Therapeutic Activity    Long Term Goals   OT Goal 1  Goal Identifier: CPT  Goal Description: Patient and family to verbalize understanding of Cognitive Performance Test results and recommendations to guide levels of support for activities of varying complexity to improve client participation and quality of life and minimize functional impact of cognitive changes  Target Date: 08/19/24  Date Met: 08/19/24      Frequency of Treatment: 1 visit (eval + tx)  Duration of Treatment: 1 visit (eval + tx)     Recommended Referrals to Other Professionals:  none at time of eval   Education Assessment: Learner/Method: Patient;Family;Listening     Risks and benefits of evaluation/treatment have been explained.   Patient/Family/caregiver agrees with Plan of Care.     Evaluation Time:    OT Ambreen Low Complexity Minutes (82343): 15    Signing  Clinician: MALGORZATA Espitia/L, CNS, CSRS        [facility]                                                                                   OUTPATIENT OCCUPATIONAL THERAPY      PLAN OF TREATMENT FOR OUTPATIENT REHABILITATION   Patient's Last Name, First Name, Cecily Mckeon YOB: 1940   Provider's Name   [facility]   Medical Record No.  [id number]     Onset Date: 07/24/24 (order date) Start of Care Date: 08/19/24     Medical Diagnosis:  Mild late onset Alzheimer's dementia without behavioral disturbance, psychotic disturbance, mood disturbance, or anxiety (H) (G30.1, F02.A0)  - Primary      OT Treatment Diagnosis:  memory changes Plan of Treatment  Frequency/Duration:1 visit (eval + tx)/1 visit (eval + tx)    Certification date from 08/19/24   To 08/19/24        See note for plan of treatment details and functional goals     MALGORZATA Espitia/TAVO, CNS, CSRS                         I CERTIFY THE NEED FOR THESE SERVICES FURNISHED UNDER        THIS PLAN OF TREATMENT AND WHILE UNDER MY CARE     (Physician attestation of this document indicates review and certification of the therapy plan).              Referring Provider:  Dionte Burnham    Initial Assessment  See Epic Evaluation- 08/19/24

## 2024-08-19 NOTE — PROGRESS NOTES
Cognitive Performance Test    SUMMARY OF TEST:    The Cognitive Performance Test (CPT) is a standardized performance-based assessment to measure working memory/executive function processing capacities that underlie functional performance. Subtasks include common basic and instrumental activities of daily living (ADL/IADL) which are rated based on the manner in which patients respond to task demands of varying complexity. The total CPT score describes a level of functioning that indicates how information is processed, implications for functional activities, potential safety risks and a recommended level of supervision or assist based on cognitive function. The highest total score on this test is in the range of 5.6 to 5.8.    DATE OF TESTIN24    Ordering Provider: Dionte Burnham MD    Order date: 24    Order Diagnosis: Mild late onset Alzheimer's dementia without behavioral disturbance, psychotic disturbance, mood disturbance, or anxiety (H) [G30.1, F02.A0]  - Primary     Occupational Profile (including diagnosis and medical history): Patient is am 83 year old female with a diagnosis of Alzheimer's who was referred to outpatient occupational therapy therapy at the request of Dr. Burnham for administration of The Cognitive Performance Test (CPT). She presented to session accompanied by her daughter, Diana, who was present and contributed to history. She has previously undergone CPT in 2020 with results revealing a score of 5.0/5.6. She was most recently seen for a follow up with Dr. Burnham on 24. Per this visit, her daughter reports noticeable decline.  The patient tends to repeat questions every few minutes, has difficulty with a calendar, and requires more frequent calls/reminders about appointments and when to be ready.  Daughter manages her medications and finances and calls to check in each morning. She has starting  Cares program for 2 hours, 2 days per week  who helps with shopping, light cleaning, and monitoring items in fridge. She lives in an independent senior living apartment.        Previous cognitive screens completed in OT or by Physician and score:   MoCA 7/24/24=13/30 (16/30 in January 2024).  CPT 2020=5.0/5.6    Functional History Interview:    Informants: Patient and daughter, Diana       Client s perception of memory/ thinking or functional difficulties: Patient acknowledges memory changes and notes she is able to function within her familiar environment in her apartment without concerns. She accepts assistance from her daughter and  cares.  Her daughter has notices worsening memory including repeating questions more often, has difficulty with a calendar, and requires more frequent calls/reminders about appointments and when to be ready.       Living situation: Independent senior living apartment. Diana calls and checks in every morning      Home maintenance activities: She manages her own laundry and receives some help for cleaning     Meal preparation and grocery shopping: She makes simple, ready made meals from [employer] using micrwoave or goes to the dinning room. She does not use stove/oven      Finances and paying bills: Diana manages finances     Medication management: Diana sets up medications in pillbox. Pt occasionally misses medications in the AM     Appointments: Diana manages appointments     Driving and transportation: She has not driven in ~5 years     Leisure and routine activities: She previously enjoyed reading; occassional attends activities; walking daily on site      ADL/Mobility/Physical Functioning: IND     Fall Risk Screen:   Has the patient fallen 2 or more times in the last year? No      Has the patient fallen and had an injury in the past year? No       Timed Up and Go Score: NT    Is the patient a fall risk? Department fall risk interventions implemented      RESULTS OF TESTING:                                                                                          CPT Subtest Results    MEDBOX: 4.5/6 SHOP/GLOVES: 5/6 PHONE: 4/6   WASH:  5/5 TRAVEL: 6/6 TOAST: 4/5   DRESS: NT/5   TOTAL CPT SCORE:  28.5/34     Average CPT Score  4.75/5.6    INTERPRETATION OF TEST RESULTS:    Based on the Cognitive Performance Test, this patient scored at CPT Level 4.5.  See CPT Levels reference below.    Summary of functional cognitive status:   Individuals who score at this level show mild to moderate functional decline. They may show significant deficits in working memory and executive thought processes and judgment, reasoning and planning show obvious impairment.  They are often distractible with inability to shift attention/actions given competing stimuli.  Difficulty with problem solving and managing details.  May not recognize or respond to emergent situations.  Impaired safety awareness with inability to anticipate potential problems. Complex daily tasks performed with inconsistency, difficulty, or error.  They show significant difficulty with completion of complex daily tasks and start of difficulty with self-care tasks. May show decreased quality or initiation of self-cares (for example, not changing clothes). Persons may/may not be aware of their own deficits and may be able to speak verbally better than they can actually perform. Benefits from structured, routine activity.  Will likely need reminders to complete tasks outside of the routine.Safety concerns: Medications should be monitored, stove use may require supervision, and driving ability may be affected. Individuals at this level requires frequent check-in support, assistance for complex daily activities, and may benefit from a day program or assisted living setting.   Requires assistance with planning and IADL tasks like medication management, cooking, shopping and finances. Learns concrete tasks through repetition, but performance may not generalize.       Factors affecting  performance:  No additional problems noted    Recommendations:    Assist for ADL/IADL:  Meal planning/preparation, Shopping, Finances, Driving, Medication management, Appointments/calendar  Supervision for ADL/IADL:  Meal preparation  Supervision in living setting:  Daily checks. Frequent check-in support and assistance with IADL's is necessary for independent living. Structured, routine activity assist to promote and maintain function.                            It is recommended that patient continue to receive assistance complex tasks including medication management/schedule, finances, household tasks, meal planning/preparation, driving appointments, shopping, finances. Recommend  using notes, daily checklists, calendars or other reminders; make tasks easier and limit tasks that are too hard; allow extra time; monitor, supervise or help with complex tasks. Provide assistance for community transportation and supervision when navigating unfamiliar areas                                                       TIME ADMINISTERING TEST: 40    TIME FOR INTERPRETATION AND PREPARATION OF REPORT: 25    TOTAL TIME: 65      CPT Levels Reference:    Patient's Average CPT Score:  4.5                                                                                                                                                  Individual scores range along a continuum as outlined below.  In addition to cognitive status, other factors may affect safety in a home environment.  Please refer to specific recommendations for this patient.    ___5.6-5.8  Normal functioning (absence of cognitive-functional disability).  Independent in managing personal affairs, monitors and directs own behavior.  Uses complex information to carry out daily activities with safety and accuracy.    Proficient with instrumental activities of daily living (IADL) and learning new activity.  Problems are anticipated, errors are avoided, and consequences of  "actions are considered.      ___5.0   Mild cognitive-functional disability; deficits in working memory and executive thought processes. Difficulty using complex information. Problems may be observed with recent memory, judgment, reasoning and planning ahead. May be impulsive or have difficulty anticipating consequences.  Safety:  May require assistance to plan ahead; or to manage complex medication schedules, appointments or finances.  Hazardous activities may need to be monitored or limited.  ADL:  Mild functional decline.  Able to complete basic self-care and routine household tasks.  May have difficulty with complex daily tasks such as reading, writing, meal preparation, shopping or driving.   Learns through hands on teaching. Self-centered behavior or difficulty considering the needs of others may be seen related to trouble seeing the  whole picture\". Can appear disorganized or uninhibited.    __x_4.5  Mild to moderate cognitive-functional disability. Significant deficits in working memory and executive thought processes. Judgment, reasoning and planning show obvious impairment.  Distractible with inability to shift attention/actions given competing stimuli.  Difficulty with problem solving and managing details. Complex daily tasks performed with inconsistency, difficulty, or error.     Safety:  Medications should be monitored, stove use may require supervision, and driving ability may be affected.  Impaired safety awareness with inability to anticipate potential problems.  May not recognize or respond to emergent situations. Requires frequent check-in support.   ADL:  Mild difficulty with simple everyday self-care tasks. Benefits from structured, routine activity.  Will likely need reminders to complete tasks outside of the routine. Requires assistance with planning and IADL tasks like shopping and finances. Learns concrete tasks through repetition, but performance may not generalize. Tends to be impulsive with " poor insight. Self centered behavior or inability to consider the needs of others is common.    ___4.0  Moderate cognitive-functional disability; abstract to concrete thought processes. Working memory and executive function impairments are obvious. Difficulty with planning and problem solving.  Behavior is goal-directed, but unable to follow multi-step directions, is easily distracted, and may not recognize mistakes.  Inability to anticipate hazards or understand precautions.  Safety:  Recommend 24-hour supervision for safety. Supervision needed for medication management and for hazardous activities. May not be able to follow a restricted diet. Can get lost in unfamiliar surroundings. Generally, persons functioning at level 4 should not be driving.   ADL:  Some decline in quality or frequency of ADL.  [place] enhanced by use of a routine, simple concrete directions, and caregiver set-up of needed items. Complex tasks such as money or home management typically requires assistance.  Relies heavily on vision to guide behavior; will ignore objects/hazards not in plain sight and can be distracted by irrelevant objects. Often has poor insight.  Able to carry out social conversation and may verbally  cover  for deficits leading caregivers to believe they are capable of functioning independently.       ___3.5  Moderate cognitive-functional disability; increased cues needed for task completion. Aware of concrete task steps but needs prompting or cues to initiate and complete simple tasks. Attention span is limited, simple directions may need to be repeated, and re-focus to a topic or task may be required.  Safety:  24-hour supervision required for safety and for assistance with daily tasks. Assistance required with medications, and access to medication should be limited. Meals, nutrition and dietary restrictions need to be monitored.  All hazardous activities should be restricted or supervised. Should not drive. Prone  to wandering and can become lost.  ADL:  Moderate functional decline. Familiar tasks usually requires set-up of supplies and directions to complete steps. May need objects handed to them for task initiation. Function best with a set schedule in familiar surroundings with familiar people. All complex tasks must be done by others. Vocabulary is diminished and speech often unfocused.

## 2024-12-19 DIAGNOSIS — I51.81 TAKOTSUBO CARDIOMYOPATHY: ICD-10-CM

## 2024-12-19 DIAGNOSIS — E78.5 HYPERLIPIDEMIA LDL GOAL <130: ICD-10-CM

## 2024-12-19 RX ORDER — LOVASTATIN 40 MG/1
40 TABLET ORAL AT BEDTIME
Qty: 90 TABLET | Refills: 3 | Status: SHIPPED | OUTPATIENT
Start: 2024-12-19

## 2024-12-19 RX ORDER — METOPROLOL SUCCINATE 25 MG/1
25 TABLET, EXTENDED RELEASE ORAL DAILY
Qty: 90 TABLET | Refills: 3 | Status: SHIPPED | OUTPATIENT
Start: 2024-12-19

## 2025-01-02 ENCOUNTER — TELEPHONE (OUTPATIENT)
Dept: NEUROLOGY | Facility: CLINIC | Age: 85
End: 2025-01-02
Payer: COMMERCIAL

## 2025-01-02 NOTE — TELEPHONE ENCOUNTER
Called waitlist patient and offered an appointment with Chacha  on this date Week of January 27 & time Various options  at this location [place]      Please note there is no guarantee this appointment will be available as it is first come first serve.    Patient will need a warm transfer to clinic office for scheduling, as appointments are all on hold.

## 2025-01-05 DIAGNOSIS — F41.9 ANXIETY: ICD-10-CM

## 2025-01-06 RX ORDER — SERTRALINE HYDROCHLORIDE 25 MG/1
25 TABLET, FILM COATED ORAL DAILY
Qty: 90 TABLET | Refills: 3 | OUTPATIENT
Start: 2025-01-06

## 2025-01-07 ENCOUNTER — MYC MEDICAL ADVICE (OUTPATIENT)
Dept: FAMILY MEDICINE | Facility: CLINIC | Age: 85
End: 2025-01-07
Payer: COMMERCIAL

## 2025-01-23 ENCOUNTER — MYC MEDICAL ADVICE (OUTPATIENT)
Dept: NEUROLOGY | Facility: CLINIC | Age: 85
End: 2025-01-23
Payer: COMMERCIAL

## 2025-04-02 NOTE — PROGRESS NOTES
ESTABLISHED PATIENT NEUROLOGY NOTE    DATE OF VISIT: 4/3/2025  CLINIC LOCATION: [facility]  MRN: [id number]  PATIENT NAME: Cecily Ivory  YOB: 1940    REASON FOR VISIT: No chief complaint on file.    SUBJECTIVE:                                                      HISTORY OF PRESENT ILLNESS: Patient is here to follow up regarding Alzheimer's disease.  She was last seen on 7/24/2024.  Please refer to my initial/other prior notes for further information.  Patient is accompanied by her daughter today.    Since the last visit, the patient reports ***.  She denies interval development or new focal neurological symptoms.    CPT from 8/19/2024 was 4.75/5.6, consistent with mild to moderate cognitive functional disability that would require daily checks for safety.  Previous score from 2020 was 5.0/5.6.  EXAM:                                                    Physical Exam:   Vitals: There were no vitals taken for this visit.  [place] Cognitive Assessment:          [place] Cognitive Assessment Score:   /30.   General: pt is in NAD, cooperative.  Skin: normal turgor, moist mucous membranes, no lesions/rashes noticed.  HEENT: ATNC, white sclera, normal conjunctiva.  Respiratory: Symmetric lung excursion, no accessory respiratory muscle use.  Abdomen: Non distended.  Neurological: awake, cooperative, follows commands, no aphasia, face is symmetric, equally moves all extremities, no dysmetria bilaterally.  ASSESSMENT AND PLAN:                                                    Assessment: 84 year old female patient presents for follow-up of Alzheimer's disease.  She reports ***.  Daughter ***.  We reviewed the results of CPT testing, as detailed above.  MoCA today is ***/30 compared to previous range of 13-24/30.  Previously we discussed [place].    Diagnoses:    ICD-10-CM    1. Mild late onset Alzheimer's dementia without behavioral disturbance, psychotic disturbance, mood disturbance,  or anxiety (H)  G30.1     F02.A0         Plan:  There are no Patient Instructions on file for this visit.    Total Time: *** minutes spent on the date of the encounter doing chart review, history and exam, documentation and further activities per the note.    Dionte Burnham MD  [facility]  (Chart documentation was completed in part with Dragon voice-recognition software. Even though reviewed, some grammatical, spelling, and word errors may remain.)

## 2025-04-03 ENCOUNTER — OFFICE VISIT (OUTPATIENT)
Dept: NEUROLOGY | Facility: CLINIC | Age: 85
End: 2025-04-03
Payer: COMMERCIAL

## 2025-04-03 VITALS — HEART RATE: 61 BPM | DIASTOLIC BLOOD PRESSURE: 64 MMHG | OXYGEN SATURATION: 93 % | SYSTOLIC BLOOD PRESSURE: 145 MMHG

## 2025-04-03 DIAGNOSIS — F02.A0 MILD LATE ONSET ALZHEIMER'S DEMENTIA WITHOUT BEHAVIORAL DISTURBANCE, PSYCHOTIC DISTURBANCE, MOOD DISTURBANCE, OR ANXIETY (H): Primary | ICD-10-CM

## 2025-04-03 DIAGNOSIS — G30.1 MILD LATE ONSET ALZHEIMER'S DEMENTIA WITHOUT BEHAVIORAL DISTURBANCE, PSYCHOTIC DISTURBANCE, MOOD DISTURBANCE, OR ANXIETY (H): Primary | ICD-10-CM

## 2025-04-03 ASSESSMENT — MONTREAL COGNITIVE ASSESSMENT (MOCA)
10. [FLUENCY] NAME WORDS STARTING WITH DESIGNATED LETTER: 0
VISUOSPATIAL/EXECUTIVE SUBSCORE: 1
9. REPEAT EACH SENTENCE: 2
WHAT LEVEL OF EDUCATION WAS ATTAINED: 0
11. FOR EACH PAIR OF WORDS, WHAT CATEGORY DO THEY BELONG TO (OUT OF 2): 2
8. SERIAL SUBTRACTION OF 7S: 3
WHAT IS THE TOTAL SCORE (OUT OF 30): 15
12. MEMORY INDEX SCORE: 0
7. [VIGILENCE] TAP WHEN HEARING DESIGNATED LETTER: 1
4. NAME EACH OF THE THREE ANIMALS SHOWN: 2
6. READ LIST OF DIGITS [FORWARD/BACKWARD]: 2
13. ORIENTATION SUBSCORE: 2

## 2025-04-03 NOTE — NURSING NOTE
"Cecily Ivory is a 84 year old female who presents for:  Chief Complaint   Patient presents with    RECHECK     Daughter reports some decline since last visit        Initial Vitals:  BP (!) 145/64 (BP Location: Right arm, Patient Position: Sitting, Cuff Size: Adult Regular)   Pulse 61   SpO2 93%  Estimated body mass index is 25.48 kg/m  as calculated from the following:    Height as of 1/3/25: 1.702 m (5' 7\").    Weight as of 1/3/25: 73.8 kg (162 lb 11.2 oz).. There is no height or weight on file to calculate BSA. BP completed using cuff size: regular    Sebastian Gramajo    "

## 2025-04-03 NOTE — LETTER
4/3/2025      Cecily Ivory  [address]  [place] [address]      Dear Colleague,    Thank you for referring your patient, Cecily Ivory, to the [facility] NEUROLOGY [facility]. Please see a copy of my visit note below.    ESTABLISHED PATIENT NEUROLOGY NOTE    DATE OF VISIT: 4/3/2025  CLINIC LOCATION: [facility]  MRN: [id number]  PATIENT NAME: Cecily Ivory  YOB: 1940    REASON FOR VISIT:   Chief Complaint   Patient presents with     RECHECK     Daughter reports some decline since last visit     SUBJECTIVE:                                                      HISTORY OF PRESENT ILLNESS: Patient is here to follow up regarding Alzheimer's disease.  She was last seen on 2024.  Please refer to my initial/other prior notes for further information.  Patient is accompanied by her daughter today.    Since the last visit, the patient reports memory worsening.  Daughter agrees. More repetitions, cares increased. She denies interval development or new focal neurological symptoms.    CPT from 2024 was 4.75/5.6, consistent with mild to moderate cognitive functional disability that would require daily checks for safety.  Previous score from  was 5.0/5.6.  EXAM:                                                    Physical Exam:   Vitals: BP (!) 145/64 (BP Location: Right arm, Patient Position: Sitting, Cuff Size: Adult Regular)   Pulse 61   SpO2 93%   Ad Cognitive Assessment:    [place] Cognitive Assessment (MOCA)  Visuospatial/Executive : 1  Namin  Attention - Digits: 2  Attention - Letters: 1  Attention - Subtraction: 3  Language - Repeat: 2  Language - Fluency : 0  Abstraction: 2  Delayed Recall: 0  Orientation: 2  Education: 0  MOCA Score: 15  Administered by: : JOAO Kelsey - MoCA 8.3     [place] Cognitive Assessment Score:  MOCA Score: 15/30.   General: pt is in NAD, cooperative.  Skin: normal turgor, moist mucous  membranes, no lesions/rashes noticed.  HEENT: ATNC, white sclera, normal conjunctiva.  Respiratory: Symmetric lung excursion, no accessory respiratory muscle use.  Abdomen: Non distended.  Neurological: awake, cooperative, follows commands, no aphasia, face is symmetric, equally moves all extremities, no dysmetria bilaterally.  ASSESSMENT AND PLAN:                                                    Assessment: 84 year old female patient presents for follow-up of Alzheimer's disease.  She reports memory worsening.  Daughter agrees.  We reviewed the results of CPT testing, as detailed above.  MoCA today is 15/30 (stable), compared to previous range of 13-24/30.  Previously she was not able to tolerate donepezil.  Galantamine and rivastigmine might not be tolerated.  In the past, we also discussed Namenda.  Today, we reviewed again, including common side effects, but decided to hold off.  We will repeat cognitive test in 1 year or sooner if needed.    Cecily to follow up with Primary Care provider regarding elevated blood pressure.     Diagnoses:    ICD-10-CM    1. Mild late onset Alzheimer's dementia without behavioral disturbance, psychotic disturbance, mood disturbance, or anxiety (H)  G30.1     F02.A0         Plan: At today's visit we thoroughly discussed current symptoms, evaluation results, available treatment options, and the plan.    We decided to continue monitoring her cognitive status.  Will repeat cognitive test next time she comes.    We also discussed option of Namenda, and she/her daughter will further review the information about it at home.    Next follow-up appointment is in the next 1 year or earlier if needed.    Total Time: 21 minutes spent on the date of the encounter doing chart review, history and exam, documentation and further activities per the note.    Dionte Burnham MD  [facility] Neurology  (Chart documentation was completed in part with Dragon voice-recognition software.  Even though reviewed, some grammatical, spelling, and word errors may remain.)      Again, thank you for allowing me to participate in the care of your patient.        Sincerely,        Dionte Burnham MD    Electronically signed

## 2025-04-03 NOTE — PATIENT INSTRUCTIONS
AFTER VISIT SUMMARY (AVS):    At today's visit we thoroughly discussed current symptoms, evaluation results, available treatment options, and the plan.    We decided to continue monitoring your cognitive status.  Will repeat cognitive test next time you come.    We also discussed option of Namenda, and you could review the information about it at home.    Next follow-up appointment is in the next 1 year or earlier if needed.    Please do not hesitate to call me with any questions or concerns.    Thanks.

## 2025-04-03 NOTE — PROGRESS NOTES
ESTABLISHED PATIENT NEUROLOGY NOTE    DATE OF VISIT: 4/3/2025  CLINIC LOCATION: [facility]  MRN: [id number]  PATIENT NAME: Cecily Ivory  YOB: 1940    REASON FOR VISIT:   Chief Complaint   Patient presents with    RECHECK     Daughter reports some decline since last visit     SUBJECTIVE:                                                      HISTORY OF PRESENT ILLNESS: Patient is here to follow up regarding Alzheimer's disease.  She was last seen on 2024.  Please refer to my initial/other prior notes for further information.  Patient is accompanied by her daughter today.    Since the last visit, the patient reports memory worsening.  Daughter agrees. More repetitions, cares increased. She denies interval development or new focal neurological symptoms.    CPT from 2024 was 4.75/5.6, consistent with mild to moderate cognitive functional disability that would require daily checks for safety.  Previous score from  was 5.0/5.6.  EXAM:                                                    Physical Exam:   Vitals: BP (!) 145/64 (BP Location: Right arm, Patient Position: Sitting, Cuff Size: Adult Regular)   Pulse 61   SpO2 93%   [place] Cognitive Assessment:    Ad Cognitive Assessment (MOCA)  Visuospatial/Executive : 1  Namin  Attention - Digits: 2  Attention - Letters: 1  Attention - Subtraction: 3  Language - Repeat: 2  Language - Fluency : 0  Abstraction: 2  Delayed Recall: 0  Orientation: 2  Education: 0  MOCA Score: 15  Administered by: : JOAO Kelsey - MoCA 8.3     [place] Cognitive Assessment Score:  MOCA Score: 15/30.   General: pt is in NAD, cooperative.  Skin: normal turgor, moist mucous membranes, no lesions/rashes noticed.  HEENT: ATNC, white sclera, normal conjunctiva.  Respiratory: Symmetric lung excursion, no accessory respiratory muscle use.  Abdomen: Non distended.  Neurological: awake, cooperative, follows commands, no aphasia, face is  symmetric, equally moves all extremities, no dysmetria bilaterally.  ASSESSMENT AND PLAN:                                                    Assessment: 84 year old female patient presents for follow-up of Alzheimer's disease.  She reports memory worsening.  Daughter agrees.  We reviewed the results of CPT testing, as detailed above.  MoCA today is 15/30 (stable), compared to previous range of 13-24/30.  Previously she was not able to tolerate donepezil.  Galantamine and rivastigmine might not be tolerated.  In the past, we also discussed Namenda.  Today, we reviewed again, including common side effects, but decided to hold off.  We will repeat cognitive test in 1 year or sooner if needed.    Cecliy to follow up with Primary Care provider regarding elevated blood pressure.     Diagnoses:    ICD-10-CM    1. Mild late onset Alzheimer's dementia without behavioral disturbance, psychotic disturbance, mood disturbance, or anxiety (H)  G30.1     F02.A0         Plan: At today's visit we thoroughly discussed current symptoms, evaluation results, available treatment options, and the plan.    We decided to continue monitoring her cognitive status.  Will repeat cognitive test next time she comes.    We also discussed option of Namenda, and she/her daughter will further review the information about it at home.    Next follow-up appointment is in the next 1 year or earlier if needed.    Total Time: 21 minutes spent on the date of the encounter doing chart review, history and exam, documentation and further activities per the note.    Dionte Burnham MD  [facility] Neurology  (Chart documentation was completed in part with Dragon voice-recognition software. Even though reviewed, some grammatical, spelling, and word errors may remain.)

## 2025-05-19 ENCOUNTER — MYC MEDICAL ADVICE (OUTPATIENT)
Dept: FAMILY MEDICINE | Facility: CLINIC | Age: 85
End: 2025-05-19
Payer: COMMERCIAL

## 2025-05-19 NOTE — TELEPHONE ENCOUNTER
No ans daughter Silvia [phone],   left message to call clinic in morning to sched Virtual appt with Team.     [facility] currently has 2 virtual slots available 5-20-25

## 2025-05-19 NOTE — TELEPHONE ENCOUNTER
In order to do the polst will need virtual of office visit so probably best to do team for this and to do the form    Ellis Ruiz M.D.

## 2025-05-21 ENCOUNTER — VIRTUAL VISIT (OUTPATIENT)
Dept: FAMILY MEDICINE | Facility: CLINIC | Age: 85
End: 2025-05-21
Payer: COMMERCIAL

## 2025-05-21 ENCOUNTER — MEDICAL CORRESPONDENCE (OUTPATIENT)
Dept: HEALTH INFORMATION MANAGEMENT | Facility: CLINIC | Age: 85
End: 2025-05-21

## 2025-05-21 DIAGNOSIS — E78.5 HYPERLIPIDEMIA LDL GOAL <130: ICD-10-CM

## 2025-05-21 DIAGNOSIS — Z02.89 ENCOUNTER FOR COMPLETION OF FORM WITH PATIENT: ICD-10-CM

## 2025-05-21 PROCEDURE — 98005 SYNCH AUDIO-VIDEO EST LOW 20: CPT | Performed by: PHYSICIAN ASSISTANT

## 2025-05-21 RX ORDER — LOVASTATIN 40 MG/1
40 TABLET ORAL DAILY
Status: SHIPPED
Start: 2025-05-21

## 2025-05-22 NOTE — PLAN OF CARE
"Problem: Patient Care Overview  Goal: Plan of Care/Patient Progress Review  Outcome: No Change  /52 (BP Location: Left arm)  Temp 96.8  F (36  C) (Oral)  Resp 16  Ht 1.689 m (5' 6.5\")  Wt 67.1 kg (148 lb)  SpO2 94%  BMI 23.53 kg/m2  \: A&O x4, CMS intact, on capnography at RA, denies SOB. Saenz cath patent, hypoactive BS, not passing flatus, LBM 5/14/18 in AM.  Incision site dressing CDI. Pt has not been up to dangle yet. Does not have knee immobilizer. PT will see her today to determine. Pt had N/V last night during assessment. Started having slight pain around a 5/10. At 0030 gave her prn Dilaudid and Zofran which both were effective. Pt had scheduled Toradol at 0230. Has IVF @ 75 ml/hr, schedule IV abx Ancef.  Lethargic during shift but easily aroused; will continue to monitor. PT/OT to assess in AM, continue POC             " [Joint Pain] : joint pain [Joint Swelling] : joint swelling [Negative] : Heme/Lymph

## 2025-06-14 ENCOUNTER — HEALTH MAINTENANCE LETTER (OUTPATIENT)
Age: 85
End: 2025-06-14

## 2025-06-24 ENCOUNTER — TELEPHONE (OUTPATIENT)
Dept: FAMILY MEDICINE | Facility: CLINIC | Age: 85
End: 2025-06-24
Payer: COMMERCIAL

## 2025-06-24 NOTE — TELEPHONE ENCOUNTER
Team-Please send signed medication list to [facility]. [phone] attn: Jennifer, Director of Nursing.    Thank you-    Gina Issa RN

## 2025-06-24 NOTE — TELEPHONE ENCOUNTER
Signed med list faxed to [facility].  Justina Day, CMA

## 2025-07-07 ENCOUNTER — DOCUMENTATION ONLY (OUTPATIENT)
Dept: OTHER | Facility: CLINIC | Age: 85
End: 2025-07-07
Payer: COMMERCIAL

## 2025-07-14 ENCOUNTER — TELEPHONE (OUTPATIENT)
Dept: FAMILY MEDICINE | Facility: CLINIC | Age: 85
End: 2025-07-14
Payer: COMMERCIAL

## 2025-07-14 NOTE — TELEPHONE ENCOUNTER
"Faxed \"signed\" pt/ot orders to [facility] @ [phone]    Confirmation received  "

## 2025-07-15 DIAGNOSIS — J30.2 SEASONAL ALLERGIC RHINITIS, UNSPECIFIED TRIGGER: ICD-10-CM

## 2025-07-15 RX ORDER — CETIRIZINE HYDROCHLORIDE 10 MG/1
10 TABLET ORAL DAILY
Qty: 30 TABLET | Refills: 11 | OUTPATIENT
Start: 2025-07-15

## 2025-07-16 DIAGNOSIS — J30.2 SEASONAL ALLERGIC RHINITIS, UNSPECIFIED TRIGGER: ICD-10-CM

## 2025-07-16 RX ORDER — CETIRIZINE HYDROCHLORIDE 10 MG/1
10 TABLET ORAL DAILY
Qty: 30 TABLET | Refills: 11 | Status: SHIPPED | OUTPATIENT
Start: 2025-07-16

## 2025-07-16 NOTE — TELEPHONE ENCOUNTER
Medication passed protocol, however, refill RN could not approve because this is an early refill request with pharmacy change. Provider, please approve or deny the prescription.    Nerissa Caldera RN on 7/16/2025 at 10:33 AM

## 2025-08-08 ENCOUNTER — TELEPHONE (OUTPATIENT)
Dept: FAMILY MEDICINE | Facility: CLINIC | Age: 85
End: 2025-08-08
Payer: COMMERCIAL

## 2025-08-27 ENCOUNTER — NURSE TRIAGE (OUTPATIENT)
Dept: FAMILY MEDICINE | Facility: CLINIC | Age: 85
End: 2025-08-27
Payer: COMMERCIAL

## 2025-08-27 DIAGNOSIS — R21 RASH: Primary | ICD-10-CM

## 2025-08-27 RX ORDER — CLOTRIMAZOLE 1 %
CREAM (GRAM) TOPICAL 2 TIMES DAILY
Qty: 12 G | Refills: 0 | Status: SHIPPED | OUTPATIENT
Start: 2025-08-27

## (undated) DEVICE — DRSG TEGADERM 4X10" 1627

## (undated) DEVICE — DRSG AQUACEL AG 3.5X9.75" HYDROFIBER 412011

## (undated) DEVICE — SUCTION IRR SYSTEM W/O TIP INTERPULSE HANDPIECE 0210-100-000

## (undated) DEVICE — SU VICRYL 0 CT-1 27" J340H

## (undated) DEVICE — CATH DIAG 4FR ANG PIG 538453S

## (undated) DEVICE — PREP CHLORAPREP 26ML TINTED ORANGE  260815

## (undated) DEVICE — GLOVE PROTEXIS BLUE W/NEU-THERA 7.5  2D73EB75

## (undated) DEVICE — SYR 05ML LL W/O NDL

## (undated) DEVICE — CAST PADDING 6" STERILE 9046S

## (undated) DEVICE — SET HANDPIECE INTERPULSE W/COAXIAL FAN SPRAY TIP 0210118000

## (undated) DEVICE — BONE CEMENT MIXEVAC III HI VAC KIT  0206-015-000

## (undated) DEVICE — BAG CLEAR TRASH 1.3M 39X33" P4040C

## (undated) DEVICE — CATH DIAG 4FR JL 4.5 538417

## (undated) DEVICE — TOURNIQUET CUFF 30" REPRO BLUE 60-7070-105

## (undated) DEVICE — SOL WATER IRRIG 1000ML BOTTLE 2F7114

## (undated) DEVICE — CATH ANGIO INFINITI JR4 4FRX100CM 538421

## (undated) DEVICE — SU VICRYL 2-0 CT-1 27" UND J259H

## (undated) DEVICE — KIT HAND CONTROL ANGIOTOUCH ACIST 65CM AT-P65

## (undated) DEVICE — GLOVE PROTEXIS POWDER FREE 7.5 ORTHOPEDIC 2D73ET75

## (undated) DEVICE — MANIFOLD NEPTUNE 4 PORT 700-20

## (undated) DEVICE — LINEN TOWEL PACK X5 5464

## (undated) DEVICE — BLADE SAW SAGITTAL STRK 18X90X1.27MM HD SYS 6 6118-127-090

## (undated) DEVICE — SYR 50ML LL W/O NDL 309653

## (undated) DEVICE — WIRE GUIDE 0.035"X260CM SAFE-T-J EXCHANGE G00517

## (undated) DEVICE — SU STRATAFIX 2-0 MH 36" SXPD2B412

## (undated) DEVICE — SLEEVE TR BAND RADIAL COMPRESSION DEVICE 24CM TRB24-REG

## (undated) DEVICE — SU ETHIBOND 1 CT-1 30" X425H

## (undated) DEVICE — SU VICRYL 1 CT-1 27" J341H

## (undated) DEVICE — NDL SPINAL 18GA 3.5" 405184

## (undated) DEVICE — DRAPE SHEET REV FOLD 3/4 9349

## (undated) DEVICE — TOTE ANGIO CORP PC15AT SAN32CC83O

## (undated) DEVICE — GLOVE PROTEXIS POWDER FREE 8.0 ORTHOPEDIC 2D73ET80

## (undated) DEVICE — LINEN FULL SHEET 5511

## (undated) DEVICE — NDL 19GA 1.5"

## (undated) DEVICE — GLOVE PROTEXIS BLUE W/NEU-THERA 8.0  2D73EB80

## (undated) DEVICE — PACK TOTAL KNEE BOXED LATEX FREE PO15TKFCT

## (undated) DEVICE — SU PDO 1 STRATAFIX 36X36CM CTX TAPERPOINT SXPD2B405

## (undated) DEVICE — LINEN HALF SHEET 5512

## (undated) DEVICE — DRSG GAUZE 4X4" 3033

## (undated) DEVICE — LINEN ORTHO ACL PACK 5447

## (undated) DEVICE — BONE CLEANING TIP INTERPULSE  0210-010-000

## (undated) DEVICE — NDL BLUNT 18GA 1" W/O FILTER 305181

## (undated) DEVICE — DEFIB PRO-PADZ LVP LQD GEL ADULT 8900-2105-01

## (undated) DEVICE — Device

## (undated) DEVICE — MANIFOLD KIT ANGIO AUTOMATED 014613

## (undated) DEVICE — BLADE SAW SAGITTAL STRK 25X100X1.27MM HD SYS 6 6125-127-100

## (undated) DEVICE — CLOSURE SYS SKIN PREMIERPRO EXOFIN FUSION 4X22CM STRL 3472

## (undated) DEVICE — SUCTION MANIFOLD NEPTUNE 2 SYS 4 PORT 0702-020-000

## (undated) DEVICE — HOOD FLYTE W/PEELAWAY 408-800-100

## (undated) DEVICE — EYE PREP BETADINE 5% SOLUTION 30ML 0065-0411-30

## (undated) DEVICE — INTRO GLIDESHEATH SLENDER 6FR 10X45CM 60-1060

## (undated) DEVICE — PACK TOTAL KNEE SOP15TKFSD

## (undated) RX ORDER — KETOROLAC TROMETHAMINE 30 MG/ML
INJECTION, SOLUTION INTRAMUSCULAR; INTRAVENOUS
Status: DISPENSED
Start: 2021-12-09

## (undated) RX ORDER — TRANEXAMIC ACID 650 MG/1
TABLET ORAL
Status: DISPENSED
Start: 2021-12-09

## (undated) RX ORDER — HYDROMORPHONE HYDROCHLORIDE 1 MG/ML
INJECTION, SOLUTION INTRAMUSCULAR; INTRAVENOUS; SUBCUTANEOUS
Status: DISPENSED
Start: 2018-05-14

## (undated) RX ORDER — FENTANYL CITRATE 50 UG/ML
INJECTION, SOLUTION INTRAMUSCULAR; INTRAVENOUS
Status: DISPENSED
Start: 2018-05-14

## (undated) RX ORDER — FENTANYL CITRATE 0.05 MG/ML
INJECTION, SOLUTION INTRAMUSCULAR; INTRAVENOUS
Status: DISPENSED
Start: 2021-12-09

## (undated) RX ORDER — FENTANYL CITRATE 50 UG/ML
INJECTION, SOLUTION INTRAMUSCULAR; INTRAVENOUS
Status: DISPENSED
Start: 2020-01-07

## (undated) RX ORDER — MAGNESIUM SULFATE HEPTAHYDRATE 40 MG/ML
INJECTION, SOLUTION INTRAVENOUS
Status: DISPENSED
Start: 2021-12-09

## (undated) RX ORDER — NITROGLYCERIN 5 MG/ML
VIAL (ML) INTRAVENOUS
Status: DISPENSED
Start: 2020-01-07

## (undated) RX ORDER — DEXAMETHASONE SODIUM PHOSPHATE 10 MG/ML
INJECTION, SOLUTION INTRAMUSCULAR; INTRAVENOUS
Status: DISPENSED
Start: 2018-05-14

## (undated) RX ORDER — HYDROMORPHONE HCL IN WATER/PF 6 MG/30 ML
PATIENT CONTROLLED ANALGESIA SYRINGE INTRAVENOUS
Status: DISPENSED
Start: 2021-12-09

## (undated) RX ORDER — REGADENOSON 0.08 MG/ML
INJECTION, SOLUTION INTRAVENOUS
Status: DISPENSED
Start: 2023-03-08

## (undated) RX ORDER — FENTANYL CITRATE 50 UG/ML
INJECTION, SOLUTION INTRAMUSCULAR; INTRAVENOUS
Status: DISPENSED
Start: 2021-12-09

## (undated) RX ORDER — PROPOFOL 10 MG/ML
INJECTION, EMULSION INTRAVENOUS
Status: DISPENSED
Start: 2018-05-14

## (undated) RX ORDER — VERAPAMIL HYDROCHLORIDE 2.5 MG/ML
INJECTION, SOLUTION INTRAVENOUS
Status: DISPENSED
Start: 2020-01-07

## (undated) RX ORDER — GLYCOPYRROLATE 0.2 MG/ML
INJECTION, SOLUTION INTRAMUSCULAR; INTRAVENOUS
Status: DISPENSED
Start: 2021-12-09

## (undated) RX ORDER — ONDANSETRON 2 MG/ML
INJECTION INTRAMUSCULAR; INTRAVENOUS
Status: DISPENSED
Start: 2021-12-09

## (undated) RX ORDER — LIDOCAINE HYDROCHLORIDE 10 MG/ML
INJECTION, SOLUTION EPIDURAL; INFILTRATION; INTRACAUDAL; PERINEURAL
Status: DISPENSED
Start: 2020-01-07

## (undated) RX ORDER — ASPIRIN 81 MG/1
TABLET, CHEWABLE ORAL
Status: DISPENSED
Start: 2020-01-07

## (undated) RX ORDER — ACETAMINOPHEN 500 MG
TABLET ORAL
Status: DISPENSED
Start: 2018-05-14

## (undated) RX ORDER — CEFAZOLIN SODIUM 2 G/100ML
INJECTION, SOLUTION INTRAVENOUS
Status: DISPENSED
Start: 2018-05-14

## (undated) RX ORDER — CEFAZOLIN SODIUM 2 G/100ML
INJECTION, SOLUTION INTRAVENOUS
Status: DISPENSED
Start: 2021-12-09

## (undated) RX ORDER — DEXAMETHASONE SODIUM PHOSPHATE 4 MG/ML
INJECTION, SOLUTION INTRA-ARTICULAR; INTRALESIONAL; INTRAMUSCULAR; INTRAVENOUS; SOFT TISSUE
Status: DISPENSED
Start: 2021-12-09

## (undated) RX ORDER — LIDOCAINE HYDROCHLORIDE 10 MG/ML
INJECTION, SOLUTION EPIDURAL; INFILTRATION; INTRACAUDAL; PERINEURAL
Status: DISPENSED
Start: 2018-05-14

## (undated) RX ORDER — VANCOMYCIN HYDROCHLORIDE 1 G/20ML
INJECTION, POWDER, LYOPHILIZED, FOR SOLUTION INTRAVENOUS
Status: DISPENSED
Start: 2021-12-09

## (undated) RX ORDER — HEPARIN SODIUM 1000 [USP'U]/ML
INJECTION, SOLUTION INTRAVENOUS; SUBCUTANEOUS
Status: DISPENSED
Start: 2020-01-07

## (undated) RX ORDER — LIDOCAINE HYDROCHLORIDE 20 MG/ML
INJECTION, SOLUTION EPIDURAL; INFILTRATION; INTRACAUDAL; PERINEURAL
Status: DISPENSED
Start: 2021-12-09

## (undated) RX ORDER — HEPARIN SODIUM 200 [USP'U]/100ML
INJECTION, SOLUTION INTRAVENOUS
Status: DISPENSED
Start: 2020-01-07